# Patient Record
Sex: MALE | Race: WHITE | NOT HISPANIC OR LATINO | Employment: OTHER | URBAN - NONMETROPOLITAN AREA
[De-identification: names, ages, dates, MRNs, and addresses within clinical notes are randomized per-mention and may not be internally consistent; named-entity substitution may affect disease eponyms.]

---

## 2023-10-06 DIAGNOSIS — R22.31 AXILLARY MASS, RIGHT: ICD-10-CM

## 2023-10-09 DIAGNOSIS — R22.31 MASS OF RIGHT AXILLA: ICD-10-CM

## 2023-10-14 RX ORDER — LISINOPRIL 5 MG/1
1 TABLET ORAL DAILY
COMMUNITY
End: 2023-11-12 | Stop reason: WASHOUT

## 2023-10-14 RX ORDER — LOSARTAN POTASSIUM 100 MG/1
100 TABLET ORAL DAILY
COMMUNITY
Start: 2023-09-11 | End: 2023-12-15 | Stop reason: HOSPADM

## 2023-10-14 RX ORDER — NAPROXEN SODIUM 220 MG/1
81 TABLET, FILM COATED ORAL DAILY
COMMUNITY
Start: 2023-09-19 | End: 2023-12-15 | Stop reason: HOSPADM

## 2023-10-14 RX ORDER — OMEPRAZOLE 20 MG/1
20 CAPSULE, DELAYED RELEASE ORAL DAILY
COMMUNITY
Start: 2023-09-11 | End: 2023-12-15 | Stop reason: HOSPADM

## 2023-10-14 RX ORDER — CHLORTHALIDONE 25 MG/1
1 TABLET ORAL DAILY
COMMUNITY
Start: 2023-09-26 | End: 2023-12-15 | Stop reason: HOSPADM

## 2023-10-14 RX ORDER — POTASSIUM CHLORIDE 750 MG/1
10 TABLET, EXTENDED RELEASE ORAL 2 TIMES DAILY
COMMUNITY
Start: 2023-10-09 | End: 2023-12-15 | Stop reason: HOSPADM

## 2023-10-14 RX ORDER — ATORVASTATIN CALCIUM 80 MG/1
20 TABLET, FILM COATED ORAL DAILY
COMMUNITY
Start: 2023-09-19 | End: 2023-12-15 | Stop reason: HOSPADM

## 2023-10-14 RX ORDER — FOLIC ACID 1 MG/1
TABLET ORAL
COMMUNITY
Start: 2023-09-19 | End: 2023-11-12 | Stop reason: WASHOUT

## 2023-10-14 RX ORDER — POTASSIUM CHLORIDE 20 MEQ/1
TABLET, EXTENDED RELEASE ORAL
COMMUNITY
Start: 2023-09-19 | End: 2023-10-16 | Stop reason: SDUPTHER

## 2023-10-14 RX ORDER — CLOPIDOGREL BISULFATE 75 MG/1
1 TABLET ORAL DAILY
COMMUNITY
Start: 2023-09-19 | End: 2023-11-22 | Stop reason: ALTCHOICE

## 2023-10-14 RX ORDER — AMLODIPINE BESYLATE 10 MG/1
1 TABLET ORAL DAILY
COMMUNITY
Start: 2023-09-26 | End: 2023-12-15 | Stop reason: HOSPADM

## 2023-10-16 ENCOUNTER — OFFICE VISIT (OUTPATIENT)
Dept: CARDIOLOGY | Facility: CLINIC | Age: 68
End: 2023-10-16
Payer: MEDICARE

## 2023-10-16 VITALS
BODY MASS INDEX: 30.01 KG/M2 | HEART RATE: 84 BPM | OXYGEN SATURATION: 97 % | DIASTOLIC BLOOD PRESSURE: 62 MMHG | SYSTOLIC BLOOD PRESSURE: 130 MMHG | HEIGHT: 68 IN | WEIGHT: 198 LBS

## 2023-10-16 DIAGNOSIS — I70.90 ATHEROSCLEROSIS: Primary | ICD-10-CM

## 2023-10-16 PROCEDURE — 99205 OFFICE O/P NEW HI 60 MIN: CPT | Performed by: STUDENT IN AN ORGANIZED HEALTH CARE EDUCATION/TRAINING PROGRAM

## 2023-10-16 PROCEDURE — 3008F BODY MASS INDEX DOCD: CPT | Performed by: STUDENT IN AN ORGANIZED HEALTH CARE EDUCATION/TRAINING PROGRAM

## 2023-10-16 PROCEDURE — 1160F RVW MEDS BY RX/DR IN RCRD: CPT | Performed by: STUDENT IN AN ORGANIZED HEALTH CARE EDUCATION/TRAINING PROGRAM

## 2023-10-16 PROCEDURE — 1036F TOBACCO NON-USER: CPT | Performed by: STUDENT IN AN ORGANIZED HEALTH CARE EDUCATION/TRAINING PROGRAM

## 2023-10-16 PROCEDURE — 1159F MED LIST DOCD IN RCRD: CPT | Performed by: STUDENT IN AN ORGANIZED HEALTH CARE EDUCATION/TRAINING PROGRAM

## 2023-10-16 RX ORDER — AMOXICILLIN AND CLAVULANATE POTASSIUM 875; 125 MG/1; MG/1
875 TABLET, FILM COATED ORAL 2 TIMES DAILY
COMMUNITY
End: 2023-11-12 | Stop reason: WASHOUT

## 2023-10-16 RX ORDER — LANOLIN ALCOHOL/MO/W.PET/CERES
100 CREAM (GRAM) TOPICAL DAILY
COMMUNITY
End: 2023-12-15 | Stop reason: HOSPADM

## 2023-10-16 NOTE — PROGRESS NOTES
No chief complaint on file.    HPI:  I was requested to evaluate this patient in consultation for cardiac assessment.    Mike Olson is a 68 y.o. year old former smoker male patient with past medical history significant for atherosclerosis, hypertension, hyperlipidemia, mild stroke (Sept/2023), carotid stenosis, coming for cardiovascular assessment. Patient reports and episode of mild stroke on September/2023, with no sequels afterwards. He presented to emergency department after witnessed syncope while at a concert.  Patient was found to have hypotension along with hypokalemia and hyponatremia.  Patient likely was dehydrated and had hypokalemia from his diuretic therapy.  Patient was admitted and given IV fluid after which his electrolytes improved along with Potassium repletion.  Patient was also evaluated for an MRI and MRA.  MRI came back positive for acute and subacute infarct.  Patient has 0 deficits and has NIH of 0.  Carotid duplex was done showing right-sided velocity of ICA greater than 50% and left side was below 50%.    He states that he is currently asymptomatic. He denies chest pain, shortness of breath, palpitations, leg edema, lightheadedness, headaches, fever, chills, orthopnea, paroxysmal nocturnal dyspnea or syncope.  The EKG shows normal sinus rhythm with no signs of ACS.  The echocardiogram showed normal LVEF 55% with no wall motion abnormalities.  His holter monitor showed the predominant rhythm was sinus bradycardia to sinus tachycardia. *The Maximum Heart Rate recorded was 126 bpm, 09/25 12:19:31, the Minimum Heart Rate recorded was 49 bpm, 09/30 02:54:30, and the Average Heart Rate was 76 bpm. *There were 106 VE beats with a burden of <1 %. There was 1 occurrence of Ventricular Tachycardia with the Longest episode 3 beats, 09/20 18:57:19, and the Fastest episode 116 bpm, 09/20 18:57:19. *There were 1,611 SVE beats with a burden of <1 %. There were 12 occurrences of Supraventricular  "Tachycardia with the Longest episode 16 beats, 09/25 20:48:54, and the Fastest episode 126 bpm, 09/25 12:19:30. *There were 0 Patient Triggers.  US duplex carotids with calcified plaques 50% or less bilaterally.    Past Medical History  History reviewed. No pertinent past medical history.    Past Surgical History  Past Surgical History:   Procedure Laterality Date    MR HEAD ANGIO WO IV CONTRAST  9/18/2023    MR HEAD ANGIO WO IV CONTRAST 9/18/2023 [facility] MRI       Past Family History  Family History   Problem Relation Name Age of Onset    Hypertension Mother      Prostate cancer Father      Stroke Maternal Grandfather         Allergy History  No Known Allergies    Past Social History  Social History     Socioeconomic History    Marital status:      Spouse name: None    Number of children: None    Years of education: None    Highest education level: None   Occupational History    None   Tobacco Use    Smoking status: Former     Types: Cigarettes    Smokeless tobacco: Never   Substance and Sexual Activity    Alcohol use: Not Currently    Drug use: Yes     Types: Marijuana    Sexual activity: None   Other Topics Concern    None   Social History Narrative    None     Social Determinants of Health     Financial Resource Strain: Not on file   Food Insecurity: Not on file   Transportation Needs: Not on file   Physical Activity: Not on file   Stress: Not on file   Social Connections: Not on file   Intimate Partner Violence: Not on file   Housing Stability: Not on file       Social History     Tobacco Use   Smoking Status Former    Types: Cigarettes   Smokeless Tobacco Never       Review of Systems:  ROS     Objective Data:  Last Recorded Vitals:  Vitals:    10/16/23 1333   BP: 130/62   Pulse: 84   SpO2: 97%   Weight: 89.8 kg (198 lb)   Height: 1.727 m (5' 8\")       Last Labs:  CBC - 9/18/2023:  4:27 AM  8.1 13.8 295    40.9      CMP - 9/19/2023:  5:05 AM  8.6 6.3 10 --- 0.5   2.5 3.6 10 58      PTT - No results in last " year.  _   _ _     TROPHS   Date/Time Value Ref Range Status   09/19/2023 05:05 AM 5 0 - 20 ng/L Final     Comment:     .  Less than 99th percentile of normal range cutoff-  Female and children under 18 years old <14 ng/L; Male <21 ng/L: Negative  Repeat testing should be performed if clinically indicated.   .  Female and children under 18 years old 14-50 ng/L; Male 21-50 ng/L:  Consistent with possible cardiac damage and possible increased clinical   risk. Serial measurements may help to assess extent of myocardial damage.   .  >50 ng/L: Consistent with cardiac damage, increased clinical risk and  myocardial infarction. Serial measurements may help assess extent of   myocardial damage.   .   NOTE: Children less than 1 year old may have higher baseline troponin   levels and results should be interpreted in conjunction with the overall   clinical context.   .  NOTE: Troponin I testing is performed using a different   testing methodology at [facility] than at other   [facility]. Direct result comparisons should only   be made within the same method.     09/16/2023 10:06 PM 4 0 - 20 ng/L Final     Comment:     .  Less than 99th percentile of normal range cutoff-  Female and children under 18 years old <14 ng/L; Male <21 ng/L: Negative  Repeat testing should be performed if clinically indicated.   .  Female and children under 18 years old 14-50 ng/L; Male 21-50 ng/L:  Consistent with possible cardiac damage and possible increased clinical   risk. Serial measurements may help to assess extent of myocardial damage.   .  >50 ng/L: Consistent with cardiac damage, increased clinical risk and  myocardial infarction. Serial measurements may help assess extent of   myocardial damage.   .   NOTE: Children less than 1 year old may have higher baseline troponin   levels and results should be interpreted in conjunction with the overall   clinical context.   .  NOTE: Troponin I testing is performed using a  different   testing methodology at [facility] than at other   [facility]. Direct result comparisons should only   be made within the same method.     09/16/2023 08:59 PM 4 0 - 20 ng/L Final     Comment:     .  Less than 99th percentile of normal range cutoff-  Female and children under 18 years old <14 ng/L; Male <21 ng/L: Negative  Repeat testing should be performed if clinically indicated.   .  Female and children under 18 years old 14-50 ng/L; Male 21-50 ng/L:  Consistent with possible cardiac damage and possible increased clinical   risk. Serial measurements may help to assess extent of myocardial damage.   .  >50 ng/L: Consistent with cardiac damage, increased clinical risk and  myocardial infarction. Serial measurements may help assess extent of   myocardial damage.   .   NOTE: Children less than 1 year old may have higher baseline troponin   levels and results should be interpreted in conjunction with the overall   clinical context.   .  NOTE: Troponin I testing is performed using a different   testing methodology at [facility] than at other   [facility]. Direct result comparisons should only   be made within the same method.       BNP   Date/Time Value Ref Range Status   09/19/2023 05:05 AM 92 0 - 99 pg/mL Final     Comment:     .  <100 pg/mL - Heart failure unlikely  100-299 pg/mL - Intermediate probability of acute heart  .               failure exacerbation. Correlate with clinical  .               context and patient history.    >=300 pg/mL - Heart Failure likely. Correlate with clinical  .               context and patient history.  BNP testing is performed using different testing   methodology at [facility] than at other   [facility]. Direct result comparisons should   only be made within the same method.     09/16/2023 08:59 PM 19 0 - 99 pg/mL Final     Comment:     .  <100 pg/mL - Heart failure unlikely  100-299 pg/mL - Intermediate  probability of acute heart  .               failure exacerbation. Correlate with clinical  .               context and patient history.    >=300 pg/mL - Heart Failure likely. Correlate with clinical  .               context and patient history.  BNP testing is performed using different testing   methodology at [facility] than at other   [facility] hospitals. Direct result comparisons should   only be made within the same method.       HGBA1C   Date/Time Value Ref Range Status   09/19/2023 05:05 AM 5.7 % Final     Comment:          Diagnosis of Diabetes-Adults   Non-Diabetic: < or = 5.6%   Increased risk for developing diabetes: 5.7-6.4%   Diagnostic of diabetes: > or = 6.5%  .       Monitoring of Diabetes                Age (y)     Therapeutic Goal (%)   Adults:          >18           <7.0   Pediatrics:    13-18           <7.5                   7-12           <8.0                   0- 6            7.5-8.5   American Diabetes Association. Diabetes Care 33(S1), Jan 2010.     09/11/2023 08:00 AM 5.5 4.3 - 5.6 % Final     Comment:     American Diabetes Association guidelines indicate that patients with HgbA1c in the range 5.7-6.4% are at increased risk for development of diabetes, and intervention by lifestyle modification may be beneficial. HgbA1c greater or equal to 6.5% is considered diagnostic of diabetes.   03/15/2023 08:00 AM 5.8 4.3 - 5.6 % Final     Comment:     American Diabetes Association guidelines indicate that patients with HgbA1c in the range 5.7-6.4% are at increased risk for development of diabetes, and intervention by lifestyle modification may be beneficial. HgbA1c greater or equal to 6.5% is considered diagnostic of diabetes.     VLDL   Date/Time Value Ref Range Status   09/19/2023 05:05 AM 29 0 - 40 mg/dL Final        Patient Medications:  Outpatient Encounter Medications as of 10/16/2023   Medication Sig Dispense Refill    amLODIPine (Norvasc) 10 mg tablet       amoxicillin-pot  clavulanate (Augmentin) 875-125 mg tablet Take 1 tablet (875 mg) by mouth 2 times a day.      aspirin 81 mg chewable tablet       atorvastatin (Lipitor) 80 mg tablet       chlorthalidone (Hygroton) 25 mg tablet       clopidogrel (Plavix) 75 mg tablet       folic acid (Folvite) 1 mg tablet       lisinopril 5 mg tablet Take 1 tablet (5 mg) by mouth once daily.      losartan (Cozaar) 100 mg tablet       omeprazole (PriLOSEC) 20 mg DR capsule       potassium chloride CR 10 mEq ER tablet       thiamine 100 mg tablet Take 1 tablet (100 mg) by mouth once daily.      docosahexaenoic acid/epa (FISH OIL ORAL) Fish Oil   Quantity: 0   Refills: 0   Ordered: 21-Feb-2022  Rose Colvin Generic Substitution Allowed      [DISCONTINUED] potassium chloride CR 20 mEq ER tablet        No facility-administered encounter medications on file as of 10/16/2023.       Physical Exam:  General: alert, oriented and in no acute distress  HEENT: NC/AT; EOMI; PERRLA, external ear is normal  Neck: supple; trachea midline; no masses; no JVD  Chest: lungs clear to auscultation bilaterally; no wheezing  CVS: regular rhythm, S1S2 normal, no murmurs  Abdomen: Soft, non-tender, non-distension, no organomegaly  Extremities: no clubbing/cyanosis/edema  Neuro: Grossly intact     Psychiatric: Normal mood and affect      Past Cardiology Results (Last 3 Years):  I have personally reviewed the test results and my impressions are:  - The EKG shows normal sinus rhythm with no signs of ACS.  - The echocardiogram showed normal LVEF 55% with no wall motion abnormalities.  - His holter monitor showed the predominant rhythm was sinus bradycardia to sinus tachycardia. *The Maximum Heart Rate recorded was 126 bpm, 09/25 12:19:31, the Minimum Heart Rate recorded was 49 bpm, 09/30 02:54:30, and the Average Heart Rate was 76 bpm. *There were 106 VE beats with a burden of <1 %. There was 1 occurrence of Ventricular Tachycardia with the Longest episode 3 beats, 09/20  18:57:19, and the Fastest episode 116 bpm, 09/20 18:57:19. *There were 1,611 SVE beats with a burden of <1 %. There were 12 occurrences of Supraventricular Tachycardia with the Longest episode 16 beats, 09/25 20:48:54, and the Fastest episode 126 bpm, 09/25 12:19:30. *There were 0 Patient Triggers.       Assessment/Plan   Mike Olson is a 68 y.o. year old former smoker male patient with past medical history significant for atherosclerosis, hypertension, hyperlipidemia, mild stroke (Sept/2023), carotid stenosis, coming for cardiovascular assessment. Patient reports and episode of mild stroke on September/2023, with no sequels afterwards. He states that he is currently asymptomatic.      Assessment    # Atherosclerosis / Carotid stenosis  - Patient is currently asymptomatic.  - The EKG shows normal sinus rhythm with no signs of ACS.  - The echocardiogram showed normal LVEF 55% with no wall motion abnormalities.  - His holter monitor showed the predominant rhythm was sinus bradycardia to sinus tachycardia. *The Maximum Heart Rate recorded was 126 bpm, 09/25 12:19:31, the Minimum Heart Rate recorded was 49 bpm, 09/30 02:54:30, and the Average Heart Rate was 76 bpm. *There were 106 VE beats with a burden of <1 %. There was 1 occurrence of Ventricular Tachycardia with the Longest episode 3 beats, 09/20 18:57:19, and the Fastest episode 116 bpm, 09/20 18:57:19. *There were 1,611 SVE beats with a burden of <1 %. There were 12 occurrences of Supraventricular Tachycardia with the Longest episode 16 beats, 09/25 20:48:54, and the Fastest episode 126 bpm, 09/25 12:19:30. *There were 0 Patient Triggers.  - Keep Clopidogrel, statins.  - Will request stress test, CT calcium scoring.  - Follow up in 3 months.  - Discuss initiation of Metoprolol upon return.    # Carotid stenosis  - US duplex carotids with calcified plaques 50% or less bilaterally.  - Keep home medication.    # Hypertension  - Controlled blood pressure - today  130/62mmHg.  - Keep home medication including Losartan 100mg daily, chlortalidone 25mg daily, amlopidine 10mg daily.  - Patient counseled to keep a healthy lifestyle including low-sodium diet.    # Hyperlipidemia  - Controlled by PCP  - Keep home medication with statins    Thank you for allowing me to participate in the care of this patient. Please do not hesitate to contact me with any further questions or concerns.    I spent 65 minutes in the professional and overall care of this patient.    Rob Phipps MD  Cardiology

## 2023-10-19 ENCOUNTER — HOSPITAL ENCOUNTER (OUTPATIENT)
Dept: RADIOLOGY | Facility: HOSPITAL | Age: 68
Discharge: HOME | End: 2023-10-19
Payer: MEDICARE

## 2023-10-19 DIAGNOSIS — R22.31 MASS OF RIGHT AXILLA: ICD-10-CM

## 2023-10-19 PROCEDURE — 88341 IMHCHEM/IMCYTCHM EA ADD ANTB: CPT | Mod: TC,CMCLAB,SAMLAB | Performed by: SURGERY

## 2023-10-19 PROCEDURE — 88184 FLOWCYTOMETRY/ TC 1 MARKER: CPT

## 2023-10-19 PROCEDURE — 38505 NEEDLE BIOPSY LYMPH NODES: CPT

## 2023-10-19 PROCEDURE — 76942 ECHO GUIDE FOR BIOPSY: CPT | Performed by: RADIOLOGY

## 2023-10-19 PROCEDURE — 88341 IMHCHEM/IMCYTCHM EA ADD ANTB: CPT | Mod: TC,SUR,CMCLAB,SAMLAB | Performed by: SURGERY

## 2023-10-19 PROCEDURE — 20206 BIOPSY MUSCLE PERQ NEEDLE: CPT | Performed by: RADIOLOGY

## 2023-10-19 PROCEDURE — 88185 FLOWCYTOMETRY/TC ADD-ON: CPT

## 2023-10-19 PROCEDURE — 2720000007 HC OR 272 NO HCPCS

## 2023-10-19 PROCEDURE — 88305 TISSUE EXAM BY PATHOLOGIST: CPT | Performed by: PATHOLOGY

## 2023-10-19 PROCEDURE — 88341 IMHCHEM/IMCYTCHM EA ADD ANTB: CPT | Mod: TC,SUR,SAMLAB | Performed by: SURGERY

## 2023-10-19 PROCEDURE — 88341 IMHCHEM/IMCYTCHM EA ADD ANTB: CPT | Performed by: PATHOLOGY

## 2023-10-19 PROCEDURE — 88342 IMHCHEM/IMCYTCHM 1ST ANTB: CPT | Performed by: PATHOLOGY

## 2023-10-19 PROCEDURE — 81445 SO NEO GSAP 5-50DNA/DNA&RNA: CPT | Performed by: SURGERY

## 2023-10-19 PROCEDURE — 88189 FLOWCYTOMETRY/READ 16 & >: CPT

## 2023-10-19 PROCEDURE — G0452 MOLECULAR PATHOLOGY INTERPR: HCPCS | Performed by: SURGERY

## 2023-10-20 ENCOUNTER — APPOINTMENT (OUTPATIENT)
Dept: SURGICAL ONCOLOGY | Facility: HOSPITAL | Age: 68
End: 2023-10-20
Payer: MEDICARE

## 2023-10-27 ENCOUNTER — HOSPITAL ENCOUNTER (OUTPATIENT)
Dept: CARDIOLOGY | Facility: HOSPITAL | Age: 68
Discharge: HOME | End: 2023-10-27
Payer: MEDICARE

## 2023-10-27 DIAGNOSIS — I67.848 OTHER CEREBROVASCULAR VASOSPASM AND VASOCONSTRICTION: ICD-10-CM

## 2023-10-27 DIAGNOSIS — I67.81 CEREBROVASCULAR INSUFFICIENCY: ICD-10-CM

## 2023-10-27 LAB
CELL COUNT (BLOOD): 0.04 X10*3/UL
CELL POPULATIONS: NORMAL
DIAGNOSIS: NORMAL
FLOW DIFFERENTIAL: NORMAL
FLOW TEST ORDERED: NORMAL
LAB TEST METHOD: NORMAL
NUMBER OF CELLS COLLECTED: NORMAL
PATH REPORT.TOTAL CANCER: NORMAL
SIGNATURE COMMENT: NORMAL
SPECIMEN VIABILITY: NORMAL

## 2023-10-27 PROCEDURE — 93248 EXT ECG>7D<15D REV&INTERPJ: CPT | Performed by: STUDENT IN AN ORGANIZED HEALTH CARE EDUCATION/TRAINING PROGRAM

## 2023-11-10 ENCOUNTER — OFFICE VISIT (OUTPATIENT)
Dept: SURGICAL ONCOLOGY | Facility: HOSPITAL | Age: 68
End: 2023-11-10
Payer: MEDICARE

## 2023-11-10 VITALS
HEIGHT: 67 IN | WEIGHT: 182.54 LBS | HEART RATE: 90 BPM | DIASTOLIC BLOOD PRESSURE: 54 MMHG | SYSTOLIC BLOOD PRESSURE: 139 MMHG | TEMPERATURE: 97 F | BODY MASS INDEX: 28.65 KG/M2 | OXYGEN SATURATION: 100 % | RESPIRATION RATE: 18 BRPM

## 2023-11-10 DIAGNOSIS — R13.10 DYSPHAGIA, UNSPECIFIED TYPE: ICD-10-CM

## 2023-11-10 DIAGNOSIS — C77.3: ICD-10-CM

## 2023-11-10 DIAGNOSIS — R22.31 AXILLARY MASS, RIGHT: ICD-10-CM

## 2023-11-10 DIAGNOSIS — R97.20 PSA ELEVATION: ICD-10-CM

## 2023-11-10 PROCEDURE — 1036F TOBACCO NON-USER: CPT | Performed by: SURGERY

## 2023-11-10 PROCEDURE — 1160F RVW MEDS BY RX/DR IN RCRD: CPT | Performed by: SURGERY

## 2023-11-10 PROCEDURE — 1159F MED LIST DOCD IN RCRD: CPT | Performed by: SURGERY

## 2023-11-10 PROCEDURE — 99215 OFFICE O/P EST HI 40 MIN: CPT | Mod: GC | Performed by: SURGERY

## 2023-11-10 PROCEDURE — 99205 OFFICE O/P NEW HI 60 MIN: CPT | Performed by: SURGERY

## 2023-11-10 PROCEDURE — 3008F BODY MASS INDEX DOCD: CPT | Performed by: SURGERY

## 2023-11-10 PROCEDURE — 1125F AMNT PAIN NOTED PAIN PRSNT: CPT | Performed by: SURGERY

## 2023-11-10 RX ORDER — TRAMADOL HYDROCHLORIDE 50 MG/1
50 TABLET ORAL EVERY 8 HOURS PRN
Qty: 21 TABLET | Refills: 0 | Status: SHIPPED | OUTPATIENT
Start: 2023-11-10 | End: 2023-12-15 | Stop reason: HOSPADM

## 2023-11-10 ASSESSMENT — PAIN SCALES - GENERAL: PAINLEVEL: 7

## 2023-11-10 ASSESSMENT — ENCOUNTER SYMPTOMS
UNEXPECTED WEIGHT CHANGE: 1
VOICE CHANGE: 1
APPETITE CHANGE: 1
TROUBLE SWALLOWING: 1
ACTIVITY CHANGE: 1

## 2023-11-10 NOTE — H&P
"History Of Present Illness  Mike Olson is a 68 y.o. 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 The patient reports that the mass first appeared 6 months ago, at which time he perceived it as \"skin boil.\" Over this period the mass has grown in size to 7 cm x 4 cm x 3.5 cm, and appears erythematous, with ulcerations at the apex of the mass. Mr. Olson reports that the mass occasionally bled in the beginning, but currently bleeds as often as twice a week, often time also exuding a clear liquid. He also reports that more recently he has been shedding some skin and regions of the mass. He states that the mass is painful, especially when he is trying to sleep, reporting a 6 to 7 out of 10 pain. In this time, he also reports a weight loss of 34 pounds (216 lbs to 182 lbs).     The patient also reports a recent stroke (). He also complains of dysphagia, which he states it may have preceded the onset of the stroke. He states that he has difficulty swallowing and \"keeping [solid] food down.\" Noteworthy, the patient quit alcohol since the stroke in September of this year. He used to drink 20 beers weekly. Additionally, he uses a can of chewing tobacco weekly and has done for years as well as smoke marijuana. He denied use of any illicit substance. Lastly, the patient also reported concern for recent high PSA lab, and father who  of prostate cancer. He reports not following up with urologist.      Past Medical History  Recent stroke 2023  Hyperlipidemia  Atherosclerosis  Hypertension  Carotid stenosis   LV EF 55%    Surgical History  Past Surgical History:   Procedure Laterality Date    MR HEAD ANGIO WO IV CONTRAST  2023    MR HEAD ANGIO WO IV CONTRAST 2023 [facility] MRI        Social History  He reports that he has quit smoking. " His smoking use included cigarettes. He has never used smokeless tobacco. He reports that he does not currently use alcohol. He reports current drug use. Drug: Marijuana. In the past, his drinking included 20 beers a week. Chewing tobacco history included a can a week.     Family History  Family History   Problem Relation Name Age of Onset    Hypertension Mother      Prostate cancer Father      Stroke Maternal Grandfather          Allergies  Patient has no known allergies.    Review of Systems   Constitutional:  Positive for activity change, appetite change and unexpected weight change.   HENT:  Positive for trouble swallowing and voice change.    Respiratory: Negative.     Gastrointestinal:         Poor appetite. Weight loss. Regular bowel function. No bleeding   Endocrine: Negative.    Musculoskeletal:         Pain with movement of the right arm due to the mass.    Skin:         Axillary mass with bleeding and sloughing   Neurological:         No radiating pain or paresthesias in the right upper extremity        Physical Exam  Cardiovascular:      Rate and Rhythm: Normal rate and regular rhythm.      Pulses: Normal pulses.      Heart sounds: Normal heart sounds.   Pulmonary:      Effort: Pulmonary effort is normal.      Breath sounds: Normal breath sounds.   Abdominal:      General: Abdomen is flat. Bowel sounds are normal.      Palpations: Abdomen is soft.   Musculoskeletal:         General: No swelling or deformity.   Skin:     Comments: The right axillary mass measures 7 cm x 4 cm x 3.5 cm.  No other masses were noted. Palpation of axillary lymph nodes did not reveal any enlarged lymph nodes closed to the chest wall or apical area. A possible lymph node mass was noted upon palpation around the mass, but it was unclear whether it was an extension of the main mass or an enlarged lymph node. No lymph nodes were noted in neck or submandibular area.    Neurological:      Sensory: No sensory deficit.      Comments:  "Right leg weakness is minor but has been present since the stroke         Last Recorded Vitals  Blood pressure 139/54, pulse 90, temperature 36.1 °C (97 °F), temperature source Temporal, resp. rate 18, height 1.707 m (5' 7.21\"), weight 82.8 kg (182 lb 8.7 oz), SpO2 100 %.    Relevant Results    BI US biopsy of lymph node    Result Date: 10/19/2023  Interpreted By:  Damon Nayak, STUDY: BI US BIOPSY OF LYMPH NODE;  10/19/2023 11:56 am   INDICATION: Signs/Symptoms:R axilla mass biopsy.   COMPARISON: Ultrasound dated 10/04/2023   ACCESSION NUMBER(S): [id number]   ORDERING CLINICIAN: SCAR JOHNSON   FINDINGS: A detailed discussion of the procedure was performed with the patient.  Informed consent was obtained by Dr. Nayak.   The patient was placed in the supine position. Ultrasound of the right axilla was performed and demonstrated  a large fungating mass in the right axilla, similar to that seen on prior ultrasound.. The patient was prepped and draped in normal sterile fashion. 1% lidocaine was utilized for local anesthesia. A 17 gauge coaxial trocar was then placed through the normal skin inferior to the mass and into the posterior aspect of the mass under direct ultrasound guidance. The inner stylette was withdrawn and an 18 gauge cutting needle was placed through the coaxial sheath. After confirmation of position of the cutting trough of the needle within the mass, a biopsy sample was obtained. Subsequently, 2 additional passes were made into the mass using the 18 gauge cutting needle through the coaxial sheath under direct ultrasound guidance. There were no immediate complications.  The procedure was performed by Dr. Nayak.   The patient was monitored throughout the procedure by the nurse, including blood pressure, heart rate, EKG, and pulse oximetry.       Successful ultrasound-guided biopsy of the right axillary mass.   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 Will obtain staging imaging to rule out occult disease prior to intervention. Recent stroke ~2 months ago. Followed by Cardiology.   Also, elevated PSA, and Dysphagia    [ ] Plan for staging of the SCC with a whole ruth PET/CT to rule out occult disease.   [ ] Surgical planning and tumor board discussion thereafter. The patient's recent stroke may preclude surgical management as a first treatment, and referral to Medical Oncology is made to consider all appropriate options.   [ ] Tramadol for pain. Discussed using tylenol regularly first  [ ] ENT referral for dysphagia - reportedly present prior to stroke.   [ ] Urology referral for elevated PSA  [ ] Cardiac clearance needed for any surgical intervention. Planned assessments may need to be expedited.   [ ] Counseled to continue to abstain from alcohol, and to stop dip.     The patient asked very appropriate questions that were answered to the best of my ability with the current information at hand. He knows to call with any questions or concerns that arise

## 2023-11-12 ASSESSMENT — ENCOUNTER SYMPTOMS
ENDOCRINE NEGATIVE: 1
RESPIRATORY NEGATIVE: 1

## 2023-11-12 NOTE — H&P
"History Of Present Illness  Mike Oslon is a 68 y.o. 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 The patient reports that the mass first appeared 6 months ago, at which time he perceived it as \"skin boil.\" Over this period the mass has grown in size to 7 cm x 4 cm x 3.5 cm, and appears erythematous, with ulcerations at the apex of the mass. Mr. Olson reports that the mass occasionally bled in the beginning, but currently bleeds as often as twice a week, often time also exuding a clear liquid. He also reports that more recently he has been shedding some skin and regions of the mass. He states that the mass is painful, especially when he is trying to sleep, reporting a 6 to 7 out of 10 pain. In this time, he also reports a weight loss of 34 pounds (216 lbs to 182 lbs).     The patient also reports a recent stroke (). He also complains of dysphagia, which he states it may have preceded the onset of the stroke. He states that he has difficulty swallowing and \"keeping [solid] food down.\" Noteworthy, the patient quit alcohol since the stroke in September of this year. He used to drink 20 beers weekly. Additionally, he uses a can of chewing tobacco weekly and has done for years as well as smoke marijuana. He denied use of any illicit substance. Lastly, the patient also reported concern for recent high PSA lab, and father who  of prostate cancer. He reports not following up with urologist.      Past Medical History  Recent stroke 2023  Hyperlipidemia  Atherosclerosis  Hypertension  Carotid stenosis   LV EF 55%    Surgical History  Past Surgical History:   Procedure Laterality Date    MR HEAD ANGIO WO IV CONTRAST  2023    MR HEAD ANGIO WO IV CONTRAST 2023 [facility] MRI        Social History  He reports that he has quit smoking. " His smoking use included cigarettes. He has never used smokeless tobacco. He reports that he does not currently use alcohol. He reports current drug use. Drug: Marijuana. In the past, his drinking included 20 beers a week. Chewing tobacco history included a can a week.     Family History  Family History   Problem Relation Name Age of Onset    Hypertension Mother      Prostate cancer Father      Stroke Maternal Grandfather          Allergies  Patient has no known allergies.    Review of Systems   Constitutional:  Positive for activity change, appetite change and unexpected weight change.   HENT:  Positive for trouble swallowing and voice change.    Respiratory: Negative.     Gastrointestinal:         Poor appetite. Weight loss. Regular bowel function. No bleeding   Endocrine: Negative.    Musculoskeletal:         Pain with movement of the right arm due to the mass.    Skin:         Axillary mass with bleeding and sloughing   Neurological:         No radiating pain or paresthesias in the right upper extremity        Physical Exam  Cardiovascular:      Rate and Rhythm: Normal rate and regular rhythm.      Pulses: Normal pulses.      Heart sounds: Normal heart sounds.   Pulmonary:      Effort: Pulmonary effort is normal.      Breath sounds: Normal breath sounds.   Abdominal:      General: Abdomen is flat. Bowel sounds are normal.      Palpations: Abdomen is soft.   Musculoskeletal:         General: No swelling or deformity.   Skin:     Comments: The right axillary mass measures 7 cm x 4 cm x 3.5 cm.  No other masses were noted. Palpation of axillary lymph nodes did not reveal any enlarged lymph nodes closed to the chest wall or apical area. A possible lymph node mass was noted upon palpation around the mass, but it was unclear whether it was an extension of the main mass or an enlarged lymph node. No lymph nodes were noted in neck or submandibular area.    Neurological:      Sensory: No sensory deficit.      Comments:  "Right leg weakness is minor but has been present since the stroke         Last Recorded Vitals  Blood pressure 139/54, pulse 90, temperature 36.1 °C (97 °F), temperature source Temporal, resp. rate 18, height 1.707 m (5' 7.21\"), weight 82.8 kg (182 lb 8.7 oz), SpO2 100 %.    Relevant Results    BI US biopsy of lymph node    Result Date: 10/19/2023  Interpreted By:  Damon Nayak, STUDY: BI US BIOPSY OF LYMPH NODE;  10/19/2023 11:56 am   INDICATION: Signs/Symptoms:R axilla mass biopsy.   COMPARISON: Ultrasound dated 10/04/2023   ACCESSION NUMBER(S): [id number]   ORDERING CLINICIAN: SCAR JOHNSON   FINDINGS: A detailed discussion of the procedure was performed with the patient.  Informed consent was obtained by Dr. Nayak.   The patient was placed in the supine position. Ultrasound of the right axilla was performed and demonstrated  a large fungating mass in the right axilla, similar to that seen on prior ultrasound.. The patient was prepped and draped in normal sterile fashion. 1% lidocaine was utilized for local anesthesia. A 17 gauge coaxial trocar was then placed through the normal skin inferior to the mass and into the posterior aspect of the mass under direct ultrasound guidance. The inner stylette was withdrawn and an 18 gauge cutting needle was placed through the coaxial sheath. After confirmation of position of the cutting trough of the needle within the mass, a biopsy sample was obtained. Subsequently, 2 additional passes were made into the mass using the 18 gauge cutting needle through the coaxial sheath under direct ultrasound guidance. There were no immediate complications.  The procedure was performed by Dr. Nayak.   The patient was monitored throughout the procedure by the nurse, including blood pressure, heart rate, EKG, and pulse oximetry.       Successful ultrasound-guided biopsy of the right axillary mass.   Signed by: Damon Nayak 10/19/2023 11:59 AM Dictation workstation:   [id number]  "          Assessment/Plan   Right axillary SCC, presumed skin source based on the exophytic mass. Will obtain staging imaging to rule out occult disease prior to intervention. Recent stroke ~2 months ago. Followed by Cardiology.   Also, elevated PSA, and Dysphagia    [ ] Plan for staging of the SCC with a whole ruth PET/CT to rule out occult disease.   [ ] Surgical planning and tumor board discussion thereafter. The patient's recent stroke may preclude surgical management as a first treatment, and referral to Medical Oncology is made to consider all appropriate options.   [ ] Tramadol for pain. Discussed using tylenol regularly first  [ ] ENT referral for dysphagia - reportedly present prior to stroke.   [ ] Urology referral for elevated PSA  [ ] Cardiac clearance needed for any surgical intervention. Planned assessments may need to be expedited.   [ ] Counseled to continue to abstain from alcohol, and to stop dip.     The patient asked very appropriate questions that were answered to the best of my ability with the current information at hand. He knows to call with any questions or concerns that arise    Nicholas Arrizaa MD MPH

## 2023-11-14 NOTE — PROGRESS NOTES
"History Of Present Illness  Mike Olson is a 68 y.o. 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 The patient reports that the mass first appeared 6 months ago, at which time he perceived it as \"skin boil.\" Over this period the mass has grown in size to 7 cm x 4 cm x 3.5 cm, and appears erythematous, with ulcerations at the apex of the mass. Mr. Olson reports that the mass occasionally bled in the beginning, but currently bleeds as often as twice a week, often time also exuding a clear liquid. He also reports that more recently he has been shedding some skin and regions of the mass. He states that the mass is painful, especially when he is trying to sleep, reporting a 6 to 7 out of 10 pain. In this time, he also reports a weight loss of 34 pounds (216 lbs to 182 lbs).      The patient also reports a recent stroke (). He also complains of dysphagia, which he states it may have preceded the onset of the stroke. He states that he has difficulty swallowing and \"keeping [solid] food down.\" Noteworthy, the patient quit alcohol since the stroke in September of this year. He used to drink 20 beers weekly. Additionally, he uses a can of chewing tobacco weekly and has done for years as well as smoke marijuana. He denied use of any illicit substance. Lastly, the patient also reported concern for recent high PSA lab, and father who  of prostate cancer. He reports not following up with urologist.      Past Medical History  Recent stroke 2023  Hyperlipidemia  Atherosclerosis  Hypertension  Carotid stenosis   LV EF 55%     Surgical History        Past Surgical History:   Procedure Laterality Date    MR HEAD ANGIO WO IV CONTRAST   2023     MR HEAD ANGIO WO IV CONTRAST 2023 [facility] MRI         Social History  He reports that he has " quit smoking. His smoking use included cigarettes. He has never used smokeless tobacco. He reports that he does not currently use alcohol. He reports current drug use. Drug: Marijuana. In the past, his drinking included 20 beers a week. Chewing tobacco history included a can a week.      Family History         Family History   Problem Relation Name Age of Onset    Hypertension Mother        Prostate cancer Father        Stroke Maternal Grandfather             Allergies  Patient has no known allergies.     Review of Systems   Constitutional:  Positive for activity change, appetite change and unexpected weight change.   HENT:  Positive for trouble swallowing and voice change.    Respiratory: Negative.     Gastrointestinal:         Poor appetite. Weight loss. Regular bowel function. No bleeding   Endocrine: Negative.    Musculoskeletal:         Pain with movement of the right arm due to the mass.    Skin:         Axillary mass with bleeding and sloughing   Neurological:         No radiating pain or paresthesias in the right upper extremity         Physical Exam  Cardiovascular:      Rate and Rhythm: Normal rate and regular rhythm.      Pulses: Normal pulses.      Heart sounds: Normal heart sounds.   Pulmonary:      Effort: Pulmonary effort is normal.      Breath sounds: Normal breath sounds.   Abdominal:      General: Abdomen is flat. Bowel sounds are normal.      Palpations: Abdomen is soft.   Musculoskeletal:         General: No swelling or deformity.   Skin:     Comments: The right axillary mass measures 7 cm x 4 cm x 3.5 cm.  No other masses were noted. Palpation of axillary lymph nodes did not reveal any enlarged lymph nodes closed to the chest wall or apical area. A possible lymph node mass was noted upon palpation around the mass, but it was unclear whether it was an extension of the main mass or an enlarged lymph node. No lymph nodes were noted in neck or submandibular area.    Neurological:      Sensory: No  "sensory deficit.      Comments: Right leg weakness is minor but has been present since the stroke            Last Recorded Vitals  Blood pressure 139/54, pulse 90, temperature 36.1 °C (97 °F), temperature source Temporal, resp. rate 18, height 1.707 m (5' 7.21\"), weight 82.8 kg (182 lb 8.7 oz), SpO2 100 %.     Relevant Results     BI US biopsy of lymph node     Result Date: 10/19/2023  Interpreted By:  Damon Nayak, STUDY: BI US BIOPSY OF LYMPH NODE;  10/19/2023 11:56 am   INDICATION: Signs/Symptoms:R axilla mass biopsy.   COMPARISON: Ultrasound dated 10/04/2023   ACCESSION NUMBER(S): [id number]   ORDERING CLINICIAN: SCAR JOHNSON   FINDINGS: A detailed discussion of the procedure was performed with the patient.  Informed consent was obtained by Dr. Nayak.   The patient was placed in the supine position. Ultrasound of the right axilla was performed and demonstrated  a large fungating mass in the right axilla, similar to that seen on prior ultrasound.. The patient was prepped and draped in normal sterile fashion. 1% lidocaine was utilized for local anesthesia. A 17 gauge coaxial trocar was then placed through the normal skin inferior to the mass and into the posterior aspect of the mass under direct ultrasound guidance. The inner stylette was withdrawn and an 18 gauge cutting needle was placed through the coaxial sheath. After confirmation of position of the cutting trough of the needle within the mass, a biopsy sample was obtained. Subsequently, 2 additional passes were made into the mass using the 18 gauge cutting needle through the coaxial sheath under direct ultrasound guidance. There were no immediate complications.  The procedure was performed by Dr. Nayak.   The patient was monitored throughout the procedure by the nurse, including blood pressure, heart rate, EKG, and pulse oximetry.        Successful ultrasound-guided biopsy of the right axillary mass.   Signed by: Damon Nayak 10/19/2023 11:59 AM " Dictation workstation:   [id number]             Assessment/Plan   Right axillary SCC, presumed skin source based on the exophytic mass. Will obtain staging imaging to rule out occult disease prior to intervention. Recent stroke ~2 months ago. Followed by Cardiology.   Also, elevated PSA, and Dysphagia     [ ] Plan for staging of the SCC with a whole ruth PET/CT to rule out occult disease.   [ ] Surgical planning and tumor board discussion thereafter. The patient's recent stroke may preclude surgical management as a first treatment, and referral to Medical Oncology is made to consider all appropriate options.   [ ] Tramadol for pain. Discussed using tylenol regularly first  [ ] ENT referral for dysphagia - reportedly present prior to stroke.   [ ] Urology referral for elevated PSA  [ ] Cardiac clearance needed for any surgical intervention. Planned assessments may need to be expedited.   [ ] Counseled to continue to abstain from alcohol, and to stop dip.      The patient asked very appropriate questions that were answered to the best of my ability with the current information at hand. He knows to call with any questions or concerns that arise     Nicholas Arriaza MD MPH

## 2023-11-16 ENCOUNTER — APPOINTMENT (OUTPATIENT)
Dept: RADIOLOGY | Facility: HOSPITAL | Age: 68
End: 2023-11-16
Payer: MEDICARE

## 2023-11-16 ENCOUNTER — HOSPITAL ENCOUNTER (EMERGENCY)
Facility: HOSPITAL | Age: 68
Discharge: HOME | End: 2023-11-16
Payer: MEDICARE

## 2023-11-16 VITALS
OXYGEN SATURATION: 96 % | TEMPERATURE: 97.9 F | HEART RATE: 86 BPM | SYSTOLIC BLOOD PRESSURE: 147 MMHG | HEIGHT: 68 IN | RESPIRATION RATE: 18 BRPM | WEIGHT: 182 LBS | BODY MASS INDEX: 27.58 KG/M2 | DIASTOLIC BLOOD PRESSURE: 64 MMHG

## 2023-11-16 DIAGNOSIS — R13.10 DYSPHAGIA, UNSPECIFIED TYPE: Primary | ICD-10-CM

## 2023-11-16 LAB
ANION GAP SERPL CALC-SCNC: 16 MMOL/L (ref 10–20)
BASOPHILS # BLD AUTO: 0.04 X10*3/UL (ref 0–0.1)
BASOPHILS NFR BLD AUTO: 0.3 %
BUN SERPL-MCNC: 52 MG/DL (ref 6–23)
CALCIUM SERPL-MCNC: 11.1 MG/DL (ref 8.6–10.3)
CHLORIDE SERPL-SCNC: 98 MMOL/L (ref 98–107)
CO2 SERPL-SCNC: 27 MMOL/L (ref 21–32)
CREAT SERPL-MCNC: 1.5 MG/DL (ref 0.5–1.3)
EOSINOPHIL # BLD AUTO: 0.21 X10*3/UL (ref 0–0.7)
EOSINOPHIL NFR BLD AUTO: 1.6 %
ERYTHROCYTE [DISTWIDTH] IN BLOOD BY AUTOMATED COUNT: 11.4 % (ref 11.5–14.5)
GFR SERPL CREATININE-BSD FRML MDRD: 50 ML/MIN/1.73M*2
GLUCOSE SERPL-MCNC: 131 MG/DL (ref 74–99)
HCT VFR BLD AUTO: 38.9 % (ref 41–52)
HGB BLD-MCNC: 12.8 G/DL (ref 13.5–17.5)
IMM GRANULOCYTES # BLD AUTO: 0.08 X10*3/UL (ref 0–0.7)
IMM GRANULOCYTES NFR BLD AUTO: 0.6 % (ref 0–0.9)
LYMPHOCYTES # BLD AUTO: 1.29 X10*3/UL (ref 1.2–4.8)
LYMPHOCYTES NFR BLD AUTO: 9.7 %
MCH RBC QN AUTO: 29.2 PG (ref 26–34)
MCHC RBC AUTO-ENTMCNC: 32.9 G/DL (ref 32–36)
MCV RBC AUTO: 89 FL (ref 80–100)
MONOCYTES # BLD AUTO: 1.17 X10*3/UL (ref 0.1–1)
MONOCYTES NFR BLD AUTO: 8.8 %
NEUTROPHILS # BLD AUTO: 10.55 X10*3/UL (ref 1.2–7.7)
NEUTROPHILS NFR BLD AUTO: 79 %
NRBC BLD-RTO: 0 /100 WBCS (ref 0–0)
PLATELET # BLD AUTO: 447 X10*3/UL (ref 150–450)
POTASSIUM SERPL-SCNC: 3.8 MMOL/L (ref 3.5–5.3)
RBC # BLD AUTO: 4.38 X10*6/UL (ref 4.5–5.9)
SODIUM SERPL-SCNC: 137 MMOL/L (ref 136–145)
TSH SERPL-ACNC: 0.89 MIU/L (ref 0.44–3.98)
WBC # BLD AUTO: 13.3 X10*3/UL (ref 4.4–11.3)

## 2023-11-16 PROCEDURE — 96360 HYDRATION IV INFUSION INIT: CPT

## 2023-11-16 PROCEDURE — 84443 ASSAY THYROID STIM HORMONE: CPT | Performed by: NURSE PRACTITIONER

## 2023-11-16 PROCEDURE — 99283 EMERGENCY DEPT VISIT LOW MDM: CPT | Mod: 25

## 2023-11-16 PROCEDURE — 70360 X-RAY EXAM OF NECK: CPT | Mod: FY,FR

## 2023-11-16 PROCEDURE — 2500000004 HC RX 250 GENERAL PHARMACY W/ HCPCS (ALT 636 FOR OP/ED): Performed by: NURSE PRACTITIONER

## 2023-11-16 PROCEDURE — 36415 COLL VENOUS BLD VENIPUNCTURE: CPT | Performed by: NURSE PRACTITIONER

## 2023-11-16 PROCEDURE — 70360 X-RAY EXAM OF NECK: CPT | Mod: FOREIGN READ | Performed by: RADIOLOGY

## 2023-11-16 PROCEDURE — 80048 BASIC METABOLIC PNL TOTAL CA: CPT | Performed by: NURSE PRACTITIONER

## 2023-11-16 PROCEDURE — 99285 EMERGENCY DEPT VISIT HI MDM: CPT | Mod: 25

## 2023-11-16 PROCEDURE — 85025 COMPLETE CBC W/AUTO DIFF WBC: CPT | Performed by: NURSE PRACTITIONER

## 2023-11-16 RX ORDER — TRAMADOL HYDROCHLORIDE 50 MG/1
50 TABLET ORAL EVERY 8 HOURS PRN
Status: DISCONTINUED | OUTPATIENT
Start: 2023-11-16 | End: 2023-11-16 | Stop reason: HOSPADM

## 2023-11-16 RX ADMIN — SODIUM CHLORIDE 1000 ML: 9 INJECTION, SOLUTION INTRAVENOUS at 12:16

## 2023-11-16 ASSESSMENT — COLUMBIA-SUICIDE SEVERITY RATING SCALE - C-SSRS
2. HAVE YOU ACTUALLY HAD ANY THOUGHTS OF KILLING YOURSELF?: NO
6. HAVE YOU EVER DONE ANYTHING, STARTED TO DO ANYTHING, OR PREPARED TO DO ANYTHING TO END YOUR LIFE?: NO
1. IN THE PAST MONTH, HAVE YOU WISHED YOU WERE DEAD OR WISHED YOU COULD GO TO SLEEP AND NOT WAKE UP?: NO

## 2023-11-16 ASSESSMENT — PAIN SCALES - GENERAL: PAINLEVEL_OUTOF10: 7

## 2023-11-16 ASSESSMENT — PAIN - FUNCTIONAL ASSESSMENT: PAIN_FUNCTIONAL_ASSESSMENT: 0-10

## 2023-11-16 ASSESSMENT — PAIN DESCRIPTION - PAIN TYPE: TYPE: CHRONIC PAIN

## 2023-11-17 ENCOUNTER — HOSPITAL ENCOUNTER (OUTPATIENT)
Dept: RADIOLOGY | Facility: HOSPITAL | Age: 68
Discharge: HOME | End: 2023-11-17
Payer: MEDICARE

## 2023-11-17 DIAGNOSIS — R22.31 AXILLARY MASS, RIGHT: ICD-10-CM

## 2023-11-17 DIAGNOSIS — C77.3: ICD-10-CM

## 2023-11-17 PROCEDURE — 78816 PET IMAGE W/CT FULL BODY: CPT | Mod: PET TUMOR INIT TX STRAT | Performed by: RADIOLOGY

## 2023-11-17 PROCEDURE — 78816 PET IMAGE W/CT FULL BODY: CPT | Mod: PI

## 2023-11-17 PROCEDURE — A9552 F18 FDG: HCPCS

## 2023-11-17 PROCEDURE — 3430000001 HC RX 343 DIAGNOSTIC RADIOPHARMACEUTICALS

## 2023-11-17 RX ORDER — FLUDEOXYGLUCOSE F 18 200 MCI/ML
14.2 INJECTION, SOLUTION INTRAVENOUS
Status: COMPLETED | OUTPATIENT
Start: 2023-11-17 | End: 2023-11-17

## 2023-11-17 RX ADMIN — FLUDEOXYGLUCOSE F 18 14.2 MILLICURIE: 200 INJECTION, SOLUTION INTRAVENOUS at 12:20

## 2023-11-20 ENCOUNTER — TUMOR BOARD CONFERENCE (OUTPATIENT)
Dept: HEMATOLOGY/ONCOLOGY | Facility: HOSPITAL | Age: 68
End: 2023-11-20
Payer: MEDICARE

## 2023-11-20 DIAGNOSIS — C77.3: ICD-10-CM

## 2023-11-20 DIAGNOSIS — C44.92 SQUAMOUS CELL CARCINOMA OF SKIN: Primary | ICD-10-CM

## 2023-11-20 NOTE — TUMOR BOARD NOTE
General Patient Information  Name:  Mike Olson  Evaluation #:  1  Conference Date:  11-  YOB: 1955  MRN:  [id number]  Program Physician(s):  Axel Blackburn  Referring Physician(s):  Nicholas Arriaza, Zach Miramontes      Summary   Assessment:  Right axillary lymph node with a poorly differentiated carcinoma, consistent with poorly differentiated squamous cell carcinoma.  No residual lymph node parenchyma is seen.    PET/CT with squamous cell carcinoma of unknown origin with significant involvement of the proximal right arm as well as the proximal esophagus, extensive osseous metastatic disease involving the axial and appendicular skeleton, multiple suspected muscular implants, hypermetabolic nodule within the left lower lobe which may represent metastatic involvement or a primary lung lesion, and left anterior pleural-based implant favoring metastatic disease.    Recommendation:  Refer to Head & Neck Surgical Oncology.  EGD.  Consider triple scope.  P16 and PD1 staining.    Review Multidisciplinary Cutaneous Oncology Conference recommendation with patient.  Continue routine follow up and total body skin exams with Dermatology.    Follow Up:  Nicholas Arriaza, Dermatology, Head & Neck Surgical Oncology, GI      History and Physical Exam  Dermatologic History:   68 y.o. male with a biopsy of the right axillary lymph node on 10- showing a poorly differentiated carcinoma, consistent with poorly differentiated squamous cell carcinoma.  No residual lymph node parenchyma is seen.    PET/CT on 11- showed squamous cell carcinoma of unknown origin with significant involvement of the proximal right arm as well as the proximal esophagus, extensive osseous metastatic disease involving the axial and appendicular skeleton, multiple suspected muscular implants are also present, hypermetabolic nodule within the left lower lobe which may represent metastatic involvement or a primary lung  lesion, left anterior pleural-based implant favoring metastatic disease, and possible fracture of the left 3rd and 4th metacarpal base.    Past Medical History:    Recent stroke 9/17/2023  Hyperlipidemia  Atherosclerosis  Hypertension  Carotid stenosis   LV EF 55%    Family History of DNS/MM:  Unknown    Skin:  The right axillary mass measures 7 cm x 4 cm x 3.5 cm.  No other masses were noted.    Lymphatic:  Palpation of axillary lymph nodes did not reveal any enlarged lymph nodes closed to the chest wall or apical area. A possible lymph node mass was noted upon palpation around the mass, but it was unclear whether it was an extension of the main mass or an enlarged lymph node. No lymph nodes were noted in neck or submandibular area.      Pathology  Surgical Pathology Exam: [id number]  Resulted 10/23/2023 10:08     A. AXILLARY LYMPH NODE BIOPSY:  -POORLY DIFFERENTIATED CARCINOMA, CONSISTENT WITH POORLY DIFFERENTIATED SQUAMOUS CELL CARCINOMA.  SEE NOTE     Note:  Microscopic examination of H&E sections demonstrates a poorly differentiated carcinoma , immunoreactive with CK7 and p40 , infiltrating soft tissue.  No residual lymph node parenchyma is seen.  The following immunostains are negative: NKX3.1, GATA3, CK20, TTF-1 and CDX2.  Findings are nonspecific and consistent with the diagnosis issued above.  Clinical correlation is recommended.    Electronically signed by Heike Yap MD PhD on 10/23/2023 at 1008     Radiology  NM PET CT FDG wholebody  Interpreted By:  Ric Mcgarry,  John Baird   STUDY:  NM PET CT FDG WHOLEBODY;  11/17/2023 2:58 pm      INDICATION:  Signs/Symptoms:patient with large axilla squamous cell mass- staging  needed for surgical planning.      Per EMR: 68-year-old male with history of a relatively longstanding  right axillary mass, corresponding to biopsy-proven poorly  differentiated squamous cell carcinoma. Patient also reports recent  dysphagia.      COMPARISON:  Extremity ultrasound  10/19/2023.      ACCESSION NUMBER(S):  [id number]      ORDERING CLINICIAN:  SCAR JOHNSON      TECHNIQUE:  DIVISION OF NUCLEAR MEDICINE  POSITRON EMISSION TOMOGRAPHY (PET-CT)      The patient received an intravenous dose of 14.2 mCi of Fluorine-18  fluorodeoxyglucose (FDG). Positron emission tomographic (PET) images  from skull vertex to the feet were then acquired after a one hour  delay. Also acquired was a contemporaneous low dose non-contrast CT  scan performed for attenuation correction of PET images and anatomic  localization.  The PET and CT images were digitally fused for  display.  All images were acquired on a combined PET-CT scanner unit.  Some areas of FDG accumulation may be described in standardized  uptake value (SUV) units.      CODING:  Initial Treatment Strategy (PI)  Subsequent Treatment Strategy (PS)      CALIBRATION:  Dose Injection-to-Scan Interval (mins): 81 min  Mediastinal bloodpool SUV (normal 1.5-2.5): 2.3  Blood glucose: 112 mg/dL      FINDINGS:  HEAD AND NECK:  No evidence of focal hypermetabolic lesion in the brain parenchyma,  noting that evaluation is limited because of the expected physiologic  diffuse FDG uptake in the brain. No focal hypermetabolic soft tissue  lesion is seen in the neck. No hypermetabolic cervical  lymphadenopathy is present.      CHEST:  There is a pleural-based lesion measuring 0.6 cm demonstrating  hypermetabolic activity (Maximum SUV 6.0).  1.2 cm left lower lobe nodule with hypermetabolic activity (Maximum  SUV 4.7). Trace left pleural effusion.  Hypermetabolic right axillary lymphadenopathy with maximum SUV of  10.1. Hypermetabolic mediastinal lymphadenopathy including a right  superior paratracheal lymph node with maximum SUV of 3.2 as well as a  2.0 cm left superior paratracheal lymph node with maximum SUV 9.9.  The esophagus is patulous in appearance without with note made of a  hypermetabolic solid mass involving the superior 3rd of the  esophagus  measuring 2.3 cm in thickness (Maximum SUV 14.6).      ABDOMEN AND PELVIS:  No hypermetabolic soft tissue lesion is present in the abdomen and  pelvis. No evidence of hypermetabolic lymphadenopathy.  Physiologic radiotracer uptake is present in the liver and spleen  with excretion into the bowel loops and the genitourinary tract.      MUSCULOSKELETAL/EXTREMITIES:  Along the ventral aspect of the right arm there is a large cutaneous  mass measuring 4.7 x 2.9 cm (Maximum SUV 14.6).      Extensive osseous metastatic disease is suggestive focal  hypermetabolic activity within the axial and appendicular skeleton.  Notable lesions include, but are not limited to: *The right lateral  mass of C1 extending to the transverse process (Maximum SUV 8.3) *The  left vertebral body of C2 (Maximum SUV 7.0) and C2 spinous process  (Maximum SUV 4.0) *The left coracoid process (Maximum SUV 10.7).  *The right clavicle at the level of the acromioclavicular joint  (Maximum SUV 8.5) and sternoclavicular joint. *The T1 vertebral body  extending through the right pedicle, lamina, transverse process, and  spinous process *The right L4 vertebral body extending to the pedicle  (Maximum SUV 11.3) *The S1 vertebral body (predominantly right side)  (Maximum SUV 9.0) *Patchy involvement of the entire pelvis including  the bilateral acetabula. (Maximum SUV 8-10) *Involvement of the  intratrochanteric portion of the bilateral femora, with maximum SUV  of 11.0 on the right and 8.6 on the left. *Left distal ulnar  involvement (Maximum SUV 5.1)      There is a suspected fracture of the base of the left 3rd/4th  metacarpal with diffuse hypermetabolic activity of the 4th metacarpal  diaphysis (Maximum SUV 7.6).      Multiple suspected muscular implants are present, including the left  biceps femoris, the right erector spinae and the left lower cervical  paraspinal musculature.      IMPRESSION:  1. Squamous cell carcinoma of unknown origin with  significant  involvement of the proximal right arm as well as the proximal  esophagus.  2. Extensive osseous metastatic disease involving the axial and  appendicular skeleton is present as described above. Multiple  suspected muscular implants are also present.  3. Hypermetabolic nodule within the left lower lobe which may  represent metastatic involvement or a primary lung lesion.  4. Left anterior pleural-based implant favoring metastatic disease.  5. Possible fracture of the left 3rd and 4th metacarpal base.  Recommend correlation with radiograph of the left hand.      Images  11-

## 2023-11-21 DIAGNOSIS — C77.3: ICD-10-CM

## 2023-11-22 ENCOUNTER — APPOINTMENT (OUTPATIENT)
Dept: RADIOLOGY | Facility: HOSPITAL | Age: 68
DRG: 356 | End: 2023-11-22
Payer: MEDICARE

## 2023-11-22 ENCOUNTER — HOSPITAL ENCOUNTER (INPATIENT)
Facility: HOSPITAL | Age: 68
LOS: 23 days | Discharge: HOSPICE | DRG: 356 | End: 2023-12-15
Attending: EMERGENCY MEDICINE | Admitting: INTERNAL MEDICINE
Payer: MEDICARE

## 2023-11-22 ENCOUNTER — CLINICAL SUPPORT (OUTPATIENT)
Dept: EMERGENCY MEDICINE | Facility: HOSPITAL | Age: 68
DRG: 356 | End: 2023-11-22
Payer: MEDICARE

## 2023-11-22 DIAGNOSIS — R13.19 ESOPHAGEAL DYSPHAGIA: ICD-10-CM

## 2023-11-22 DIAGNOSIS — C44.1292 SQUAMOUS CELL CARCINOMA (SCC) OF LOWER EYELID OF LEFT EYE: ICD-10-CM

## 2023-11-22 DIAGNOSIS — C15.9 SQUAMOUS CELL ESOPHAGEAL CANCER (MULTI): ICD-10-CM

## 2023-11-22 DIAGNOSIS — C79.51 METASTATIC SQUAMOUS CELL CARCINOMA TO BONE (MULTI): Primary | ICD-10-CM

## 2023-11-22 DIAGNOSIS — S41.109A WOUND OF AXILLARY REGION: ICD-10-CM

## 2023-11-22 PROBLEM — R13.10 DIFFICULTY SWALLOWING: Status: ACTIVE | Noted: 2023-11-22

## 2023-11-22 LAB
ABO GROUP (TYPE) IN BLOOD: NORMAL
ALBUMIN SERPL BCP-MCNC: 3.4 G/DL (ref 3.4–5)
ALP SERPL-CCNC: 87 U/L (ref 33–136)
ALT SERPL W P-5'-P-CCNC: 18 U/L (ref 10–52)
ANION GAP SERPL CALC-SCNC: 20 MMOL/L (ref 10–20)
ANTIBODY SCREEN: NORMAL
AST SERPL W P-5'-P-CCNC: 21 U/L (ref 9–39)
ATRIAL RATE: 94 BPM
BASOPHILS # BLD AUTO: 0.08 X10*3/UL (ref 0–0.1)
BASOPHILS NFR BLD AUTO: 0.4 %
BILIRUB SERPL-MCNC: 0.6 MG/DL (ref 0–1.2)
BUN SERPL-MCNC: 47 MG/DL (ref 6–23)
CALCIUM SERPL-MCNC: 11.5 MG/DL (ref 8.6–10.6)
CHLORIDE SERPL-SCNC: 97 MMOL/L (ref 98–107)
CO2 SERPL-SCNC: 26 MMOL/L (ref 21–32)
CREAT SERPL-MCNC: 1.48 MG/DL (ref 0.5–1.3)
EOSINOPHIL # BLD AUTO: 0.11 X10*3/UL (ref 0–0.7)
EOSINOPHIL NFR BLD AUTO: 0.6 %
ERYTHROCYTE [DISTWIDTH] IN BLOOD BY AUTOMATED COUNT: 11.8 % (ref 11.5–14.5)
GFR SERPL CREATININE-BSD FRML MDRD: 51 ML/MIN/1.73M*2
GLUCOSE SERPL-MCNC: 119 MG/DL (ref 74–99)
HCT VFR BLD AUTO: 41 % (ref 41–52)
HGB BLD-MCNC: 12.6 G/DL (ref 13.5–17.5)
IMM GRANULOCYTES # BLD AUTO: 0.22 X10*3/UL (ref 0–0.7)
IMM GRANULOCYTES NFR BLD AUTO: 1.2 % (ref 0–0.9)
INR PPP: 1.4 (ref 0.9–1.1)
LYMPHOCYTES # BLD AUTO: 1.35 X10*3/UL (ref 1.2–4.8)
LYMPHOCYTES NFR BLD AUTO: 7.5 %
MAGNESIUM SERPL-MCNC: 1.76 MG/DL (ref 1.6–2.4)
MCH RBC QN AUTO: 28.6 PG (ref 26–34)
MCHC RBC AUTO-ENTMCNC: 30.7 G/DL (ref 32–36)
MCV RBC AUTO: 93 FL (ref 80–100)
MONOCYTES # BLD AUTO: 1.34 X10*3/UL (ref 0.1–1)
MONOCYTES NFR BLD AUTO: 7.4 %
NEUTROPHILS # BLD AUTO: 14.91 X10*3/UL (ref 1.2–7.7)
NEUTROPHILS NFR BLD AUTO: 82.9 %
NRBC BLD-RTO: 0 /100 WBCS (ref 0–0)
P AXIS: 97 DEGREES
P OFFSET: 181 MS
P ONSET: 139 MS
PHOSPHATE SERPL-MCNC: 4.3 MG/DL (ref 2.5–4.9)
PLATELET # BLD AUTO: 417 X10*3/UL (ref 150–450)
POTASSIUM SERPL-SCNC: 4.2 MMOL/L (ref 3.5–5.3)
PR INTERVAL: 168 MS
PROT SERPL-MCNC: 8.2 G/DL (ref 6.4–8.2)
PROTHROMBIN TIME: 15.3 SECONDS (ref 9.8–12.8)
Q ONSET: 223 MS
QRS COUNT: 15 BEATS
QRS DURATION: 80 MS
QT INTERVAL: 360 MS
QTC CALCULATION(BAZETT): 450 MS
QTC FREDERICIA: 418 MS
R AXIS: -1 DEGREES
RBC # BLD AUTO: 4.41 X10*6/UL (ref 4.5–5.9)
RH FACTOR (ANTIGEN D): NORMAL
SODIUM SERPL-SCNC: 139 MMOL/L (ref 136–145)
T AXIS: 27 DEGREES
T OFFSET: 403 MS
VENTRICULAR RATE: 94 BPM
WBC # BLD AUTO: 18 X10*3/UL (ref 4.4–11.3)

## 2023-11-22 PROCEDURE — 73130 X-RAY EXAM OF HAND: CPT | Mod: LEFT SIDE | Performed by: RADIOLOGY

## 2023-11-22 PROCEDURE — 85610 PROTHROMBIN TIME: CPT | Performed by: PHYSICIAN ASSISTANT

## 2023-11-22 PROCEDURE — 83735 ASSAY OF MAGNESIUM: CPT | Performed by: PHYSICIAN ASSISTANT

## 2023-11-22 PROCEDURE — 85025 COMPLETE CBC W/AUTO DIFF WBC: CPT | Performed by: PHYSICIAN ASSISTANT

## 2023-11-22 PROCEDURE — 2500000001 HC RX 250 WO HCPCS SELF ADMINISTERED DRUGS (ALT 637 FOR MEDICARE OP)

## 2023-11-22 PROCEDURE — 99285 EMERGENCY DEPT VISIT HI MDM: CPT | Mod: 25 | Performed by: EMERGENCY MEDICINE

## 2023-11-22 PROCEDURE — 2500000004 HC RX 250 GENERAL PHARMACY W/ HCPCS (ALT 636 FOR OP/ED): Performed by: PHYSICIAN ASSISTANT

## 2023-11-22 PROCEDURE — 71045 X-RAY EXAM CHEST 1 VIEW: CPT | Mod: FY,FR

## 2023-11-22 PROCEDURE — 86901 BLOOD TYPING SEROLOGIC RH(D): CPT | Performed by: PHYSICIAN ASSISTANT

## 2023-11-22 PROCEDURE — 1170000001 HC PRIVATE ONCOLOGY ROOM DAILY

## 2023-11-22 PROCEDURE — 99222 1ST HOSP IP/OBS MODERATE 55: CPT

## 2023-11-22 PROCEDURE — 36415 COLL VENOUS BLD VENIPUNCTURE: CPT | Performed by: PHYSICIAN ASSISTANT

## 2023-11-22 PROCEDURE — 92610 EVALUATE SWALLOWING FUNCTION: CPT | Mod: GN

## 2023-11-22 PROCEDURE — 99223 1ST HOSP IP/OBS HIGH 75: CPT | Performed by: STUDENT IN AN ORGANIZED HEALTH CARE EDUCATION/TRAINING PROGRAM

## 2023-11-22 PROCEDURE — 99285 EMERGENCY DEPT VISIT HI MDM: CPT | Performed by: PHYSICIAN ASSISTANT

## 2023-11-22 PROCEDURE — 96360 HYDRATION IV INFUSION INIT: CPT

## 2023-11-22 PROCEDURE — 84100 ASSAY OF PHOSPHORUS: CPT | Performed by: PHYSICIAN ASSISTANT

## 2023-11-22 PROCEDURE — 93005 ELECTROCARDIOGRAM TRACING: CPT

## 2023-11-22 PROCEDURE — 80053 COMPREHEN METABOLIC PANEL: CPT | Performed by: PHYSICIAN ASSISTANT

## 2023-11-22 PROCEDURE — 71045 X-RAY EXAM CHEST 1 VIEW: CPT | Mod: FOREIGN READ | Performed by: RADIOLOGY

## 2023-11-22 PROCEDURE — 73130 X-RAY EXAM OF HAND: CPT | Mod: LT,FR

## 2023-11-22 RX ORDER — ACETAMINOPHEN 325 MG/1
975 TABLET ORAL EVERY 8 HOURS
Status: DISCONTINUED | OUTPATIENT
Start: 2023-11-22 | End: 2023-11-28

## 2023-11-22 RX ORDER — PANTOPRAZOLE SODIUM 20 MG/1
20 TABLET, DELAYED RELEASE ORAL
Status: DISCONTINUED | OUTPATIENT
Start: 2023-11-23 | End: 2023-11-28 | Stop reason: ALTCHOICE

## 2023-11-22 RX ORDER — ACETAMINOPHEN 325 MG/1
975 TABLET ORAL EVERY 8 HOURS PRN
Status: DISCONTINUED | OUTPATIENT
Start: 2023-11-22 | End: 2023-11-22

## 2023-11-22 RX ORDER — AMLODIPINE BESYLATE 10 MG/1
10 TABLET ORAL DAILY
Status: DISCONTINUED | OUTPATIENT
Start: 2023-11-22 | End: 2023-11-28

## 2023-11-22 RX ORDER — NAPROXEN SODIUM 220 MG/1
81 TABLET, FILM COATED ORAL DAILY
Status: DISCONTINUED | OUTPATIENT
Start: 2023-11-22 | End: 2023-11-28

## 2023-11-22 RX ORDER — ATORVASTATIN CALCIUM 80 MG/1
80 TABLET, FILM COATED ORAL DAILY
Status: DISCONTINUED | OUTPATIENT
Start: 2023-11-22 | End: 2023-11-28

## 2023-11-22 RX ORDER — SODIUM CHLORIDE, SODIUM LACTATE, POTASSIUM CHLORIDE, CALCIUM CHLORIDE 600; 310; 30; 20 MG/100ML; MG/100ML; MG/100ML; MG/100ML
125 INJECTION, SOLUTION INTRAVENOUS CONTINUOUS
Status: DISCONTINUED | OUTPATIENT
Start: 2023-11-22 | End: 2023-11-24

## 2023-11-22 RX ORDER — POLYETHYLENE GLYCOL 3350 17 G/17G
17 POWDER, FOR SOLUTION ORAL DAILY
Status: DISCONTINUED | OUTPATIENT
Start: 2023-11-22 | End: 2023-12-01

## 2023-11-22 RX ORDER — CHLORTHALIDONE 25 MG/1
25 TABLET ORAL DAILY
Status: DISCONTINUED | OUTPATIENT
Start: 2023-11-22 | End: 2023-11-28

## 2023-11-22 RX ORDER — ENOXAPARIN SODIUM 100 MG/ML
40 INJECTION SUBCUTANEOUS EVERY 24 HOURS
Status: DISCONTINUED | OUTPATIENT
Start: 2023-11-22 | End: 2023-12-08

## 2023-11-22 RX ORDER — OXYCODONE HYDROCHLORIDE 5 MG/1
5 TABLET ORAL EVERY 6 HOURS PRN
Status: DISCONTINUED | OUTPATIENT
Start: 2023-11-22 | End: 2023-11-25

## 2023-11-22 RX ADMIN — OXYCODONE HYDROCHLORIDE 5 MG: 5 TABLET ORAL at 17:50

## 2023-11-22 RX ADMIN — ACETAMINOPHEN 975 MG: 325 TABLET ORAL at 17:50

## 2023-11-22 RX ADMIN — SODIUM CHLORIDE, POTASSIUM CHLORIDE, SODIUM LACTATE AND CALCIUM CHLORIDE 1000 ML: 600; 310; 30; 20 INJECTION, SOLUTION INTRAVENOUS at 09:33

## 2023-11-22 RX ADMIN — SODIUM CHLORIDE, POTASSIUM CHLORIDE, SODIUM LACTATE AND CALCIUM CHLORIDE 125 ML/HR: 600; 310; 30; 20 INJECTION, SOLUTION INTRAVENOUS at 12:47

## 2023-11-22 SDOH — SOCIAL STABILITY: SOCIAL INSECURITY: ARE THERE ANY APPARENT SIGNS OF INJURIES/BEHAVIORS THAT COULD BE RELATED TO ABUSE/NEGLECT?: NO

## 2023-11-22 SDOH — SOCIAL STABILITY: SOCIAL INSECURITY: HAS ANYONE EVER THREATENED TO HURT YOUR FAMILY OR YOUR PETS?: NO

## 2023-11-22 SDOH — SOCIAL STABILITY: SOCIAL INSECURITY: ABUSE: ADULT

## 2023-11-22 SDOH — SOCIAL STABILITY: SOCIAL INSECURITY: ARE YOU OR HAVE YOU BEEN THREATENED OR ABUSED PHYSICALLY, EMOTIONALLY, OR SEXUALLY BY ANYONE?: NO

## 2023-11-22 SDOH — SOCIAL STABILITY: SOCIAL INSECURITY: DO YOU FEEL ANYONE HAS EXPLOITED OR TAKEN ADVANTAGE OF YOU FINANCIALLY OR OF YOUR PERSONAL PROPERTY?: NO

## 2023-11-22 SDOH — SOCIAL STABILITY: SOCIAL INSECURITY: DOES ANYONE TRY TO KEEP YOU FROM HAVING/CONTACTING OTHER FRIENDS OR DOING THINGS OUTSIDE YOUR HOME?: NO

## 2023-11-22 SDOH — SOCIAL STABILITY: SOCIAL INSECURITY: DO YOU FEEL UNSAFE GOING BACK TO THE PLACE WHERE YOU ARE LIVING?: NO

## 2023-11-22 SDOH — SOCIAL STABILITY: SOCIAL INSECURITY: HAVE YOU HAD THOUGHTS OF HARMING ANYONE ELSE?: NO

## 2023-11-22 ASSESSMENT — COGNITIVE AND FUNCTIONAL STATUS - GENERAL
DRESSING REGULAR UPPER BODY CLOTHING: A LITTLE
EATING MEALS: A LITTLE
TURNING FROM BACK TO SIDE WHILE IN FLAT BAD: A LITTLE
STANDING UP FROM CHAIR USING ARMS: A LITTLE
MOVING FROM LYING ON BACK TO SITTING ON SIDE OF FLAT BED WITH BEDRAILS: A LITTLE
MOVING TO AND FROM BED TO CHAIR: A LITTLE
DRESSING REGULAR LOWER BODY CLOTHING: A LITTLE
DAILY ACTIVITIY SCORE: 21
DRESSING REGULAR LOWER BODY CLOTHING: A LITTLE
MOBILITY SCORE: 16
DAILY ACTIVITIY SCORE: 21
EATING MEALS: A LITTLE
CLIMB 3 TO 5 STEPS WITH RAILING: A LITTLE
WALKING IN HOSPITAL ROOM: A LITTLE
STANDING UP FROM CHAIR USING ARMS: A LITTLE
PATIENT BASELINE BEDBOUND: NO
CLIMB 3 TO 5 STEPS WITH RAILING: A LOT
MOBILITY SCORE: 21
WALKING IN HOSPITAL ROOM: A LOT
DRESSING REGULAR UPPER BODY CLOTHING: A LITTLE

## 2023-11-22 ASSESSMENT — LIFESTYLE VARIABLES
EVER FELT BAD OR GUILTY ABOUT YOUR DRINKING: NO
AUDIT-C TOTAL SCORE: 0
HAVE YOU EVER FELT YOU SHOULD CUT DOWN ON YOUR DRINKING: NO
HOW OFTEN DO YOU HAVE 6 OR MORE DRINKS ON ONE OCCASION: NEVER
HAVE PEOPLE ANNOYED YOU BY CRITICIZING YOUR DRINKING: NO
HOW MANY STANDARD DRINKS CONTAINING ALCOHOL DO YOU HAVE ON A TYPICAL DAY: PATIENT DOES NOT DRINK
SKIP TO QUESTIONS 9-10: 1
REASON UNABLE TO ASSESS: NO
EVER HAD A DRINK FIRST THING IN THE MORNING TO STEADY YOUR NERVES TO GET RID OF A HANGOVER: NO
HOW OFTEN DO YOU HAVE A DRINK CONTAINING ALCOHOL: NEVER
AUDIT-C TOTAL SCORE: 0

## 2023-11-22 ASSESSMENT — PAIN - FUNCTIONAL ASSESSMENT
PAIN_FUNCTIONAL_ASSESSMENT: 0-10

## 2023-11-22 ASSESSMENT — PATIENT HEALTH QUESTIONNAIRE - PHQ9
SUM OF ALL RESPONSES TO PHQ9 QUESTIONS 1 & 2: 0
2. FEELING DOWN, DEPRESSED OR HOPELESS: NOT AT ALL
1. LITTLE INTEREST OR PLEASURE IN DOING THINGS: NOT AT ALL

## 2023-11-22 ASSESSMENT — PAIN SCALES - GENERAL
PAINLEVEL_OUTOF10: 0 - NO PAIN
PAINLEVEL_OUTOF10: 0 - NO PAIN
PAINLEVEL_OUTOF10: 8
PAINLEVEL_OUTOF10: 8

## 2023-11-22 ASSESSMENT — ACTIVITIES OF DAILY LIVING (ADL)
GROOMING: INDEPENDENT
ADEQUATE_TO_COMPLETE_ADL: YES
TOILETING: NEEDS ASSISTANCE
LACK_OF_TRANSPORTATION: NO
DRESSING YOURSELF: NEEDS ASSISTANCE
FEEDING YOURSELF: INDEPENDENT
WALKS IN HOME: INDEPENDENT
JUDGMENT_ADEQUATE_SAFELY_COMPLETE_DAILY_ACTIVITIES: YES
PATIENT'S MEMORY ADEQUATE TO SAFELY COMPLETE DAILY ACTIVITIES?: YES
HEARING - RIGHT EAR: FUNCTIONAL
BATHING: INDEPENDENT
HEARING - LEFT EAR: FUNCTIONAL

## 2023-11-22 ASSESSMENT — COLUMBIA-SUICIDE SEVERITY RATING SCALE - C-SSRS
1. IN THE PAST MONTH, HAVE YOU WISHED YOU WERE DEAD OR WISHED YOU COULD GO TO SLEEP AND NOT WAKE UP?: NO
2. HAVE YOU ACTUALLY HAD ANY THOUGHTS OF KILLING YOURSELF?: NO
6. HAVE YOU EVER DONE ANYTHING, STARTED TO DO ANYTHING, OR PREPARED TO DO ANYTHING TO END YOUR LIFE?: NO

## 2023-11-22 NOTE — H&P
History Of Present Illness  67 year old male with PMH HTN, CVA (9/2023), recently diagnosed poorly differentiated metastatic SCC (11/2023) who presents for worsening dysphagia.    Patient states that he has had trouble swallowing for about 6 months that has progressively worsened. He endorses dysphagia with solid food but none with water.  He states that over the last 6 months he has progressed to eating just pudding.  He states he is able to take his medications with water without issues.  He states that he recently saw a surgeon for his dysphagia and he recommended the PET scan which he got a few days ago and did not know the results of until he was informed of them by the emergency department.  He states he does not have any pain but sometimes can have pain while sleeping in his right armpit.  He states he knows how to move it now so that he does not have any pain.  Describes some seeping from the mass sometimes but otherwise no issues.  Denies fevers, chills, chest pain, and shortness of breath, abdominal pain, and urinary symptoms.  He states that he completed 3 weeks of the Plavix and is longer taking it.  Endorses compliance with aspirin.    In the ED:  - Vitals:   T 36.9, , /72, RR 16, SpO2 95% on RA  - Labs:   CBC: WBC 18, Hgb 12.6, plt 417   BMP: Na 139, K 4.2, Cl 97, HCO3 26, BUN 47, Cr 1.48, glu 119   LFT: tprot 8.2, alb 3.4, alkphos 87, AST 21, ALT 18, tbili 0.6,   Electrolytes: PO4 4.3, Ca 11.5,   Heme: PT 15.3, INR 1.4,      - Imaging:  PET 11/17/23:  1. Squamous cell carcinoma of unknown origin with significant  involvement of the proximal right arm as well as the proximal  esophagus.  2. Extensive osseous metastatic disease involving the axial and  appendicular skeleton is present as described above. Multiple  suspected muscular implants are also present.  3. Hypermetabolic nodule within the left lower lobe which may  represent metastatic involvement or a primary lung lesion.  4. Left  anterior pleural-based implant favoring metastatic disease.  5. Possible fracture of the left 3rd and 4th metacarpal base.  Recommend correlation with radiograph of the left hand.    EKG: NSR, rate 90s, L axis deviation, significant artifact    ED Interventions: 1L LR, IV LR infusion at 125cc/hr,     -------------------------------------------  TUMOR BOARD 11/20/23:  Assessment:  Right axillary lymph node with a poorly differentiated carcinoma, consistent with poorly differentiated squamous cell carcinoma.  No residual lymph node parenchyma is seen.     PET/CT with squamous cell carcinoma of unknown origin with significant involvement of the proximal right arm as well as the proximal esophagus, extensive osseous metastatic disease involving the axial and appendicular skeleton, multiple suspected muscular implants, hypermetabolic nodule within the left lower lobe which may represent metastatic involvement or a primary lung lesion, and left anterior pleural-based implant favoring metastatic disease.     Recommendation:  Refer to Head & Neck Surgical Oncology.  EGD.  Consider triple scope.  P16 and PD1 staining.     Review Multidisciplinary Cutaneous Oncology Conference recommendation with patient.  Continue routine follow up and total body skin exams with Dermatology.     Follow Up:  Nicholas Arriaza, Dermatology, Head & Neck Surgical Oncology, GI  --------------------------------------------------    Dermatologic History:   68 y.o. male with a biopsy of the right axillary lymph node on 10- showing a poorly differentiated carcinoma, consistent with poorly differentiated squamous cell carcinoma.  No residual lymph node parenchyma is seen.     PET/CT on 11- showed squamous cell carcinoma of unknown origin with significant involvement of the proximal right arm as well as the proximal esophagus, extensive osseous metastatic disease involving the axial and appendicular skeleton, multiple suspected muscular  implants are also present, hypermetabolic nodule within the left lower lobe which may represent metastatic involvement or a primary lung lesion, left anterior pleural-based implant favoring metastatic disease, and possible fracture of the left 3rd and 4th metacarpal base.     Past Medical History:    Recent stroke 9/17/2023  Hyperlipidemia  Atherosclerosis  Hypertension  Carotid stenosis   LV EF 55%     Family History of DNS/MM:  Unknown     Past Medical History  Past Medical History:   Diagnosis Date    Cancer (CMS/HCC)        Surgical History  Past Surgical History:   Procedure Laterality Date    MR HEAD ANGIO WO IV CONTRAST  9/18/2023    MR HEAD ANGIO WO IV CONTRAST 9/18/2023 [facility] MRI        Social History  He reports that he has quit smoking. His smoking use included cigarettes. He has never used smokeless tobacco. He reports that he does not currently use alcohol. He reports current drug use. Drug: Marijuana.    Family History  Family History   Problem Relation Name Age of Onset    Hypertension Mother      Prostate cancer Father      Stroke Maternal Grandfather          Allergies  Patient has no known allergies.    Review of Systems     Physical Exam  Constitutional:       General: He is not in acute distress.     Appearance: Normal appearance.   HENT:      Head: Normocephalic and atraumatic.      Mouth/Throat:      Mouth: Mucous membranes are moist.   Eyes:      Extraocular Movements: Extraocular movements intact.      Conjunctiva/sclera: Conjunctivae normal.      Pupils: Pupils are equal, round, and reactive to light.   Cardiovascular:      Rate and Rhythm: Normal rate and regular rhythm.      Heart sounds: No murmur heard.  Pulmonary:      Effort: Pulmonary effort is normal.   Abdominal:      General: Bowel sounds are normal. There is no distension.      Palpations: Abdomen is soft.      Tenderness: There is no abdominal tenderness.   Skin:     General: Skin is warm and dry.      Comments: R axilla fungating  "mass covered with clean bandage, no signs of erythema, non tender to palpation in surrounding area.   Neurological:      General: No focal deficit present.      Mental Status: He is alert and oriented to person, place, and time.      Gait: Gait normal.   Psychiatric:      Comments: Appropriate mood and affect          Last Recorded Vitals  Blood pressure 134/75, pulse 84, temperature 36.4 °C (97.5 °F), temperature source Temporal, resp. rate 16, height 1.702 m (5' 7\"), weight 82.6 kg (182 lb), SpO2 95 %.    Relevant Results      Results for orders placed or performed during the hospital encounter of 11/22/23 (from the past 24 hour(s))   Phosphorus   Result Value Ref Range    Phosphorus 4.3 2.5 - 4.9 mg/dL   Magnesium   Result Value Ref Range    Magnesium 1.76 1.60 - 2.40 mg/dL   Comprehensive metabolic panel   Result Value Ref Range    Glucose 119 (H) 74 - 99 mg/dL    Sodium 139 136 - 145 mmol/L    Potassium 4.2 3.5 - 5.3 mmol/L    Chloride 97 (L) 98 - 107 mmol/L    Bicarbonate 26 21 - 32 mmol/L    Anion Gap 20 10 - 20 mmol/L    Urea Nitrogen 47 (H) 6 - 23 mg/dL    Creatinine 1.48 (H) 0.50 - 1.30 mg/dL    eGFR 51 (L) >60 mL/min/1.73m*2    Calcium 11.5 (H) 8.6 - 10.6 mg/dL    Albumin 3.4 3.4 - 5.0 g/dL    Alkaline Phosphatase 87 33 - 136 U/L    Total Protein 8.2 6.4 - 8.2 g/dL    AST 21 9 - 39 U/L    Bilirubin, Total 0.6 0.0 - 1.2 mg/dL    ALT 18 10 - 52 U/L   CBC and Auto Differential   Result Value Ref Range    WBC 18.0 (H) 4.4 - 11.3 x10*3/uL    nRBC 0.0 0.0 - 0.0 /100 WBCs    RBC 4.41 (L) 4.50 - 5.90 x10*6/uL    Hemoglobin 12.6 (L) 13.5 - 17.5 g/dL    Hematocrit 41.0 41.0 - 52.0 %    MCV 93 80 - 100 fL    MCH 28.6 26.0 - 34.0 pg    MCHC 30.7 (L) 32.0 - 36.0 g/dL    RDW 11.8 11.5 - 14.5 %    Platelets 417 150 - 450 x10*3/uL    Neutrophils % 82.9 40.0 - 80.0 %    Immature Granulocytes %, Automated 1.2 (H) 0.0 - 0.9 %    Lymphocytes % 7.5 13.0 - 44.0 %    Monocytes % 7.4 2.0 - 10.0 %    Eosinophils % 0.6 0.0 - 6.0 % "    Basophils % 0.4 0.0 - 2.0 %    Neutrophils Absolute 14.91 (H) 1.20 - 7.70 x10*3/uL    Immature Granulocytes Absolute, Automated 0.22 0.00 - 0.70 x10*3/uL    Lymphocytes Absolute 1.35 1.20 - 4.80 x10*3/uL    Monocytes Absolute 1.34 (H) 0.10 - 1.00 x10*3/uL    Eosinophils Absolute 0.11 0.00 - 0.70 x10*3/uL    Basophils Absolute 0.08 0.00 - 0.10 x10*3/uL   Type And Screen   Result Value Ref Range    ABO TYPE A     Rh TYPE POS     ANTIBODY SCREEN NEG    Protime-INR   Result Value Ref Range    Protime 15.3 (H) 9.8 - 12.8 seconds    INR 1.4 (H) 0.9 - 1.1   SST TOP   Result Value Ref Range    Extra Tube Hold for add-ons.    SST TOP   Result Value Ref Range    Extra Tube Hold for add-ons.    ECG 12 lead   Result Value Ref Range    Ventricular Rate 94 BPM    Atrial Rate 94 BPM    IA Interval 168 ms    QRS Duration 80 ms    QT Interval 360 ms    QTC Calculation(Bazett) 450 ms    P Axis 97 degrees    R Axis -1 degrees    T Axis 27 degrees    QRS Count 15 beats    Q Onset 223 ms    P Onset 139 ms    P Offset 181 ms    T Offset 403 ms    QTC Fredericia 418 ms     Scheduled medications  amLODIPine, 10 mg, oral, Daily  aspirin, 81 mg, oral, Daily  atorvastatin, 80 mg, oral, Daily  chlorthalidone, 25 mg, oral, Daily  enoxaparin, 40 mg, subcutaneous, q24h  [START ON 11/23/2023] pantoprazole, 20 mg, oral, Daily before breakfast  polyethylene glycol, 17 g, oral, Daily      Continuous medications  lactated Ringer's, 125 mL/hr, Last Rate: 125 mL/hr (11/22/23 1247)      PRN medications         Assessment/Plan   Principal Problem:    Difficulty swallowing  Active Problems:    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  67 year old male with PMH HTN, CVA (9/2023), recently diagnosed poorly differentiated metastatic SCC (11/2023) who presents for worsening dysphagia. Given new diagnosis, no treatment has been started yet and no known origin yet. Will discuss with Med Onc (Dr. Yin was to be his primary oncologist), Surg  Onc (Dr Corona had already seen pt) and will also consult GI for EGD/biopsy. Will keep NPO pending SLP.    #Metastatic SCC, poorly differentiated   #Dysphagia   -Diagnosed 11/2023; no treatment yet; unknown origin  -Surg Onc consulted; appreciate recs (saw Dr. Corona outpatient 11/10/23)  -SLP consulted for swallow evaluation; NPO in mean time  -Consulted GI for possible EGD/biopsy; plan for EGD on Friday 11/24  -Plan to inform Dr. Yin (future medical oncologist-1st appt scheduled 12/1)  -Consider consulting pulm for possible lung biopsy as potential origin   -Tylenol PRN for pain; patient denies any pain currently    #MARYELLEN  -Baseline Cr ~1; on admission 1.48, was 1.50 on 11/16  -S/p 1L LR, IV LR infusion at 125cc/hr,  -Urine lytes pending; likely pre-renal, appears a little dry on exam  -Hold ARB  -Continue to monitor     #HTN  -Continue home amlodipine 10mg daily  -Continue home chlorithalidone 25mg daily  -Hold ARB given MARYELLEN    #CVA 9/2023  #Carotid stenosis  -2 acute or subacute infarcts seen on MRI 9/18/2023  -Continue ASA 81mg daily  -Completed 21 days of plavix (started 9/18/23)  -Continue atorvastatin 80mg daily  -Vascular Surgery follow up outpatient for the carotid stenosis    #Hypercalcemia  -Ca 11.5 on admission  -S/p 1L LR in the ED, on LR infusion at 125cc/hr  -Follow up repeat    #Elevated PSA  -Urology follow up outpatient    Disposition: tele  Follow-ups: PCP, Onc, Surg Onc,     N: NPO  A: none    DVT ppx: lovenox  GI ppx: home PPI    Code Status: FULL (confirmed on 11/22/23)  Surrogate Medical Decision-maker: Wife April [phone]             Alison Pride MD  DACR

## 2023-11-22 NOTE — CONSULTS
Reason For Consult  67yo with hx of HTN, HLD, chronic alcoholism, long term tobacco usage (chewing tobacco), CVA 2/2 carotid artery stenosis (09/17/2023, finished course of DAPT) who presents through ED for workup/treatment of metastatic squamous cell cancer.     Pt presented in clinic to Dr. Arriaza on 11/10 with 40lb weight loss, dysphagia, weakness and bleeding fungating right axillary mass 7cm x 4cm x 3.5cm. Biopsy of right axillary lymph node found poorly differentiated squamous cell cancer. Planned for PET, tumor board presentation, ENT c/s, urology c/s, cardiac workup, med onc appointment (Dr. Yin).     PET on 11/17 show involvement of proximal right arm, proximal esophagus, extensive osseous metastatic disease involving axial/appendicular skeleton, muscular implants, left lower lobe nodule, and left anterior pleural based implant. Pt discussed in 11/20 tumor board, recommendation for ENT surg onc, triple scope, P16 and PD1 staining, followup with surgical oncology.     Pt presented today 11/22 with continuous dysphagia. States that he is still having difficulty keeping solid food down, able to keep hydrated but otherwise unable to take much orally. States that he is concerned about getting his cancer workup and treatment done expeditiously since appointments are made 1+ months out. WBC 18.0, otherwise lab work non-concerning.       History Of Present Illness     Past Medical History  He has a past medical history of Cancer (CMS/HCC).    Surgical History  He has a past surgical history that includes MR angio head wo IV contrast (9/18/2023).     Social History  He reports that he has quit smoking. His smoking use included cigarettes. He has never used smokeless tobacco. He reports that he does not currently use alcohol. He reports current drug use. Drug: Marijuana. In the past, his drinking included 20 beers a week. Chewing tobacco history included a can a week, recently quit in September.     Family  "History  Family History   Problem Relation Name Age of Onset    Hypertension Mother      Prostate cancer Father      Stroke Maternal Grandfather          Allergies  Patient has no known allergies.    Review of Systems  Review of Systems   Constitutional:  Positive for activity change, appetite change and unexpected weight change.   HENT:  Positive for trouble swallowing and voice change.    Respiratory: Negative.     Gastrointestinal:         Poor appetite. Weight loss. Regular bowel function. No bleeding   Endocrine: Negative.    Musculoskeletal:         Pain with movement of the right arm due to the mass.    Skin:         Axillary mass with bleeding and sloughing   Neurological:         No radiating pain or paresthesias in the right upper extremity       Physical Exam    Physical Exam  Cardiovascular:      Rate and Rhythm: Normal rate and regular rhythm.      Pulses: Normal pulses.      Heart sounds: Normal heart sounds.   Pulmonary:      Effort: Pulmonary effort is normal.      Breath sounds: Normal breath sounds.   Abdominal:      General: Abdomen is flat. Bowel sounds are normal.      Palpations: Abdomen is soft.   Musculoskeletal:         General: No swelling or deformity.   Skin:     Comments: palpable right axillary mass, clean without purulent drainage, erythematous without bleeding   Neurological:      Sensory: No sensory deficit.      Comments: minor right leg weakness, uses cane          Last Recorded Vitals  Blood pressure 134/75, pulse 84, temperature 36.4 °C (97.5 °F), temperature source Temporal, resp. rate 16, height 1.702 m (5' 7\"), weight 82.6 kg (182 lb), SpO2 95 %.    Relevant Results  Results for orders placed or performed during the hospital encounter of 11/22/23 (from the past 24 hour(s))   Phosphorus   Result Value Ref Range    Phosphorus 4.3 2.5 - 4.9 mg/dL   Magnesium   Result Value Ref Range    Magnesium 1.76 1.60 - 2.40 mg/dL   Comprehensive metabolic panel   Result Value Ref Range    " Glucose 119 (H) 74 - 99 mg/dL    Sodium 139 136 - 145 mmol/L    Potassium 4.2 3.5 - 5.3 mmol/L    Chloride 97 (L) 98 - 107 mmol/L    Bicarbonate 26 21 - 32 mmol/L    Anion Gap 20 10 - 20 mmol/L    Urea Nitrogen 47 (H) 6 - 23 mg/dL    Creatinine 1.48 (H) 0.50 - 1.30 mg/dL    eGFR 51 (L) >60 mL/min/1.73m*2    Calcium 11.5 (H) 8.6 - 10.6 mg/dL    Albumin 3.4 3.4 - 5.0 g/dL    Alkaline Phosphatase 87 33 - 136 U/L    Total Protein 8.2 6.4 - 8.2 g/dL    AST 21 9 - 39 U/L    Bilirubin, Total 0.6 0.0 - 1.2 mg/dL    ALT 18 10 - 52 U/L   CBC and Auto Differential   Result Value Ref Range    WBC 18.0 (H) 4.4 - 11.3 x10*3/uL    nRBC 0.0 0.0 - 0.0 /100 WBCs    RBC 4.41 (L) 4.50 - 5.90 x10*6/uL    Hemoglobin 12.6 (L) 13.5 - 17.5 g/dL    Hematocrit 41.0 41.0 - 52.0 %    MCV 93 80 - 100 fL    MCH 28.6 26.0 - 34.0 pg    MCHC 30.7 (L) 32.0 - 36.0 g/dL    RDW 11.8 11.5 - 14.5 %    Platelets 417 150 - 450 x10*3/uL    Neutrophils % 82.9 40.0 - 80.0 %    Immature Granulocytes %, Automated 1.2 (H) 0.0 - 0.9 %    Lymphocytes % 7.5 13.0 - 44.0 %    Monocytes % 7.4 2.0 - 10.0 %    Eosinophils % 0.6 0.0 - 6.0 %    Basophils % 0.4 0.0 - 2.0 %    Neutrophils Absolute 14.91 (H) 1.20 - 7.70 x10*3/uL    Immature Granulocytes Absolute, Automated 0.22 0.00 - 0.70 x10*3/uL    Lymphocytes Absolute 1.35 1.20 - 4.80 x10*3/uL    Monocytes Absolute 1.34 (H) 0.10 - 1.00 x10*3/uL    Eosinophils Absolute 0.11 0.00 - 0.70 x10*3/uL    Basophils Absolute 0.08 0.00 - 0.10 x10*3/uL   Type And Screen   Result Value Ref Range    ABO TYPE A     Rh TYPE POS     ANTIBODY SCREEN NEG    Protime-INR   Result Value Ref Range    Protime 15.3 (H) 9.8 - 12.8 seconds    INR 1.4 (H) 0.9 - 1.1   SST TOP   Result Value Ref Range    Extra Tube Hold for add-ons.    SST TOP   Result Value Ref Range    Extra Tube Hold for add-ons.    ECG 12 lead   Result Value Ref Range    Ventricular Rate 94 BPM    Atrial Rate 94 BPM    MA Interval 168 ms    QRS Duration 80 ms    QT Interval 360 ms     QTC Calculation(Bazett) 450 ms    P Axis 97 degrees    R Axis -1 degrees    T Axis 27 degrees    QRS Count 15 beats    Q Onset 223 ms    P Onset 139 ms    P Offset 181 ms    T Offset 403 ms    QTC Fredericia 418 ms          Assessment/Plan   69yo with hx of HTN, HLD, chronic alcoholism, long term tobacco usage (chewing tobacco), CVA 2/2 carotid artery stenosis (09/17/2023, finished course of DAPT) hx of rapidly enlarging right axillary mass who presents through ED for workup/treatment of metastatic squamous cell cancer. PET scan showed widely metastatic disease to bone, proximal esophagus, and lung.     Pt will need workup of his dysphagia (likely 2/2 to proximal esophageal mass) likely triple scope with biopsy and ENT evaluation, admission to med onc. Of note, Dr. Yin (medical oncology) had scheduled outpatient appointment with patient for 12/1 and was aware of patient's clinical history. Surg onc will continue to follow, appreciate all coordinating teams.       Discussed with Dr. Sofiya Armendariz MD

## 2023-11-22 NOTE — CARE PLAN
The patient's goals for the shift include      The clinical goals for the shift include pt will remain safe and free of injuries and fall throughout shift    Pt new admission this shift. Pt oriented to floor. Pt progressing.   Problem: Fall/Injury  Goal: Not fall by end of shift  Outcome: Progressing  Goal: Be free from injury by end of the shift  Outcome: Progressing  Goal: Verbalize understanding of personal risk factors for fall in the hospital  Outcome: Progressing  Goal: Verbalize understanding of risk factor reduction measures to prevent injury from fall in the home  Outcome: Progressing  Goal: Use assistive devices by end of the shift  Outcome: Progressing  Goal: Pace activities to prevent fatigue by end of the shift  Outcome: Progressing     Problem: Skin  Goal: Decreased wound size/increased tissue granulation at next dressing change  Outcome: Progressing  Goal: Participates in plan/prevention/treatment measures  Outcome: Progressing  Goal: Prevent/manage excess moisture  Outcome: Progressing  Goal: Prevent/minimize sheer/friction injuries  Outcome: Progressing  Goal: Promote/optimize nutrition  Outcome: Progressing  Goal: Promote skin healing  Outcome: Progressing

## 2023-11-22 NOTE — H&P (VIEW-ONLY)
"Gastroenterology Consult Service INITIAL CONSULT Note  Department of Gastroenterology & Hepatology  [facility]    [facility]  November 22, 2023   Patient: Mike Olson    Medical Record: [id number]    Reason for Consult: Progressive dysphagia  Requesting Service: Heme-onc    Subjective     Mike Olson is a 68 y.o. 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 Admitted for expedite workup for the malignancy workup . Gastroenterology is consulted for Progressive dysphagia.    The patient was seen at bedside complaining of progressive dysphagia that started about 6 months ago, noticed at the same time he noticed a right axillary mass, dysphagia was at first with solids reports \"the feeling of food being stuck in his upper chest\" and would be relived with drinking liquids, becoming gradually worse, unable to tolerate solids at all, and he regurgitates the food minutes after ingestion. Denies heart burn, vomiting, coughing, sob, chest pain, hematemesis, or odynophagia,  fever or abdominal pain.  Currently tolerating soft diet and liquids like mashed potato, pudding, soups, boost drinks and water. Able to tolerate taking his pills.   The patient reports a weight loss of 40 pounds since September. Decrease appetite, and constipation (last BM 1 week ago, he is passing flatus)    Oncological history:  Has been dealing with a right armpit mass for the past several months which recently was biopsied on 10-  which showed the carcinoma and this was followed up by PET scan.  PET/CT on 11- with squamous cell carcinoma of unknown origin with significant involvement of the proximal right arm as well as the proximal esophagus, extensive osseous metastatic disease involving the axial and appendicular " skeleton, multiple suspected muscular implants, hypermetabolic nodule within the left lower lobe which may represent metastatic involvement or a primary lung lesion, and left anterior pleural-based implant favoring metastatic disease.  Tumor board held on 11/20: Recs: Refer to [facility].  EGD.  Consider triple scope.  P16 and PD1 staining.    12 point ROS otherwise negative, unless indicated above.     Past Medical History:  Recent stroke 9/17/2023  Hyperlipidemia  Atherosclerosis  Hypertension  Carotid stenosis   LV EF 55%    Home Medications    The list below reflects the updated PTA list. Comments regarding how patient may be taking medications differently can be found in the Admit Orders Activity  Prior to Admission Medications   Prescriptions Last Dose Informant Patient Reported? Taking?   amLODIPine (Norvasc) 10 mg tablet 11/21/2023 at am Self Yes Yes   Sig: Take 1 tablet (10 mg) by mouth once daily.   aspirin 81 mg chewable tablet 11/21/2023 at am Self Yes Yes   Sig: Chew 1 tablet (81 mg) once daily.   atorvastatin (Lipitor) 80 mg tablet 11/21/2023 at am Self Yes Yes   Sig: Take 1 tablet (80 mg) by mouth once daily.   chlorthalidone (Hygroton) 25 mg tablet 11/21/2023 at am Self Yes Yes   Sig: Take 1 tablet (25 mg) by mouth once daily.   losartan (Cozaar) 100 mg tablet 11/21/2023 at am Self Yes Yes   Sig: Take 1 tablet (100 mg) by mouth once daily.   omeprazole (PriLOSEC) 20 mg DR capsule 11/21/2023 at am Self Yes Yes   Sig: Take 1 capsule (20 mg) by mouth once daily.   potassium chloride CR 10 mEq ER tablet 11/21/2023 Self Yes Yes   Sig: Take 1 tablet (10 mEq) by mouth 2 times a day.   thiamine 100 mg tablet 11/21/2023 Self Yes Yes   Sig: Take 1 tablet (100 mg) by mouth once daily.   traMADol (Ultram) 50 mg tablet     No Yes   Sig: Take 1 tablet (50 mg) by mouth every 8 hours if needed for severe pain (7 - 10) for up to 7 days.            Surgical History: No abdominal  "surgeries.    Allergies:  No Known Allergies    Social History:  He reports that he has quit smoking. His smoking use included cigarettes. He has never used smokeless tobacco. He reports that he does not currently use alcohol. He reports current drug use. Drug: Marijuana.     Family History:    Family History   Problem Relation Name Age of Onset    Hypertension Mother      Prostate cancer Father      Stroke Maternal Grandfather       No family history of GI or liver disease.     Objective     Vitals:    Vitals:    11/22/23 0830 11/22/23 1207 11/22/23 1510 11/22/23 1650   BP: 131/77 134/75 139/71 139/71   BP Location: Right arm Right arm  Left arm   Patient Position: Lying Lying  Lying   Pulse: 98 84 93 95   Resp: 16 16 16 18   Temp: 36.4 °C (97.5 °F) 36.4 °C (97.5 °F)  36.4 °C (97.5 °F)   TempSrc: Temporal Temporal  Temporal   SpO2: 96% 95% 98% 97%   Weight:    76.5 kg (168 lb 11.2 oz)   Height:   1.69 m (5' 6.54\")        Physical Exam  Gen: well appearing, A+Ox3, in no acute distress  HEENT: PEERL, EOMI, sclera anicteric, no conjunctival injection, MMM, no oropharyngeal lesions  Resp: CTAB, normal breath sounds, no wheezing/crackles/ rales  CV: RRR, normal S1/S2  GI: non-tender, non-distended, normal BSs in all 4 quadrants, no rebound or guarding, no masses palpable  MSK: warm and well perfused, no edema, scratch marks on LE bilaterally  Neuro: A+Ox3, no gross focal deficits  Skin:  R axilla fungating mass covered with clean bandage, no signs of erythema, non tender to palpation in surrounding area.        Labs:   Lab Results   Component Value Date    WBC 18.0 (H) 11/22/2023    HGB 12.6 (L) 11/22/2023    HCT 41.0 11/22/2023    MCV 93 11/22/2023     11/22/2023     Lab Results   Component Value Date    GLUCOSE 119 (H) 11/22/2023    CALCIUM 11.5 (H) 11/22/2023     11/22/2023    K 4.2 11/22/2023    CO2 26 11/22/2023    CL 97 (L) 11/22/2023    BUN 47 (H) 11/22/2023    CREATININE 1.48 (H) 11/22/2023     Lab " Results   Component Value Date    ALT 18 11/22/2023    AST 21 11/22/2023    ALKPHOS 87 11/22/2023    BILITOT 0.6 11/22/2023     Lab Results   Component Value Date    INR 1.4 (H) 11/22/2023    PROTIME 15.3 (H) 11/22/2023       Imaging:   PET/CT on 11- showed squamous cell carcinoma of unknown origin with significant involvement of the proximal right arm as well as the proximal esophagus, extensive osseous metastatic disease involving the axial and appendicular skeleton, multiple suspected muscular implants are also present, hypermetabolic nodule within the left lower lobe which may represent metastatic involvement or a primary lung lesion, left anterior pleural-based implant favoring metastatic disease, and possible fracture of the left 3rd and 4th metacarpal base.     GI procedures: No previous Endoscopy or colonoscopy    Assessment & Plan      Mike Olson is a 68 y.o. 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 Admitted for expedite workup for the malignancy workup . Gastroenterology is consulted for Progressive dysphagia.    The patient progressive Dysphagia is most likely related to the proximal esophageal mass seen on PET scan. The patients PET scan findings are concerning for possible esophageal primary source or Pulmonary source, further evaluation with EGD and biopsy is planned on Friday 11/24. The plan was discussed with the patient.     Recommendations:  - EGD on Friday 11/24.  - keep the patient NPO at midnight on 11/23.  - The patient is clear now for eating as tolerated.    Patient seen and discussed with attending, Dr. Wright.     Thank you for the consultation. Gastroenterology will continue to the follow the patient.   Please do not hesitate to contact me on Haiku or page [phone] if there are any further  questions between the weekday hours of 7 AM - 5 PM.   If there is an urgent concern during the weekend, after-hours, or holidays; then please page the on-call GI fellow at [phone]. Thank you.    SIGNATURE: Cary Moser MD PATIENT NAME: Mike Olson   DATE: November 22, 2023 MRN: [id number]

## 2023-11-22 NOTE — PROGRESS NOTES
Speech-Language Pathology    Inpatient Speech-Language Pathology Clinical Swallow Evaluation    Patient Name: Mike Olson  MRN: [id number]  Today's Date: 11/22/2023             Current Problem:   1. 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   Life sustaining POI meds per MD given risk of dysphagia and aspiration.      Assessment:  Pt tolerated single sips of water. Once progressed to the three ounce water protocol; pt unable to complete screen with overt s/s of airway invasion via cough with regurgitation. Given absent enteral access with poor intake and weight loss; additional PO trials of purees x3 tsp given with continued s/s of pharyngeal deficits or airway invasion via wet vocal quality and audible regurgitation sounds without the expectoration of purees.       Plan:  SLP Plan: Skilled SLP  SLP Frequency: 1x per week  Discussed POC: Patient, Physician  Discussed Risks/Benefits: Yes  Patient/Caregiver Agreeable: Yes  SLP - OK to Discharge: Yes      Subjective   Current Problem:  Outside imaging reviewed including his recent PET scan which does show tumor in his proximal esophagus most likely causing his dysphagia. BSE reveals concern for pharyngeal as well.      General Visit  Information:  Patient Class: Inpatient  Dysphagia Diagnosis:  (Dysphagia with concern for pharyngeal and esophageal deficits.)    Baseline Assessment:  Respiratory Status: Room air  Hearing: Within Functional Limits  Baseline Vocal Quality: Normal  Volitional Cough: Strong    Oral/Motor Assessment:  Dentition: Adequate/Natural  Oral Motor: Within Functional Limits  Breath Support: Adequate for speech  Hearing: Within Functional Limits      Consistencies Trialed:  Consistencies Trialed: Yes  Consistencies Trialed: Ice Chips, Thin (IDDSI Level 0) - Straw, Thin (IDDSI Level 0) - Cup, Thin (IDDSI Level 0) - Spoon, Pureed/extremely thick (IDDSI Level 4)      Clinical Observations:  Patient Positioning: Upright in Bed  Management of Oral Secretions: Adequate  Was The 3 oz Swallow Protocol Completed: Yes  Signs/Symptoms of Aspiration: Cough, Other (Comment) (regurgitation)  Multiple Swallows: Thin (IDDSI Level 0) - Straw, Thin (IDDSI Level 0) - Cup, Pureed/Extremely Thick (IDDSI Level 4)  Delayed Cough: Thin (IDDSI Level 0) - Straw, Pureed/Extremely Thick (IDDSI Level 4)  Wet Vocal Quality: Thin (IDDSI Level 0) - Straw, Pureed/Extremely Thick (IDDSI Level 4)    Extensive education provided to patient re: current swallow function, recommendations/results and dysphagia POC.     Consultations/Referrals/Coordination of Services: GI

## 2023-11-22 NOTE — CONSULTS
"Gastroenterology Consult Service INITIAL CONSULT Note  Department of Gastroenterology & Hepatology  [facility]    [facility]  November 22, 2023   Patient: Mike Olson    Medical Record: [id number]    Reason for Consult: Progressive dysphagia  Requesting Service: Heme-onc    Subjective     Mike Olson is a 68 y.o. 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 Admitted for expedite workup for the malignancy workup . Gastroenterology is consulted for Progressive dysphagia.    The patient was seen at bedside complaining of progressive dysphagia that started about 6 months ago, noticed at the same time he noticed a right axillary mass, dysphagia was at first with solids reports \"the feeling of food being stuck in his upper chest\" and would be relived with drinking liquids, becoming gradually worse, unable to tolerate solids at all, and he regurgitates the food minutes after ingestion. Denies heart burn, vomiting, coughing, sob, chest pain, hematemesis, or odynophagia,  fever or abdominal pain.  Currently tolerating soft diet and liquids like mashed potato, pudding, soups, boost drinks and water. Able to tolerate taking his pills.   The patient reports a weight loss of 40 pounds since September. Decrease appetite, and constipation (last BM 1 week ago, he is passing flatus)    Oncological history:  Has been dealing with a right armpit mass for the past several months which recently was biopsied on 10-  which showed the carcinoma and this was followed up by PET scan.  PET/CT on 11- with squamous cell carcinoma of unknown origin with significant involvement of the proximal right arm as well as the proximal esophagus, extensive osseous metastatic disease involving the axial and appendicular " skeleton, multiple suspected muscular implants, hypermetabolic nodule within the left lower lobe which may represent metastatic involvement or a primary lung lesion, and left anterior pleural-based implant favoring metastatic disease.  Tumor board held on 11/20: Recs: Refer to [facility].  EGD.  Consider triple scope.  P16 and PD1 staining.    12 point ROS otherwise negative, unless indicated above.     Past Medical History:  Recent stroke 9/17/2023  Hyperlipidemia  Atherosclerosis  Hypertension  Carotid stenosis   LV EF 55%    Home Medications    The list below reflects the updated PTA list. Comments regarding how patient may be taking medications differently can be found in the Admit Orders Activity  Prior to Admission Medications   Prescriptions Last Dose Informant Patient Reported? Taking?   amLODIPine (Norvasc) 10 mg tablet 11/21/2023 at am Self Yes Yes   Sig: Take 1 tablet (10 mg) by mouth once daily.   aspirin 81 mg chewable tablet 11/21/2023 at am Self Yes Yes   Sig: Chew 1 tablet (81 mg) once daily.   atorvastatin (Lipitor) 80 mg tablet 11/21/2023 at am Self Yes Yes   Sig: Take 1 tablet (80 mg) by mouth once daily.   chlorthalidone (Hygroton) 25 mg tablet 11/21/2023 at am Self Yes Yes   Sig: Take 1 tablet (25 mg) by mouth once daily.   losartan (Cozaar) 100 mg tablet 11/21/2023 at am Self Yes Yes   Sig: Take 1 tablet (100 mg) by mouth once daily.   omeprazole (PriLOSEC) 20 mg DR capsule 11/21/2023 at am Self Yes Yes   Sig: Take 1 capsule (20 mg) by mouth once daily.   potassium chloride CR 10 mEq ER tablet 11/21/2023 Self Yes Yes   Sig: Take 1 tablet (10 mEq) by mouth 2 times a day.   thiamine 100 mg tablet 11/21/2023 Self Yes Yes   Sig: Take 1 tablet (100 mg) by mouth once daily.   traMADol (Ultram) 50 mg tablet     No Yes   Sig: Take 1 tablet (50 mg) by mouth every 8 hours if needed for severe pain (7 - 10) for up to 7 days.            Surgical History: No abdominal  "surgeries.    Allergies:  No Known Allergies    Social History:  He reports that he has quit smoking. His smoking use included cigarettes. He has never used smokeless tobacco. He reports that he does not currently use alcohol. He reports current drug use. Drug: Marijuana.     Family History:    Family History   Problem Relation Name Age of Onset    Hypertension Mother      Prostate cancer Father      Stroke Maternal Grandfather       No family history of GI or liver disease.     Objective     Vitals:    Vitals:    11/22/23 0830 11/22/23 1207 11/22/23 1510 11/22/23 1650   BP: 131/77 134/75 139/71 139/71   BP Location: Right arm Right arm  Left arm   Patient Position: Lying Lying  Lying   Pulse: 98 84 93 95   Resp: 16 16 16 18   Temp: 36.4 °C (97.5 °F) 36.4 °C (97.5 °F)  36.4 °C (97.5 °F)   TempSrc: Temporal Temporal  Temporal   SpO2: 96% 95% 98% 97%   Weight:    76.5 kg (168 lb 11.2 oz)   Height:   1.69 m (5' 6.54\")        Physical Exam  Gen: well appearing, A+Ox3, in no acute distress  HEENT: PEERL, EOMI, sclera anicteric, no conjunctival injection, MMM, no oropharyngeal lesions  Resp: CTAB, normal breath sounds, no wheezing/crackles/ rales  CV: RRR, normal S1/S2  GI: non-tender, non-distended, normal BSs in all 4 quadrants, no rebound or guarding, no masses palpable  MSK: warm and well perfused, no edema, scratch marks on LE bilaterally  Neuro: A+Ox3, no gross focal deficits  Skin:  R axilla fungating mass covered with clean bandage, no signs of erythema, non tender to palpation in surrounding area.        Labs:   Lab Results   Component Value Date    WBC 18.0 (H) 11/22/2023    HGB 12.6 (L) 11/22/2023    HCT 41.0 11/22/2023    MCV 93 11/22/2023     11/22/2023     Lab Results   Component Value Date    GLUCOSE 119 (H) 11/22/2023    CALCIUM 11.5 (H) 11/22/2023     11/22/2023    K 4.2 11/22/2023    CO2 26 11/22/2023    CL 97 (L) 11/22/2023    BUN 47 (H) 11/22/2023    CREATININE 1.48 (H) 11/22/2023     Lab " Results   Component Value Date    ALT 18 11/22/2023    AST 21 11/22/2023    ALKPHOS 87 11/22/2023    BILITOT 0.6 11/22/2023     Lab Results   Component Value Date    INR 1.4 (H) 11/22/2023    PROTIME 15.3 (H) 11/22/2023       Imaging:   PET/CT on 11- showed squamous cell carcinoma of unknown origin with significant involvement of the proximal right arm as well as the proximal esophagus, extensive osseous metastatic disease involving the axial and appendicular skeleton, multiple suspected muscular implants are also present, hypermetabolic nodule within the left lower lobe which may represent metastatic involvement or a primary lung lesion, left anterior pleural-based implant favoring metastatic disease, and possible fracture of the left 3rd and 4th metacarpal base.     GI procedures: No previous Endoscopy or colonoscopy    Assessment & Plan      Mike Olson is a 68 y.o. 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 Admitted for expedite workup for the malignancy workup . Gastroenterology is consulted for Progressive dysphagia.    The patient progressive Dysphagia is most likely related to the proximal esophageal mass seen on PET scan. The patients PET scan findings are concerning for possible esophageal primary source or Pulmonary source, further evaluation with EGD and biopsy is planned on Friday 11/24. The plan was discussed with the patient.     Recommendations:  - EGD on Friday 11/24.  - keep the patient NPO at midnight on 11/23.  - The patient is clear now for eating as tolerated.    Patient seen and discussed with attending, Dr. Wright.     Thank you for the consultation. Gastroenterology will continue to the follow the patient.   Please do not hesitate to contact me on Haiku or page [phone] if there are any further  questions between the weekday hours of 7 AM - 5 PM.   If there is an urgent concern during the weekend, after-hours, or holidays; then please page the on-call GI fellow at [phone]. Thank you.    SIGNATURE: Cary Moser MD PATIENT NAME: Mike Olson   DATE: November 22, 2023 MRN: [id number]

## 2023-11-22 NOTE — Clinical Note
Patient tolerated the procedure well and is comfortable with no complaints of pain. Vital signs stable. Arousable prior to transport. Patient recovery started in room with endo RN.

## 2023-11-22 NOTE — ED PROVIDER NOTES
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  States that he has had this trouble swallowing for past several months however it is worse over the past couple months including now a 40 pound weight loss.  States that he feels that he cannot get food down past his lower throat/upper chest.  Has only been able to tolerate liquids.  Has been chewing his medications to take them regularly which she states he is currently taking them.  Denies any fevers, chills, night with rigors.  Denies any pain of any kind.  Has been dealing with a right armpit mass for the past several months which recently was biopsied which showed the carcinoma and this was followed up by PET scan.  However results have not yet been communicated to him nor step going forward for treatment plan as they stated they were expecting a phone call however not yet received 1.  States that previously functioned independently however lately has needed a cane to ambulate due to generalized weakness  Intermission from alcohol use disorder and longtime tobacco use      Physical Exam:   GEN: Vitals noted. NAD  EYES:  EOMs grossly intact, anicteric sclera  HIGINIO: Mucosa moist.  NECK: Supple.  CARD: RRR  PULMONARY: Moving air well. Clear all lung fields.  ABDOMEN: Soft, no guarding, no rigidity. Nontender. NABS  EXTREMITIES: Full ROM, no pitting edema,   SKIN: Approximately 10 cm round fungating mass in the right axilla with mild weeping with no surrounding erythema  NEURO: Alert and oriented x 3, speech is clear, no obvious deficits noted.     EKG interpretation:  Sinus rhythm rate of 94, normal axis, normal levels, no ST segment  change.  ----------------------------------------------------------------------------------------------------------------------------    MDM:  60-year-old male with history as above presenting for trouble swallowing worsened over the past few weeks.  On exam he is well-appearing sitting up comfortably.  Vital signs stable.  Lungs CTA BL.  CV RRR.  ABD soft NTTP.  Does have a large fungating mass in his right axilla that appears to be unchanged from recent dermatology visit.  Outside imaging reviewed including his recent PET scan which does show tumor in his proximal esophagus most likely causing his dysphagia.  Will check blood work and admit to hospital for further evaluation.  Patient discussed with ED attending.  Lab work with leukocytosis to 18,000, no remarkable anemia, unchanged kidney function from recent.  X-rays unremarkable.  He is admitted to heme-onc service.  He remained hemodynamically stable with no new complaints.    XR hand left 3+ views   Final Result   No detectable traumatic left hand abnormality.   Signed by Emory Gonzalez MD      XR chest 1 view   Final Result   No acute cardiopulmonary abnormality.   Signed by Walker Burton MD        Labs Reviewed   COMPREHENSIVE METABOLIC PANEL - Abnormal       Result Value    Glucose 119 (*)     Sodium 139      Potassium 4.2      Chloride 97 (*)     Bicarbonate 26      Anion Gap 20      Urea Nitrogen 47 (*)     Creatinine 1.48 (*)     eGFR 51 (*)     Calcium 11.5 (*)     Albumin 3.4      Alkaline Phosphatase 87      Total Protein 8.2      AST 21      Bilirubin, Total 0.6      ALT 18     CBC WITH AUTO DIFFERENTIAL - Abnormal    WBC 18.0 (*)     nRBC 0.0      RBC 4.41 (*)     Hemoglobin 12.6 (*)     Hematocrit 41.0      MCV 93      MCH 28.6      MCHC 30.7 (*)     RDW 11.8      Platelets 417      Neutrophils % 82.9      Immature Granulocytes %, Automated 1.2 (*)     Lymphocytes % 7.5      Monocytes % 7.4      Eosinophils % 0.6      Basophils % 0.4       Neutrophils Absolute 14.91 (*)     Immature Granulocytes Absolute, Automated 0.22      Lymphocytes Absolute 1.35      Monocytes Absolute 1.34 (*)     Eosinophils Absolute 0.11      Basophils Absolute 0.08     PROTIME-INR - Abnormal    Protime 15.3 (*)     INR 1.4 (*)    PHOSPHORUS - Normal    Phosphorus 4.3     MAGNESIUM - Normal    Magnesium 1.76     TYPE AND SCREEN        ----------------------------------------------------------------------------------------------------------------------------    This note was dictated using a speech recognition program.  While an attempt was made at proof reading to minimize errors, minor errors in transcription may be present call for questions.     Indio Schneider PA-C  11/22/23 1049       Indio Schneider PA-C  11/22/23 1112

## 2023-11-22 NOTE — ED NOTES
Pharmacy Medication History Review    Mike Olson is a 68 y.o. male admitted for Difficulty swallowing. Pharmacy reviewed the patient's [id number] medications and allergies for accuracy.    The list below reflects the updated PTA list. Comments regarding how patient may be taking medications differently can be found in the Admit Orders Activity  Prior to Admission Medications   Prescriptions Last Dose Informant Patient Reported? Taking?   amLODIPine (Norvasc) 10 mg tablet 11/21/2023 at am Self Yes Yes   Sig: Take 1 tablet (10 mg) by mouth once daily.   aspirin 81 mg chewable tablet 11/21/2023 at am Self Yes Yes   Sig: Chew 1 tablet (81 mg) once daily.   atorvastatin (Lipitor) 80 mg tablet 11/21/2023 at am Self Yes Yes   Sig: Take 1 tablet (80 mg) by mouth once daily.   chlorthalidone (Hygroton) 25 mg tablet 11/21/2023 at am Self Yes Yes   Sig: Take 1 tablet (25 mg) by mouth once daily.   losartan (Cozaar) 100 mg tablet 11/21/2023 at am Self Yes Yes   Sig: Take 1 tablet (100 mg) by mouth once daily.   omeprazole (PriLOSEC) 20 mg DR capsule 11/21/2023 at am Self Yes Yes   Sig: Take 1 capsule (20 mg) by mouth once daily.   potassium chloride CR 10 mEq ER tablet 11/21/2023 Self Yes Yes   Sig: Take 1 tablet (10 mEq) by mouth 2 times a day.   thiamine 100 mg tablet 11/21/2023 Self Yes Yes   Sig: Take 1 tablet (100 mg) by mouth once daily.   traMADol (Ultram) 50 mg tablet   No Yes   Sig: Take 1 tablet (50 mg) by mouth every 8 hours if needed for severe pain (7 - 10) for up to 7 days.      Facility-Administered Medications: None        The list below reflects the updated allergy list. Please review each documented allergy for additional clarification and justification.  Allergies  Reviewed by Rose Guardado CPhT on 11/22/2023   No Known Allergies         Patient declines M2B at discharge. Pharmacy has been updated to [id number] on [facility].    Sources used to complete the med history  "include   OARRS, Surescripts fill history, Care everywhere  Patient interview, patient is a good historian, and had a list of his current medications available  Patient also brought his home medications with him to the ED    Below are additional concerns with the patient's PTA list.  None      Rose Guardado, PharmD, [facility] PGY1 Pharmacy Resident  [facility]  Please reach out via [employer] Secure Chat for questions, or if no response call [phone] or [employer] \"MedRec\"    "

## 2023-11-23 LAB
ALBUMIN SERPL BCP-MCNC: 3.4 G/DL (ref 3.4–5)
ANION GAP SERPL CALC-SCNC: 19 MMOL/L (ref 10–20)
BASOPHILS # BLD AUTO: 0.08 X10*3/UL (ref 0–0.1)
BASOPHILS NFR BLD AUTO: 0.5 %
BUN SERPL-MCNC: 34 MG/DL (ref 6–23)
CALCIUM SERPL-MCNC: 11.3 MG/DL (ref 8.6–10.6)
CHLORIDE SERPL-SCNC: 97 MMOL/L (ref 98–107)
CO2 SERPL-SCNC: 29 MMOL/L (ref 21–32)
CREAT SERPL-MCNC: 1.04 MG/DL (ref 0.5–1.3)
EOSINOPHIL # BLD AUTO: 0.3 X10*3/UL (ref 0–0.7)
EOSINOPHIL NFR BLD AUTO: 1.7 %
ERYTHROCYTE [DISTWIDTH] IN BLOOD BY AUTOMATED COUNT: 11.6 % (ref 11.5–14.5)
GFR SERPL CREATININE-BSD FRML MDRD: 78 ML/MIN/1.73M*2
GLUCOSE SERPL-MCNC: 78 MG/DL (ref 74–99)
HCT VFR BLD AUTO: 39 % (ref 41–52)
HGB BLD-MCNC: 12.6 G/DL (ref 13.5–17.5)
IMM GRANULOCYTES # BLD AUTO: 0.28 X10*3/UL (ref 0–0.7)
IMM GRANULOCYTES NFR BLD AUTO: 1.6 % (ref 0–0.9)
LYMPHOCYTES # BLD AUTO: 1.77 X10*3/UL (ref 1.2–4.8)
LYMPHOCYTES NFR BLD AUTO: 10.3 %
MAGNESIUM SERPL-MCNC: 1.56 MG/DL (ref 1.6–2.4)
MCH RBC QN AUTO: 29.2 PG (ref 26–34)
MCHC RBC AUTO-ENTMCNC: 32.3 G/DL (ref 32–36)
MCV RBC AUTO: 90 FL (ref 80–100)
MONOCYTES # BLD AUTO: 1.47 X10*3/UL (ref 0.1–1)
MONOCYTES NFR BLD AUTO: 8.5 %
NEUTROPHILS # BLD AUTO: 13.36 X10*3/UL (ref 1.2–7.7)
NEUTROPHILS NFR BLD AUTO: 77.4 %
NRBC BLD-RTO: 0 /100 WBCS (ref 0–0)
PHOSPHATE SERPL-MCNC: 3.4 MG/DL (ref 2.5–4.9)
PLATELET # BLD AUTO: 417 X10*3/UL (ref 150–450)
POTASSIUM SERPL-SCNC: 3.8 MMOL/L (ref 3.5–5.3)
RBC # BLD AUTO: 4.32 X10*6/UL (ref 4.5–5.9)
SODIUM SERPL-SCNC: 141 MMOL/L (ref 136–145)
WBC # BLD AUTO: 17.3 X10*3/UL (ref 4.4–11.3)

## 2023-11-23 PROCEDURE — 80069 RENAL FUNCTION PANEL: CPT | Performed by: STUDENT IN AN ORGANIZED HEALTH CARE EDUCATION/TRAINING PROGRAM

## 2023-11-23 PROCEDURE — 99232 SBSQ HOSP IP/OBS MODERATE 35: CPT | Performed by: INTERNAL MEDICINE

## 2023-11-23 PROCEDURE — 96372 THER/PROPH/DIAG INJ SC/IM: CPT | Performed by: STUDENT IN AN ORGANIZED HEALTH CARE EDUCATION/TRAINING PROGRAM

## 2023-11-23 PROCEDURE — 2500000001 HC RX 250 WO HCPCS SELF ADMINISTERED DRUGS (ALT 637 FOR MEDICARE OP)

## 2023-11-23 PROCEDURE — 83735 ASSAY OF MAGNESIUM: CPT | Performed by: STUDENT IN AN ORGANIZED HEALTH CARE EDUCATION/TRAINING PROGRAM

## 2023-11-23 PROCEDURE — 2500000004 HC RX 250 GENERAL PHARMACY W/ HCPCS (ALT 636 FOR OP/ED)

## 2023-11-23 PROCEDURE — 1170000001 HC PRIVATE ONCOLOGY ROOM DAILY

## 2023-11-23 PROCEDURE — 2500000001 HC RX 250 WO HCPCS SELF ADMINISTERED DRUGS (ALT 637 FOR MEDICARE OP): Performed by: STUDENT IN AN ORGANIZED HEALTH CARE EDUCATION/TRAINING PROGRAM

## 2023-11-23 PROCEDURE — 2500000004 HC RX 250 GENERAL PHARMACY W/ HCPCS (ALT 636 FOR OP/ED): Performed by: STUDENT IN AN ORGANIZED HEALTH CARE EDUCATION/TRAINING PROGRAM

## 2023-11-23 PROCEDURE — 36415 COLL VENOUS BLD VENIPUNCTURE: CPT | Performed by: STUDENT IN AN ORGANIZED HEALTH CARE EDUCATION/TRAINING PROGRAM

## 2023-11-23 PROCEDURE — 85025 COMPLETE CBC W/AUTO DIFF WBC: CPT | Performed by: STUDENT IN AN ORGANIZED HEALTH CARE EDUCATION/TRAINING PROGRAM

## 2023-11-23 RX ORDER — ACETAMINOPHEN 500 MG
5 TABLET ORAL NIGHTLY PRN
Status: DISCONTINUED | OUTPATIENT
Start: 2023-11-23 | End: 2023-11-28

## 2023-11-23 RX ORDER — MAGNESIUM SULFATE HEPTAHYDRATE 40 MG/ML
2 INJECTION, SOLUTION INTRAVENOUS ONCE
Status: COMPLETED | OUTPATIENT
Start: 2023-11-23 | End: 2023-11-23

## 2023-11-23 RX ADMIN — ASPIRIN 81 MG CHEWABLE TABLET 81 MG: 81 TABLET CHEWABLE at 09:32

## 2023-11-23 RX ADMIN — MAGNESIUM SULFATE HEPTAHYDRATE 2 G: 40 INJECTION, SOLUTION INTRAVENOUS at 14:55

## 2023-11-23 RX ADMIN — ENOXAPARIN SODIUM 40 MG: 100 INJECTION SUBCUTANEOUS at 12:18

## 2023-11-23 RX ADMIN — OXYCODONE HYDROCHLORIDE 5 MG: 5 TABLET ORAL at 09:33

## 2023-11-23 RX ADMIN — ACETAMINOPHEN 975 MG: 325 TABLET ORAL at 09:32

## 2023-11-23 RX ADMIN — AMLODIPINE BESYLATE 10 MG: 10 TABLET ORAL at 09:33

## 2023-11-23 RX ADMIN — CHLORTHALIDONE 25 MG: 25 TABLET ORAL at 09:33

## 2023-11-23 RX ADMIN — ACETAMINOPHEN 975 MG: 325 TABLET ORAL at 17:40

## 2023-11-23 RX ADMIN — OXYCODONE HYDROCHLORIDE 5 MG: 5 TABLET ORAL at 17:40

## 2023-11-23 RX ADMIN — ATORVASTATIN CALCIUM 80 MG: 80 TABLET, FILM COATED ORAL at 09:32

## 2023-11-23 ASSESSMENT — PAIN SCALES - GENERAL
PAINLEVEL_OUTOF10: 7
PAINLEVEL_OUTOF10: 8
PAINLEVEL_OUTOF10: 0 - NO PAIN
PAINLEVEL_OUTOF10: 7
PAINLEVEL_OUTOF10: 8
PAINLEVEL_OUTOF10: 0 - NO PAIN

## 2023-11-23 ASSESSMENT — COGNITIVE AND FUNCTIONAL STATUS - GENERAL
MOBILITY SCORE: 21
DAILY ACTIVITIY SCORE: 21
EATING MEALS: A LITTLE
WALKING IN HOSPITAL ROOM: A LITTLE
CLIMB 3 TO 5 STEPS WITH RAILING: A LITTLE
DRESSING REGULAR UPPER BODY CLOTHING: A LITTLE
DRESSING REGULAR LOWER BODY CLOTHING: A LITTLE
DRESSING REGULAR UPPER BODY CLOTHING: A LITTLE
STANDING UP FROM CHAIR USING ARMS: A LITTLE
STANDING UP FROM CHAIR USING ARMS: A LITTLE
WALKING IN HOSPITAL ROOM: A LITTLE
TOILETING: A LITTLE
DAILY ACTIVITIY SCORE: 21
CLIMB 3 TO 5 STEPS WITH RAILING: A LITTLE
MOBILITY SCORE: 21
DRESSING REGULAR LOWER BODY CLOTHING: A LITTLE

## 2023-11-23 ASSESSMENT — PAIN - FUNCTIONAL ASSESSMENT: PAIN_FUNCTIONAL_ASSESSMENT: 0-10

## 2023-11-23 NOTE — PROGRESS NOTES
"Mike Olson is a 68 y.o. male on day 1 of admission presenting with Difficulty swallowing.    Subjective   No acute events overnight. Today, the patient reports that he is doing well with no concerns. Denies worsening right axillary pain, throat pain, N/V, diarrhea/constipation.       Objective     Physical Exam  Constitutional:       General: He is not in acute distress.     Appearance: Normal appearance.   HENT:      Head: Normocephalic and atraumatic.      Mouth/Throat:      Mouth: Mucous membranes are moist.   Eyes:      Extraocular Movements: Extraocular movements intact.      Conjunctiva/sclera: Conjunctivae normal.      Pupils: Pupils are equal, round, and reactive to light.   Cardiovascular:      Rate and Rhythm: Normal rate and regular rhythm.      Heart sounds: No murmur heard.  Pulmonary:      Effort: Pulmonary effort is normal.   Abdominal:      General: Bowel sounds are normal. There is no distension.      Palpations: Abdomen is soft.      Tenderness: There is no abdominal tenderness.   Skin:     General: Skin is warm and dry.      Comments: R axilla fungating mass covered with clean bandage, no signs of erythema, non tender to palpation in surrounding area.   Neurological:      General: No focal deficit present.      Mental Status: He is alert and oriented to person, place, and time.      Gait: Gait normal.   Psychiatric:      Comments: Appropriate mood and affect     Last Recorded Vitals  Blood pressure 126/69, pulse 77, temperature 36.3 °C (97.3 °F), temperature source Temporal, resp. rate 18, height 1.69 m (5' 6.54\"), weight 76.5 kg (168 lb 11.2 oz), SpO2 95 %.  Intake/Output last 3 Shifts:  I/O last 3 completed shifts:  In: 652.1 (8.5 mL/kg) [I.V.:652.1 (8.5 mL/kg)]  Out: 300 (3.9 mL/kg) [Urine:300 (0.1 mL/kg/hr)]  Weight: 76.5 kg     Relevant Results  Scheduled medications  acetaminophen, 975 mg, oral, q8h  amLODIPine, 10 mg, oral, Daily  aspirin, 81 mg, oral, Daily  atorvastatin, 80 mg, oral, " Daily  chlorthalidone, 25 mg, oral, Daily  enoxaparin, 40 mg, subcutaneous, q24h  [Held by provider] pantoprazole, 20 mg, oral, Daily before breakfast  polyethylene glycol, 17 g, oral, Daily      Continuous medications  lactated Ringer's, 125 mL/hr, Last Rate: 125 mL/hr (11/22/23 1247)      PRN medications  PRN medications: melatonin, oxyCODONE  Results for orders placed or performed during the hospital encounter of 11/22/23 (from the past 24 hour(s))   CBC and Auto Differential   Result Value Ref Range    WBC 17.3 (H) 4.4 - 11.3 x10*3/uL    nRBC 0.0 0.0 - 0.0 /100 WBCs    RBC 4.32 (L) 4.50 - 5.90 x10*6/uL    Hemoglobin 12.6 (L) 13.5 - 17.5 g/dL    Hematocrit 39.0 (L) 41.0 - 52.0 %    MCV 90 80 - 100 fL    MCH 29.2 26.0 - 34.0 pg    MCHC 32.3 32.0 - 36.0 g/dL    RDW 11.6 11.5 - 14.5 %    Platelets 417 150 - 450 x10*3/uL    Neutrophils % 77.4 40.0 - 80.0 %    Immature Granulocytes %, Automated 1.6 (H) 0.0 - 0.9 %    Lymphocytes % 10.3 13.0 - 44.0 %    Monocytes % 8.5 2.0 - 10.0 %    Eosinophils % 1.7 0.0 - 6.0 %    Basophils % 0.5 0.0 - 2.0 %    Neutrophils Absolute 13.36 (H) 1.20 - 7.70 x10*3/uL    Immature Granulocytes Absolute, Automated 0.28 0.00 - 0.70 x10*3/uL    Lymphocytes Absolute 1.77 1.20 - 4.80 x10*3/uL    Monocytes Absolute 1.47 (H) 0.10 - 1.00 x10*3/uL    Eosinophils Absolute 0.30 0.00 - 0.70 x10*3/uL    Basophils Absolute 0.08 0.00 - 0.10 x10*3/uL   Renal function panel   Result Value Ref Range    Glucose 78 74 - 99 mg/dL    Sodium 141 136 - 145 mmol/L    Potassium 3.8 3.5 - 5.3 mmol/L    Chloride 97 (L) 98 - 107 mmol/L    Bicarbonate 29 21 - 32 mmol/L    Anion Gap 19 10 - 20 mmol/L    Urea Nitrogen 34 (H) 6 - 23 mg/dL    Creatinine 1.04 0.50 - 1.30 mg/dL    eGFR 78 >60 mL/min/1.73m*2    Calcium 11.3 (H) 8.6 - 10.6 mg/dL    Phosphorus 3.4 2.5 - 4.9 mg/dL    Albumin 3.4 3.4 - 5.0 g/dL   Magnesium   Result Value Ref Range    Magnesium 1.56 (L) 1.60 - 2.40 mg/dL     ECG 12 lead    Result Date:  11/22/2023  Normal sinus rhythm Normal ECG When compared with ECG of 16-SEP-2023 20:38, Previous ECG has undetermined rhythm, needs review See ED provider note for full interpretation and clinical correlation Confirmed by Lisa Figueroa ([id number]) on 11/22/2023 12:25:32 PM    XR chest 1 view    Result Date: 11/22/2023  STUDY: Chest Radiograph;  11/22/2023, 9:23AM. INDICATION: Shortness of breath. COMPARISON: None. ACCESSION NUMBER(S): [id number] ORDERING CLINICIAN: JANENE GARCÍA TECHNIQUE:  Frontal chest was obtained at 9:10 hours. FINDINGS: CARDIOMEDIASTINAL SILHOUETTE: Cardiomediastinal silhouette is normal in size and configuration.  LUNGS: Lungs are clear.  ABDOMEN: No remarkable upper abdominal findings.  BONES: No acute osseous changes.    No acute cardiopulmonary abnormality. Signed by Walker Burton MD    XR hand left 3+ views    Result Date: 11/22/2023  STUDY: Hand Radiographs; 11/22/2023 9:23 AM INDICATION: Left third and fourth finger pain. Evaluate for fracture. COMPARISON: None. ACCESSION NUMBER(S): [id number] ORDERING CLINICIAN: JANENE GARCÍA TECHNIQUE:  Three view(s) of the left hand. FINDINGS:  There is no detectable displaced fracture, including at the left third and fourth fingers.  The alignment is anatomic.  No soft tissue abnormality is seen.    No detectable traumatic left hand abnormality. Signed by Emory Gonzalez MD                                Assessment/Plan   Principal Problem:    Difficulty swallowing  Active Problems:    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 Given new diagnosis, no treatment has been started yet and no known origin yet. Will discuss with Med Onc (Dr. Yin was to be his primary oncologist), Surg Onc (Dr Corona had already seen pt) and will also consult GI for EGD/biopsy. Per GI, CLD as  tolerated and NPO at [facility] for EGD 11/24.     #Metastatic SCC, poorly differentiated   #Dysphagia   -Diagnosed 11/2023; no treatment yet; unknown origin  -Surg Onc consulted; appreciate recs (saw Dr. Croona outpatient 11/10/23)  -SLP consulted for swallow evaluation and recommended NPO, but GI said ok for clear liquid diet  -Consulted GI for possible EGD/biopsy; plan for EGD on Friday 11/24  -Plan to inform Dr. Yin (future medical oncologist-1st appt scheduled 12/1)  -Consider consulting pulm for possible lung biopsy as potential origin   -Tylenol PRN for pain; patient denies any pain currently     #MARYELLEN (resolved)  -Baseline Cr ~1; on admission 1.48, was 1.50 on 11/16 -> now at baseline 1.04 on 11/23  -S/p 1L LR, IV LR infusion at 125cc/hr, given improvement in Cr likely pre-renal  -Hold ARB for now  -Continue to monitor      #HTN  -Continue home amlodipine 10mg daily  -Continue home chlorithalidone 25mg daily  -Hold ARB given MARYELLEN     #CVA 9/2023  #Carotid stenosis  -2 acute or subacute infarcts seen on MRI 9/18/2023  -Continue ASA 81mg daily  -Completed 21 days of plavix (started 9/18/23)  -Continue atorvastatin 80mg daily  -Vascular Surgery follow up outpatient for the carotid stenosis     #Hypercalcemia  -Ca 11.5 on admission  -S/p 1L LR in the ED, on LR infusion at 125cc/hr  -Follow up repeat     #Elevated PSA  -Urology follow up outpatient     Disposition: tele  Follow-ups: PCP, Onc, Surg Onc,     F: prn  E: prn   N: CLD, NPO at [facility]  A: PIVs     DVT ppx: lovenox  GI ppx: home PPI    Code Status: FULL (confirmed on 11/22/23)  Surrogate Medical Decision-maker: Wife April [phone]           Axel Willoughby MD

## 2023-11-23 NOTE — CARE PLAN
The patient's goals for the shift include      The clinical goals for the shift include Pt will remain safe and free of injury and fall by end of shift    Pt goals progressing.   Problem: Fall/Injury  Goal: Not fall by end of shift  11/23/2023 1043 by Alxe Olea RN  Outcome: Progressing  11/23/2023 1042 by Alex Olea RN  Outcome: Progressing  Goal: Be free from injury by end of the shift  11/23/2023 1043 by Alex Olea RN  Outcome: Progressing  11/23/2023 1042 by Alex Olea RN  Outcome: Progressing  Goal: Verbalize understanding of personal risk factors for fall in the hospital  11/23/2023 1043 by Alex Olea RN  Outcome: Progressing  11/23/2023 1042 by Alex Olea RN  Outcome: Progressing  Goal: Verbalize understanding of risk factor reduction measures to prevent injury from fall in the home  11/23/2023 1043 by Alex Olea RN  Outcome: Progressing  11/23/2023 1042 by Alex Olea RN  Outcome: Progressing  Goal: Use assistive devices by end of the shift  11/23/2023 1043 by Alex Olea RN  Outcome: Progressing  11/23/2023 1042 by Alex Olea RN  Outcome: Progressing  Goal: Pace activities to prevent fatigue by end of the shift  11/23/2023 1043 by Alex Olea RN  Outcome: Progressing  11/23/2023 1042 by Alex Olea RN  Outcome: Progressing     Problem: Skin  Goal: Decreased wound size/increased tissue granulation at next dressing change  11/23/2023 1043 by Alex Olea RN  Outcome: Progressing  11/23/2023 1042 by Alex Olea RN  Outcome: Progressing  Goal: Participates in plan/prevention/treatment measures  11/23/2023 1043 by Alex Olea RN  Outcome: Progressing  11/23/2023 1042 by Alex Olea RN  Outcome: Progressing  Goal: Prevent/manage excess moisture  11/23/2023 1043 by lAex Olea RN  Outcome: Progressing  11/23/2023 1042 by Alex Olea RN  Outcome: Progressing  Goal: Prevent/minimize sheer/friction  injuries  11/23/2023 1043 by Alex Olea RN  Outcome: Progressing  11/23/2023 1042 by Alex Olea RN  Outcome: Progressing  Goal: Promote/optimize nutrition  11/23/2023 1043 by Alex Olea RN  Outcome: Progressing  11/23/2023 1042 by Alex Olea, RN  Outcome: Progressing  Goal: Promote skin healing  11/23/2023 1043 by Alex Olea RN  Outcome: Progressing  11/23/2023 1042 by Alex Olea RN  Outcome: Progressing

## 2023-11-23 NOTE — HOSPITAL COURSE
69yo with hx of HTN, HLD, chronic alcoholism, long term tobacco usage (chewing tobacco), CVA 2/2 carotid artery stenosis (09/17/2023, finished course of DAPT) hx of rapidly enlarging right axillary mass who initially presented through ED for workup/treatment of worsening dysphagia. PET scan 11/2023 showed widely metastatic disease to bone, proximal esophagus, and lung.  Given new diagnosis, no treatment has been started yet and no known origin yet. Consulted GI who performed EGD/biopsy on 11/24 which showed a large, nearly completely obstructing esophageal mass. Rad onc was consulted for palliative radiation therapy, plan for CT sim on 11/27 and begin radiation treatment after. General surgery was consulted for possible G-tube and port placement for chemo infusion and plan to take patient to OR on 11/30.  Eventually had PEG tube placed with general surgery without complication.  Patient began palliative radiation with radiation oncology in 5 fractions to target the esophageal mass.  Patient was tolerating radiation treatment but did develop signs and symptoms of delirium while inpatient.  Most notably deliriums symptoms were worse on 12/8 with the patient was only oriented to self and had an increasing oxygen requirement that necessitated 2 L of supplemental oxygen therapy.  The precipitation of the delirium is likely multifactorial but initiation of methadone preceded the delirium 3 days prior and likely contributed to the bladder retention and somnolence.  After discontinuing the patient's methadone, he was more awake and oriented on 12/9 upon evaluation by providers and by family.  Repeats chest x-rays demonstrated pulmonary edema and diuresis with multiple doses of Lasix was given over the course of 3 days.  On 12/8 patient did develop bladder retention and was straight cathed 3 times and PVR demonstrated a volume of greater than 400 cc, bladder.  A Vazquez catheter was placed on 12/8 and the patient was net  negative on 12/9 with 1.9 L out in 24 hours and net -1 L.  Patient's creatinine slowly improved during this time although his white count slowly crept up.  CT chest without contrast was ordered on 12/9 due to persistent oxygen requirement and elevated white count on antibiotics.  CT chest demonstrated coarse bibasilar airspace opacities that may correlate with residual aspiration pneumonia. Patient also received RT to R axillary mass from 12/5-12/11. On 12/12 in AM rapid response was called for fever, tachycardia, and altered mental status. Wound cultures from R axillary mass started growing yeast on 12/13. ID was consulted and patient was started on Micafungin in addition to Vancomycin and Meropenem. On 12/14 patient became increasingly tachypneic and pain was out of proportion. Patient made comfort care on 12/14 and was admitted to [facility] on 12/15.

## 2023-11-23 NOTE — PROGRESS NOTES
"[facility]  [facility] [place]  CONSULT FOLLOW-UP     Reason For Consult  Progressive dysphagia, esophageal mass on PET CT    History Of Present Illness  Mike Olson is a 68 y.o. 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 Admitted for expedite workup for the malignancy workup . Gastroenterology is consulted for Progressive dysphagia     SUBJECTIVE     NAEO  Patient has no new complaints this morning    EXAM     Last Recorded Vitals  Blood pressure 126/69, pulse 77, temperature 36.3 °C (97.3 °F), temperature source Temporal, resp. rate 18, height 1.69 m (5' 6.54\"), weight 76.5 kg (168 lb 11.2 oz), SpO2 95 %.      Intake/Output Summary (Last 24 hours) at 11/23/2023 1241  Last data filed at 11/23/2023 0200  Gross per 24 hour   Intake 652.08 ml   Output 300 ml   Net 352.08 ml          Physical Exam  General: well-nourished, no acute distress  HEENT: PERRLA, EOM intact, no scleral icterus, moist MM  Respiratory: CTA bilaterally, normal work of breathing  Cardiovascular: RRR, no murmurs/rubs/gallops  Abdomen: Soft, nontender, nondistended, bowel sounds present, no masses palpated, no organomeagly  Extremities: no edema, no asterixis  Neuro: alert and oriented, CNII-XII grossly intact, moves all 4 extremities with no focal deficits      OBJECTIVE                                                                              Medications             Current Facility-Administered Medications:     acetaminophen (Tylenol) tablet 975 mg, 975 mg, oral, q8h, Glo Cooper MD, 975 mg at 11/23/23 0932    amLODIPine (Norvasc) tablet 10 mg, 10 mg, oral, Daily, Alison Pride MD, 10 mg at 11/23/23 0933    aspirin chewable tablet 81 mg, 81 mg, oral, " Daily, Alison Pride MD, 81 mg at 11/23/23 0932    atorvastatin (Lipitor) tablet 80 mg, 80 mg, oral, Daily, Alison Pride MD, 80 mg at 11/23/23 0932    chlorthalidone (Hygroton) tablet 25 mg, 25 mg, oral, Daily, Alison Pride MD, 25 mg at 11/23/23 0933    enoxaparin (Lovenox) syringe 40 mg, 40 mg, subcutaneous, q24h, Alison Pride MD, 40 mg at 11/23/23 1218    lactated Ringer's infusion, 125 mL/hr, intravenous, Continuous, Indio Schneider PA-C, Last Rate: 125 mL/hr at 11/22/23 1247, 125 mL/hr at 11/22/23 1247    oxyCODONE (Roxicodone) immediate release tablet 5 mg, 5 mg, oral, q6h PRN, Glo Cooper MD, 5 mg at 11/23/23 0933    pantoprazole (ProtoNix) EC tablet 20 mg, 20 mg, oral, Daily before breakfast, Alison Pride MD    polyethylene glycol (Glycolax, Miralax) packet 17 g, 17 g, oral, Daily, Alison Pride MD                                                                            Labs     Results for orders placed or performed during the hospital encounter of 11/22/23 (from the past 24 hour(s))   CBC and Auto Differential   Result Value Ref Range    WBC 17.3 (H) 4.4 - 11.3 x10*3/uL    nRBC 0.0 0.0 - 0.0 /100 WBCs    RBC 4.32 (L) 4.50 - 5.90 x10*6/uL    Hemoglobin 12.6 (L) 13.5 - 17.5 g/dL    Hematocrit 39.0 (L) 41.0 - 52.0 %    MCV 90 80 - 100 fL    MCH 29.2 26.0 - 34.0 pg    MCHC 32.3 32.0 - 36.0 g/dL    RDW 11.6 11.5 - 14.5 %    Platelets 417 150 - 450 x10*3/uL    Neutrophils % 77.4 40.0 - 80.0 %    Immature Granulocytes %, Automated 1.6 (H) 0.0 - 0.9 %    Lymphocytes % 10.3 13.0 - 44.0 %    Monocytes % 8.5 2.0 - 10.0 %    Eosinophils % 1.7 0.0 - 6.0 %    Basophils % 0.5 0.0 - 2.0 %    Neutrophils Absolute 13.36 (H) 1.20 - 7.70 x10*3/uL    Immature Granulocytes Absolute, Automated 0.28 0.00 - 0.70 x10*3/uL    Lymphocytes Absolute 1.77 1.20 - 4.80 x10*3/uL    Monocytes Absolute 1.47 (H) 0.10 - 1.00 x10*3/uL    Eosinophils Absolute 0.30 0.00 - 0.70 x10*3/uL    Basophils Absolute 0.08 0.00 - 0.10 x10*3/uL    Renal function panel   Result Value Ref Range    Glucose 78 74 - 99 mg/dL    Sodium 141 136 - 145 mmol/L    Potassium 3.8 3.5 - 5.3 mmol/L    Chloride 97 (L) 98 - 107 mmol/L    Bicarbonate 29 21 - 32 mmol/L    Anion Gap 19 10 - 20 mmol/L    Urea Nitrogen 34 (H) 6 - 23 mg/dL    Creatinine 1.04 0.50 - 1.30 mg/dL    eGFR 78 >60 mL/min/1.73m*2    Calcium 11.3 (H) 8.6 - 10.6 mg/dL    Phosphorus 3.4 2.5 - 4.9 mg/dL    Albumin 3.4 3.4 - 5.0 g/dL   Magnesium   Result Value Ref Range    Magnesium 1.56 (L) 1.60 - 2.40 mg/dL                                                                            Imaging     PET CT  IMPRESSION:  1. Squamous cell carcinoma of unknown origin with significant  involvement of the proximal right arm as well as the proximal  esophagus.  2. Extensive osseous metastatic disease involving the axial and  appendicular skeleton is present as described above. Multiple  suspected muscular implants are also present.  3. Hypermetabolic nodule within the left lower lobe which may  represent metastatic involvement or a primary lung lesion.  4. Left anterior pleural-based implant favoring metastatic disease.  5. Possible fracture of the left 3rd and 4th metacarpal base.  Recommend correlation with radiograph of the left hand.                                                                         GI Procedures     No Prior      ASSESSMENT / PLAN     ASSESSMENT/PLAN:  Mike Olson is a 68 y.o. 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 Admitted for expedite workup for the malignancy workup . Gastroenterology is consulted for Progressive dysphagia     Patient's progressive dysphagia is most likely related to the proximal esophageal mass seen on PET scan. The patients  PET scan findings are concerning for possible esophageal primary source or pulmonary source. Plan to proceed with EGD for evaluation       Recommendations:  -Okay for diet as tolerated today  -Plan for EGD tomorrow 11/24  -Please make NPO at [facility]    Patient was seen and discussed with Dr. Wright    Thank you for this interesting consult. Gastroenterology will continue to follow.  -During weekday hours of 7am-5pm please do not hesitate to contact me on [employer] Chat or page [phone] if there are any further questions between the weekday hours of 7 AM - 5 PM.   -After hours, on weekends, and on holidays, please page the on-call GI fellow at [phone]. Thank you.      Mary Alex MD  PGY4 Gastroenterology Fellow  [facility] [place]

## 2023-11-24 ENCOUNTER — APPOINTMENT (OUTPATIENT)
Dept: GASTROENTEROLOGY | Facility: HOSPITAL | Age: 68
DRG: 356 | End: 2023-11-24
Payer: MEDICARE

## 2023-11-24 LAB
ALBUMIN SERPL BCP-MCNC: 3.1 G/DL (ref 3.4–5)
ANION GAP SERPL CALC-SCNC: 20 MMOL/L (ref 10–20)
BASOPHILS # BLD AUTO: 0.07 X10*3/UL (ref 0–0.1)
BASOPHILS NFR BLD AUTO: 0.4 %
BUN SERPL-MCNC: 23 MG/DL (ref 6–23)
CALCIUM SERPL-MCNC: 10.2 MG/DL (ref 8.6–10.6)
CHLORIDE SERPL-SCNC: 98 MMOL/L (ref 98–107)
CO2 SERPL-SCNC: 27 MMOL/L (ref 21–32)
CREAT SERPL-MCNC: 0.78 MG/DL (ref 0.5–1.3)
EOSINOPHIL # BLD AUTO: 0.36 X10*3/UL (ref 0–0.7)
EOSINOPHIL NFR BLD AUTO: 2.3 %
ERYTHROCYTE [DISTWIDTH] IN BLOOD BY AUTOMATED COUNT: 11.4 % (ref 11.5–14.5)
GFR SERPL CREATININE-BSD FRML MDRD: >90 ML/MIN/1.73M*2
GLUCOSE BLD MANUAL STRIP-MCNC: 71 MG/DL (ref 74–99)
GLUCOSE BLD MANUAL STRIP-MCNC: 74 MG/DL (ref 74–99)
GLUCOSE SERPL-MCNC: 65 MG/DL (ref 74–99)
HCT VFR BLD AUTO: 35.2 % (ref 41–52)
HGB BLD-MCNC: 11.3 G/DL (ref 13.5–17.5)
IMM GRANULOCYTES # BLD AUTO: 0.19 X10*3/UL (ref 0–0.7)
IMM GRANULOCYTES NFR BLD AUTO: 1.2 % (ref 0–0.9)
LYMPHOCYTES # BLD AUTO: 1.31 X10*3/UL (ref 1.2–4.8)
LYMPHOCYTES NFR BLD AUTO: 8.2 %
MAGNESIUM SERPL-MCNC: 1.49 MG/DL (ref 1.6–2.4)
MCH RBC QN AUTO: 28.7 PG (ref 26–34)
MCHC RBC AUTO-ENTMCNC: 32.1 G/DL (ref 32–36)
MCV RBC AUTO: 89 FL (ref 80–100)
MONOCYTES # BLD AUTO: 1.46 X10*3/UL (ref 0.1–1)
MONOCYTES NFR BLD AUTO: 9.2 %
NEUTROPHILS # BLD AUTO: 12.54 X10*3/UL (ref 1.2–7.7)
NEUTROPHILS NFR BLD AUTO: 78.7 %
NRBC BLD-RTO: 0 /100 WBCS (ref 0–0)
PHOSPHATE SERPL-MCNC: 3.4 MG/DL (ref 2.5–4.9)
PLATELET # BLD AUTO: 353 X10*3/UL (ref 150–450)
POTASSIUM SERPL-SCNC: 3.4 MMOL/L (ref 3.5–5.3)
RBC # BLD AUTO: 3.94 X10*6/UL (ref 4.5–5.9)
SODIUM SERPL-SCNC: 142 MMOL/L (ref 136–145)
WBC # BLD AUTO: 15.9 X10*3/UL (ref 4.4–11.3)

## 2023-11-24 PROCEDURE — 2500000001 HC RX 250 WO HCPCS SELF ADMINISTERED DRUGS (ALT 637 FOR MEDICARE OP)

## 2023-11-24 PROCEDURE — 80069 RENAL FUNCTION PANEL: CPT

## 2023-11-24 PROCEDURE — 85025 COMPLETE CBC W/AUTO DIFF WBC: CPT

## 2023-11-24 PROCEDURE — 3700000012 HC SEDATION LEVEL 5+ TIME - INITIAL 15 MINUTES 5/> YEARS

## 2023-11-24 PROCEDURE — 88305 TISSUE EXAM BY PATHOLOGIST: CPT | Mod: TC,SUR | Performed by: INTERNAL MEDICINE

## 2023-11-24 PROCEDURE — 96372 THER/PROPH/DIAG INJ SC/IM: CPT | Performed by: STUDENT IN AN ORGANIZED HEALTH CARE EDUCATION/TRAINING PROGRAM

## 2023-11-24 PROCEDURE — 1170000001 HC PRIVATE ONCOLOGY ROOM DAILY

## 2023-11-24 PROCEDURE — 7100000009 HC PHASE TWO TIME - INITIAL BASE CHARGE

## 2023-11-24 PROCEDURE — 88305 TISSUE EXAM BY PATHOLOGIST: CPT | Performed by: STUDENT IN AN ORGANIZED HEALTH CARE EDUCATION/TRAINING PROGRAM

## 2023-11-24 PROCEDURE — 82947 ASSAY GLUCOSE BLOOD QUANT: CPT

## 2023-11-24 PROCEDURE — 99152 MOD SED SAME PHYS/QHP 5/>YRS: CPT | Performed by: INTERNAL MEDICINE

## 2023-11-24 PROCEDURE — 43202 ESOPHAGOSCOPY FLEX BIOPSY: CPT

## 2023-11-24 PROCEDURE — 36415 COLL VENOUS BLD VENIPUNCTURE: CPT

## 2023-11-24 PROCEDURE — 97161 PT EVAL LOW COMPLEX 20 MIN: CPT | Mod: GP

## 2023-11-24 PROCEDURE — 99232 SBSQ HOSP IP/OBS MODERATE 35: CPT

## 2023-11-24 PROCEDURE — 2500000001 HC RX 250 WO HCPCS SELF ADMINISTERED DRUGS (ALT 637 FOR MEDICARE OP): Performed by: STUDENT IN AN ORGANIZED HEALTH CARE EDUCATION/TRAINING PROGRAM

## 2023-11-24 PROCEDURE — 43202 ESOPHAGOSCOPY FLEX BIOPSY: CPT | Performed by: INTERNAL MEDICINE

## 2023-11-24 PROCEDURE — 0DB28ZX EXCISION OF MIDDLE ESOPHAGUS, VIA NATURAL OR ARTIFICIAL OPENING ENDOSCOPIC, DIAGNOSTIC: ICD-10-PCS | Performed by: SURGERY

## 2023-11-24 PROCEDURE — 83735 ASSAY OF MAGNESIUM: CPT

## 2023-11-24 PROCEDURE — 94760 N-INVAS EAR/PLS OXIMETRY 1: CPT

## 2023-11-24 PROCEDURE — 2500000004 HC RX 250 GENERAL PHARMACY W/ HCPCS (ALT 636 FOR OP/ED)

## 2023-11-24 PROCEDURE — 2500000004 HC RX 250 GENERAL PHARMACY W/ HCPCS (ALT 636 FOR OP/ED): Performed by: INTERNAL MEDICINE

## 2023-11-24 PROCEDURE — 88342 IMHCHEM/IMCYTCHM 1ST ANTB: CPT | Performed by: STUDENT IN AN ORGANIZED HEALTH CARE EDUCATION/TRAINING PROGRAM

## 2023-11-24 PROCEDURE — 2500000004 HC RX 250 GENERAL PHARMACY W/ HCPCS (ALT 636 FOR OP/ED): Performed by: STUDENT IN AN ORGANIZED HEALTH CARE EDUCATION/TRAINING PROGRAM

## 2023-11-24 PROCEDURE — 7100000010 HC PHASE TWO TIME - EACH INCREMENTAL 1 MINUTE

## 2023-11-24 PROCEDURE — 0DC58ZZ EXTIRPATION OF MATTER FROM ESOPHAGUS, VIA NATURAL OR ARTIFICIAL OPENING ENDOSCOPIC: ICD-10-PCS | Performed by: SURGERY

## 2023-11-24 PROCEDURE — 88341 IMHCHEM/IMCYTCHM EA ADD ANTB: CPT | Performed by: STUDENT IN AN ORGANIZED HEALTH CARE EDUCATION/TRAINING PROGRAM

## 2023-11-24 RX ORDER — POTASSIUM CHLORIDE 14.9 MG/ML
20 INJECTION INTRAVENOUS ONCE
Status: COMPLETED | OUTPATIENT
Start: 2023-11-24 | End: 2023-11-24

## 2023-11-24 RX ORDER — DEXTROSE MONOHYDRATE AND SODIUM CHLORIDE 5; .9 G/100ML; G/100ML
100 INJECTION, SOLUTION INTRAVENOUS CONTINUOUS
Status: DISCONTINUED | OUTPATIENT
Start: 2023-11-24 | End: 2023-11-28

## 2023-11-24 RX ORDER — SODIUM CHLORIDE 9 MG/ML
125 INJECTION, SOLUTION INTRAVENOUS CONTINUOUS
Status: DISCONTINUED | OUTPATIENT
Start: 2023-11-24 | End: 2023-11-24

## 2023-11-24 RX ORDER — MIDAZOLAM HYDROCHLORIDE 1 MG/ML
INJECTION, SOLUTION INTRAMUSCULAR; INTRAVENOUS AS NEEDED
Status: DISCONTINUED | OUTPATIENT
Start: 2023-11-24 | End: 2023-12-07

## 2023-11-24 RX ORDER — MEPERIDINE HYDROCHLORIDE 50 MG/ML
INJECTION INTRAMUSCULAR; INTRAVENOUS; SUBCUTANEOUS AS NEEDED
Status: DISCONTINUED | OUTPATIENT
Start: 2023-11-24 | End: 2023-12-15 | Stop reason: HOSPADM

## 2023-11-24 RX ORDER — MAGNESIUM SULFATE HEPTAHYDRATE 40 MG/ML
4 INJECTION, SOLUTION INTRAVENOUS ONCE
Status: COMPLETED | OUTPATIENT
Start: 2023-11-24 | End: 2023-11-24

## 2023-11-24 RX ORDER — LOSARTAN POTASSIUM 50 MG/1
100 TABLET ORAL DAILY
Status: DISCONTINUED | OUTPATIENT
Start: 2023-11-24 | End: 2023-11-28

## 2023-11-24 RX ADMIN — LOSARTAN POTASSIUM 100 MG: 50 TABLET, FILM COATED ORAL at 11:17

## 2023-11-24 RX ADMIN — POTASSIUM CHLORIDE 20 MEQ: 14.9 INJECTION, SOLUTION INTRAVENOUS at 18:00

## 2023-11-24 RX ADMIN — MAGNESIUM SULFATE HEPTAHYDRATE 4 G: 40 INJECTION, SOLUTION INTRAVENOUS at 18:00

## 2023-11-24 RX ADMIN — MIDAZOLAM HYDROCHLORIDE 1 MG: 1 INJECTION, SOLUTION INTRAMUSCULAR; INTRAVENOUS at 15:42

## 2023-11-24 RX ADMIN — MEPERIDINE HYDROCHLORIDE 25 MG: 100 INJECTION, SOLUTION INTRAMUSCULAR; INTRAVENOUS; SUBCUTANEOUS at 15:39

## 2023-11-24 RX ADMIN — Medication 5 MG: at 20:09

## 2023-11-24 RX ADMIN — ENOXAPARIN SODIUM 40 MG: 100 INJECTION SUBCUTANEOUS at 20:03

## 2023-11-24 RX ADMIN — MEPERIDINE HYDROCHLORIDE 50 MG: 50 INJECTION INTRAMUSCULAR; INTRAVENOUS; SUBCUTANEOUS at 15:36

## 2023-11-24 RX ADMIN — OXYCODONE HYDROCHLORIDE 5 MG: 5 TABLET ORAL at 20:10

## 2023-11-24 RX ADMIN — ATORVASTATIN CALCIUM 80 MG: 80 TABLET, FILM COATED ORAL at 20:03

## 2023-11-24 RX ADMIN — AMLODIPINE BESYLATE 10 MG: 10 TABLET ORAL at 09:10

## 2023-11-24 RX ADMIN — MIDAZOLAM HYDROCHLORIDE 2 MG: 1 INJECTION, SOLUTION INTRAMUSCULAR; INTRAVENOUS at 15:37

## 2023-11-24 RX ADMIN — MEPERIDINE HYDROCHLORIDE 25 MG: 50 INJECTION INTRAMUSCULAR; INTRAVENOUS; SUBCUTANEOUS at 15:42

## 2023-11-24 RX ADMIN — SODIUM CHLORIDE 125 ML/HR: 9 INJECTION, SOLUTION INTRAVENOUS at 09:09

## 2023-11-24 RX ADMIN — DEXTROSE AND SODIUM CHLORIDE 125 ML/HR: 5; 900 INJECTION, SOLUTION INTRAVENOUS at 17:59

## 2023-11-24 RX ADMIN — MIDAZOLAM HYDROCHLORIDE 1 MG: 1 INJECTION, SOLUTION INTRAMUSCULAR; INTRAVENOUS at 15:39

## 2023-11-24 RX ADMIN — OXYCODONE HYDROCHLORIDE 5 MG: 5 TABLET ORAL at 11:17

## 2023-11-24 ASSESSMENT — COGNITIVE AND FUNCTIONAL STATUS - GENERAL
MOVING TO AND FROM BED TO CHAIR: A LITTLE
STANDING UP FROM CHAIR USING ARMS: A LITTLE
DRESSING REGULAR LOWER BODY CLOTHING: A LITTLE
WALKING IN HOSPITAL ROOM: A LITTLE
CLIMB 3 TO 5 STEPS WITH RAILING: A LITTLE
MOVING TO AND FROM BED TO CHAIR: A LITTLE
WALKING IN HOSPITAL ROOM: A LITTLE
WALKING IN HOSPITAL ROOM: A LITTLE
DRESSING REGULAR LOWER BODY CLOTHING: A LITTLE
HELP NEEDED FOR BATHING: A LITTLE
STANDING UP FROM CHAIR USING ARMS: A LITTLE
TOILETING: A LITTLE
MOBILITY SCORE: 20
MOBILITY SCORE: 20
MOVING TO AND FROM BED TO CHAIR: A LITTLE
DRESSING REGULAR UPPER BODY CLOTHING: A LITTLE
MOBILITY SCORE: 20
TOILETING: A LITTLE
DAILY ACTIVITIY SCORE: 20
DAILY ACTIVITIY SCORE: 20
CLIMB 3 TO 5 STEPS WITH RAILING: A LITTLE
HELP NEEDED FOR BATHING: A LITTLE
STANDING UP FROM CHAIR USING ARMS: A LITTLE
DRESSING REGULAR UPPER BODY CLOTHING: A LITTLE
CLIMB 3 TO 5 STEPS WITH RAILING: A LITTLE

## 2023-11-24 ASSESSMENT — PAIN - FUNCTIONAL ASSESSMENT
PAIN_FUNCTIONAL_ASSESSMENT: 0-10

## 2023-11-24 ASSESSMENT — PAIN SCALES - GENERAL
PAINLEVEL_OUTOF10: 0 - NO PAIN
PAINLEVEL_OUTOF10: 7
PAINLEVEL_OUTOF10: 0 - NO PAIN
PAINLEVEL_OUTOF10: 7
PAINLEVEL_OUTOF10: 7
PAINLEVEL_OUTOF10: 5 - MODERATE PAIN
PAINLEVEL_OUTOF10: 7
PAINLEVEL_OUTOF10: 0 - NO PAIN
PAINLEVEL_OUTOF10: 0 - NO PAIN

## 2023-11-24 ASSESSMENT — ACTIVITIES OF DAILY LIVING (ADL): ADL_ASSISTANCE: INDEPENDENT

## 2023-11-24 ASSESSMENT — COLUMBIA-SUICIDE SEVERITY RATING SCALE - C-SSRS
6. HAVE YOU EVER DONE ANYTHING, STARTED TO DO ANYTHING, OR PREPARED TO DO ANYTHING TO END YOUR LIFE?: NO
1. IN THE PAST MONTH, HAVE YOU WISHED YOU WERE DEAD OR WISHED YOU COULD GO TO SLEEP AND NOT WAKE UP?: NO
2. HAVE YOU ACTUALLY HAD ANY THOUGHTS OF KILLING YOURSELF?: NO

## 2023-11-24 NOTE — PROGRESS NOTES
"Mike Olson is a 68 y.o. male on day 2 of admission presenting with Difficulty swallowing.    Subjective   No acute events overnight. Today, the patient reports that he is doing well with no specific concerns. Denies worsening throat pain, constipation/diarrhea, or fever/chills.       Objective     Physical Exam  Constitutional:       General: He is not in acute distress.     Appearance: Normal appearance.   HENT:      Head: Normocephalic and atraumatic.      Mouth/Throat:      Mouth: Mucous membranes are moist.   Eyes:      Extraocular Movements: Extraocular movements intact.      Conjunctiva/sclera: Conjunctivae normal.      Pupils: Pupils are equal, round, and reactive to light.   Cardiovascular:      Rate and Rhythm: Normal rate and regular rhythm.      Heart sounds: No murmur heard.  Pulmonary:      Effort: Pulmonary effort is normal.   Abdominal:      General: Bowel sounds are normal. There is no distension.      Palpations: Abdomen is soft.      Tenderness: There is no abdominal tenderness.   Skin:     General: Skin is warm and dry.      Comments: R axilla fungating mass covered with clean bandage, no signs of erythema, non tender to palpation in surrounding area.   Neurological:      General: No focal deficit present.      Mental Status: He is alert and oriented to person, place, and time.      Gait: Gait normal.   Psychiatric:      Comments: Appropriate mood and affect     Last Recorded Vitals  Blood pressure 164/74, pulse 86, temperature 37 °C (98.6 °F), temperature source Temporal, resp. rate 16, height 1.69 m (5' 6.54\"), weight 76.5 kg (168 lb 11.2 oz), SpO2 95 %.  Intake/Output last 3 Shifts:  I/O last 3 completed shifts:  In: 50 (0.7 mL/kg) [I.V.:50 (0.7 mL/kg)]  Out: 876 (11.4 mL/kg) [Urine:875 (0.3 mL/kg/hr); Stool:1]  Weight: 76.5 kg     Relevant Results  Scheduled medications  acetaminophen, 975 mg, oral, q8h  amLODIPine, 10 mg, oral, Daily  aspirin, 81 mg, oral, Daily  atorvastatin, 80 mg, oral, " Daily  [Held by provider] chlorthalidone, 25 mg, oral, Daily  enoxaparin, 40 mg, subcutaneous, q24h  [Held by provider] pantoprazole, 20 mg, oral, Daily before breakfast  polyethylene glycol, 17 g, oral, Daily      Continuous medications  [Held by provider] lactated Ringer's, 125 mL/hr, Last Rate: 125 mL/hr (11/22/23 1247)      PRN medications  PRN medications: melatonin, oxyCODONE  No results found for this or any previous visit (from the past 24 hour(s)).  ECG 12 lead    Result Date: 11/22/2023  Normal sinus rhythm Normal ECG When compared with ECG of 16-SEP-2023 20:38, Previous ECG has undetermined rhythm, needs review See ED provider note for full interpretation and clinical correlation Confirmed by Lisa Figueroa ([id number]) on 11/22/2023 12:25:32 PM    XR chest 1 view    Result Date: 11/22/2023  STUDY: Chest Radiograph;  11/22/2023, 9:23AM. INDICATION: Shortness of breath. COMPARISON: None. ACCESSION NUMBER(S): [id number] ORDERING CLINICIAN: JANENE GARCÍA TECHNIQUE:  Frontal chest was obtained at 9:10 hours. FINDINGS: CARDIOMEDIASTINAL SILHOUETTE: Cardiomediastinal silhouette is normal in size and configuration.  LUNGS: Lungs are clear.  ABDOMEN: No remarkable upper abdominal findings.  BONES: No acute osseous changes.    No acute cardiopulmonary abnormality. Signed by Walker Burton MD    XR hand left 3+ views    Result Date: 11/22/2023  STUDY: Hand Radiographs; 11/22/2023 9:23 AM INDICATION: Left third and fourth finger pain. Evaluate for fracture. COMPARISON: None. ACCESSION NUMBER(S): [id number] ORDERING CLINICIAN: JANENE GARCÍA TECHNIQUE:  Three view(s) of the left hand. FINDINGS:  There is no detectable displaced fracture, including at the left third and fourth fingers.  The alignment is anatomic.  No soft tissue abnormality is seen.    No detectable traumatic left hand abnormality. Signed by Emory Gonzalez MD                                Assessment/Plan   Principal Problem:    Difficulty  swallowing  Active Problems:    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 Given new diagnosis, no treatment has been started yet and no known origin yet. Will discuss with Med Onc (Dr. Yin was to be his primary oncologist), Surg Onc (Dr Corona had already seen pt) and will also consult GI for EGD/biopsy. Per GI, CLD as tolerated and NPO at [facility] for EGD 11/24.     Updates 11/24:  -Scheduled for EGD on 11/24, will follow GI recs after procedure  -Holding chlorithalidone 25mg daily given hypercalcemia  -Restarting home losartan 100mg daily as MARYELLEN has resolved    #Metastatic SCC, poorly differentiated   #Dysphagia   -Diagnosed 11/2023; no treatment yet; unknown origin  -Surg Onc consulted; appreciate recs (saw Dr. Corona outpatient 11/10/23)  -SLP consulted for swallow evaluation and recommended NPO  -Consulted GI for possible EGD/biopsy; plan for EGD on Friday 11/24  -Plan to inform Dr. Yin (future medical oncologist-1st appt scheduled 12/1)  -Consider consulting pulm for possible lung biopsy as potential origin   -Tylenol PRN for pain; patient denies any pain currently     #MARYELLEN (resolved)  -Baseline Cr ~1; on admission 1.48, was 1.50 on 11/16 -> now at baseline 1.04 on 11/23  -S/p 1L LR, IV LR infusion at 125cc/hr, given improvement in Cr likely pre-renal  -Restarted home losartan 100mg daily  -Continue to monitor      #HTN  -Continue home amlodipine 10mg daily  -Holding chlorithalidone 25mg daily given hypercalcemia  -Continue ARB as MARYELLEN has resolved     #CVA 9/2023  #Carotid stenosis  -2 acute or subacute infarcts seen on MRI 9/18/2023  -Continue ASA 81mg daily  -Completed 21 days of plavix (started 9/18/23)  -Continue atorvastatin 80mg daily  -Vascular Surgery follow up outpatient for the carotid stenosis     #Hypercalcemia  -Ca 11.5 on  admission  -S/p 1L LR in the ED, on LR infusion at 125cc/hr  -Repeat was 11.3 on 11/23, so avoiding LR in favor of NS     #Elevated PSA  -Urology follow up outpatient     Disposition: tele  Follow-ups: PCP, Onc, Surg Onc,      F: prn  E: prn   N: NPO, TBD after EGD  A: PIVs     DVT ppx: lovenox  GI ppx: home PPI    Code Status: FULL (confirmed on 11/22/23)  Surrogate Medical Decision-maker: Wife April [phone]           Axel Willoughby MD

## 2023-11-24 NOTE — PROGRESS NOTES
Brief GI Note:    S/p EGD today  Impression  Malignant-appearing mass (not traversable) in the esophagus, covering the whole circumference; performed cold forceps biopsy  Single small, mucosal nodule was visualized in the esophagus        Findings  A large amount of medication debris was noted in the esophagus requiring removal.  Malignant-appearing mass (not traversable) in the esophagus (25 cm from the incisors), covering the whole circumference; performed cold forceps biopsy  Single small, mucosal nodule was visualized in the esophagus (23 cm from the incisors). This nodule likely represents synchronous disease.    Recommendations:  -follow up path results  -Consult to oncology  -Can trial clear liquid diet if cleared by SLP  -Ongoing formal SLP eval  -Will need multidisciplinary discussions for nutrition plan in the setting of esophageal obstruction if unable to tolerate PO intake--PEG tube cannot be placed endoscopically   -GI will continue to follow      Thank you for this interesting consult. Gastroenterology will continue to follow.  -During weekday hours of 7am-5pm please do not hesitate to contact me on [employer] Chat or page [phone] if there are any further questions between the weekday hours of 7 AM - 5 PM.   -After hours, on weekends, and on holidays, please page the [place] [phone]. Thank you.

## 2023-11-24 NOTE — PROGRESS NOTES
Occupational Therapy                 Therapy Communication Note    Patient Name: Mike Olson  MRN: [id number]  Today's Date: 11/24/2023     Discipline: Occupational Therapy    Missed Visit Reason: Missed Visit Reason:  (EGD 11/24.)    Missed Time: Attempt

## 2023-11-24 NOTE — CONSULTS
Wound Care Consult     Visit Date: 11/24/2023      Patient Name: Mike Olson         MRN: [id number]           YOB: 1955     Reason for Consult: Fungating tumor to right  axilla.        Wound History:  Mike Olson is a 68 y.o. 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 Admitted for expedite workup for the malignancy workup .  Now with fungating tumor to right axilla.     Pertinent Labs:   Albumin   Date Value Ref Range Status   11/24/2023 3.1 (L) 3.4 - 5.0 g/dL Final       Wound Assessment:  Wound 11/22/23 Other (comment) Axilla Right (Active)   Wound Image   11/24/23 1638   Site Assessment Edema;Red 11/23/23 1100   Lisa-Wound Assessment White;Dejesus 11/23/23 1100   Shape Irregular  11/24/23 1638   Wound Length (cm) 7.5 cm 11/24/23 1638   Wound Width (cm) 3.5 cm 11/24/23 1638   Wound Surface Area (cm^2) 26.25 cm^2 11/24/23 1638   Wound Depth (cm) 0 cm 11/24/23 1638   Wound Volume (cm^3) 0 cm^3 11/24/23 1638   Drainage Amount Small 11/23/23 1100   Dressing Foam 11/23/23 1100       Wound Team Summary Assessment: Wound is moist and malodorous. Patient reports it has been there for about 6 months. He has treated with neosporin and alcohol at home.   Recommendations:  Remove ABD and then Calcium alginate by saturating with water or NSS. Do not pull any stuck dressing from this wound as it can cause bleeding. Tumors tend to be very vascular. Clean with Vashe cleanser (Left at bedside) to clean and to minimize odor. Cover with Aquacel silver cut to size of tumor to control drainage and neutralize odor. Then cover with ABD to help absorb drainage. Change daily or more often as needed.      Wound Team Plan: Wound team will not continue to follow. Please re consult for   any changes or concerns with wound.      SUZANNE GERHARDT, RN, BSN, CWOCN  11/24/2023  4:52 PM

## 2023-11-24 NOTE — PROGRESS NOTES
Physical Therapy    Physical Therapy    Physical Therapy Evaluation    Patient Name: Mike Olson  MRN: [id number]  Today's Date: 11/24/2023   Time Calculation  Start Time: 1114  Stop Time: 1130  Time Calculation (min): 16 min    Assessment/Plan   PT Assessment  PT Assessment Results: Decreased endurance, Impaired balance, Decreased mobility, Pain  Rehab Prognosis: Excellent  Evaluation/Treatment Tolerance: Patient tolerated treatment well  Medical Staff Made Aware: Yes  End of Session Communication: Bedside nurse  Assessment Comment: 68 year old male admitted with worsening dysphagia.. Pt presents with decreased endurance and balance impacting functional mobility. Pt would benefit from continued PT while in hospital to improve functional mobility and return to PLOF.  End of Session Patient Position: Bed, 2 rail up (sititng EOB; family present in room)  IP OR SWING BED PT PLAN  Inpatient or Swing Bed: Inpatient  PT Plan  Treatment/Interventions: Transfer training, Gait training, Stair training, Balance training, Neuromuscular re-education, Endurance training, Strengthening, Therapeutic exercise, Therapeutic activity, Home exercise program, Positioning, Postural re-education  PT Plan: Skilled PT  PT Frequency: 2 times per week  PT Discharge Recommendations: No PT needed after discharge  PT Recommended Transfer Status: Stand by assist  PT - OK to Discharge: Yes      Subjective   General Visit Information:  Reason for Referral: 68 year old male admitted with worsening dysphagia.  Past Medical History Relevant to Rehab: HTN, CVA (9/2023), recently diagnosed poorly differentiated metastatic SCC (11/2023)  Prior to Session Communication: Bedside nurse  Patient Position Received: Bed, 2 rail up (sitting EOB)  Family/Caregiver Present: Yes (pts daughter and son in law present during session)  General Comment: Pt supine in bed upon entry to room. Pt pleasant, cooperative and willing to work with PT.   Home Living:  Home  Living  Type of Home: House  Lives With: Spouse (dog named Jason.)  Home Adaptive Equipment: Cane, Walker rolling or standard (pt does not use walker currently)  Home Layout: One level (2 SANJU)  Prior Level of Function:  Prior Function Per Pt/Caregiver Report  Level of [place]: Independent with ADLs and functional transfers, Independent with homemaking with ambulation  ADL Assistance: Independent  Homemaking Assistance: Independent  Ambulatory Assistance: Independent  Vocational: Retired  Leisure: Pt likes to hang out with is dog.  Prior Function Comments: pt states that he has been using a cane since 3 weeks ago 2/2 to fatigue and weakness.  Precautions:     Vital Signs:       Objective   Lines/Tubes/Drains:     Continuous Medications/Drips:  sodium chloride 0.9%, 125 mL/hr, Last Rate: 125 mL/hr (11/24/23 0909)      Oxygen:    Pain:  Pain Assessment  Pain Assessment: 0-10  Pain Score: 7  Pain Location:  (R lateral trunk where pt states a mass is located)  Cognition:  Cognition  Overall Cognitive Status: Within Functional Limits      Extremity/Trunk Assessments:  Strength:           RLE   RLE : Within Functional Limits  LLE   LLE : Within Functional Limits    General Assessments:         Activity Tolerance  Endurance: Tolerates 10 - 20 min exercise with multiple rests        Static Sitting Balance  Static Sitting-Comment/Number of Minutes: sba  Dynamic Sitting Balance  Dynamic Sitting-Comments: sba  Static Standing Balance  Static Standing-Comment/Number of Minutes: sba  Dynamic Standing Balance  Dynamic Standing-Comments: sba    Functional Assessments:  Bed Mobility  Bed Mobility: No (pt sitting EOB upon entry to room and wanting to remain sitting EOB at end of session; predict pt completes I.)  Transfers  Transfer: Yes  Transfer 1  Technique 1: Sit to stand, Stand to sit  Transfer Device 1:  (straight cane)  Transfer Level of Assistance 1: Close supervision  Ambulation/Gait Training  Ambulation/Gait Training  Performed: Yes  Ambulation/Gait Training 1  Surface 1: Level tile  Device 1: Single point cane  Assistance 1: Contact guard  Quality of Gait 1:  (pt with occasional uneven steps)  Comments/Distance (ft) 1: 30ft          Outcome Measures:  [facility] Basic Mobility  Turning from your back to your side while in a flat bed without using bedrails: None  Moving from lying on your back to sitting on the side of a flat bed without using bedrails: None  Moving to and from bed to chair (including a wheelchair): A little  Standing up from a chair using your arms (e.g. wheelchair or bedside chair): A little  To walk in hospital room: A little  Climbing 3-5 steps with railing: A little  Basic Mobility - Total Score: 20                               Encounter Problems       Encounter Problems (Active)       Balance       Goal 1       Start:  11/24/23               Mobility       Goal 1       Start:  11/24/23            Goal 2       Start:  11/24/23            Goal 3       Start:  11/24/23               Transfers       Goal 1       Start:  11/24/23                   Education Documentation  Mobility Training, taught by Neyda Isbell PT at 11/24/2023 12:21 PM.  Learner: Patient  Readiness: Acceptance  Method: Explanation  Response: Needs Reinforcement    Education Comments  No comments found.            11/24/23 at 12:22 PM   Neyda Isbell PT   Rehab Office: [phone]

## 2023-11-24 NOTE — CARE PLAN
The patient's goals for the shift include      The clinical goals for the shift include Pt will remain safe and free of injury and fall by end of shift      Problem: Fall/Injury  Goal: Not fall by end of shift  Outcome: Progressing  Goal: Be free from injury by end of the shift  Outcome: Progressing  Goal: Verbalize understanding of personal risk factors for fall in the hospital  Outcome: Progressing  Goal: Verbalize understanding of risk factor reduction measures to prevent injury from fall in the home  Outcome: Progressing  Goal: Use assistive devices by end of the shift  Outcome: Progressing  Goal: Pace activities to prevent fatigue by end of the shift  Outcome: Progressing     Problem: Skin  Goal: Decreased wound size/increased tissue granulation at next dressing change  Outcome: Progressing  Flowsheets (Taken 11/23/2023 2208)  Decreased wound size/increased tissue granulation at next dressing change: Promote sleep for wound healing  Goal: Participates in plan/prevention/treatment measures  Outcome: Progressing  Flowsheets (Taken 11/23/2023 2208)  Participates in plan/prevention/treatment measures: Increase activity/out of bed for meals  Goal: Prevent/manage excess moisture  Outcome: Progressing  Goal: Prevent/minimize sheer/friction injuries  Outcome: Progressing  Goal: Promote/optimize nutrition  Outcome: Progressing  Goal: Promote skin healing  Outcome: Progressing

## 2023-11-24 NOTE — CARE PLAN
The patient's goals for the shift include      The clinical goals for the shift include patient will remain HDS throughout the shift 11/23/23 @ 0700

## 2023-11-24 NOTE — PROGRESS NOTES
Speech-Language Pathology                 Therapy Communication Note    Patient Name: Mike Olson  MRN: [id number]  Today's Date: 11/24/2023     Discipline: Speech Language Pathology    Missed Visit Reason:   PT NPO for EGD today. Speech Language Pathologist recommends full work up for dysphagia to include oral-pharyngeal and esophageal via MBSS with esophagram once cleared by GI.     Missed Time: Attempt    Comment:

## 2023-11-25 ENCOUNTER — DOCUMENTATION (OUTPATIENT)
Dept: RADIATION ONCOLOGY | Facility: HOSPITAL | Age: 68
End: 2023-11-25
Payer: MEDICARE

## 2023-11-25 DIAGNOSIS — C15.9 SQUAMOUS CELL ESOPHAGEAL CANCER (MULTI): Primary | ICD-10-CM

## 2023-11-25 LAB
ALBUMIN SERPL BCP-MCNC: 2.8 G/DL (ref 3.4–5)
ALBUMIN SERPL BCP-MCNC: 2.9 G/DL (ref 3.4–5)
ANION GAP SERPL CALC-SCNC: 13 MMOL/L (ref 10–20)
ANION GAP SERPL CALC-SCNC: 14 MMOL/L (ref 10–20)
BASOPHILS # BLD AUTO: 0.05 X10*3/UL (ref 0–0.1)
BASOPHILS NFR BLD AUTO: 0.3 %
BUN SERPL-MCNC: 13 MG/DL (ref 6–23)
BUN SERPL-MCNC: 15 MG/DL (ref 6–23)
CALCIUM SERPL-MCNC: 9.6 MG/DL (ref 8.6–10.6)
CALCIUM SERPL-MCNC: 9.7 MG/DL (ref 8.6–10.6)
CHLORIDE SERPL-SCNC: 101 MMOL/L (ref 98–107)
CHLORIDE SERPL-SCNC: 103 MMOL/L (ref 98–107)
CO2 SERPL-SCNC: 28 MMOL/L (ref 21–32)
CO2 SERPL-SCNC: 29 MMOL/L (ref 21–32)
CREAT SERPL-MCNC: 0.68 MG/DL (ref 0.5–1.3)
CREAT SERPL-MCNC: 0.7 MG/DL (ref 0.5–1.3)
EOSINOPHIL # BLD AUTO: 0.26 X10*3/UL (ref 0–0.7)
EOSINOPHIL NFR BLD AUTO: 1.7 %
ERYTHROCYTE [DISTWIDTH] IN BLOOD BY AUTOMATED COUNT: 11.9 % (ref 11.5–14.5)
GFR SERPL CREATININE-BSD FRML MDRD: >90 ML/MIN/1.73M*2
GFR SERPL CREATININE-BSD FRML MDRD: >90 ML/MIN/1.73M*2
GLUCOSE BLD MANUAL STRIP-MCNC: 121 MG/DL (ref 74–99)
GLUCOSE BLD MANUAL STRIP-MCNC: 131 MG/DL (ref 74–99)
GLUCOSE BLD MANUAL STRIP-MCNC: 136 MG/DL (ref 74–99)
GLUCOSE BLD MANUAL STRIP-MCNC: 148 MG/DL (ref 74–99)
GLUCOSE BLD MANUAL STRIP-MCNC: 148 MG/DL (ref 74–99)
GLUCOSE BLD MANUAL STRIP-MCNC: 68 MG/DL (ref 74–99)
GLUCOSE SERPL-MCNC: 128 MG/DL (ref 74–99)
GLUCOSE SERPL-MCNC: 140 MG/DL (ref 74–99)
HCT VFR BLD AUTO: 32.7 % (ref 41–52)
HGB BLD-MCNC: 10.9 G/DL (ref 13.5–17.5)
IMM GRANULOCYTES # BLD AUTO: 0.18 X10*3/UL (ref 0–0.7)
IMM GRANULOCYTES NFR BLD AUTO: 1.2 % (ref 0–0.9)
LYMPHOCYTES # BLD AUTO: 1.29 X10*3/UL (ref 1.2–4.8)
LYMPHOCYTES NFR BLD AUTO: 8.6 %
MAGNESIUM SERPL-MCNC: 1.74 MG/DL (ref 1.6–2.4)
MCH RBC QN AUTO: 29 PG (ref 26–34)
MCHC RBC AUTO-ENTMCNC: 33.3 G/DL (ref 32–36)
MCV RBC AUTO: 87 FL (ref 80–100)
MONOCYTES # BLD AUTO: 1.44 X10*3/UL (ref 0.1–1)
MONOCYTES NFR BLD AUTO: 9.6 %
NEUTROPHILS # BLD AUTO: 11.84 X10*3/UL (ref 1.2–7.7)
NEUTROPHILS NFR BLD AUTO: 78.6 %
NRBC BLD-RTO: 0 /100 WBCS (ref 0–0)
PHOSPHATE SERPL-MCNC: 2.4 MG/DL (ref 2.5–4.9)
PHOSPHATE SERPL-MCNC: 2.6 MG/DL (ref 2.5–4.9)
PLATELET # BLD AUTO: 341 X10*3/UL (ref 150–450)
POTASSIUM SERPL-SCNC: 3.1 MMOL/L (ref 3.5–5.3)
POTASSIUM SERPL-SCNC: 3.4 MMOL/L (ref 3.5–5.3)
RBC # BLD AUTO: 3.76 X10*6/UL (ref 4.5–5.9)
SODIUM SERPL-SCNC: 141 MMOL/L (ref 136–145)
SODIUM SERPL-SCNC: 141 MMOL/L (ref 136–145)
WBC # BLD AUTO: 15.1 X10*3/UL (ref 4.4–11.3)

## 2023-11-25 PROCEDURE — 1170000001 HC PRIVATE ONCOLOGY ROOM DAILY

## 2023-11-25 PROCEDURE — 36415 COLL VENOUS BLD VENIPUNCTURE: CPT

## 2023-11-25 PROCEDURE — 2500000001 HC RX 250 WO HCPCS SELF ADMINISTERED DRUGS (ALT 637 FOR MEDICARE OP): Performed by: STUDENT IN AN ORGANIZED HEALTH CARE EDUCATION/TRAINING PROGRAM

## 2023-11-25 PROCEDURE — 96372 THER/PROPH/DIAG INJ SC/IM: CPT | Performed by: STUDENT IN AN ORGANIZED HEALTH CARE EDUCATION/TRAINING PROGRAM

## 2023-11-25 PROCEDURE — 85025 COMPLETE CBC W/AUTO DIFF WBC: CPT

## 2023-11-25 PROCEDURE — 2500000004 HC RX 250 GENERAL PHARMACY W/ HCPCS (ALT 636 FOR OP/ED)

## 2023-11-25 PROCEDURE — 2500000001 HC RX 250 WO HCPCS SELF ADMINISTERED DRUGS (ALT 637 FOR MEDICARE OP)

## 2023-11-25 PROCEDURE — 99232 SBSQ HOSP IP/OBS MODERATE 35: CPT

## 2023-11-25 PROCEDURE — 83735 ASSAY OF MAGNESIUM: CPT

## 2023-11-25 PROCEDURE — 80069 RENAL FUNCTION PANEL: CPT

## 2023-11-25 PROCEDURE — 2500000004 HC RX 250 GENERAL PHARMACY W/ HCPCS (ALT 636 FOR OP/ED): Performed by: STUDENT IN AN ORGANIZED HEALTH CARE EDUCATION/TRAINING PROGRAM

## 2023-11-25 PROCEDURE — 82947 ASSAY GLUCOSE BLOOD QUANT: CPT

## 2023-11-25 RX ORDER — OXYCODONE HYDROCHLORIDE 5 MG/1
5 TABLET ORAL EVERY 6 HOURS PRN
Status: DISCONTINUED | OUTPATIENT
Start: 2023-11-25 | End: 2023-11-28

## 2023-11-25 RX ORDER — POTASSIUM CHLORIDE 14.9 MG/ML
20 INJECTION INTRAVENOUS ONCE
Status: DISCONTINUED | OUTPATIENT
Start: 2023-11-25 | End: 2023-11-25

## 2023-11-25 RX ORDER — ACETAMINOPHEN 325 MG/1
650 TABLET ORAL EVERY 4 HOURS PRN
Status: DISCONTINUED | OUTPATIENT
Start: 2023-11-25 | End: 2023-11-25

## 2023-11-25 RX ORDER — CEFAZOLIN SODIUM 2 G/100ML
2 INJECTION, SOLUTION INTRAVENOUS EVERY 8 HOURS
Status: CANCELLED | OUTPATIENT
Start: 2023-11-25

## 2023-11-25 RX ORDER — OXYCODONE HYDROCHLORIDE 5 MG/1
10 TABLET ORAL EVERY 6 HOURS PRN
Status: DISCONTINUED | OUTPATIENT
Start: 2023-11-25 | End: 2023-11-28

## 2023-11-25 RX ORDER — POTASSIUM CHLORIDE 14.9 MG/ML
20 INJECTION INTRAVENOUS
Status: COMPLETED | OUTPATIENT
Start: 2023-11-25 | End: 2023-11-25

## 2023-11-25 RX ORDER — LOSARTAN POTASSIUM 50 MG/1
100 TABLET ORAL DAILY
Status: CANCELLED | OUTPATIENT
Start: 2023-11-26

## 2023-11-25 RX ORDER — HEPARIN SODIUM 5000 [USP'U]/ML
5000 INJECTION, SOLUTION INTRAVENOUS; SUBCUTANEOUS ONCE
Status: CANCELLED | OUTPATIENT
Start: 2023-11-25 | End: 2023-11-25

## 2023-11-25 RX ADMIN — ASPIRIN 81 MG CHEWABLE TABLET 81 MG: 81 TABLET CHEWABLE at 08:14

## 2023-11-25 RX ADMIN — DEXTROSE AND SODIUM CHLORIDE 100 ML/HR: 5; 900 INJECTION, SOLUTION INTRAVENOUS at 17:15

## 2023-11-25 RX ADMIN — ACETAMINOPHEN 975 MG: 325 TABLET ORAL at 17:15

## 2023-11-25 RX ADMIN — POTASSIUM CHLORIDE 20 MEQ: 14.9 INJECTION, SOLUTION INTRAVENOUS at 13:33

## 2023-11-25 RX ADMIN — AMLODIPINE BESYLATE 10 MG: 10 TABLET ORAL at 08:14

## 2023-11-25 RX ADMIN — LOSARTAN POTASSIUM 100 MG: 50 TABLET, FILM COATED ORAL at 08:14

## 2023-11-25 RX ADMIN — ACETAMINOPHEN 975 MG: 325 TABLET ORAL at 08:15

## 2023-11-25 RX ADMIN — ACETAMINOPHEN 975 MG: 325 TABLET ORAL at 01:14

## 2023-11-25 RX ADMIN — DEXTROSE AND SODIUM CHLORIDE 125 ML/HR: 5; 900 INJECTION, SOLUTION INTRAVENOUS at 08:17

## 2023-11-25 RX ADMIN — OXYCODONE HYDROCHLORIDE 5 MG: 5 TABLET ORAL at 08:14

## 2023-11-25 RX ADMIN — DEXTROSE AND SODIUM CHLORIDE 125 ML/HR: 5; 900 INJECTION, SOLUTION INTRAVENOUS at 01:15

## 2023-11-25 RX ADMIN — OXYCODONE HYDROCHLORIDE 5 MG: 5 TABLET ORAL at 01:14

## 2023-11-25 RX ADMIN — OXYCODONE HYDROCHLORIDE 10 MG: 5 TABLET ORAL at 17:15

## 2023-11-25 RX ADMIN — POTASSIUM CHLORIDE 20 MEQ: 14.9 INJECTION, SOLUTION INTRAVENOUS at 11:29

## 2023-11-25 RX ADMIN — ATORVASTATIN CALCIUM 80 MG: 80 TABLET, FILM COATED ORAL at 08:14

## 2023-11-25 RX ADMIN — ENOXAPARIN SODIUM 40 MG: 100 INJECTION SUBCUTANEOUS at 20:27

## 2023-11-25 ASSESSMENT — PAIN SCALES - GENERAL
PAINLEVEL_OUTOF10: 5 - MODERATE PAIN
PAINLEVEL_OUTOF10: 7
PAINLEVEL_OUTOF10: 7
PAINLEVEL_OUTOF10: 2
PAINLEVEL_OUTOF10: 0 - NO PAIN
PAINLEVEL_OUTOF10: 7
PAINLEVEL_OUTOF10: 8

## 2023-11-25 ASSESSMENT — COGNITIVE AND FUNCTIONAL STATUS - GENERAL
DRESSING REGULAR LOWER BODY CLOTHING: A LITTLE
DRESSING REGULAR UPPER BODY CLOTHING: A LITTLE
DRESSING REGULAR UPPER BODY CLOTHING: A LITTLE
CLIMB 3 TO 5 STEPS WITH RAILING: A LITTLE
DAILY ACTIVITIY SCORE: 20
STANDING UP FROM CHAIR USING ARMS: A LITTLE
WALKING IN HOSPITAL ROOM: A LITTLE
MOVING TO AND FROM BED TO CHAIR: A LITTLE
TOILETING: A LITTLE
MOVING TO AND FROM BED TO CHAIR: A LITTLE
DAILY ACTIVITIY SCORE: 20
STANDING UP FROM CHAIR USING ARMS: A LITTLE
HELP NEEDED FOR BATHING: A LITTLE
TOILETING: A LITTLE
DRESSING REGULAR LOWER BODY CLOTHING: A LITTLE
WALKING IN HOSPITAL ROOM: A LOT
MOBILITY SCORE: 19
CLIMB 3 TO 5 STEPS WITH RAILING: A LITTLE
HELP NEEDED FOR BATHING: A LITTLE
MOBILITY SCORE: 20

## 2023-11-25 ASSESSMENT — PAIN - FUNCTIONAL ASSESSMENT
PAIN_FUNCTIONAL_ASSESSMENT: 0-10

## 2023-11-25 NOTE — PROGRESS NOTES
"Mike Olson is a 68 y.o. male on day 3 of admission presenting with Difficulty swallowing.    Subjective   No acute events overnight. Today, the patient reports that he is doing well with no concerns. He tolerated a clear liquid diet and denies any coughing or aspiration. Denies increased throat pain, fever/chills, abdominal pain, diarrhea/constipation.       Objective     Physical Exam  Constitutional:       General: He is not in acute distress.     Appearance: Normal appearance.   HENT:      Head: Normocephalic and atraumatic.      Mouth/Throat:      Mouth: Mucous membranes are moist.   Eyes:      Extraocular Movements: Extraocular movements intact.      Conjunctiva/sclera: Conjunctivae normal.      Pupils: Pupils are equal, round, and reactive to light.   Cardiovascular:      Rate and Rhythm: Normal rate and regular rhythm.      Heart sounds: No murmur heard.  Pulmonary:      Effort: Pulmonary effort is normal.   Abdominal:      General: Bowel sounds are normal. There is no distension.      Palpations: Abdomen is soft.      Tenderness: There is no abdominal tenderness.   Skin:     General: Skin is warm and dry.      Comments: R axilla fungating mass covered with clean bandage, no signs of erythema, non tender to palpation in surrounding area.   Neurological:      General: No focal deficit present.      Mental Status: He is alert and oriented to person, place, and time.      Gait: Gait normal.   Psychiatric:      Comments: Appropriate mood and affect     Last Recorded Vitals  Blood pressure 147/69, pulse 84, temperature 36.5 °C (97.7 °F), temperature source Temporal, resp. rate 18, height 1.69 m (5' 6.54\"), weight 76.5 kg (168 lb 11.2 oz), SpO2 94 %.  Intake/Output last 3 Shifts:  I/O last 3 completed shifts:  In: 2539.6 (33.2 mL/kg) [I.V.:2439.6 (31.9 mL/kg); IV Piggyback:100]  Out: 1050 (13.7 mL/kg) [Urine:1050 (0.4 mL/kg/hr)]  Weight: 76.5 kg     Relevant Results  Scheduled medications  acetaminophen, 975 mg, " oral, q8h  amLODIPine, 10 mg, oral, Daily  aspirin, 81 mg, oral, Daily  atorvastatin, 80 mg, oral, Daily  [Held by provider] chlorthalidone, 25 mg, oral, Daily  enoxaparin, 40 mg, subcutaneous, q24h  losartan, 100 mg, oral, Daily  [Held by provider] pantoprazole, 20 mg, oral, Daily before breakfast  polyethylene glycol, 17 g, oral, Daily      Continuous medications  D5 % and 0.9 % sodium chloride, 125 mL/hr, Last Rate: 125 mL/hr (11/25/23 0817)      PRN medications  PRN medications: acetaminophen, melatonin, meperidine, meperidine PF, midazolam, oxyCODONE, oxyCODONE  Results for orders placed or performed during the hospital encounter of 11/22/23 (from the past 24 hour(s))   POCT GLUCOSE   Result Value Ref Range    POCT Glucose 71 (L) 74 - 99 mg/dL   POCT GLUCOSE   Result Value Ref Range    POCT Glucose 68 (L) 74 - 99 mg/dL   POCT GLUCOSE   Result Value Ref Range    POCT Glucose 131 (H) 74 - 99 mg/dL   POCT GLUCOSE   Result Value Ref Range    POCT Glucose 148 (H) 74 - 99 mg/dL   CBC and Auto Differential   Result Value Ref Range    WBC 15.1 (H) 4.4 - 11.3 x10*3/uL    nRBC 0.0 0.0 - 0.0 /100 WBCs    RBC 3.76 (L) 4.50 - 5.90 x10*6/uL    Hemoglobin 10.9 (L) 13.5 - 17.5 g/dL    Hematocrit 32.7 (L) 41.0 - 52.0 %    MCV 87 80 - 100 fL    MCH 29.0 26.0 - 34.0 pg    MCHC 33.3 32.0 - 36.0 g/dL    RDW 11.9 11.5 - 14.5 %    Platelets 341 150 - 450 x10*3/uL    Neutrophils % 78.6 40.0 - 80.0 %    Immature Granulocytes %, Automated 1.2 (H) 0.0 - 0.9 %    Lymphocytes % 8.6 13.0 - 44.0 %    Monocytes % 9.6 2.0 - 10.0 %    Eosinophils % 1.7 0.0 - 6.0 %    Basophils % 0.3 0.0 - 2.0 %    Neutrophils Absolute 11.84 (H) 1.20 - 7.70 x10*3/uL    Immature Granulocytes Absolute, Automated 0.18 0.00 - 0.70 x10*3/uL    Lymphocytes Absolute 1.29 1.20 - 4.80 x10*3/uL    Monocytes Absolute 1.44 (H) 0.10 - 1.00 x10*3/uL    Eosinophils Absolute 0.26 0.00 - 0.70 x10*3/uL    Basophils Absolute 0.05 0.00 - 0.10 x10*3/uL   Magnesium   Result Value Ref  Range    Magnesium 1.74 1.60 - 2.40 mg/dL   Renal Function Panel   Result Value Ref Range    Glucose 140 (H) 74 - 99 mg/dL    Sodium 141 136 - 145 mmol/L    Potassium 3.1 (L) 3.5 - 5.3 mmol/L    Chloride 101 98 - 107 mmol/L    Bicarbonate 29 21 - 32 mmol/L    Anion Gap 14 10 - 20 mmol/L    Urea Nitrogen 15 6 - 23 mg/dL    Creatinine 0.70 0.50 - 1.30 mg/dL    eGFR >90 >60 mL/min/1.73m*2    Calcium 9.7 8.6 - 10.6 mg/dL    Phosphorus 2.4 (L) 2.5 - 4.9 mg/dL    Albumin 2.8 (L) 3.4 - 5.0 g/dL     EGD    Result Date: 11/24/2023  Table formatting from the original result was not included. Impression Malignant-appearing mass (not traversable) in the esophagus, covering the whole circumference; performed cold forceps biopsy Single small, mucosal nodule was visualized in the esophagus Findings A large amount of medication debris was noted in the esophagus requiring removal. Malignant-appearing mass (not traversable) in the esophagus (25 cm from the incisors), covering the whole circumference; performed cold forceps biopsy Single small, mucosal nodule was visualized in the esophagus (23 cm from the incisors). This nodule likely represents synchronous disease. Recommendation  Await pathology results  Follow up with PCP  Follow up with Dr. Vero Boucher - Inpatient Medicine Service. Follow up with Charlee Cloe and Mary Alex - Inpatient GI Consult Service.  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 Staff Staff Role Alvino Wiggins MD Proceduralist Medications meperidine PF (Demerol) injection 75 mg midazolam (Versed) injection 4 mg meperidine (Demerol) injection 25 mg (Totals for administrations occurring from 1518 to 1550 on 11/24/23) Preprocedure A history and physical has been performed, and patient  medication allergies have been reviewed. The patient's tolerance of previous anesthesia has been reviewed. The risks and benefits of the procedure and the sedation options and risks were discussed with the patient. All questions were answered and informed consent obtained. Details of the Procedure The patient underwent moderate sedation, which was administered by the procedural nurse. The patient's blood pressure, heart rate, level of consciousness, oxygen, respirations, ECG and ETCO2 were monitored throughout the procedure. The scope was introduced through the mouth and advanced to the middle third of the esophagus. The patient's estimated blood loss was minimal (<5 mL). The procedure was not difficult. The patient tolerated the procedure well. There were no apparent adverse events. Events Procedure Events Event Event Time ENDO SCOPE IN TIME 11/24/2023  3:42 PM ENDO SCOPE OUT TIME 11/24/2023  3:48 PM Specimens ID Type Source Tests Collected by Time 1 : Cold BX of esophageal mass in mis esophagus R/O cancer Tissue ESOPHAGUS MID BIOPSY SURGICAL PATHOLOGY EXAM Alvino Wiggins MD 11/24/2023 1545 Procedure Location [facility] [address] [facility] [id number] Referring Provider [place] [address] [place] [address] Procedure Provider Alvino Wiggins MD                                 Assessment/Plan   Principal Problem:    Difficulty swallowing  Active Problems:    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 Given new diagnosis, no treatment has been started yet and no known origin yet. Will discuss with Med Onc (Dr. Yin was to be his primary oncologist), Surg Onc (Dr Corona had already seen pt) and will also consult GI for EGD/biopsy. EGD on 11/24 showed large, nearly fully obstructing  esophageal mass. Patient is not a surgical candidate given metastatic disease. Managing nutrition and need for chemo/radiation.     Updates 11/25:  -EGD on 11/24 showed large, nearly fully obstructing esophageal mass. Patient is not a surgical candidate because of metastatic disease.   - For nutrition, started patient on Boost as tolerated. Consulted [facility] for G-tube consideration  - Will discuss with rad onc about whether patient is candidate for radiation therapy  -Restarted chlorithalidone 25mg daily as hypercalcemia resolved  -Restarting home losartan 100mg daily as MARYELLEN has resolved     #Metastatic SCC, poorly differentiated   #Dysphagia   -Diagnosed 11/2023; no treatment yet; unknown origin  -Surg Onc consulted; appreciate recs (saw Dr. Corona outpatient 11/10/23)  -SLP consulted for swallow evaluation and recommended NPO  -Consulted GI for possible EGD/biopsy; plan for EGD on Friday 11/24  -Plan to inform Dr. Yin (future medical oncologist-1st appt scheduled 12/1)  -Consider consulting pulm for possible lung biopsy as potential origin   -Tylenol PRN for pain; patient denies any pain currently     #MARYELLEN (resolved)  -Baseline Cr ~1; on admission 1.48, was 1.50 on 11/16 -> now at baseline 1.04 on 11/23  -S/p 1L LR, IV LR infusion at 125cc/hr, given improvement in Cr likely pre-renal  -Restarted home losartan 100mg daily  -Continue to monitor      #HTN  -Continue home amlodipine 10mg daily  -Holding chlorithalidone 25mg daily given hypercalcemia  -Continue ARB as MARYELLEN has resolved     #CVA 9/2023  #Carotid stenosis  -2 acute or subacute infarcts seen on MRI 9/18/2023  -Continue ASA 81mg daily  -Completed 21 days of plavix (started 9/18/23)  -Continue atorvastatin 80mg daily  -Vascular Surgery follow up outpatient for the carotid stenosis     #Hypercalcemia  -Ca 11.5 on admission  -S/p 1L LR in the ED, on LR infusion at 125cc/hr  -Repeat was 11.3 on 11/23, so avoiding LR in favor of NS     #Elevated  PSA  -Urology follow up outpatient     Disposition: tele  Follow-ups: PCP, Onc, Surg Onc,      F: prn  E: prn   N: CLD as tolerated +Boost  A: PIVs     DVT ppx: lovenox  GI ppx: home PPI    Code Status: FULL (confirmed on 11/22/23)  Surrogate Medical Decision-maker: Wife April [phone]           Axel Willoughby MD

## 2023-11-25 NOTE — CARE PLAN
The patient's goals for the shift include      The clinical goals for the shift include Patient will report decrease pain discomfort 6/10 by the end of the shift 11/25/23 @ 0700      Problem: Fall/Injury  Goal: Not fall by end of shift  Outcome: Progressing  Goal: Be free from injury by end of the shift  Outcome: Progressing  Goal: Verbalize understanding of personal risk factors for fall in the hospital  Outcome: Progressing  Goal: Verbalize understanding of risk factor reduction measures to prevent injury from fall in the home  Outcome: Progressing  Goal: Use assistive devices by end of the shift  Outcome: Progressing  Goal: Pace activities to prevent fatigue by end of the shift  Outcome: Progressing     Problem: Skin  Goal: Decreased wound size/increased tissue granulation at next dressing change  Outcome: Progressing  Flowsheets (Taken 11/24/2023 2338)  Decreased wound size/increased tissue granulation at next dressing change: Promote sleep for wound healing  Goal: Participates in plan/prevention/treatment measures  Outcome: Progressing  Flowsheets (Taken 11/24/2023 2338)  Participates in plan/prevention/treatment measures: Elevate heels  Goal: Prevent/manage excess moisture  Outcome: Progressing  Flowsheets (Taken 11/24/2023 2338)  Prevent/manage excess moisture:   Moisturize dry skin   Cleanse incontinence/protect with barrier cream  Goal: Prevent/minimize sheer/friction injuries  Outcome: Progressing  Flowsheets (Taken 11/24/2023 2338)  Prevent/minimize sheer/friction injuries: Use pull sheet  Goal: Promote/optimize nutrition  Outcome: Progressing  Flowsheets (Taken 11/24/2023 2338)  Promote/optimize nutrition: Offer water/supplements/favorite foods  Goal: Promote skin healing  Outcome: Progressing

## 2023-11-25 NOTE — CARE PLAN
The patient's goals for the shift include      The clinical goals for the shift include Patient will report decrease pain discomfort 6/10 by the end of the shift 11/25/23 @ 0700

## 2023-11-25 NOTE — PROGRESS NOTES
Radiation Oncology Inpatient Consult  Patient Name:  Mike Olson  MRN:  [id number]  :  1955    Referring Provider: No ref. provider found  Primary Care Provider: ANKUSH Mccarthy  Care Team: Patient Care Team:  ANKUSH Mccarthy as PCP - General (Family Medicine)  Nishant Driscoll MD as Referring Physician (General Surgery)  Vero Boucher MD as Consulting Physician (Hematology and Oncology)    Date of Service: 2023     Diagnosis:   1-Poorly differentiated SCC of unknown origin with diffuse metastasis (10/23)  2-severe dysphagia due to significant involvement of proximal esophagus ()    Cancer history:   -2023: noticed right axillary mass which progressively growth with intermittent bleeding and at the same time progressive dysphagia  -10/23/2023 axillary lymph node biopsy: poorly differentiated squamous cell carcinoma  -2023 PET/CT: extensive osseous metastatic disease including proximal esophagus  -2023: admitted with severe dysphagia and weakness. EGD: Malignant-appearing mass  in the esophagus (25 cm from the incisors), covering the whole circumference    History of Present Illness:  Mr. Olson, a 68-year-old man, has poorly differentiated SCC of unknown origin with diffuse metastasis, including the proximal esophagus, causing severe dysphagia.    Mr. Olson is a 68-year-old man who initially noticed right axillary lymph nodes. He described how a mass started to grow over time and eventually began to bleed intermittently with clear liquid discharge, along with shedding skin and affecting regions of the mass. The mass later became painful and interfered with his ability to sleep. During the same time, he experienced progressive dysphagia, which was initially relieved by drinking liquids but gradually worsened, eventually rendering him unable to tolerate solids at all. He also began regurgitating food minutes after ingestion. He denied experiencing heartburn, vomiting,  coughing, shortness of breath, chest pain, hematemesis  as well as fever or abdominal pain. He reported a weight loss of 40 pounds since September, decreased appetite, and constipation.    Ms. Olson sought medical attention and was seen by Dr. Arriaza, who conducted an axillary lymph node biopsy on 10/23/2023. The pathology results showed poorly differentiated squamous cell carcinoma, which immunoreacted with CK7 and p40, infiltrating soft tissue. No residual lymph node parenchyma was observed, with the following immunostains testing negative: NKX3.1, GATA3, CK20, TTF-1, and CDX2. Subsequently, he underwent a PET/CT on 11/17/2023, revealing extensive osseous metastatic disease involving the axial and appendicular skeleton and proximal esophagus, a hypermetabolic nodule within the left lower lobe, and a left anterior pleural-based implant favoring metastatic disease.  On 11/23/2023, Ms. Jones presented to the [facility] with severe dysphagia and was seen by the GI team. He underwent an EGD on 11/24/2023 by Dr. Wright, which reported a malignant-appearing mass in the esophagus, as well as a single small, mucosal nodule likely representing synchronous disease. Dr. Arriaza's surgical oncology team planned for the placement of a G-tube on 11/30 and consulted radiation oncology to discuss treatment options.    At the time of the interview, the patient was resting comfortably in bed and reported improved pain with pain medication. He denied experiencing hemoptysis, cough, nausea/vomiting, shortness of breath, chest pain, hematochezia, or melena.    Additionally, it is noted that the patient presented to the ED on 9/23/2023 with weakness and dysphagia and was diagnosed with a stroke.     - Past Medical History:  Stroke, Hyperlipidemia, Atherosclerosis, Hypertension, Carotid stenosis, LV EF 55%  - Past surgical history: none  - Social History:  quit smoking. His smoking use included cigarettes. He has never used smokeless  tobacco. He reports that he does not currently use alcohol. He reports current drug use. Drug: Marijuana. In the past, his drinking included 20 beers a week. Chewing tobacco history included a can a week.   - Family history: Father- Prostate cancer, Wife -liver cancer and currently receiving systemic therapy     -Prior radiation therapy: No  -Pacemaker: No  -Other implantable devices: No  -Connective tissue disease: No    Current Systemic Treatment:  No       Past Medical History:    Past Medical History:   Diagnosis Date    Cancer (CMS/HCC)         Past Surgical History:    Past Surgical History:   Procedure Laterality Date    MR HEAD ANGIO WO IV CONTRAST  9/18/2023    MR HEAD ANGIO WO IV CONTRAST 9/18/2023 [facility] MRI        Family History:  Cancer-related family history includes Prostate cancer in his father.    Social History:    Social History     Tobacco Use    Smoking status: Former     Types: Cigarettes    Smokeless tobacco: Never   Substance Use Topics    Alcohol use: Not Currently    Drug use: Yes     Types: Marijuana       Allergies:  No Known Allergies     Medications:  No current facility-administered medications for this visit.  No current outpatient medications on file.    Facility-Administered Medications Ordered in Other Visits:     acetaminophen (Tylenol) tablet 975 mg, 975 mg, oral, q8h, Glo Cooper MD, 975 mg at 11/25/23 0815    amLODIPine (Norvasc) tablet 10 mg, 10 mg, oral, Daily, Alison Pride MD, 10 mg at 11/25/23 0814    aspirin chewable tablet 81 mg, 81 mg, oral, Daily, Alison Pride MD, 81 mg at 11/25/23 0814    atorvastatin (Lipitor) tablet 80 mg, 80 mg, oral, Daily, Alison Pride MD, 80 mg at 11/25/23 0814    chlorthalidone (Hygroton) tablet 25 mg, 25 mg, oral, Daily, Alison Pride MD, 25 mg at 11/23/23 0933    D5 % and 0.9 % sodium chloride infusion, 100 mL/hr, intravenous, Continuous, Axel Willoughby MD, Last Rate: 100 mL/hr at 11/25/23 1126, 100 mL/hr at 11/25/23 1126    enoxaparin  (Lovenox) syringe 40 mg, 40 mg, subcutaneous, q24h, Alison Pride MD, 40 mg at 11/24/23 2003    losartan (Cozaar) tablet 100 mg, 100 mg, oral, Daily, Axel Willoughby MD, 100 mg at 11/25/23 0814    melatonin tablet 5 mg, 5 mg, oral, Nightly PRN, Celena Goldberg MD, 5 mg at 11/24/23 2009    meperidine (Demerol) injection, , intravenous, PRN, Alvino Wiggins MD, 25 mg at 11/24/23 1539    meperidine PF (Demerol) injection, , intravenous, PRN, Alvino Wiggins MD, 25 mg at 11/24/23 1542    midazolam (Versed) injection, , intravenous, PRN, Alvino Wiggins MD, 1 mg at 11/24/23 1542    oxyCODONE (Roxicodone) immediate release tablet 10 mg, 10 mg, oral, q6h PRN, Axel Willoughby MD    oxyCODONE (Roxicodone) immediate release tablet 5 mg, 5 mg, oral, q6h PRN, Axel Willoughby MD    [Held by provider] pantoprazole (ProtoNix) EC tablet 20 mg, 20 mg, oral, Daily before breakfast, Alison Pride MD    polyethylene glycol (Glycolax, Miralax) packet 17 g, 17 g, oral, Daily, Alison Pride MD    potassium chloride 20 mEq in 100 mL IV premix, 20 mEq, intravenous, q2h, Axel Willoughby MD, Last Rate: 50 mL/hr at 11/25/23 1333, 20 mEq at 11/25/23 1333      Review of Systems:    Except for the symptoms described in the history above, the review of systems is negative. Specifically, except as noted, when asked the patient expressed no complaints relative to constitutional (fever, weight loss), eyes, ears, nose, mouth, throat, neurologic, cardiovascular, pulmonary, breast, GI, , skin, musculoskeletal, endocrine, hematologic/lymphatic, or immunologic systems.  Performance Status:  The Karnofsky performance scale today is 100, Fully active, able to carry on all pre-disease performed without restriction (ECOG equivalent 0).        OBJECTIVE  Physical Exam:  Gen: normal appearing, in NAD  ENT: Eyes symmetric  Resp: Breathing comfortably in room air  CV: Normal perfusion  Abd: Soft and non-distended  Extremities: symmetric  Skin: A  fungating mass is present in the right axilla, covered with a clean bandage, and there are no signs of erythema. The surrounding area is not tender to palpation.    Laboratory Review:     All pertinent labs  were personally reviewed and interpreted in clinic by Dr. Castaneda and me. Findings as history of present illness and EMR.    Pathology Review:   All pertinent pathology were personally reviewed and interpreted in clinic by Dr. Castaneda and me. Findings as history of present illness and EMR.   10/23/2023 Right axillary lymph node biopsy: POORLY DIFFERENTIATED CARCINOMA, CONSISTENT WITH POORLY DIFFERENTIATED SQUAMOUS CELL CARCINOMA     Imaging:      All imaging was personally reviewed and interpreted in clinic by Dr. Castaneda and me. Findings as per history of present illness and EMR.  PET/CT 11/17/2023:     IMPRESSION:  1. Squamous cell carcinoma of unknown origin with significant  involvement of the proximal right arm as well as the proximal  esophagus.  2. Extensive osseous metastatic disease involving the axial and  appendicular skeleton is present as described above. Multiple  suspected muscular implants are also present.  3. Hypermetabolic nodule within the left lower lobe which may  represent metastatic involvement or a primary lung lesion.  4. Left anterior pleural-based implant favoring metastatic disease.  5. Possible fracture of the left 3rd and 4th metacarpal base.  Recommend correlation with radiograph of the left hand        ASSESSMENT/PLAN:      Mr. Olson, a 68-year-old man, has poorly differentiated SCC of unknown origin with diffuse metastasis, including the proximal esophagus, causing severe dysphagia, plan to proceed with palliative RT 20 Gy in 5 fractions.     We recommended palliative RT to provide the patient with symptomatic relief. We discussed with the patient this is not a definitive plan; there is still a possibility of the development of additional sites of metastatic disease in the future. The  treatment goal would be to provide palliation, and the patient agrees with these goals. We also went over the details of CT simulation and the delivery of once-daily treatments over one week.     We reviewed the potential acute and chronic side effects associated with radiation therapy, which may include fatigue, loss of appetite, difficulty swallowing, painful swallowing, nausea, vomiting, dehydration, and a risk of radiation pneumonitis, scarring, as well as a small but non-zero increased risk of long-term cardiovascular issues. The patient understands and accepts these risks. He knows he can contact us at any time with questions or concerns    -Fast for 2 hours before radiation treatments   -Antiemetics prior to each treatment.   -11/27 CT simulation followed by the first treatment  -11/27 PEG tube placement  -Recommend low threshold for obtaining MRI of the spine given osseous extension seen on PET Scan    Discussed with attending physician, Dr. Tonya Padilla MD  PGY-4, Radiation Oncology  11/25/2023 2:40 PM       I saw and evaluated the patient.  I personally obtained the key and critical portions of the history and physical exam or was physically present for key and critical portions performed by the resident/fellow. I reviewed the resident/fellow’s documentation and discussed the patient with the resident/fellow.  I agree with the resident/fellow’s medical decision making as documented in the note.

## 2023-11-25 NOTE — PROGRESS NOTES
"Mike Olson is a 68 y.o. male on day 3 of admission presenting with Difficulty swallowing.    Subjective   NAONE       Objective     Physical Exam  Cardiovascular:      Rate and Rhythm: Normal rate and regular rhythm.      Pulses: Normal pulses.      Heart sounds: Normal heart sounds.   Pulmonary:      Effort: Pulmonary effort is normal.      Breath sounds: Normal breath sounds.   Abdominal:      General: Abdomen is flat. Bowel sounds are normal.      Palpations: Abdomen is soft.   Musculoskeletal:         General: No swelling or deformity.   Skin:     Comments: palpable right axillary mass, clean without purulent drainage, erythematous without bleeding   Neurological:      Sensory: No sensory deficit.      Comments: minor right leg weakness, uses cane    Last Recorded Vitals  Blood pressure 147/69, pulse 84, temperature 36.5 °C (97.7 °F), temperature source Temporal, resp. rate 18, height 1.69 m (5' 6.54\"), weight 76.5 kg (168 lb 11.2 oz), SpO2 94 %.  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 PET scan showed widely metastatic disease to bone, proximal esophagus, and lung.  EGD performed on 11/24 showed mass within esophagus and the scope was unable to be passed further.     Recommendations:   - Surgical oncology is available for laparoscopic placement of G-tube if desired by primary team for enteral access  - Possible OR time is available " this Thursday 11/30  - Will cont to follow    Discussed with attending, Dr. Sofiya Ludwig MD  Surgical Oncology PGY1

## 2023-11-26 ENCOUNTER — DOCUMENTATION (OUTPATIENT)
Dept: RADIATION ONCOLOGY | Facility: HOSPITAL | Age: 68
End: 2023-11-26
Payer: MEDICARE

## 2023-11-26 LAB
ALBUMIN SERPL BCP-MCNC: 2.8 G/DL (ref 3.4–5)
ANION GAP SERPL CALC-SCNC: 17 MMOL/L (ref 10–20)
BASOPHILS # BLD AUTO: 0.05 X10*3/UL (ref 0–0.1)
BASOPHILS NFR BLD AUTO: 0.3 %
BUN SERPL-MCNC: 11 MG/DL (ref 6–23)
CALCIUM SERPL-MCNC: 9.7 MG/DL (ref 8.6–10.6)
CHLORIDE SERPL-SCNC: 101 MMOL/L (ref 98–107)
CO2 SERPL-SCNC: 28 MMOL/L (ref 21–32)
CREAT SERPL-MCNC: 0.68 MG/DL (ref 0.5–1.3)
EOSINOPHIL # BLD AUTO: 0.19 X10*3/UL (ref 0–0.7)
EOSINOPHIL NFR BLD AUTO: 1.2 %
ERYTHROCYTE [DISTWIDTH] IN BLOOD BY AUTOMATED COUNT: 11.7 % (ref 11.5–14.5)
GFR SERPL CREATININE-BSD FRML MDRD: >90 ML/MIN/1.73M*2
GLUCOSE BLD MANUAL STRIP-MCNC: 120 MG/DL (ref 74–99)
GLUCOSE BLD MANUAL STRIP-MCNC: 125 MG/DL (ref 74–99)
GLUCOSE BLD MANUAL STRIP-MCNC: 136 MG/DL (ref 74–99)
GLUCOSE SERPL-MCNC: 129 MG/DL (ref 74–99)
HCT VFR BLD AUTO: 33.3 % (ref 41–52)
HGB BLD-MCNC: 10.6 G/DL (ref 13.5–17.5)
IMM GRANULOCYTES # BLD AUTO: 0.18 X10*3/UL (ref 0–0.7)
IMM GRANULOCYTES NFR BLD AUTO: 1.2 % (ref 0–0.9)
LYMPHOCYTES # BLD AUTO: 1.3 X10*3/UL (ref 1.2–4.8)
LYMPHOCYTES NFR BLD AUTO: 8.4 %
MAGNESIUM SERPL-MCNC: 1.4 MG/DL (ref 1.6–2.4)
MCH RBC QN AUTO: 28.8 PG (ref 26–34)
MCHC RBC AUTO-ENTMCNC: 31.8 G/DL (ref 32–36)
MCV RBC AUTO: 91 FL (ref 80–100)
MONOCYTES # BLD AUTO: 1.52 X10*3/UL (ref 0.1–1)
MONOCYTES NFR BLD AUTO: 9.8 %
NEUTROPHILS # BLD AUTO: 12.31 X10*3/UL (ref 1.2–7.7)
NEUTROPHILS NFR BLD AUTO: 79.1 %
NRBC BLD-RTO: 0 /100 WBCS (ref 0–0)
PHOSPHATE SERPL-MCNC: 3.1 MG/DL (ref 2.5–4.9)
PLATELET # BLD AUTO: 293 X10*3/UL (ref 150–450)
POTASSIUM SERPL-SCNC: 3.5 MMOL/L (ref 3.5–5.3)
RBC # BLD AUTO: 3.68 X10*6/UL (ref 4.5–5.9)
SODIUM SERPL-SCNC: 142 MMOL/L (ref 136–145)
WBC # BLD AUTO: 15.6 X10*3/UL (ref 4.4–11.3)

## 2023-11-26 PROCEDURE — 82947 ASSAY GLUCOSE BLOOD QUANT: CPT

## 2023-11-26 PROCEDURE — 36415 COLL VENOUS BLD VENIPUNCTURE: CPT

## 2023-11-26 PROCEDURE — 96372 THER/PROPH/DIAG INJ SC/IM: CPT | Performed by: STUDENT IN AN ORGANIZED HEALTH CARE EDUCATION/TRAINING PROGRAM

## 2023-11-26 PROCEDURE — 2500000004 HC RX 250 GENERAL PHARMACY W/ HCPCS (ALT 636 FOR OP/ED): Performed by: STUDENT IN AN ORGANIZED HEALTH CARE EDUCATION/TRAINING PROGRAM

## 2023-11-26 PROCEDURE — 2500000001 HC RX 250 WO HCPCS SELF ADMINISTERED DRUGS (ALT 637 FOR MEDICARE OP): Performed by: STUDENT IN AN ORGANIZED HEALTH CARE EDUCATION/TRAINING PROGRAM

## 2023-11-26 PROCEDURE — 83735 ASSAY OF MAGNESIUM: CPT

## 2023-11-26 PROCEDURE — 80069 RENAL FUNCTION PANEL: CPT

## 2023-11-26 PROCEDURE — 2500000004 HC RX 250 GENERAL PHARMACY W/ HCPCS (ALT 636 FOR OP/ED)

## 2023-11-26 PROCEDURE — 85025 COMPLETE CBC W/AUTO DIFF WBC: CPT

## 2023-11-26 PROCEDURE — 2500000001 HC RX 250 WO HCPCS SELF ADMINISTERED DRUGS (ALT 637 FOR MEDICARE OP)

## 2023-11-26 PROCEDURE — 1170000001 HC PRIVATE ONCOLOGY ROOM DAILY

## 2023-11-26 PROCEDURE — 99232 SBSQ HOSP IP/OBS MODERATE 35: CPT

## 2023-11-26 RX ORDER — MAGNESIUM SULFATE HEPTAHYDRATE 40 MG/ML
4 INJECTION, SOLUTION INTRAVENOUS ONCE
Status: COMPLETED | OUTPATIENT
Start: 2023-11-26 | End: 2023-11-26

## 2023-11-26 RX ORDER — POTASSIUM CHLORIDE 14.9 MG/ML
20 INJECTION INTRAVENOUS ONCE
Status: COMPLETED | OUTPATIENT
Start: 2023-11-26 | End: 2023-11-26

## 2023-11-26 RX ADMIN — CHLORTHALIDONE 25 MG: 25 TABLET ORAL at 08:21

## 2023-11-26 RX ADMIN — LOSARTAN POTASSIUM 100 MG: 50 TABLET, FILM COATED ORAL at 08:23

## 2023-11-26 RX ADMIN — MAGNESIUM SULFATE HEPTAHYDRATE 4 G: 40 INJECTION, SOLUTION INTRAVENOUS at 11:02

## 2023-11-26 RX ADMIN — ACETAMINOPHEN 975 MG: 325 TABLET ORAL at 08:24

## 2023-11-26 RX ADMIN — ASPIRIN 81 MG CHEWABLE TABLET 81 MG: 81 TABLET CHEWABLE at 08:21

## 2023-11-26 RX ADMIN — OXYCODONE HYDROCHLORIDE 10 MG: 5 TABLET ORAL at 20:15

## 2023-11-26 RX ADMIN — ATORVASTATIN CALCIUM 80 MG: 80 TABLET, FILM COATED ORAL at 08:21

## 2023-11-26 RX ADMIN — OXYCODONE HYDROCHLORIDE 10 MG: 5 TABLET ORAL at 08:21

## 2023-11-26 RX ADMIN — AMLODIPINE BESYLATE 10 MG: 10 TABLET ORAL at 08:22

## 2023-11-26 RX ADMIN — POTASSIUM CHLORIDE 20 MEQ: 14.9 INJECTION, SOLUTION INTRAVENOUS at 11:03

## 2023-11-26 RX ADMIN — ENOXAPARIN SODIUM 40 MG: 100 INJECTION SUBCUTANEOUS at 20:15

## 2023-11-26 ASSESSMENT — COGNITIVE AND FUNCTIONAL STATUS - GENERAL
DRESSING REGULAR UPPER BODY CLOTHING: A LITTLE
HELP NEEDED FOR BATHING: A LITTLE
WALKING IN HOSPITAL ROOM: A LOT
HELP NEEDED FOR BATHING: A LITTLE
TOILETING: A LITTLE
CLIMB 3 TO 5 STEPS WITH RAILING: A LITTLE
DRESSING REGULAR UPPER BODY CLOTHING: A LITTLE
DRESSING REGULAR LOWER BODY CLOTHING: A LITTLE
TOILETING: A LITTLE
STANDING UP FROM CHAIR USING ARMS: A LITTLE
DAILY ACTIVITIY SCORE: 20
MOBILITY SCORE: 24
DAILY ACTIVITIY SCORE: 20
MOVING TO AND FROM BED TO CHAIR: A LITTLE
DRESSING REGULAR LOWER BODY CLOTHING: A LITTLE
MOBILITY SCORE: 19

## 2023-11-26 ASSESSMENT — PAIN - FUNCTIONAL ASSESSMENT
PAIN_FUNCTIONAL_ASSESSMENT: 0-10

## 2023-11-26 ASSESSMENT — PAIN SCALES - GENERAL
PAINLEVEL_OUTOF10: 7
PAINLEVEL_OUTOF10: 7
PAINLEVEL_OUTOF10: 2
PAINLEVEL_OUTOF10: 0 - NO PAIN

## 2023-11-26 NOTE — PROGRESS NOTES
"Mike Olson is a 68 y.o. male on day 4 of admission presenting with Difficulty swallowing.    Subjective   No acute events overnight. Today, the patient reports that he is doing well with no concerns. Has been able to tolerate a clear liquid diet with no signs of aspiration. Denies worsening throat pain or dysphagia, fever/chills, N/V, diarrhea/constipation.       Objective     Physical Exam  Constitutional:       General: He is not in acute distress.     Appearance: Normal appearance.   HENT:      Head: Normocephalic and atraumatic.      Mouth/Throat:      Mouth: Mucous membranes are moist.   Eyes:      Extraocular Movements: Extraocular movements intact.      Conjunctiva/sclera: Conjunctivae normal.      Pupils: Pupils are equal, round, and reactive to light.   Cardiovascular:      Rate and Rhythm: Normal rate and regular rhythm.      Heart sounds: No murmur heard.  Pulmonary:      Effort: Pulmonary effort is normal.   Abdominal:      General: Bowel sounds are normal. There is no distension.      Palpations: Abdomen is soft.      Tenderness: There is no abdominal tenderness.   Skin:     General: Skin is warm and dry.      Comments: R axilla fungating mass covered with clean bandage, no signs of erythema, non tender to palpation in surrounding area.   Neurological:      General: No focal deficit present.      Mental Status: He is alert and oriented to person, place, and time.      Gait: Gait normal.   Psychiatric:      Comments: Appropriate mood and affect    Last Recorded Vitals  Blood pressure 154/76, pulse 89, temperature 36.7 °C (98.1 °F), resp. rate 18, height 1.69 m (5' 6.54\"), weight 76.5 kg (168 lb 11.2 oz), SpO2 95 %.  Intake/Output last 3 Shifts:  I/O last 3 completed shifts:  In: 4035.8 (52.7 mL/kg) [I.V.:4035.8 (52.7 mL/kg)]  Out: 2600 (34 mL/kg) [Urine:2600 (0.9 mL/kg/hr)]  Weight: 76.5 kg     Relevant Results  Scheduled medications  acetaminophen, 975 mg, oral, q8h  amLODIPine, 10 mg, oral, " Daily  aspirin, 81 mg, oral, Daily  atorvastatin, 80 mg, oral, Daily  chlorthalidone, 25 mg, oral, Daily  enoxaparin, 40 mg, subcutaneous, q24h  losartan, 100 mg, oral, Daily  magnesium sulfate, 4 g, intravenous, Once  [Held by provider] pantoprazole, 20 mg, oral, Daily before breakfast  polyethylene glycol, 17 g, oral, Daily  potassium chloride, 20 mEq, intravenous, Once      Continuous medications  D5 % and 0.9 % sodium chloride, 100 mL/hr, Last Rate: 100 mL/hr (11/26/23 0751)      PRN medications  PRN medications: melatonin, meperidine, meperidine PF, midazolam, oxyCODONE, oxyCODONE  Results for orders placed or performed during the hospital encounter of 11/22/23 (from the past 24 hour(s))   POCT GLUCOSE   Result Value Ref Range    POCT Glucose 148 (H) 74 - 99 mg/dL   POCT GLUCOSE   Result Value Ref Range    POCT Glucose 136 (H) 74 - 99 mg/dL   Renal function panel   Result Value Ref Range    Glucose 128 (H) 74 - 99 mg/dL    Sodium 141 136 - 145 mmol/L    Potassium 3.4 (L) 3.5 - 5.3 mmol/L    Chloride 103 98 - 107 mmol/L    Bicarbonate 28 21 - 32 mmol/L    Anion Gap 13 10 - 20 mmol/L    Urea Nitrogen 13 6 - 23 mg/dL    Creatinine 0.68 0.50 - 1.30 mg/dL    eGFR >90 >60 mL/min/1.73m*2    Calcium 9.6 8.6 - 10.6 mg/dL    Phosphorus 2.6 2.5 - 4.9 mg/dL    Albumin 2.9 (L) 3.4 - 5.0 g/dL   POCT GLUCOSE   Result Value Ref Range    POCT Glucose 121 (H) 74 - 99 mg/dL   CBC and Auto Differential   Result Value Ref Range    WBC 15.6 (H) 4.4 - 11.3 x10*3/uL    nRBC 0.0 0.0 - 0.0 /100 WBCs    RBC 3.68 (L) 4.50 - 5.90 x10*6/uL    Hemoglobin 10.6 (L) 13.5 - 17.5 g/dL    Hematocrit 33.3 (L) 41.0 - 52.0 %    MCV 91 80 - 100 fL    MCH 28.8 26.0 - 34.0 pg    MCHC 31.8 (L) 32.0 - 36.0 g/dL    RDW 11.7 11.5 - 14.5 %    Platelets 293 150 - 450 x10*3/uL    Neutrophils % 79.1 40.0 - 80.0 %    Immature Granulocytes %, Automated 1.2 (H) 0.0 - 0.9 %    Lymphocytes % 8.4 13.0 - 44.0 %    Monocytes % 9.8 2.0 - 10.0 %    Eosinophils % 1.2 0.0  - 6.0 %    Basophils % 0.3 0.0 - 2.0 %    Neutrophils Absolute 12.31 (H) 1.20 - 7.70 x10*3/uL    Immature Granulocytes Absolute, Automated 0.18 0.00 - 0.70 x10*3/uL    Lymphocytes Absolute 1.30 1.20 - 4.80 x10*3/uL    Monocytes Absolute 1.52 (H) 0.10 - 1.00 x10*3/uL    Eosinophils Absolute 0.19 0.00 - 0.70 x10*3/uL    Basophils Absolute 0.05 0.00 - 0.10 x10*3/uL   Magnesium   Result Value Ref Range    Magnesium 1.40 (L) 1.60 - 2.40 mg/dL   Renal Function Panel   Result Value Ref Range    Glucose 129 (H) 74 - 99 mg/dL    Sodium 142 136 - 145 mmol/L    Potassium 3.5 3.5 - 5.3 mmol/L    Chloride 101 98 - 107 mmol/L    Bicarbonate 28 21 - 32 mmol/L    Anion Gap 17 10 - 20 mmol/L    Urea Nitrogen 11 6 - 23 mg/dL    Creatinine 0.68 0.50 - 1.30 mg/dL    eGFR >90 >60 mL/min/1.73m*2    Calcium 9.7 8.6 - 10.6 mg/dL    Phosphorus 3.1 2.5 - 4.9 mg/dL    Albumin 2.8 (L) 3.4 - 5.0 g/dL   POCT GLUCOSE   Result Value Ref Range    POCT Glucose 136 (H) 74 - 99 mg/dL                                    Assessment/Plan   Principal Problem:    Difficulty swallowing  Active Problems:    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 Given new diagnosis, no treatment has been started yet and no known origin yet. Will discuss with Med Onc (Dr. Yin was to be his primary oncologist), Surg Onc (Dr Corona had already seen pt) and will also consult GI for EGD/biopsy. EGD on 11/24 showed large, nearly fully obstructing esophageal mass. Patient is not a surgical candidate given metastatic disease. Managing nutrition and need for chemo/radiation.     Updates 11/26:  -EGD on 11/24 showed large, nearly fully obstructing esophageal mass. Patient is not a surgical candidate because of metastatic disease.   - For nutrition, started patient on  Clear Boost as tolerated. Consulted ACS for G-tube consideration, plan to take to OR on 11/30  - Patient will have CT sim on 11/27 with rad onc and begin treatment     #Metastatic SCC, poorly differentiated   #Dysphagia   -Diagnosed 11/2023; no treatment yet; unknown origin  -Surg Onc consulted; appreciate recs (saw Dr. Corona outpatient 11/10/23)  -SLP consulted for swallow evaluation and recommended NPO  -Consulted GI for possible EGD/biopsy; plan for EGD on Friday 11/24  -Plan to inform Dr. Yin (future medical oncologist-1st appt scheduled 12/1)  -Consider consulting pulm for possible lung biopsy as potential origin   -Tylenol PRN for pain; patient denies any pain currently  - For nutrition, started patient on Clear Boost as tolerated. Consulted ACS for G-tube consideration, plan to take to OR on 11/30  - Patient will have CT sim on 11/27 with rad onc and begin treatment     #MARYELLEN (resolved)  -Baseline Cr ~1; on admission 1.48, was 1.50 on 11/16 -> now at baseline 1.04 on 11/23  -S/p 1L LR, IV LR infusion at 125cc/hr, given improvement in Cr likely pre-renal  -Restarted home losartan 100mg daily  -Continue to monitor      #HTN  -Continue home amlodipine 10mg daily  -Holding chlorithalidone 25mg daily given hypercalcemia  -Continue ARB as MARYELLEN has resolved     #CVA 9/2023  #Carotid stenosis  -2 acute or subacute infarcts seen on MRI 9/18/2023  -Continue ASA 81mg daily  -Completed 21 days of plavix (started 9/18/23)  -Continue atorvastatin 80mg daily  -Vascular Surgery follow up outpatient for the carotid stenosis     #Hypercalcemia  -Ca 11.5 on admission  -S/p 1L LR in the ED, on LR infusion at 125cc/hr  -Repeat was 11.3 on 11/23, so avoiding LR in favor of NS     #Elevated PSA  -Urology follow up outpatient     Disposition: tele  Follow-ups: PCP, Onc, Surg Onc,      F: prn  E: prn   N: CLD as tolerated +Boost  A: PIVs     DVT ppx: lovenox  GI ppx: home PPI    Code Status: FULL (confirmed on  11/22/23)  Surrogate Medical Decision-maker: Wife April [phone]           Axel Willoughby MD

## 2023-11-26 NOTE — PROGRESS NOTES
Due to scheduling conflicts, we will not be able to get his surgery done on Thursday with Dr Arriaza. Will tentatively add him on for tomorrow. Currently have him booked to follow at 11:00 a.m. Tomorrow 11/27/23 with Dr Lazo.    We will try to plan around radiation oncology so as to not interfere with his planned treatment.     If unable to get the case completed tomorrow, will likely be on Wednesday.    Lonnie CASTRO [facility]

## 2023-11-27 ENCOUNTER — HOSPITAL ENCOUNTER (OUTPATIENT)
Dept: RADIOLOGY | Facility: EXTERNAL LOCATION | Age: 68
Discharge: HOME | End: 2023-11-27
Payer: MEDICARE

## 2023-11-27 ENCOUNTER — APPOINTMENT (OUTPATIENT)
Dept: RADIATION ONCOLOGY | Facility: HOSPITAL | Age: 68
End: 2023-11-27
Payer: MEDICARE

## 2023-11-27 ENCOUNTER — APPOINTMENT (OUTPATIENT)
Dept: RADIOLOGY | Facility: HOSPITAL | Age: 68
DRG: 356 | End: 2023-11-27
Payer: MEDICARE

## 2023-11-27 ENCOUNTER — HOSPITAL ENCOUNTER (OUTPATIENT)
Dept: RADIATION ONCOLOGY | Facility: HOSPITAL | Age: 68
Setting detail: RADIATION/ONCOLOGY SERIES
Discharge: HOME | End: 2023-11-27
Payer: MEDICARE

## 2023-11-27 ENCOUNTER — ANESTHESIA EVENT (OUTPATIENT)
Dept: OPERATING ROOM | Facility: HOSPITAL | Age: 68
DRG: 356 | End: 2023-11-27
Payer: MEDICARE

## 2023-11-27 ENCOUNTER — ANESTHESIA (OUTPATIENT)
Dept: OPERATING ROOM | Facility: HOSPITAL | Age: 68
DRG: 356 | End: 2023-11-27
Payer: MEDICARE

## 2023-11-27 DIAGNOSIS — C15.9 MALIGNANT NEOPLASM OF ESOPHAGUS, UNSPECIFIED LOCATION (MULTI): ICD-10-CM

## 2023-11-27 DIAGNOSIS — C15.9 MALIGNANT NEOPLASM OF ESOPHAGUS, UNSPECIFIED LOCATION (MULTI): Primary | ICD-10-CM

## 2023-11-27 PROBLEM — D64.9 ANEMIA: Status: ACTIVE | Noted: 2023-11-27

## 2023-11-27 PROBLEM — I10 HTN (HYPERTENSION): Status: ACTIVE | Noted: 2023-11-27

## 2023-11-27 PROBLEM — I63.9 CVA (CEREBRAL VASCULAR ACCIDENT) (MULTI): Status: ACTIVE | Noted: 2023-11-27

## 2023-11-27 PROBLEM — I65.21 STENOSIS OF RIGHT CAROTID ARTERY: Status: ACTIVE | Noted: 2023-11-27

## 2023-11-27 PROBLEM — E78.5 HYPERLIPIDEMIA: Status: ACTIVE | Noted: 2023-11-27

## 2023-11-27 PROCEDURE — 77001 FLUOROGUIDE FOR VEIN DEVICE: CPT | Performed by: SURGERY

## 2023-11-27 PROCEDURE — 2500000004 HC RX 250 GENERAL PHARMACY W/ HCPCS (ALT 636 FOR OP/ED): Performed by: STUDENT IN AN ORGANIZED HEALTH CARE EDUCATION/TRAINING PROGRAM

## 2023-11-27 PROCEDURE — 99233 SBSQ HOSP IP/OBS HIGH 50: CPT

## 2023-11-27 PROCEDURE — 71045 X-RAY EXAM CHEST 1 VIEW: CPT

## 2023-11-27 PROCEDURE — 1170000001 HC PRIVATE ONCOLOGY ROOM DAILY

## 2023-11-27 PROCEDURE — 2780000003 HC OR 278 NO HCPCS: Performed by: SURGERY

## 2023-11-27 PROCEDURE — 77300 RADIATION THERAPY DOSE PLAN: CPT | Performed by: STUDENT IN AN ORGANIZED HEALTH CARE EDUCATION/TRAINING PROGRAM

## 2023-11-27 PROCEDURE — 3600000005 HC OR TIME - INITIAL BASE CHARGE - PROCEDURE LEVEL FIVE: Performed by: SURGERY

## 2023-11-27 PROCEDURE — 7100000002 HC RECOVERY ROOM TIME - EACH INCREMENTAL 1 MINUTE: Performed by: SURGERY

## 2023-11-27 PROCEDURE — 77334 RADIATION TREATMENT AID(S): CPT | Performed by: STUDENT IN AN ORGANIZED HEALTH CARE EDUCATION/TRAINING PROGRAM

## 2023-11-27 PROCEDURE — 77290 THER RAD SIMULAJ FIELD CPLX: CPT | Performed by: STUDENT IN AN ORGANIZED HEALTH CARE EDUCATION/TRAINING PROGRAM

## 2023-11-27 PROCEDURE — 2500000001 HC RX 250 WO HCPCS SELF ADMINISTERED DRUGS (ALT 637 FOR MEDICARE OP)

## 2023-11-27 PROCEDURE — 77295 3-D RADIOTHERAPY PLAN: CPT | Performed by: STUDENT IN AN ORGANIZED HEALTH CARE EDUCATION/TRAINING PROGRAM

## 2023-11-27 PROCEDURE — A49440 PERIPHERAL IV: Performed by: ANESTHESIOLOGY

## 2023-11-27 PROCEDURE — 71045 X-RAY EXAM CHEST 1 VIEW: CPT | Performed by: RADIOLOGY

## 2023-11-27 PROCEDURE — A4217 STERILE WATER/SALINE, 500 ML: HCPCS | Performed by: SURGERY

## 2023-11-27 PROCEDURE — 7100000001 HC RECOVERY ROOM TIME - INITIAL BASE CHARGE: Performed by: SURGERY

## 2023-11-27 PROCEDURE — 3600000010 HC OR TIME - EACH INCREMENTAL 1 MINUTE - PROCEDURE LEVEL FIVE: Performed by: SURGERY

## 2023-11-27 PROCEDURE — 02HV33Z INSERTION OF INFUSION DEVICE INTO SUPERIOR VENA CAVA, PERCUTANEOUS APPROACH: ICD-10-PCS | Performed by: SURGERY

## 2023-11-27 PROCEDURE — 3700000002 HC GENERAL ANESTHESIA TIME - EACH INCREMENTAL 1 MINUTE: Performed by: SURGERY

## 2023-11-27 PROCEDURE — 2500000005 HC RX 250 GENERAL PHARMACY W/O HCPCS: Performed by: STUDENT IN AN ORGANIZED HEALTH CARE EDUCATION/TRAINING PROGRAM

## 2023-11-27 PROCEDURE — 2500000004 HC RX 250 GENERAL PHARMACY W/ HCPCS (ALT 636 FOR OP/ED): Performed by: INTERNAL MEDICINE

## 2023-11-27 PROCEDURE — 8E0W4CZ ROBOTIC ASSISTED PROCEDURE OF TRUNK REGION, PERCUTANEOUS ENDOSCOPIC APPROACH: ICD-10-PCS | Performed by: SURGERY

## 2023-11-27 PROCEDURE — 82947 ASSAY GLUCOSE BLOOD QUANT: CPT

## 2023-11-27 PROCEDURE — 2720000007 HC OR 272 NO HCPCS: Performed by: SURGERY

## 2023-11-27 PROCEDURE — 3700000001 HC GENERAL ANESTHESIA TIME - INITIAL BASE CHARGE: Performed by: SURGERY

## 2023-11-27 PROCEDURE — 2500000004 HC RX 250 GENERAL PHARMACY W/ HCPCS (ALT 636 FOR OP/ED)

## 2023-11-27 PROCEDURE — 0DH64UZ INSERTION OF FEEDING DEVICE INTO STOMACH, PERCUTANEOUS ENDOSCOPIC APPROACH: ICD-10-PCS | Performed by: SURGERY

## 2023-11-27 PROCEDURE — 36561 INSERT TUNNELED CV CATH: CPT | Performed by: SURGERY

## 2023-11-27 PROCEDURE — C1788 PORT, INDWELLING, IMP: HCPCS | Performed by: SURGERY

## 2023-11-27 PROCEDURE — 0JH60WZ INSERTION OF TOTALLY IMPLANTABLE VASCULAR ACCESS DEVICE INTO CHEST SUBCUTANEOUS TISSUE AND FASCIA, OPEN APPROACH: ICD-10-PCS | Performed by: SURGERY

## 2023-11-27 PROCEDURE — 77293 RESPIRATOR MOTION MGMT SIMUL: CPT | Performed by: STUDENT IN AN ORGANIZED HEALTH CARE EDUCATION/TRAINING PROGRAM

## 2023-11-27 PROCEDURE — 96372 THER/PROPH/DIAG INJ SC/IM: CPT | Performed by: STUDENT IN AN ORGANIZED HEALTH CARE EDUCATION/TRAINING PROGRAM

## 2023-11-27 PROCEDURE — 76000 FLUOROSCOPY <1 HR PHYS/QHP: CPT

## 2023-11-27 PROCEDURE — 43653 LAPAROSCOPY GASTROSTOMY: CPT | Performed by: SURGERY

## 2023-11-27 PROCEDURE — C1892 INTRO/SHEATH,FIXED,PEEL-AWAY: HCPCS | Performed by: SURGERY

## 2023-11-27 PROCEDURE — 2500000004 HC RX 250 GENERAL PHARMACY W/ HCPCS (ALT 636 FOR OP/ED): Performed by: SURGERY

## 2023-11-27 DEVICE — POWERPORT SLIM IMPLANTABLE PORT WITH ATTACHABLE 6F CHRONOFLEX OPEN-ENDED SINGLE-LUMEN VENOUS CATHETER INTERMEDIATE KIT (WITHOUT SUTURE PLUGS)
Type: IMPLANTABLE DEVICE | Site: CHEST | Status: FUNCTIONAL
Brand: POWERPORT, CHRONOFLEX

## 2023-11-27 RX ORDER — HEPARIN SODIUM 5000 [USP'U]/ML
INJECTION, SOLUTION INTRAVENOUS; SUBCUTANEOUS AS NEEDED
Status: DISCONTINUED | OUTPATIENT
Start: 2023-11-27 | End: 2023-11-27

## 2023-11-27 RX ORDER — NEOSTIGMINE METHYLSULFATE 1 MG/ML
INJECTION, SOLUTION INTRAVENOUS AS NEEDED
Status: DISCONTINUED | OUTPATIENT
Start: 2023-11-27 | End: 2023-11-27

## 2023-11-27 RX ORDER — ONDANSETRON HYDROCHLORIDE 2 MG/ML
INJECTION, SOLUTION INTRAVENOUS AS NEEDED
Status: DISCONTINUED | OUTPATIENT
Start: 2023-11-27 | End: 2023-11-27

## 2023-11-27 RX ORDER — ONDANSETRON HYDROCHLORIDE 2 MG/ML
4 INJECTION, SOLUTION INTRAVENOUS ONCE AS NEEDED
Status: DISCONTINUED | OUTPATIENT
Start: 2023-11-27 | End: 2023-11-27 | Stop reason: HOSPADM

## 2023-11-27 RX ORDER — PROPOFOL 10 MG/ML
INJECTION, EMULSION INTRAVENOUS
Status: COMPLETED
Start: 2023-11-27 | End: 2023-11-27

## 2023-11-27 RX ORDER — HYDROMORPHONE HYDROCHLORIDE 1 MG/ML
0.4 INJECTION, SOLUTION INTRAMUSCULAR; INTRAVENOUS; SUBCUTANEOUS EVERY 5 MIN PRN
Status: DISCONTINUED | OUTPATIENT
Start: 2023-11-27 | End: 2023-11-27 | Stop reason: HOSPADM

## 2023-11-27 RX ORDER — LIDOCAINE HYDROCHLORIDE 10 MG/ML
0.1 INJECTION INFILTRATION; PERINEURAL ONCE
Status: DISCONTINUED | OUTPATIENT
Start: 2023-11-27 | End: 2023-11-27 | Stop reason: HOSPADM

## 2023-11-27 RX ORDER — SODIUM CHLORIDE, SODIUM LACTATE, POTASSIUM CHLORIDE, CALCIUM CHLORIDE 600; 310; 30; 20 MG/100ML; MG/100ML; MG/100ML; MG/100ML
100 INJECTION, SOLUTION INTRAVENOUS CONTINUOUS
Status: DISCONTINUED | OUTPATIENT
Start: 2023-11-27 | End: 2023-11-27 | Stop reason: HOSPADM

## 2023-11-27 RX ORDER — SUCCINYLCHOLINE CHLORIDE 100 MG/5ML
SYRINGE (ML) INTRAVENOUS AS NEEDED
Status: DISCONTINUED | OUTPATIENT
Start: 2023-11-27 | End: 2023-11-27

## 2023-11-27 RX ORDER — HYDROMORPHONE HYDROCHLORIDE 1 MG/ML
INJECTION, SOLUTION INTRAMUSCULAR; INTRAVENOUS; SUBCUTANEOUS
Status: COMPLETED
Start: 2023-11-27 | End: 2023-11-27

## 2023-11-27 RX ORDER — HYDROMORPHONE HYDROCHLORIDE 1 MG/ML
0.2 INJECTION, SOLUTION INTRAMUSCULAR; INTRAVENOUS; SUBCUTANEOUS EVERY 5 MIN PRN
Status: DISCONTINUED | OUTPATIENT
Start: 2023-11-27 | End: 2023-11-27 | Stop reason: HOSPADM

## 2023-11-27 RX ORDER — HYDROMORPHONE HYDROCHLORIDE 1 MG/ML
INJECTION, SOLUTION INTRAMUSCULAR; INTRAVENOUS; SUBCUTANEOUS AS NEEDED
Status: DISCONTINUED | OUTPATIENT
Start: 2023-11-27 | End: 2023-11-27

## 2023-11-27 RX ORDER — FENTANYL CITRATE 50 UG/ML
INJECTION, SOLUTION INTRAMUSCULAR; INTRAVENOUS
Status: DISCONTINUED
Start: 2023-11-27 | End: 2023-12-15 | Stop reason: HOSPADM

## 2023-11-27 RX ORDER — GLYCOPYRROLATE 0.2 MG/ML
INJECTION INTRAMUSCULAR; INTRAVENOUS AS NEEDED
Status: DISCONTINUED | OUTPATIENT
Start: 2023-11-27 | End: 2023-11-27

## 2023-11-27 RX ORDER — PROPOFOL 10 MG/ML
INJECTION, EMULSION INTRAVENOUS AS NEEDED
Status: DISCONTINUED | OUTPATIENT
Start: 2023-11-27 | End: 2023-11-27

## 2023-11-27 RX ORDER — BUPIVACAINE HYDROCHLORIDE 5 MG/ML
INJECTION, SOLUTION EPIDURAL; INTRACAUDAL AS NEEDED
Status: DISCONTINUED | OUTPATIENT
Start: 2023-11-27 | End: 2023-11-27 | Stop reason: HOSPADM

## 2023-11-27 RX ORDER — MIDAZOLAM HYDROCHLORIDE 1 MG/ML
INJECTION INTRAMUSCULAR; INTRAVENOUS
Status: DISCONTINUED
Start: 2023-11-27 | End: 2023-12-15 | Stop reason: HOSPADM

## 2023-11-27 RX ORDER — FENTANYL CITRATE 50 UG/ML
INJECTION, SOLUTION INTRAMUSCULAR; INTRAVENOUS AS NEEDED
Status: DISCONTINUED | OUTPATIENT
Start: 2023-11-27 | End: 2023-11-27

## 2023-11-27 RX ORDER — CEFAZOLIN 1 G/1
INJECTION, POWDER, FOR SOLUTION INTRAVENOUS AS NEEDED
Status: DISCONTINUED | OUTPATIENT
Start: 2023-11-27 | End: 2023-11-27

## 2023-11-27 RX ORDER — LIDOCAINE HYDROCHLORIDE 20 MG/ML
INJECTION, SOLUTION INFILTRATION; PERINEURAL AS NEEDED
Status: DISCONTINUED | OUTPATIENT
Start: 2023-11-27 | End: 2023-11-27

## 2023-11-27 RX ORDER — HYDROMORPHONE HYDROCHLORIDE 1 MG/ML
0.1 INJECTION, SOLUTION INTRAMUSCULAR; INTRAVENOUS; SUBCUTANEOUS EVERY 5 MIN PRN
Status: DISCONTINUED | OUTPATIENT
Start: 2023-11-27 | End: 2023-11-27 | Stop reason: HOSPADM

## 2023-11-27 RX ORDER — ROCURONIUM BROMIDE 10 MG/ML
INJECTION, SOLUTION INTRAVENOUS AS NEEDED
Status: DISCONTINUED | OUTPATIENT
Start: 2023-11-27 | End: 2023-11-27

## 2023-11-27 RX ORDER — PHENYLEPHRINE HCL IN 0.9% NACL 0.4MG/10ML
SYRINGE (ML) INTRAVENOUS AS NEEDED
Status: DISCONTINUED | OUTPATIENT
Start: 2023-11-27 | End: 2023-11-27

## 2023-11-27 RX ADMIN — DEXTROSE AND SODIUM CHLORIDE 100 ML/HR: 5; 900 INJECTION, SOLUTION INTRAVENOUS at 20:07

## 2023-11-27 RX ADMIN — ROCURONIUM BROMIDE 15 MG: 10 INJECTION, SOLUTION INTRAVENOUS at 14:46

## 2023-11-27 RX ADMIN — FENTANYL CITRATE 25 MCG: 50 INJECTION, SOLUTION INTRAMUSCULAR; INTRAVENOUS at 13:28

## 2023-11-27 RX ADMIN — HEPARIN SODIUM 5000 UNITS: 5000 INJECTION, SOLUTION INTRAVENOUS; SUBCUTANEOUS at 12:08

## 2023-11-27 RX ADMIN — SODIUM CHLORIDE, SODIUM LACTATE, POTASSIUM CHLORIDE, AND CALCIUM CHLORIDE: 600; 310; 30; 20 INJECTION, SOLUTION INTRAVENOUS at 11:50

## 2023-11-27 RX ADMIN — LIDOCAINE HYDROCHLORIDE 100 MG: 20 INJECTION, SOLUTION INFILTRATION; PERINEURAL at 11:54

## 2023-11-27 RX ADMIN — ROCURONIUM BROMIDE 20 MG: 10 INJECTION, SOLUTION INTRAVENOUS at 12:23

## 2023-11-27 RX ADMIN — ENOXAPARIN SODIUM 40 MG: 100 INJECTION SUBCUTANEOUS at 20:34

## 2023-11-27 RX ADMIN — ROCURONIUM BROMIDE 20 MG: 10 INJECTION, SOLUTION INTRAVENOUS at 13:04

## 2023-11-27 RX ADMIN — FENTANYL CITRATE 50 MCG: 50 INJECTION, SOLUTION INTRAMUSCULAR; INTRAVENOUS at 12:23

## 2023-11-27 RX ADMIN — HYDROMORPHONE HYDROCHLORIDE 0.4 MG: 1 INJECTION, SOLUTION INTRAMUSCULAR; INTRAVENOUS; SUBCUTANEOUS at 15:13

## 2023-11-27 RX ADMIN — GLYCOPYRROLATE 0.8 MG: 0.2 INJECTION INTRAMUSCULAR; INTRAVENOUS at 15:11

## 2023-11-27 RX ADMIN — ONDANSETRON 4 MG: 2 INJECTION, SOLUTION INTRAMUSCULAR; INTRAVENOUS at 14:59

## 2023-11-27 RX ADMIN — ROCURONIUM BROMIDE 30 MG: 10 INJECTION, SOLUTION INTRAVENOUS at 12:00

## 2023-11-27 RX ADMIN — Medication 80 MCG: at 13:50

## 2023-11-27 RX ADMIN — ROCURONIUM BROMIDE 10 MG: 10 INJECTION, SOLUTION INTRAVENOUS at 14:01

## 2023-11-27 RX ADMIN — MIDAZOLAM HYDROCHLORIDE 2 MG: 1 INJECTION, SOLUTION INTRAMUSCULAR; INTRAVENOUS at 11:38

## 2023-11-27 RX ADMIN — ROCURONIUM BROMIDE 5 MG: 10 INJECTION, SOLUTION INTRAVENOUS at 14:31

## 2023-11-27 RX ADMIN — CEFAZOLIN 2 G: 330 INJECTION, POWDER, FOR SOLUTION INTRAMUSCULAR; INTRAVENOUS at 12:02

## 2023-11-27 RX ADMIN — OXYCODONE HYDROCHLORIDE 10 MG: 5 TABLET ORAL at 05:58

## 2023-11-27 RX ADMIN — Medication 120 MCG: at 13:58

## 2023-11-27 RX ADMIN — NEOSTIGMINE METHYLSULFATE 4 MG: 1 INJECTION, SOLUTION INTRAVENOUS at 15:11

## 2023-11-27 RX ADMIN — FENTANYL CITRATE 25 MCG: 50 INJECTION, SOLUTION INTRAMUSCULAR; INTRAVENOUS at 13:30

## 2023-11-27 RX ADMIN — PROPOFOL 150 MG: 10 INJECTION, EMULSION INTRAVENOUS at 11:54

## 2023-11-27 RX ADMIN — Medication 100 MG: at 11:54

## 2023-11-27 ASSESSMENT — COGNITIVE AND FUNCTIONAL STATUS - GENERAL
DRESSING REGULAR UPPER BODY CLOTHING: A LITTLE
MOBILITY SCORE: 22
MOBILITY SCORE: 23
DRESSING REGULAR LOWER BODY CLOTHING: A LITTLE
CLIMB 3 TO 5 STEPS WITH RAILING: A LITTLE
DRESSING REGULAR UPPER BODY CLOTHING: A LITTLE
TOILETING: A LITTLE
DAILY ACTIVITIY SCORE: 20
CLIMB 3 TO 5 STEPS WITH RAILING: A LITTLE
DAILY ACTIVITIY SCORE: 20
HELP NEEDED FOR BATHING: A LITTLE
TOILETING: A LITTLE
HELP NEEDED FOR BATHING: A LITTLE
DRESSING REGULAR LOWER BODY CLOTHING: A LITTLE
WALKING IN HOSPITAL ROOM: A LITTLE

## 2023-11-27 ASSESSMENT — PAIN - FUNCTIONAL ASSESSMENT
PAIN_FUNCTIONAL_ASSESSMENT: 0-10

## 2023-11-27 ASSESSMENT — PAIN SCALES - GENERAL
PAINLEVEL_OUTOF10: 0 - NO PAIN
PAINLEVEL_OUTOF10: 0 - NO PAIN
PAINLEVEL_OUTOF10: 5 - MODERATE PAIN
PAINLEVEL_OUTOF10: 7
PAINLEVEL_OUTOF10: 8
PAINLEVEL_OUTOF10: 0 - NO PAIN
PAINLEVEL_OUTOF10: 5 - MODERATE PAIN
PAIN_LEVEL: 1
PAINLEVEL_OUTOF10: 0 - NO PAIN
PAINLEVEL_OUTOF10: 0 - NO PAIN

## 2023-11-27 NOTE — SIGNIFICANT EVENT
S:    POD 0 from  robotic PEG placement and mediport placement    O:   Vital signs are stable, normotensive, afebrile, no new or worsening oxygen requirement, not tachycardic  Visit Vitals  /71   Pulse 94   Temp 36.5 °C (97.7 °F) (Temporal)   Resp 20        Constitutional: no acute distress  Skin: warm and dry overall   Neuro: A/O x4, no gross deficits   HEENT: Atraumatic, no scleral icterus  Cardiac: RRR  Pulmonary: Unlabored respirations   Abdomen: Non distended, non tender, incision c/d/i  GI: Voiding  Surgical Site: Dressing clean dry and intact with minimal amounts of strikethrough, appropriately tender    A/P:  - Overall, patient is doing well postoperatively with no acute concerns.  - OK for meds through PEG immediately  - OK for TF through PEG 4 hours postop

## 2023-11-27 NOTE — OP NOTE
Robot-Assisted Gastrotomy Tube Insertertion, Line Placement Subcutaneous Port with Fluoroscopy Operative Note     Date: 2023  OR Location: [facility] OR    Name: Mike Olson YOB: 1955, Age: 68 y.o., MRN: [id number], Sex: male    Diagnosis  Pre-op Diagnosis     * Squamous cell esophageal cancer (CMS/HCC) [C15.9]     * Esophageal dysphagia [R13.19] Post-op Diagnosis     * Squamous cell esophageal cancer (CMS/HCC) [C15.9]     * Esophageal dysphagia [R13.19]     Procedures  Line Placement Subcutaneous Port with Fluoroscopy  19321 - [place] INSJ TUNNELED CTR VAD W/SUBQ PORT AGE 5 YR/>    Robot-Assisted Gastrotomy Tube Insertertion   - [place] ROBOTIC SURGICAL SYSTEM  35315 - GASTROSTOMY INSERTION    Surgeons      * Uzair Lazo - Primary    Resident/Fellow/Other Assistant:  Surgeon(s) and Role:     * Tamika Elise MD - Resident - Assisting     * Mason Ludwig MD - Resident - Assisting    Procedure Summary  Anesthesia: General  ASA: III  Anesthesia Staff: Anesthesiologist: Mason Gerardo MD  Anesthesia Resident: Yoly Guardado MD  Anesthesia Break User: JEFF Galdamez-CRNA  Estimated Blood Loss: 10mL  Intra-op Medications:   Medication Name Total Dose   midazolam (Versed) injection 2 mg   propofol (Diprivan) injection  - Omnicell Override Pull Cannot be calculated              Anesthesia Record               Intraprocedure I/O Totals          Intake    Propofol Drip 0.00 mL    The total shown is the total volume documented since Anesthesia Start was filed.    Total Intake 0 mL          Specimen: No specimens collected     Staff:   Circulator: Iveth Alvarez RN  Relief Circulator: Zoey Gregg RN  Relief Scrub: Miladis Coates  Scrub Person: Alciia Armstrong RN; Elke Landers         Drains and/or Catheters:   Gastrostomy/Enterostomy Gastrostomy 1 20 Fr. LLQ (Active)   Dressing Status Clean;Dry 23 1548       Tourniquet Times:         Implants:  Implants       Type Name Action Serial No.       Implant POWER PORT, SLIM, TI DEVICE W/6FR - [id number] Implanted               Findings: Normal gastric anatomy.  Fluoroscopy showing central venous catheter in position above the sinoatrial junction    Indications: Mike Olson is an 68 y.o. male who is having surgery for Squamous cell esophageal cancer (CMS/HCC) [C15.9]  Esophageal dysphagia [R13.19].  EGD showed complete obstruction of the midesophagus and he requires feeding access.  PEG could not be placed as the EGD could not pass the obstruction.  Therefore surgical gastrostomy tube is recommended and he requires chemotherapy port.    The patient was seen in the preoperative area. The risks, benefits, complications, treatment options, non-operative alternatives, expected recovery and outcomes were discussed with the patient. The possibilities of reaction to medication, pulmonary aspiration, injury to surrounding structures, bleeding, recurrent infection, the need for additional procedures, failure to diagnose a condition, and creating a complication requiring transfusion or operation were discussed with the patient. The patient concurred with the proposed plan, giving informed consent.  The site of surgery was properly noted/marked if necessary per policy. The patient has been actively warmed in preoperative area. Preoperative antibiotics have been ordered and given within 1 hours of incision. Venous thrombosis prophylaxis have been ordered including bilateral sequential compression devices and chemical prophylaxis    Procedure Details: The patient was brought to the operating room and placed supine on the table. The operative sites were prepped and draped in the usual fashion.    I directed my attention to the left chest. Incision was made below the right clavicle and a port pocket was created.  Through the incision site, the left subclavian vein was cannulated and a wire was placed. Fluoroscopy confirmed good position.  Using fluoroscopy, the catheter length  was measured and was cut to 21 cm.  Using the peel-away dilating sheath under fluoroscopic guidance, the catheter was placed.  After the catheter was placed, fluoroscopy showed the catheter couple centimeters above the sinoatrial junction, but in a good position for use.  Blood was aspirated and flushed easily from the port. Heparinized saline was used. At the superior aspect of the port, sutures were used to secure the port to the underlying fascia. Port site incision was closed with 3-0 Vicryl in 2 layers and 4-0 Monocryl.  Surgical glue was applied at the port site.    I directed my attention to the abdomen using an optical trocar technique, a 5 mm port was placed in the right upper quadrant.  Upon what seemed to be entry into the peritoneal cavity, there was some uncertainty and therefore the 5 mm port was left in place.  I now did a Chicas cutdown in the infraumbilical region and placed the 12 mm Chicas port.  The abdomen was insufflated and the 5 mm right upper quadrant port was seen in position.  It appeared that there was a blood clot on the liver capsule and this was the reason that the initial optical trocar did not look normal.  There was no active bleeding noted from the liver.  There was no visceral injury.  The right upper quadrant 5 mm port was changed to an 8 mm robotic port.  2 additional robotic ports were placed 1 in the right lower quadrant and 1 in the left lower quadrant.  The robot was docked.  The left lateral section of liver was reflected using one of the instruments and the underlying stomach was noted.  3-0 Vicryl pursestring sutures were used for an inner pursestring and an outer pursestring on the anterior wall of the distal gastric body towards the greater curve side.  An introducer set was used and a needle placed in the middle of the pursestrings, wire passed and Seldinger technique used to dilate the tract.  20 Portuguese G-tube was placed and the balloon insufflated with 10 cc of  sterile saline.  Pursestring sutures were now tied down.  4 quadrant sutures of 3-0 Vicryl were used to tack the stomach to the exit site at the abdominal peritoneum.  Surgicel Nu-Knit was applied around the site for additional hemostasis as well as at the anterior surface of the liver.  Hemostasis was excellent.  Bilateral transversus abdominis plane blocks were performed using half percent Marcaine.  Ports were removed and the Chicas site fascia was closed using 0 Vicryl sutures.  Skin incisions were closed with observable sutures. Surgical glue was applied.  G-tube bumper was at 4.5 cm      The patient tolerated the procedure well without any apparent intraoperative complications. Counts were correct. As the attending surgeon I was scrubbed for the key portions of the procedure.     Complications:  None; patient tolerated the procedure well.    Disposition: PACU - hemodynamically stable.  Condition: stable         Attending Attestation: I was present and scrubbed for the key portions of the procedure.    Uzair Lazo  Phone Number: [phone]

## 2023-11-27 NOTE — INTERVAL H&P NOTE
H&P reviewed. The patient was examined and there are no changes to the H&P. Patient is appropriate for mediport and G tube at this time.       Discussed with Attending Physician    Estrella Elise MD  General Surgery PGY 5  Surgical Oncology [id number]

## 2023-11-27 NOTE — BRIEF OP NOTE
Date: 2023 - 2023  OR Location: [facility] OR    Name: Mike Olson, : 1955, Age: 68 y.o., MRN: [id number], Sex: male    Diagnosis  Pre-op Diagnosis     * Squamous cell esophageal cancer (CMS/HCC) [C15.9]     * Esophageal dysphagia [R13.19] Post-op Diagnosis     * Squamous cell esophageal cancer (CMS/HCC) [C15.9]     * Esophageal dysphagia [R13.19]     Procedures  Line Placement Subcutaneous Port with Fluoroscopy  68312 - [place] INSJ TUNNELED CTR VAD W/SUBQ PORT AGE 5 YR/>    Robot-Assisted Gastrotomy Tube Insertertion   - [place] ROBOTIC SURGICAL SYSTEM      Surgeons      * Uzair Lazo - Primary    Resident/Fellow/Other Assistant:  Surgeon(s) and Role:     * Tamika Elise MD - Resident - Assisting     * Mason Ludwig MD - Resident - Assisting    Procedure Summary  Anesthesia: General  ASA: III  Anesthesia Staff: Anesthesiologist: Mason Gerardo MD  Anesthesia Resident: Yoly Guardado MD  Anesthesia Break User: JEFF Galdamez-CRNA  Estimated Blood Loss: 10mL  Intra-op Medications:   Medication Name Total Dose   midazolam (Versed) injection 2 mg   propofol (Diprivan) injection  - Omnicell Override Pull Cannot be calculated              Anesthesia Record               Intraprocedure I/O Totals          Intake    Propofol Drip 0.00 mL    The total shown is the total volume documented since Anesthesia Start was filed.    Total Intake 0 mL          Specimen: No specimens collected     Staff:   Circulator: Iveth Alvarez RN  Relief Circulator: Zoey Gregg RN  Relief Scrub: [place] Jaxon  Scrub Person: Alicia Armstrong RN; Elke Landers          Findings: mediport successfully placed and confirmed with fluoroscopy. Tip appears to be in SVC but catheter flushes and withdraws appropriately. G tube placed robotically with bumper at 4.5cm     Complications:  None; patient tolerated the procedure well.     Disposition: PACU - hemodynamically stable.  Condition: stable  Specimens Collected: No  specimens collected  Attending Attestation: I was present and scrubbed for the entire procedure.    Uzair Lazo  Phone Number: [phone]

## 2023-11-27 NOTE — ANESTHESIA PREPROCEDURE EVALUATION
Patient: Mike Olson    Procedure Information       Date/Time: 11/27/23 1100    Procedures:       Insertion Gastrostomy Tube Laparoscopy      Line Placement Subcutaneous Port    Location: [facility] OR 14 / [facility]    Surgeons: Uzair Lazo MD            Relevant Problems   Anesthesia (within normal limits)      Cardiovascular   (+) HTN (hypertension)   (+) Hyperlipidemia   (+) Stenosis of right carotid artery      Endocrine (within normal limits)      GI  dysphagia   (+) Squamous cell esophageal cancer (CMS/HCC)      /Renal (within normal limits)      Neuro/Psych   (+) CVA (cerebral vascular accident) (CMS/HCC)   (+) Stenosis of right carotid artery      Pulmonary (within normal limits)  Chronic smoking      GI/Hepatic   (+) Squamous cell esophageal cancer (CMS/HCC)      Hematology   (+) Anemia      Musculoskeletal (within normal limits)      Eyes, Ears, Nose, and Throat (within normal limits)       Clinical information reviewed:   Tobacco  Allergies  Meds   Med Hx  Surg Hx   Fam Hx  Soc Hx        NPO Detail:  NPO/Void Status  Date of Last Liquid: 11/27/23  Time of Last Liquid: 0000  Date of Last Solid: 11/27/23  Time of Last Solid: 0000         Physical Exam    Airway  Mallampati: IV  TM distance: <3 FB  Neck ROM: full     Cardiovascular   Rhythm: regular  Rate: normal     Dental    Pulmonary   Breath sounds clear to auscultation     Abdominal            Anesthesia Plan    ASA 3     general     intravenous induction   Postoperative administration of opioids is intended.  Trial extubation is planned.  Anesthetic plan and risks discussed with patient.    Plan discussed with attending.

## 2023-11-27 NOTE — CONSULTS
"Nutrition Initial Assessment:   Nutrition Assessment    The patient is a 68 y.o. male who is hospital day #5.  Pt presents with worsening dysphagia. Has had trouble with swallowing for about 6 months that has progressively worsened. EGD on 11/24 found a malignant appearing mass (not traversable) in the esophagus. Plan for PEG placement with surg onc on 11/27. Plan to start palliative rad (20 Gy in 5 fractions) on 11/27.     PMHx: poorly differentiated metastatic SCC     Reason for Assessment: Tube feeding recommendations      Nutrition History:  Energy Intake: Poor < 50 %  Food and Nutrient History: Pt in the OR getting PEG placed. Wife present and provided nutrition information. Reports pt has been having trouble with swalloing foods for about 4-6 months. It has progressively been getting worse. Not doing well in terms of nutrition. Mostly relying on liquids for nutrition. Pt was drinking 1-2 Boost High Protein (250 kcal and 20g pro per serving) per day. Pt also eating popsicles and ice cream. Some emesis with eating/drinking when food felt stuck.  Vitamin/Herbal Supplement Use: Vitamin B  Food Allergies/Intolerances:  None  GI Symptoms: Constipation - 2 BMs in the past 2 weeks  Oral Problems: Swallowing difficulty - d/t esophageal mass          Anthropometrics:  Start of admission anthropometrics:  Height: 169 cm (5' 6.54\")  Weight: 76.5 kg (168 lb 11.2 oz)  BMI (Calculated): 26.79    IBW/kg (Dietitian Calculated): 66 kg  Percent of IBW: 116 %       Wt Hx prior to admission    11/22/23 76.5 kg (168 lb 11.2 oz)   11/16/23 82.6 kg (182 lb)   11/10/23 82.8 kg (182 lb 8.7 oz)   10/16/23 89.8 kg (198 lb) - 15% wt loss in 1 month      Weight Change %:  Weight History / % Weight Change: Per wife, pt's weight up until middle of Sept 2023 (2.5 months ago) was around 217lb and believes pt is now 189lb (13%). Per family and EMR wt hx, pt with significant wt loss PTA.  Significant Weight Loss: Yes  Interpretation of Weight " Loss: >7.5% in 3 months; >5% in 1 month        Nutrition Focused Physical Exam Findings:  defer: pt is ALESHIA    Edema:  Edema: none  Physical Findings:  Skin: Negative (Fungating mass @axilla)    Objective Data:    Last BM Date: 11/25/23    Nutrition Significant Labs:    Results from last 7 days   Lab Units 11/26/23  0733 11/25/23  0845 11/24/23  0807 11/22/23  0901   HEMOGLOBIN g/dL 10.6* 10.9* 11.3* 12.6*   MCV fL 91 87 89 93   GLUCOSE mg/dL 129* 140* 65* 119*   POTASSIUM mmol/L 3.5 3.1* 3.4* 4.2   SODIUM mmol/L 142 141 142 139   PHOSPHORUS mg/dL 3.1 2.4* 3.4 4.3   MAGNESIUM mg/dL 1.40* 1.74 1.49* 1.76   CREATININE mg/dL 0.68 0.70 0.78 1.48*   BUN mg/dL 11 15 23 47*   ALT U/L  --   --   --  18   AST U/L  --   --   --  21   ALK PHOS U/L  --   --   --  87          Nutrition Specific Medications:  [Held by provider] atorvastatin, 80 mg, oral, Daily  [Held by provider] pantoprazole, 20 mg, oral, Daily before breakfast  [Held by provider] polyethylene glycol, 17 g, oral, Daily    Dietary Orders (From admission, onward)       Start     Ordered    11/27/23 0001  NPO Diet; Effective midnight  Diet effective midnight         11/26/23 1513    11/26/23 0855  Oral nutritional supplements  Until discontinued        Comments: As tolerated, please hold if concerns for aspiration   Question Answer Comment   Deliver with All meals    Select supplement: Ensure Clear        11/26/23 0854                     Estimated Needs:   Total Energy Estimated Needs (kCal): 2050 kCal  Method for Estimating Needs: admit wt (76.5kg) * 27 kcal/kg    Total Protein Estimated Needs (g): 100 g  Method for Estimating Needs: admit wt (76.5kg) * 1.3 g/kg    Total Fluid Estimated Needs (mL): 2050 mL  Method for Estimating Needs: 1 ml/kcal or per team          Nutrition Diagnosis   Nutrition Diagnosis:  Malnutrition Diagnosis  Patient has Malnutrition Diagnosis: Yes  Diagnosis Status: New  Malnutrition Diagnosis: Severe malnutrition related to acute disease  or injury  As Evidenced by: >5% wt loss in 1 month; reported >7.5% wt loss in 3 months; PO intake likely meeting <75% of nutr needs for >7 days            Nutrition Interventions/Recommendations   Nutrition Interventions and Recommendations:    1. Start 100mg IV thiamine x5 days    2. Please replete lytes prior to TF initiation     3. When appropriate, start Isosource 1.5 @ 25 ml/hr. Advance slowly by 10 ml/hr q8h, as tolerated, to goal rate of 55 ml/hr.  -- FWF: 250 ml 4x/day or per MD   -- Monitor RFP + Mg for s/s refeeding; hold TF at rate and replete lytes, if low, prior to advancing.   -- This regimen provides: 1320 ml, 1980 kcal, 90 g of protein, 2008 ml water.     4. After pt tolerates continuous goal volume for at least 24 hrs and lytes are consistently WNL, transition to bolus feeds   -- Isosource 1.5 bolus @ 345 ml 4x/day (~5.5 cans per day)  -- FWF: 60 ml pre/post feed with additional 260 ml BID  -- This regimen provides: 1380 ml, 2070 kcal, 94 g of protein, 2054 ml water.    5. PO diet per team          Nutrition Prescription:  Individualized Nutrition Prescription Provided for : 2050 kcal and 100g protein via enteral nutrition        Nutrition Recommendation Details:   Food and/or Nutrient Delivery Interventions  Interventions: Enteral intake, Vitamin supplement therapy  Enteral Intake: Other (Comment) (start TF)  Goal: Transition to bolus feeds (Isosource 1.5 (1375 ml, 2063 kcal, 94g pro))  Vitamin Supplement Therapy: Thiamin (100mg IV)  Goal: prevent refeeding syndrome    Coordination of Nutrition Care by a Nutrition Professional  Collaboration and Referral of Nutrition Care: Team meeting involving nutrition professional    Nutrition Education:    Discussed basics of EN and possible TF regimen with pt's wife. Answered all questions.        Nutrition Monitoring and Evaluation   Monitoring/Evaluation:   Food/Nutrient Related History Monitoring  Monitoring and Evaluation Plan: Enteral and parenteral  nutrition intake  Enteral and Parenteral Nutrition Intake: Enteral nutrition formula/solution, Enteral nutrition intake  Criteria: 100% of nutr needs    Body Composition/Growth/Weight History  Monitoring and Evaluation Plan: Weight  Weight: Weight change  Criteria: no wt change    Biochemical Data, Medical Tests and Procedures  Monitoring and Evaluation Plan: Electrolyte/renal panel  Electrolyte and Renal Panel: Magnesium, Phosphorus, Potassium, Sodium  Criteria: labs WNL            Time Spent/Follow-up Reminder:   Follow Up  Time Spent (min): 75 minutes  Last Date of Nutrition Visit: 11/27/23  Nutrition Follow-Up Needed?: Dietitian to reassess per policy  Follow up Comment: TF recs via PEG

## 2023-11-27 NOTE — CARE PLAN
Problem: Fall/Injury  Goal: Not fall by end of shift  Outcome: Progressing  Goal: Be free from injury by end of the shift  Outcome: Progressing  Goal: Verbalize understanding of personal risk factors for fall in the hospital  Outcome: Progressing  Goal: Verbalize understanding of risk factor reduction measures to prevent injury from fall in the home  Outcome: Progressing  Goal: Use assistive devices by end of the shift  Outcome: Progressing  Goal: Pace activities to prevent fatigue by end of the shift  Outcome: Progressing     Problem: Skin  Goal: Decreased wound size/increased tissue granulation at next dressing change  Outcome: Progressing  Flowsheets (Taken 11/27/2023 1753)  Decreased wound size/increased tissue granulation at next dressing change: Promote sleep for wound healing  Goal: Participates in plan/prevention/treatment measures  Outcome: Progressing  Flowsheets (Taken 11/27/2023 1753)  Participates in plan/prevention/treatment measures: Elevate heels  Goal: Prevent/manage excess moisture  Outcome: Progressing  Flowsheets (Taken 11/27/2023 1753)  Prevent/manage excess moisture: Moisturize dry skin  Goal: Prevent/minimize sheer/friction injuries  Outcome: Progressing  Flowsheets (Taken 11/27/2023 1753)  Prevent/minimize sheer/friction injuries:   Use pull sheet   Increase activity/out of bed for meals  Goal: Promote/optimize nutrition  Outcome: Progressing  Flowsheets (Taken 11/27/2023 1753)  Promote/optimize nutrition: Monitor/record intake including meals  Goal: Promote skin healing  Outcome: Progressing  Flowsheets (Taken 11/27/2023 1753)  Promote skin healing: Assess skin/pad under line(s)/device(s)

## 2023-11-27 NOTE — ANESTHESIA PROCEDURE NOTES
Peripheral IV  Date/Time: 11/27/2023 12:00 PM      Placement  Needle size: 18 G  Laterality: left  Location: hand  Site prep: alcohol  Attempts: 1

## 2023-11-27 NOTE — ANESTHESIA POSTPROCEDURE EVALUATION
Patient: Mike Olson    Procedure Summary       Date: 11/27/23 Room / Location: [facility] OR 14 / [facility] OR    Anesthesia Start: 1138 Anesthesia Stop: 1541    Procedures:       Robot-Assisted Gastrotomy Tube Insertertion (Abdomen)      Line Placement Subcutaneous Port with Fluoroscopy (Chest) Diagnosis:       Squamous cell esophageal cancer (CMS/HCC)      Esophageal dysphagia      (Squamous cell esophageal cancer (CMS/HCC) [C15.9])      (Esophageal dysphagia [R13.19])    Surgeons: Uzair Lazo MD Responsible Provider: Mason Gerardo MD    Anesthesia Type: general ASA Status: 3            Anesthesia Type: general    Vitals Value Taken Time   /59 11/27/23 1546   Temp 36.6 11/27/23 1546   Pulse 86 11/27/23 1543   Resp 21 11/27/23 1543   SpO2 100 % 11/27/23 1543   Vitals shown include unvalidated device data.    Anesthesia Post Evaluation    Patient participation: complete - patient participated  Level of consciousness: awake  Pain score: 1  Pain management: adequate  Airway patency: patent  Cardiovascular status: acceptable  Respiratory status: acceptable and face mask  Hydration status: acceptable  Postoperative Nausea and Vomiting: none        No notable events documented.

## 2023-11-27 NOTE — CARE PLAN
The patient's goals for the shift include      The clinical goals for the shift include patient will rate pain below 6/10 after pain intervention by end of shift    Problem: Fall/Injury  Goal: Not fall by end of shift  Outcome: Progressing  Goal: Be free from injury by end of the shift  Outcome: Progressing  Goal: Verbalize understanding of personal risk factors for fall in the hospital  Outcome: Progressing  Goal: Verbalize understanding of risk factor reduction measures to prevent injury from fall in the home  Outcome: Progressing  Goal: Use assistive devices by end of the shift  Outcome: Progressing  Goal: Pace activities to prevent fatigue by end of the shift  Outcome: Progressing     Problem: Skin  Goal: Decreased wound size/increased tissue granulation at next dressing change  Outcome: Progressing  Flowsheets (Taken 11/26/2023 2106)  Decreased wound size/increased tissue granulation at next dressing change: Promote sleep for wound healing  Goal: Participates in plan/prevention/treatment measures  Outcome: Progressing  Flowsheets (Taken 11/26/2023 2106)  Participates in plan/prevention/treatment measures: Elevate heels  Goal: Prevent/manage excess moisture  Outcome: Progressing  Flowsheets (Taken 11/26/2023 2106)  Prevent/manage excess moisture: Moisturize dry skin  Goal: Prevent/minimize sheer/friction injuries  Outcome: Progressing  Flowsheets (Taken 11/26/2023 2106)  Prevent/minimize sheer/friction injuries: Use pull sheet  Goal: Promote/optimize nutrition  Outcome: Progressing  Flowsheets (Taken 11/26/2023 2106)  Promote/optimize nutrition: Offer water/supplements/favorite foods  Goal: Promote skin healing  Outcome: Progressing  Flowsheets (Taken 11/26/2023 2106)  Promote skin healing: Assess skin/pad under line(s)/device(s)        Pounds Preamble Statement (Weight Entered In Details Tab): Reported Weight in pounds:

## 2023-11-27 NOTE — PROGRESS NOTES
Physical Therapy                 Therapy Communication Note    Patient Name: Mike Olson  MRN: [id number]  Today's Date: 11/27/2023     Discipline: Physical Therapy    Missed Visit Reason: Missed Visit Reason:  (Attempted follow  up however pt. in the OR.)    Missed Time: Attempt    Comment:

## 2023-11-27 NOTE — PROGRESS NOTES
"Mike Olson is a 68 y.o. male on day 5 of admission presenting with Difficulty swallowing.    Subjective   No acute events overnight. Today, the patient reports that he is doing well with no concerns. NPO overnight for anticipated PEG tube placement with ACS. CT SIM today with Rad onc. Denies worsening throat pain or dysphagia, fever/chills, N/V, diarrhea/constipation. Patient had BM yesterday. Pain is well controlled.        Objective     Physical Exam  Constitutional:       General: He is not in acute distress.     Appearance: Normal appearance.   HENT:      Head: Normocephalic and atraumatic.      Mouth/Throat:      Mouth: Mucous membranes are moist.   Eyes:      Extraocular Movements: Extraocular movements intact.      Conjunctiva/sclera: Conjunctivae normal.      Pupils: Pupils are equal, round, and reactive to light.   Cardiovascular:      Rate and Rhythm: Normal rate and regular rhythm.      Heart sounds: No murmur heard.  Pulmonary:      Effort: Pulmonary effort is normal.   Abdominal:      General: Bowel sounds are normal. There is no distension.      Palpations: Abdomen is soft.      Tenderness: There is no abdominal tenderness.   Skin:     General: Skin is warm and dry.      Comments: R axilla fungating mass covered with clean bandage, no signs of erythema, non tender to palpation in surrounding area.   Neurological:      General: No focal deficit present.      Mental Status: He is alert and oriented to person, place, and time.      Gait: Gait normal.   Psychiatric:      Comments: Appropriate mood and affect    Last Recorded Vitals  Blood pressure 142/71, pulse 94, temperature 36.5 °C (97.7 °F), temperature source Temporal, resp. rate 20, height 1.69 m (5' 6.54\"), weight 76.5 kg (168 lb 11.2 oz), SpO2 94 %.  Intake/Output last 3 Shifts:  I/O last 3 completed shifts:  In: 2956.7 (38.6 mL/kg) [I.V.:2856.7 (37.3 mL/kg); IV Piggyback:100]  Out: 2450 (32 mL/kg) [Urine:2450 (0.9 mL/kg/hr)]  Weight: 76.5 kg "     Relevant Results  Scheduled medications  [Held by provider] acetaminophen, 975 mg, oral, q8h  [Held by provider] amLODIPine, 10 mg, oral, Daily  [Held by provider] aspirin, 81 mg, oral, Daily  [Held by provider] atorvastatin, 80 mg, oral, Daily  [Held by provider] chlorthalidone, 25 mg, oral, Daily  enoxaparin, 40 mg, subcutaneous, q24h  fentaNYL PF, , ,   [Held by provider] losartan, 100 mg, oral, Daily  midazolam, , ,   [Held by provider] pantoprazole, 20 mg, oral, Daily before breakfast  [Held by provider] polyethylene glycol, 17 g, oral, Daily      Continuous medications  D5 % and 0.9 % sodium chloride, 100 mL/hr, Last Rate: 100 mL/hr (11/26/23 1336)      PRN medications  PRN medications: fentaNYL PF, [Held by provider] melatonin, meperidine, meperidine PF, midazolam, midazolam, [Held by provider] oxyCODONE, [Held by provider] oxyCODONE  Results for orders placed or performed during the hospital encounter of 11/22/23 (from the past 24 hour(s))   POCT GLUCOSE   Result Value Ref Range    POCT Glucose 120 (H) 74 - 99 mg/dL                                   Assessment/Plan   Principal Problem:    Difficulty swallowing  Active Problems:    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 Given new diagnosis, no treatment has been started yet and no known origin yet. Will discuss with Med Onc (Dr. Yin was to be his primary oncologist), Surg Onc (Dr Corona had already seen pt) and will also consult GI for EGD/biopsy. EGD on 11/24 showed large, nearly fully obstructing esophageal mass. Patient is not a surgical candidate given metastatic disease. Managing nutrition and need for  chemo/radiation.     Updates 11/27:  - ACS to place PEG tube today   - Nutrition consulted, appreciate recs  - CT SIM today with rad once  - Plan for first radiation treatment tomorrow 11/28  - remain NPO tonight with D5 and NS infusion   - start tube feeds tomorrow      #Metastatic SCC, poorly differentiated   #Dysphagia   -Diagnosed 11/2023; no treatment yet; unknown origin  -Surg Onc consulted; appreciate recs (saw Dr. Corona outpatient 11/10/23)  -SLP consulted for swallow evaluation and recommended NPO  -EGD/biopsy 11/24:  large, nearly fully obstructing esophageal mass. Patient is not a surgical candidate because of metastatic disease.   -Plan to inform Dr. Yin (future medical oncologist-1st appt scheduled 12/1)  -Consider consulting pulm for possible lung biopsy as potential origin   -Tylenol PRN for pain; patient denies any pain currently  - Nutrition consulted: 1. Start 100mg IV thiamine x5 days;  Isosource 1.5 @ 25 ml/hr. Advance slowly by 10 ml/hr q8h, as tolerated, to goal rate of 55 ml/hr.   - CT SIM 11/27  - s/p PEG tube placement 11/27  - To begin radiation therapy 11/28     #MARYELLEN (resolved)  -Baseline Cr ~1; on admission 1.48, was 1.50 on 11/16 -> now at baseline 1.04 on 11/23  -S/p 1L LR, IV LR infusion at 125cc/hr, given improvement in Cr likely pre-renal  -Restarted home losartan 100mg daily  -Continue to monitor      #HTN  -Continue home amlodipine 10mg daily  -Holding chlorithalidone 25mg daily given hypercalcemia  -Continue ARB as MARYELLEN has resolved     #CVA 9/2023  #Carotid stenosis  -2 acute or subacute infarcts seen on MRI 9/18/2023  -Continue ASA 81mg daily  -Completed 21 days of plavix (started 9/18/23)  -Continue atorvastatin 80mg daily  -Vascular Surgery follow up outpatient for the carotid stenosis     #Hypercalcemia  -Ca 11.5 on admission  -S/p 1L LR in the ED, on LR infusion at 125cc/hr  -Repeat was 11.3 on 11/23, so avoiding LR in favor of NS     #Elevated PSA  -Urology follow up  outpatient     Disposition: tele  Follow-ups: PCP, Onc, Surg Onc,      F: prn  E: prn   N: CLD as tolerated +Boost  A: PIVs     DVT ppx: lovenox  GI ppx: home PPI    Code Status: FULL (confirmed on 11/22/23)  Surrogate Medical Decision-maker: Wife April [phone]           Sid Jose MD

## 2023-11-27 NOTE — ANESTHESIA PROCEDURE NOTES
Airway  Date/Time: 11/27/2023 11:56 AM  Urgency: elective    Airway not difficult    Staffing  Performed: resident   Authorized by: Mason Gerardo MD    Performed by: Yoly Guardado MD  Patient location during procedure: OR    Indications and Patient Condition  Indications for airway management: anesthesia and airway protection  Spontaneous Ventilation: absent  Preoxygenated: yes  Patient position: sniffing  Mask difficulty assessment: 1 - vent by mask    Final Airway Details  Final airway type: endotracheal airway      Successful airway: ETT  Cuffed: yes   Successful intubation technique: video laryngoscopy  Endotracheal tube insertion site: oral  Blade: Cheng  Blade size: #4  ETT size (mm): 7.5  Cormack-Lehane Classification: grade I - full view of glottis  Placement verified by: chest auscultation and capnometry   Measured from: lips  ETT to lips (cm): 23  Number of attempts at approach: 1

## 2023-11-27 NOTE — CONSULTS
"Assessment/Plan     Consults  Subjective     HPI    Review of Systems  Review of Systems    Objective     Vital signs for last 24 hours:  Temp:  [36.4 °C (97.5 °F)-37.3 °C (99.1 °F)] 37.1 °C (98.8 °F)  Heart Rate:  [88-98] 92  Resp:  [18-20] 18  BP: (147-157)/(71-76) 147/75    Intake/Output this shift:  I/O this shift:  In: -   Out: 800 [Urine:800]    Physical Exam  Physical Exam    Labs  CBC:   Lab Results   Component Value Date    WBC 15.6 (H) 11/26/2023    RBC 3.68 (L) 11/26/2023     BMP:   Lab Results   Component Value Date    GLUCOSE 129 (H) 11/26/2023    CO2 28 11/26/2023    BUN 11 11/26/2023    CREATININE 0.68 11/26/2023    CALCIUM 9.7 11/26/2023     Coagulation: No results found for: \"INR\", \"APTT\"  "

## 2023-11-28 ENCOUNTER — RADIATION ONCOLOGY OTV (OUTPATIENT)
Dept: RADIATION ONCOLOGY | Facility: HOSPITAL | Age: 68
End: 2023-11-28
Payer: MEDICARE

## 2023-11-28 LAB
ALBUMIN SERPL BCP-MCNC: 2.7 G/DL (ref 3.4–5)
ALBUMIN SERPL BCP-MCNC: 2.9 G/DL (ref 3.4–5)
ANION GAP SERPL CALC-SCNC: 14 MMOL/L (ref 10–20)
ANION GAP SERPL CALC-SCNC: 19 MMOL/L (ref 10–20)
BASOPHILS # BLD AUTO: 0.03 X10*3/UL (ref 0–0.1)
BASOPHILS NFR BLD AUTO: 0.2 %
BUN SERPL-MCNC: 15 MG/DL (ref 6–23)
BUN SERPL-MCNC: 18 MG/DL (ref 6–23)
CALCIUM SERPL-MCNC: 9.2 MG/DL (ref 8.6–10.6)
CALCIUM SERPL-MCNC: 9.7 MG/DL (ref 8.6–10.6)
CHLORIDE SERPL-SCNC: 102 MMOL/L (ref 98–107)
CHLORIDE SERPL-SCNC: 103 MMOL/L (ref 98–107)
CO2 SERPL-SCNC: 25 MMOL/L (ref 21–32)
CO2 SERPL-SCNC: 28 MMOL/L (ref 21–32)
CREAT SERPL-MCNC: 0.77 MG/DL (ref 0.5–1.3)
CREAT SERPL-MCNC: 0.77 MG/DL (ref 0.5–1.3)
EOSINOPHIL # BLD AUTO: 0 X10*3/UL (ref 0–0.7)
EOSINOPHIL NFR BLD AUTO: 0 %
ERYTHROCYTE [DISTWIDTH] IN BLOOD BY AUTOMATED COUNT: 11.9 % (ref 11.5–14.5)
GFR SERPL CREATININE-BSD FRML MDRD: >90 ML/MIN/1.73M*2
GFR SERPL CREATININE-BSD FRML MDRD: >90 ML/MIN/1.73M*2
GLUCOSE BLD MANUAL STRIP-MCNC: 118 MG/DL (ref 74–99)
GLUCOSE BLD MANUAL STRIP-MCNC: 128 MG/DL (ref 74–99)
GLUCOSE SERPL-MCNC: 122 MG/DL (ref 74–99)
GLUCOSE SERPL-MCNC: 236 MG/DL (ref 74–99)
HCT VFR BLD AUTO: 34.2 % (ref 41–52)
HGB BLD-MCNC: 10.7 G/DL (ref 13.5–17.5)
IMM GRANULOCYTES # BLD AUTO: 0.25 X10*3/UL (ref 0–0.7)
IMM GRANULOCYTES NFR BLD AUTO: 1.3 % (ref 0–0.9)
LAB AP ASR DISCLAIMER: NORMAL
LABORATORY COMMENT REPORT: NORMAL
LYMPHOCYTES # BLD AUTO: 1.26 X10*3/UL (ref 1.2–4.8)
LYMPHOCYTES NFR BLD AUTO: 6.4 %
MAGNESIUM SERPL-MCNC: 1.73 MG/DL (ref 1.6–2.4)
MCH RBC QN AUTO: 29.3 PG (ref 26–34)
MCHC RBC AUTO-ENTMCNC: 31.3 G/DL (ref 32–36)
MCV RBC AUTO: 94 FL (ref 80–100)
MONOCYTES # BLD AUTO: 1.42 X10*3/UL (ref 0.1–1)
MONOCYTES NFR BLD AUTO: 7.3 %
NEUTROPHILS # BLD AUTO: 16.62 X10*3/UL (ref 1.2–7.7)
NEUTROPHILS NFR BLD AUTO: 84.8 %
NRBC BLD-RTO: 0 /100 WBCS (ref 0–0)
PATH REPORT.ADDENDUM SPEC: NORMAL
PATH REPORT.ADDENDUM SPEC: NORMAL
PATH REPORT.FINAL DX SPEC: NORMAL
PATH REPORT.GROSS SPEC: NORMAL
PATH REPORT.RELEVANT HX SPEC: NORMAL
PATH REPORT.TOTAL CANCER: NORMAL
PHOSPHATE SERPL-MCNC: 3.3 MG/DL (ref 2.5–4.9)
PHOSPHATE SERPL-MCNC: 3.4 MG/DL (ref 2.5–4.9)
PLATELET # BLD AUTO: 306 X10*3/UL (ref 150–450)
POTASSIUM SERPL-SCNC: 3 MMOL/L (ref 3.5–5.3)
POTASSIUM SERPL-SCNC: 4.2 MMOL/L (ref 3.5–5.3)
RBC # BLD AUTO: 3.65 X10*6/UL (ref 4.5–5.9)
SODIUM SERPL-SCNC: 141 MMOL/L (ref 136–145)
SODIUM SERPL-SCNC: 143 MMOL/L (ref 136–145)
WBC # BLD AUTO: 19.6 X10*3/UL (ref 4.4–11.3)

## 2023-11-28 PROCEDURE — 88342 IMHCHEM/IMCYTCHM 1ST ANTB: CPT | Performed by: PATHOLOGY

## 2023-11-28 PROCEDURE — 88363 XM ARCHIVE TISSUE MOLEC ANAL: CPT | Mod: TC,SUR,SAMLAB | Performed by: SURGERY

## 2023-11-28 PROCEDURE — 88360 TUMOR IMMUNOHISTOCHEM/MANUAL: CPT | Performed by: PATHOLOGY

## 2023-11-28 PROCEDURE — 2500000001 HC RX 250 WO HCPCS SELF ADMINISTERED DRUGS (ALT 637 FOR MEDICARE OP)

## 2023-11-28 PROCEDURE — 2500000004 HC RX 250 GENERAL PHARMACY W/ HCPCS (ALT 636 FOR OP/ED)

## 2023-11-28 PROCEDURE — 96372 THER/PROPH/DIAG INJ SC/IM: CPT | Performed by: STUDENT IN AN ORGANIZED HEALTH CARE EDUCATION/TRAINING PROGRAM

## 2023-11-28 PROCEDURE — 85025 COMPLETE CBC W/AUTO DIFF WBC: CPT

## 2023-11-28 PROCEDURE — 2500000004 HC RX 250 GENERAL PHARMACY W/ HCPCS (ALT 636 FOR OP/ED): Performed by: STUDENT IN AN ORGANIZED HEALTH CARE EDUCATION/TRAINING PROGRAM

## 2023-11-28 PROCEDURE — 88363 XM ARCHIVE TISSUE MOLEC ANAL: CPT | Performed by: PATHOLOGY

## 2023-11-28 PROCEDURE — 80069 RENAL FUNCTION PANEL: CPT

## 2023-11-28 PROCEDURE — 82947 ASSAY GLUCOSE BLOOD QUANT: CPT

## 2023-11-28 PROCEDURE — 99233 SBSQ HOSP IP/OBS HIGH 50: CPT

## 2023-11-28 PROCEDURE — 88341 IMHCHEM/IMCYTCHM EA ADD ANTB: CPT | Performed by: PATHOLOGY

## 2023-11-28 PROCEDURE — 83735 ASSAY OF MAGNESIUM: CPT

## 2023-11-28 PROCEDURE — 36591 DRAW BLOOD OFF VENOUS DEVICE: CPT

## 2023-11-28 PROCEDURE — 1170000001 HC PRIVATE ONCOLOGY ROOM DAILY

## 2023-11-28 RX ORDER — OXYCODONE HYDROCHLORIDE 5 MG/1
5 TABLET ORAL EVERY 6 HOURS PRN
Status: DISCONTINUED | OUTPATIENT
Start: 2023-11-28 | End: 2023-12-12

## 2023-11-28 RX ORDER — ACETAMINOPHEN 500 MG
5 TABLET ORAL NIGHTLY PRN
Status: DISCONTINUED | OUTPATIENT
Start: 2023-11-28 | End: 2023-12-14

## 2023-11-28 RX ORDER — NAPROXEN SODIUM 220 MG/1
81 TABLET, FILM COATED ORAL DAILY
Status: DISCONTINUED | OUTPATIENT
Start: 2023-11-29 | End: 2023-12-14

## 2023-11-28 RX ORDER — CHLORTHALIDONE 25 MG/1
25 TABLET ORAL DAILY
Status: DISCONTINUED | OUTPATIENT
Start: 2023-11-29 | End: 2023-12-14

## 2023-11-28 RX ORDER — ESOMEPRAZOLE MAGNESIUM 20 MG/1
20 GRANULE, DELAYED RELEASE ORAL
Status: DISCONTINUED | OUTPATIENT
Start: 2023-11-29 | End: 2023-12-14

## 2023-11-28 RX ORDER — ACETAMINOPHEN 160 MG/5ML
650 SOLUTION ORAL EVERY 4 HOURS
Status: DISCONTINUED | OUTPATIENT
Start: 2023-11-28 | End: 2023-11-29

## 2023-11-28 RX ORDER — POTASSIUM CHLORIDE 14.9 MG/ML
20 INJECTION INTRAVENOUS
Status: COMPLETED | OUTPATIENT
Start: 2023-11-28 | End: 2023-11-29

## 2023-11-28 RX ORDER — OXYCODONE HYDROCHLORIDE 5 MG/1
10 TABLET ORAL EVERY 6 HOURS PRN
Status: DISCONTINUED | OUTPATIENT
Start: 2023-11-28 | End: 2023-11-30

## 2023-11-28 RX ORDER — ATORVASTATIN CALCIUM 80 MG/1
80 TABLET, FILM COATED ORAL DAILY
Status: DISCONTINUED | OUTPATIENT
Start: 2023-11-29 | End: 2023-12-14

## 2023-11-28 RX ORDER — AMLODIPINE BESYLATE 10 MG/1
10 TABLET ORAL DAILY
Status: DISCONTINUED | OUTPATIENT
Start: 2023-11-29 | End: 2023-12-14

## 2023-11-28 RX ORDER — THIAMINE HYDROCHLORIDE 100 MG/ML
100 INJECTION, SOLUTION INTRAMUSCULAR; INTRAVENOUS DAILY
Status: COMPLETED | OUTPATIENT
Start: 2023-11-28 | End: 2023-12-04

## 2023-11-28 RX ORDER — ACETAMINOPHEN 325 MG/1
975 TABLET ORAL EVERY 8 HOURS
Status: DISCONTINUED | OUTPATIENT
Start: 2023-11-28 | End: 2023-11-28

## 2023-11-28 RX ORDER — LOSARTAN POTASSIUM 50 MG/1
100 TABLET ORAL DAILY
Status: DISCONTINUED | OUTPATIENT
Start: 2023-11-29 | End: 2023-12-14

## 2023-11-28 RX ADMIN — POTASSIUM CHLORIDE 20 MEQ: 14.9 INJECTION, SOLUTION INTRAVENOUS at 23:39

## 2023-11-28 RX ADMIN — ENOXAPARIN SODIUM 40 MG: 100 INJECTION SUBCUTANEOUS at 20:52

## 2023-11-28 RX ADMIN — DEXTROSE AND SODIUM CHLORIDE 100 ML/HR: 5; 900 INJECTION, SOLUTION INTRAVENOUS at 02:12

## 2023-11-28 RX ADMIN — THIAMINE HYDROCHLORIDE 100 MG: 100 INJECTION, SOLUTION INTRAMUSCULAR; INTRAVENOUS at 12:04

## 2023-11-28 RX ADMIN — ACETAMINOPHEN 650 MG: 650 SOLUTION ORAL at 20:52

## 2023-11-28 RX ADMIN — OXYCODONE HYDROCHLORIDE 10 MG: 5 TABLET ORAL at 12:04

## 2023-11-28 RX ADMIN — ACETAMINOPHEN 975 MG: 325 TABLET ORAL at 16:22

## 2023-11-28 ASSESSMENT — COGNITIVE AND FUNCTIONAL STATUS - GENERAL
DAILY ACTIVITIY SCORE: 20
MOBILITY SCORE: 22
WALKING IN HOSPITAL ROOM: A LITTLE
DRESSING REGULAR LOWER BODY CLOTHING: A LITTLE
TOILETING: A LITTLE
TOILETING: A LITTLE
MOBILITY SCORE: 22
DRESSING REGULAR LOWER BODY CLOTHING: A LITTLE
CLIMB 3 TO 5 STEPS WITH RAILING: A LITTLE
HELP NEEDED FOR BATHING: A LITTLE
WALKING IN HOSPITAL ROOM: A LITTLE
CLIMB 3 TO 5 STEPS WITH RAILING: A LITTLE
DRESSING REGULAR UPPER BODY CLOTHING: A LITTLE
DAILY ACTIVITIY SCORE: 20
HELP NEEDED FOR BATHING: A LITTLE
DRESSING REGULAR UPPER BODY CLOTHING: A LITTLE

## 2023-11-28 ASSESSMENT — PAIN - FUNCTIONAL ASSESSMENT
PAIN_FUNCTIONAL_ASSESSMENT: 0-10
PAIN_FUNCTIONAL_ASSESSMENT: 0-10

## 2023-11-28 ASSESSMENT — PAIN SCALES - GENERAL
PAINLEVEL_OUTOF10: 8
PAINLEVEL_OUTOF10: 0 - NO PAIN
PAINLEVEL_OUTOF10: 4

## 2023-11-28 NOTE — CARE PLAN
Problem: Fall/Injury  Goal: Not fall by end of shift  Outcome: Progressing  Goal: Be free from injury by end of the shift  Outcome: Progressing  Goal: Verbalize understanding of personal risk factors for fall in the hospital  Outcome: Progressing  Goal: Verbalize understanding of risk factor reduction measures to prevent injury from fall in the home  Outcome: Progressing  Goal: Use assistive devices by end of the shift  Outcome: Progressing  Goal: Pace activities to prevent fatigue by end of the shift  Outcome: Progressing     Problem: Skin  Goal: Decreased wound size/increased tissue granulation at next dressing change  Outcome: Progressing  Flowsheets (Taken 11/28/2023 0551)  Decreased wound size/increased tissue granulation at next dressing change: Promote sleep for wound healing  Goal: Participates in plan/prevention/treatment measures  Outcome: Progressing  Flowsheets (Taken 11/28/2023 0551)  Participates in plan/prevention/treatment measures: Increase activity/out of bed for meals  Goal: Prevent/manage excess moisture  Outcome: Progressing  Flowsheets (Taken 11/28/2023 0551)  Prevent/manage excess moisture: Follow provider orders for dressing changes  Goal: Prevent/minimize sheer/friction injuries  Outcome: Progressing  Flowsheets (Taken 11/28/2023 0551)  Prevent/minimize sheer/friction injuries: Increase activity/out of bed for meals  Goal: Promote/optimize nutrition  Outcome: Progressing  Flowsheets (Taken 11/28/2023 0551)  Promote/optimize nutrition: Monitor/record intake including meals  Goal: Promote skin healing  Outcome: Progressing  Flowsheets (Taken 11/28/2023 0551)  Promote skin healing: Assess skin/pad under line(s)/device(s)       The clinical goals for the shift include pt will report pain lower than 6/10    Over the shift, patient reported manageable pain. Feels very weak and eager to increase nutrition today. Fluids running overnight, PEG and port incisions clean dry and intact overnight.

## 2023-11-28 NOTE — PROGRESS NOTES
Radiation Oncology On Treatment Visit    Patient Name:  Mike Olson  MRN:  [id number]  :  1955    Referring Provider: No ref. 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          SUBJECTIVE:   Mr. Olson is currently is inpatient. He underwent G-Tube placement on  and denies experiencing any abomainal pain, nausea, or vomiting.    OBJECTIVE:   Alert and oriented x3, respirations unlabored  Vital Signs:  There were no vitals taken for this visit.   Pain Scale: The patient's current pain level was assessed.  They report currently having a pain of 0 out of 10.    Other Pertinent Findings:     Toxicity Assessment          2023    10:24   Toxicity Assessment   Adverse Events Reviewed (WDL) No (Exceptions to WDL)   Treatment Site Abdomen   Anorexia Grade 1   Anxiety Grade 0   Dehydration Grade 0   Depression Grade 0   Dermatitis Radiation Grade 0   Diarrhea Grade 0   Fatigue Grade 0   Fracture Grade 0   Nausea Grade 0   Pain Grade 2   Tumor Pain Grade 2   Vomiting Grade 0   Abdominal Pain Grade 0   Bloating Grade 0   Constipation Grade 0   Dyspepsia Grade 0   Dysphagia Grade 2   Fecal Incontinence Grade 0   Rectal Pain Grade 0   Peripheral Motor  Neuropathy Grade 0   Peripheral Sensory Neuropathy Grade 0   Hematuria Grade 0   Urinary Incontinence Grade 0   Dyspareunia Grade 0   Erectile Dysfunction Grade 0   Pelvic Pain Grade 0        Assessment / Plan:  chart and imaging reviewed.   - plan for palliative  daily radiation ( 20 Gy in 5 Fx) to the esophageal mass  - scheduled for chemotherapy after RT      Seen with Dr. Tonya Padilla MD  PGY-4, Radiation Oncology  11/28/2023 12:41 PM

## 2023-11-28 NOTE — PROGRESS NOTES
"Mike Olson is a 68 y.o. male on day 6 of admission presenting with Difficulty swallowing.    Subjective   No acute events overnight. PEG tube in place secure and w/o s/s infection. Did well at first palliative radiation therapy treatment today to esophageal mass. Will begin TF today and monitor for refeeding. Denies worsening throat pain or dysphagia, fever/chills, N/V, diarrhea/constipation. Patient had BM yesterday. Pain is well controlled.        Objective     Physical Exam  Constitutional:       General: He is not in acute distress.     Appearance: Normal appearance.   HENT:      Head: Normocephalic and atraumatic.      Mouth/Throat:      Mouth: Mucous membranes are moist.   Eyes:      Extraocular Movements: Extraocular movements intact.      Conjunctiva/sclera: Conjunctivae normal.      Pupils: Pupils are equal, round, and reactive to light.   Cardiovascular:      Rate and Rhythm: Normal rate and regular rhythm.      Heart sounds: No murmur heard.  Pulmonary:      Effort: Pulmonary effort is normal.   Abdominal:      General: Bowel sounds are normal. There is no distension.      Palpations: Abdomen is soft.      Tenderness: There is no abdominal tenderness.   Skin:     General: Skin is warm and dry.      Comments: R axilla fungating mass covered with clean bandage, no signs of erythema, non tender to palpation in surrounding area.   Neurological:      General: No focal deficit present.      Mental Status: He is alert and oriented to person, place, and time.      Gait: Gait normal.   Psychiatric:      Comments: Appropriate mood and affect    Last Recorded Vitals  Blood pressure 152/76, pulse 96, temperature 36 °C (96.8 °F), temperature source Temporal, resp. rate 18, height 1.69 m (5' 6.54\"), weight 76.5 kg (168 lb 11.2 oz), SpO2 96 %.  Intake/Output last 3 Shifts:  I/O last 3 completed shifts:  In: - (0 mL/kg)   Out: 1200 (15.7 mL/kg) [Urine:1200 (0.4 mL/kg/hr)]  Weight: 76.5 kg     Relevant Results  Scheduled " medications  acetaminophen, 975 mg, g-tube, q8h  [START ON 11/29/2023] amLODIPine, 10 mg, g-tube, Daily  [START ON 11/29/2023] aspirin, 81 mg, g-tube, Daily  [START ON 11/29/2023] atorvastatin, 80 mg, g-tube, Daily  [START ON 11/29/2023] chlorthalidone, 25 mg, g-tube, Daily  enoxaparin, 40 mg, subcutaneous, q24h  [START ON 11/29/2023] esomeprazole, 20 mg, nasogastric tube, Daily before breakfast  fentaNYL PF, , ,   [START ON 11/29/2023] losartan, 100 mg, g-tube, Daily  midazolam, , ,   [Held by provider] polyethylene glycol, 17 g, oral, Daily  thiamine, 100 mg, intravenous, Daily      Continuous medications       PRN medications  PRN medications: fentaNYL PF, melatonin, meperidine, meperidine PF, midazolam, midazolam, oxyCODONE, oxyCODONE  Results for orders placed or performed during the hospital encounter of 11/22/23 (from the past 24 hour(s))   CBC and Auto Differential   Result Value Ref Range    WBC 19.6 (H) 4.4 - 11.3 x10*3/uL    nRBC 0.0 0.0 - 0.0 /100 WBCs    RBC 3.65 (L) 4.50 - 5.90 x10*6/uL    Hemoglobin 10.7 (L) 13.5 - 17.5 g/dL    Hematocrit 34.2 (L) 41.0 - 52.0 %    MCV 94 80 - 100 fL    MCH 29.3 26.0 - 34.0 pg    MCHC 31.3 (L) 32.0 - 36.0 g/dL    RDW 11.9 11.5 - 14.5 %    Platelets 306 150 - 450 x10*3/uL    Neutrophils % 84.8 40.0 - 80.0 %    Immature Granulocytes %, Automated 1.3 (H) 0.0 - 0.9 %    Lymphocytes % 6.4 13.0 - 44.0 %    Monocytes % 7.3 2.0 - 10.0 %    Eosinophils % 0.0 0.0 - 6.0 %    Basophils % 0.2 0.0 - 2.0 %    Neutrophils Absolute 16.62 (H) 1.20 - 7.70 x10*3/uL    Immature Granulocytes Absolute, Automated 0.25 0.00 - 0.70 x10*3/uL    Lymphocytes Absolute 1.26 1.20 - 4.80 x10*3/uL    Monocytes Absolute 1.42 (H) 0.10 - 1.00 x10*3/uL    Eosinophils Absolute 0.00 0.00 - 0.70 x10*3/uL    Basophils Absolute 0.03 0.00 - 0.10 x10*3/uL   Magnesium   Result Value Ref Range    Magnesium 1.73 1.60 - 2.40 mg/dL   Renal function panel   Result Value Ref Range    Glucose 236 (H) 74 - 99 mg/dL     Sodium 143 136 - 145 mmol/L    Potassium 4.2 3.5 - 5.3 mmol/L    Chloride 103 98 - 107 mmol/L    Bicarbonate 25 21 - 32 mmol/L    Anion Gap 19 10 - 20 mmol/L    Urea Nitrogen 15 6 - 23 mg/dL    Creatinine 0.77 0.50 - 1.30 mg/dL    eGFR >90 >60 mL/min/1.73m*2    Calcium 9.2 8.6 - 10.6 mg/dL    Phosphorus 3.4 2.5 - 4.9 mg/dL    Albumin 2.7 (L) 3.4 - 5.0 g/dL                                   Assessment/Plan   Principal Problem:    Difficulty swallowing  Active Problems:    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 Given new diagnosis, no treatment has been started yet and no known origin yet. Will discuss with Med Onc (Dr. Yin was to be his primary oncologist), Surg Onc (Dr Corona had already seen pt) and will also consult GI for EGD/biopsy. EGD on 11/24 showed large, nearly fully obstructing esophageal mass. Patient is not a surgical candidate given metastatic disease. Managing nutrition and need for chemo/radiation.      Updates 11/27:  - PEG tube in place, TF isosource 1.5, goal rate 55 ml/hr; hold 2 hrs before radiation  - Meds per PEG tube  - RFP evening for s/s refeeding  - S/p 1/5 first radiation treatment of esophageal mass  - discontinue IVF hydration  - monitor RFPs, increase/decrease FWF as needed  - Patient is not a surgical candidate for esophageal stenting     #Metastatic SCC, poorly differentiated   #Dysphagia   - Pathology Microscopic examination of H&E sections demonstrates a poorly differentiated carcinoma , immunoreactive with CK7 and p40 , infiltrating soft tissue.  No residual lymph node parenchyma is seen.  The following immunostains are negative:  NKX3.1, GATA3, CK20, TTF-1 and CDX2   -Diagnosed 11/2023; no treatment yet; unknown origin  -Surg Onc consulted; appreciate recs (saw Dr. Corona outpatient 11/10/23)  -SLP consulted for swallow evaluation and recommended NPO  -EGD/biopsy 11/24:  large, nearly fully obstructing esophageal mass. Patient is not a surgical candidate because of metastatic disease.   -Plan to inform Dr. Yin (future medical oncologist-1st appt scheduled 12/1)  -Consider consulting pulm for possible lung biopsy as potential origin   -Tylenol PRN for pain; patient denies any pain currently  - Nutrition consulted: 1. Start 100mg IV thiamine x5 days;  Isosource 1.5 @ 25 ml/hr. Advance slowly by 10 ml/hr q8h, as tolerated, to goal rate of 55 ml/hr.   - CT SIM 11/27  -- Palliative Radiotherapy: 1/5 fractions to complete 12/4  - s/p PEG tube placement 11/27  - Tube Feeds: 1.5 @ 25 ml/hr. Advance slowly by 10 ml/hr q8h, as tolerated, to goal rate of 55 ml/hr.  - 100mg IV thiamine x5 days   - FWF: 250 ml 4x/day  - Evening RFP        #MARYELLEN (resolved)  -Baseline Cr ~1; on admission 1.48, was 1.50 on 11/16 -> now at baseline 1.04 on 11/23  -S/p 1L LR, IV LR infusion at 125cc/hr, given improvement in Cr likely pre-renal  -Restarted home losartan 100mg daily  -Continue to monitor      #HTN  -Continue home amlodipine 10mg daily  -Holding chlorithalidone 25mg daily given hypercalcemia  -Continue ARB as MARYELLEN has resolved     #CVA 9/2023  #Carotid stenosis  -2 acute or subacute infarcts seen on MRI 9/18/2023  -Continue ASA 81mg daily  -Completed 21 days of plavix (started 9/18/23)  -Continue atorvastatin 80mg daily  -Vascular Surgery follow up outpatient for the carotid stenosis     #Hypercalcemia  -Ca 11.5 on admission  -S/p 1L LR in the ED, on LR infusion at 125cc/hr  -Repeat was 11.3 on 11/23, so avoiding LR in favor of NS     #Elevated PSA  -Urology follow up outpatient     Disposition: tele  Follow-ups: PCP, Onc, Surg Onc,      F: prn  E: prn   N:  CLD as tolerated +Boost  A: PIVs     DVT ppx: lovenox  GI ppx: home PPI    Code Status: FULL (confirmed on 11/22/23)  Surrogate Medical Decision-maker: Wife April [phone]           Sid Jose MD

## 2023-11-28 NOTE — PROGRESS NOTES
"Mike Olson is a 68 y.o. male on day 6 of admission presenting with Difficulty swallowing.    Subjective   No issues overnight, surgical gastrostomy placed 11/27 without post op concerns. Has been able to drink water without issue.       Objective     Physical Exam  Constitutional:       Appearance: Normal appearance. He is normal weight.   HENT:      Head: Normocephalic and atraumatic.      Mouth/Throat:      Mouth: Mucous membranes are moist.   Eyes:      Extraocular Movements: Extraocular movements intact.      Pupils: Pupils are equal, round, and reactive to light.   Cardiovascular:      Rate and Rhythm: Normal rate and regular rhythm.   Pulmonary:      Effort: Pulmonary effort is normal.      Breath sounds: Normal breath sounds.   Abdominal:      General: Abdomen is flat.      Palpations: Abdomen is soft.   Neurological:      General: No focal deficit present.      Mental Status: He is alert and oriented to person, place, and time.   Psychiatric:         Mood and Affect: Mood normal.         Behavior: Behavior normal.       PHYSICAL EXAM  Constitutional: no acute distress  Skin: warm and dry overall   Neuro: A/O x4, no gross deficits   HEENT: Atraumatic, no scleral icterus  Chest: [facility] site c/d/i  Cardiac: RRR  Pulmonary: Unlabored respirations   Abdomen: Non distended, non tender, PEG in place with bumper spinning freely  : Voiding independently      Last Recorded Vitals  Blood pressure 152/76, pulse 96, temperature 36 °C (96.8 °F), temperature source Temporal, resp. rate 18, height 1.69 m (5' 6.54\"), weight 76.5 kg (168 lb 11.2 oz), SpO2 96 %.  Intake/Output last 3 Shifts:  I/O last 3 completed shifts:  In: - (0 mL/kg)   Out: 1200 (15.7 mL/kg) [Urine:1200 (0.4 mL/kg/hr)]  Weight: 76.5 kg     Relevant Results  Scheduled medications  [Held by provider] acetaminophen, 975 mg, oral, q8h  [Held by provider] amLODIPine, 10 mg, oral, Daily  [Held by provider] aspirin, 81 mg, oral, Daily  [Held by provider] " atorvastatin, 80 mg, oral, Daily  [Held by provider] chlorthalidone, 25 mg, oral, Daily  enoxaparin, 40 mg, subcutaneous, q24h  fentaNYL PF, , ,   [Held by provider] losartan, 100 mg, oral, Daily  midazolam, , ,   [Held by provider] pantoprazole, 20 mg, oral, Daily before breakfast  [Held by provider] polyethylene glycol, 17 g, oral, Daily      Continuous medications  D5 % and 0.9 % sodium chloride, 100 mL/hr, Last Rate: 100 mL/hr (11/28/23 0212)      PRN medications  PRN medications: fentaNYL PF, [Held by provider] melatonin, meperidine, meperidine PF, midazolam, midazolam, [Held by provider] oxyCODONE, [Held by provider] oxyCODONE      Assessment/Plan   Principal Problem:    Difficulty swallowing  Active Problems:    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 PET scan showed widely metastatic disease to bone, proximal esophagus, and lung.  EGD performed on 11/24 showed mass within esophagus and the scope was unable to be passed further. Pt underwent robotic gastrostomy placement and mediport placement on 11/27 with Dr. Lazo, doing well post operatively.        -okay to use surgical gastrostomy for TF  -radiation and CT sim plan per rad onc (palliative rads plan is only for esophageal mass and not axilla)  -fu pathology   -recommend conversation with thoracic/GI regarding possibility of stenting         Discussed with Dr. Clifton Armendariz MD

## 2023-11-29 ENCOUNTER — HOSPITAL ENCOUNTER (OUTPATIENT)
Dept: RADIATION ONCOLOGY | Facility: HOSPITAL | Age: 68
Setting detail: RADIATION/ONCOLOGY SERIES
Discharge: HOME | End: 2023-11-29
Payer: MEDICARE

## 2023-11-29 DIAGNOSIS — Z51.0 ENCOUNTER FOR ANTINEOPLASTIC RADIATION THERAPY: ICD-10-CM

## 2023-11-29 DIAGNOSIS — C15.3 MALIGNANT NEOPLASM OF UPPER THIRD OF ESOPHAGUS (MULTI): ICD-10-CM

## 2023-11-29 DIAGNOSIS — C79.51 METASTATIC SQUAMOUS CELL CARCINOMA TO BONE (MULTI): Primary | ICD-10-CM

## 2023-11-29 LAB
ALBUMIN SERPL BCP-MCNC: 2.5 G/DL (ref 3.4–5)
ANION GAP SERPL CALC-SCNC: 16 MMOL/L (ref 10–20)
BASOPHILS # BLD AUTO: 0.05 X10*3/UL (ref 0–0.1)
BASOPHILS NFR BLD AUTO: 0.3 %
BUN SERPL-MCNC: 19 MG/DL (ref 6–23)
CALCIUM SERPL-MCNC: 9.3 MG/DL (ref 8.6–10.6)
CHLORIDE SERPL-SCNC: 104 MMOL/L (ref 98–107)
CO2 SERPL-SCNC: 28 MMOL/L (ref 21–32)
CREAT SERPL-MCNC: 0.71 MG/DL (ref 0.5–1.3)
EOSINOPHIL # BLD AUTO: 0.11 X10*3/UL (ref 0–0.7)
EOSINOPHIL NFR BLD AUTO: 0.6 %
ERYTHROCYTE [DISTWIDTH] IN BLOOD BY AUTOMATED COUNT: 12.2 % (ref 11.5–14.5)
GFR SERPL CREATININE-BSD FRML MDRD: >90 ML/MIN/1.73M*2
GLUCOSE BLD MANUAL STRIP-MCNC: 137 MG/DL (ref 74–99)
GLUCOSE SERPL-MCNC: 87 MG/DL (ref 74–99)
HCT VFR BLD AUTO: 31.8 % (ref 41–52)
HGB BLD-MCNC: 10.1 G/DL (ref 13.5–17.5)
IMM GRANULOCYTES # BLD AUTO: 0.31 X10*3/UL (ref 0–0.7)
IMM GRANULOCYTES NFR BLD AUTO: 1.6 % (ref 0–0.9)
LYMPHOCYTES # BLD AUTO: 1.43 X10*3/UL (ref 1.2–4.8)
LYMPHOCYTES NFR BLD AUTO: 7.3 %
MAGNESIUM SERPL-MCNC: 1.63 MG/DL (ref 1.6–2.4)
MCH RBC QN AUTO: 28.8 PG (ref 26–34)
MCHC RBC AUTO-ENTMCNC: 31.8 G/DL (ref 32–36)
MCV RBC AUTO: 91 FL (ref 80–100)
MONOCYTES # BLD AUTO: 1.6 X10*3/UL (ref 0.1–1)
MONOCYTES NFR BLD AUTO: 8.2 %
NEUTROPHILS # BLD AUTO: 16.04 X10*3/UL (ref 1.2–7.7)
NEUTROPHILS NFR BLD AUTO: 82 %
NRBC BLD-RTO: 0 /100 WBCS (ref 0–0)
PHOSPHATE SERPL-MCNC: 2.9 MG/DL (ref 2.5–4.9)
PLATELET # BLD AUTO: 265 X10*3/UL (ref 150–450)
POTASSIUM SERPL-SCNC: 3.5 MMOL/L (ref 3.5–5.3)
RAD ONC MSQ ACTUAL FRACTIONS DELIVERED: 2
RAD ONC MSQ ACTUAL SESSION DELIVERED DOSE: 400 CGRAY
RAD ONC MSQ ACTUAL TOTAL DOSE: 800 CGRAY
RAD ONC MSQ ELAPSED DAYS: 1
RAD ONC MSQ LAST DATE: NORMAL
RAD ONC MSQ PRESCRIBED FRACTIONAL DOSE: 400 CGRAY
RAD ONC MSQ PRESCRIBED NUMBER OF FRACTIONS: 5
RAD ONC MSQ PRESCRIBED TECHNIQUE: NORMAL
RAD ONC MSQ PRESCRIBED TOTAL DOSE: 2000 CGRAY
RAD ONC MSQ PRESCRIPTION PATTERN COMMENT: NORMAL
RAD ONC MSQ START DATE: NORMAL
RAD ONC MSQ TREATMENT COURSE NUMBER: 1
RAD ONC MSQ TREATMENT SITE: NORMAL
RBC # BLD AUTO: 3.51 X10*6/UL (ref 4.5–5.9)
SODIUM SERPL-SCNC: 144 MMOL/L (ref 136–145)
WBC # BLD AUTO: 19.5 X10*3/UL (ref 4.4–11.3)

## 2023-11-29 PROCEDURE — 97116 GAIT TRAINING THERAPY: CPT | Mod: GP,CQ

## 2023-11-29 PROCEDURE — 80069 RENAL FUNCTION PANEL: CPT

## 2023-11-29 PROCEDURE — 77387 GUIDANCE FOR RADJ TX DLVR: CPT | Performed by: STUDENT IN AN ORGANIZED HEALTH CARE EDUCATION/TRAINING PROGRAM

## 2023-11-29 PROCEDURE — 99233 SBSQ HOSP IP/OBS HIGH 50: CPT

## 2023-11-29 PROCEDURE — 2500000004 HC RX 250 GENERAL PHARMACY W/ HCPCS (ALT 636 FOR OP/ED)

## 2023-11-29 PROCEDURE — 2500000001 HC RX 250 WO HCPCS SELF ADMINISTERED DRUGS (ALT 637 FOR MEDICARE OP)

## 2023-11-29 PROCEDURE — 77412 RADIATION TX DELIVERY LVL 3: CPT | Performed by: STUDENT IN AN ORGANIZED HEALTH CARE EDUCATION/TRAINING PROGRAM

## 2023-11-29 PROCEDURE — 97165 OT EVAL LOW COMPLEX 30 MIN: CPT | Mod: GO

## 2023-11-29 PROCEDURE — 2500000004 HC RX 250 GENERAL PHARMACY W/ HCPCS (ALT 636 FOR OP/ED): Performed by: STUDENT IN AN ORGANIZED HEALTH CARE EDUCATION/TRAINING PROGRAM

## 2023-11-29 PROCEDURE — 96372 THER/PROPH/DIAG INJ SC/IM: CPT | Performed by: STUDENT IN AN ORGANIZED HEALTH CARE EDUCATION/TRAINING PROGRAM

## 2023-11-29 PROCEDURE — 85025 COMPLETE CBC W/AUTO DIFF WBC: CPT

## 2023-11-29 PROCEDURE — 1170000001 HC PRIVATE ONCOLOGY ROOM DAILY

## 2023-11-29 PROCEDURE — 77014 CHG CT GUIDANCE RADIATION THERAPY FLDS PLACEMENT: CPT | Performed by: STUDENT IN AN ORGANIZED HEALTH CARE EDUCATION/TRAINING PROGRAM

## 2023-11-29 PROCEDURE — 83735 ASSAY OF MAGNESIUM: CPT

## 2023-11-29 RX ORDER — ACETAMINOPHEN 160 MG/5ML
650 SOLUTION ORAL EVERY 4 HOURS
Status: DISCONTINUED | OUTPATIENT
Start: 2023-11-29 | End: 2023-12-14

## 2023-11-29 RX ORDER — MAGNESIUM SULFATE HEPTAHYDRATE 40 MG/ML
2 INJECTION, SOLUTION INTRAVENOUS ONCE
Status: COMPLETED | OUTPATIENT
Start: 2023-11-29 | End: 2023-11-29

## 2023-11-29 RX ORDER — POTASSIUM CHLORIDE 20 MEQ/1
40 TABLET, EXTENDED RELEASE ORAL ONCE
Status: DISCONTINUED | OUTPATIENT
Start: 2023-11-29 | End: 2023-12-02

## 2023-11-29 RX ADMIN — ACETAMINOPHEN 650 MG: 650 SOLUTION ORAL at 08:19

## 2023-11-29 RX ADMIN — THIAMINE HYDROCHLORIDE 100 MG: 100 INJECTION, SOLUTION INTRAMUSCULAR; INTRAVENOUS at 12:13

## 2023-11-29 RX ADMIN — OXYCODONE HYDROCHLORIDE 10 MG: 5 TABLET ORAL at 20:01

## 2023-11-29 RX ADMIN — OXYCODONE HYDROCHLORIDE 10 MG: 5 TABLET ORAL at 03:16

## 2023-11-29 RX ADMIN — ACETAMINOPHEN 650 MG: 650 SOLUTION ORAL at 20:01

## 2023-11-29 RX ADMIN — ESOMEPRAZOLE MAGNESIUM 20 MG: 20 FOR SUSPENSION ORAL at 06:05

## 2023-11-29 RX ADMIN — ASPIRIN 81 MG CHEWABLE TABLET 81 MG: 81 TABLET CHEWABLE at 08:19

## 2023-11-29 RX ADMIN — AMLODIPINE BESYLATE 10 MG: 10 TABLET ORAL at 08:19

## 2023-11-29 RX ADMIN — ACETAMINOPHEN 650 MG: 650 SOLUTION ORAL at 12:03

## 2023-11-29 RX ADMIN — ATORVASTATIN CALCIUM 80 MG: 80 TABLET, FILM COATED ORAL at 08:19

## 2023-11-29 RX ADMIN — ENOXAPARIN SODIUM 40 MG: 100 INJECTION SUBCUTANEOUS at 20:01

## 2023-11-29 RX ADMIN — ACETAMINOPHEN 650 MG: 650 SOLUTION ORAL at 03:17

## 2023-11-29 RX ADMIN — ACETAMINOPHEN 650 MG: 650 SOLUTION ORAL at 16:38

## 2023-11-29 RX ADMIN — CHLORTHALIDONE 25 MG: 25 TABLET ORAL at 08:30

## 2023-11-29 RX ADMIN — POTASSIUM CHLORIDE 20 MEQ: 14.9 INJECTION, SOLUTION INTRAVENOUS at 01:29

## 2023-11-29 RX ADMIN — MAGNESIUM SULFATE HEPTAHYDRATE 2 G: 40 INJECTION, SOLUTION INTRAVENOUS at 12:57

## 2023-11-29 RX ADMIN — LOSARTAN POTASSIUM 100 MG: 50 TABLET, FILM COATED ORAL at 08:19

## 2023-11-29 ASSESSMENT — COGNITIVE AND FUNCTIONAL STATUS - GENERAL
CLIMB 3 TO 5 STEPS WITH RAILING: A LITTLE
DRESSING REGULAR UPPER BODY CLOTHING: A LITTLE
MOBILITY SCORE: 20
MOBILITY SCORE: 20
TOILETING: A LITTLE
MOBILITY SCORE: 22
HELP NEEDED FOR BATHING: A LITTLE
DRESSING REGULAR UPPER BODY CLOTHING: A LITTLE
WALKING IN HOSPITAL ROOM: A LITTLE
MOVING TO AND FROM BED TO CHAIR: A LITTLE
STANDING UP FROM CHAIR USING ARMS: A LITTLE
DAILY ACTIVITIY SCORE: 22
HELP NEEDED FOR BATHING: A LITTLE
DRESSING REGULAR LOWER BODY CLOTHING: A LITTLE
DAILY ACTIVITIY SCORE: 19
DAILY ACTIVITIY SCORE: 19
STANDING UP FROM CHAIR USING ARMS: A LITTLE
MOVING TO AND FROM BED TO CHAIR: A LITTLE
PERSONAL GROOMING: A LITTLE
WALKING IN HOSPITAL ROOM: A LITTLE
HELP NEEDED FOR BATHING: A LITTLE
STANDING UP FROM CHAIR USING ARMS: A LITTLE
DRESSING REGULAR LOWER BODY CLOTHING: A LITTLE
DRESSING REGULAR LOWER BODY CLOTHING: A LITTLE
CLIMB 3 TO 5 STEPS WITH RAILING: A LITTLE
PERSONAL GROOMING: A LITTLE
CLIMB 3 TO 5 STEPS WITH RAILING: A LITTLE
TOILETING: A LITTLE

## 2023-11-29 ASSESSMENT — PAIN SCALES - GENERAL
PAINLEVEL_OUTOF10: 0 - NO PAIN
PAINLEVEL_OUTOF10: 8
PAINLEVEL_OUTOF10: 4
PAINLEVEL_OUTOF10: 8

## 2023-11-29 ASSESSMENT — PAIN - FUNCTIONAL ASSESSMENT: PAIN_FUNCTIONAL_ASSESSMENT: 0-10

## 2023-11-29 ASSESSMENT — ACTIVITIES OF DAILY LIVING (ADL)
BATHING_ASSISTANCE: MINIMAL
ADL_ASSISTANCE: INDEPENDENT

## 2023-11-29 NOTE — CARE PLAN
Problem: Fall/Injury  Goal: Not fall by end of shift  Outcome: Progressing  Goal: Be free from injury by end of the shift  Outcome: Progressing  Goal: Verbalize understanding of personal risk factors for fall in the hospital  Outcome: Progressing  Goal: Verbalize understanding of risk factor reduction measures to prevent injury from fall in the home  Outcome: Progressing  Goal: Use assistive devices by end of the shift  Outcome: Progressing  Goal: Pace activities to prevent fatigue by end of the shift  Outcome: Progressing     Problem: Skin  Goal: Decreased wound size/increased tissue granulation at next dressing change  Outcome: Progressing  Flowsheets (Taken 11/28/2023 0551)  Decreased wound size/increased tissue granulation at next dressing change: Promote sleep for wound healing  Goal: Participates in plan/prevention/treatment measures  Outcome: Progressing  Flowsheets (Taken 11/28/2023 0551)  Participates in plan/prevention/treatment measures: Increase activity/out of bed for meals  Goal: Prevent/manage excess moisture  Outcome: Progressing  Flowsheets (Taken 11/29/2023 0539)  Prevent/manage excess moisture:   Moisturize dry skin   Follow provider orders for dressing changes  Goal: Prevent/minimize sheer/friction injuries  Outcome: Progressing  Flowsheets (Taken 11/29/2023 0539)  Prevent/minimize sheer/friction injuries: Increase activity/out of bed for meals  Goal: Promote/optimize nutrition  Outcome: Progressing  Flowsheets (Taken 11/29/2023 0539)  Promote/optimize nutrition: Monitor/record intake including meals  Goal: Promote skin healing  Outcome: Progressing  Flowsheets (Taken 11/29/2023 0539)  Promote skin healing: Assess skin/pad under line(s)/device(s)     the clinical goals for the shift include pt will tolerate tube feed throughout shift with no nausea    Over the shift, patient did not tolerate tube feed due to electrolyte imbalances, resulting in holding tube feed and potassium replacement. Had  pain improved with medications. No acute events overnight

## 2023-11-29 NOTE — PROGRESS NOTES
Physical Therapy    Physical Therapy Treatment    Patient Name: Mike Olson  MRN: [id number]  Today's Date: 11/29/2023  Time Calculation  Start Time: 1125  Stop Time: 1154  Time Calculation (min): 29 min       Assessment/Plan   PT Assessment  PT Assessment Results: Decreased strength, Decreased endurance, Impaired balance, Decreased mobility  Evaluation/Treatment Tolerance: Patient tolerated treatment well  End of Session Communication: Bedside nurse  End of Session Patient Position: Alarm off, not on at start of session (seated EOB)     PT Plan  Treatment/Interventions: Transfer training, Gait training, Stair training, Balance training, Neuromuscular re-education, Endurance training, Strengthening, Therapeutic exercise, Therapeutic activity, Home exercise program, Positioning, Postural re-education  PT Plan: Skilled PT  PT Frequency: 2 times per week  PT Discharge Recommendations: No PT needed after discharge  PT Recommended Transfer Status: Stand by assist  PT - OK to Discharge: Yes      General Visit Information:   PT  Visit  PT Received On: 11/29/23  General  Family/Caregiver Present: No  Prior to Session Communication: Bedside nurse  Patient Position Received: Bed, 2 rail up, Alarm off, not on at start of session  General Comment: Pt supine in bed upon arrival. Pt eager to get OOB.    Subjective   Precautions:     Vital Signs:  Vital Signs  Heart Rate: 100  Heart Rate Source: Monitor  SpO2: 97 % (decreased to 84% with short ambulation, improved to 96% with standing rest.)  Patient Position: Standing    Objective   Pain:     Cognition:       Activity Tolerance:  Activity Tolerance  Endurance: Tolerates 10 - 20 min exercise with multiple rests  Treatments:  Therapeutic Activity  Therapeutic Activity Performed: Yes  Therapeutic Activity 1: Pt completed 5xSTS in 18 seconds.    Balance/Neuromuscular Re-Education  Balance/Neuromuscular Re-Education Activity Performed: Yes  Balance/Neuromuscular Re-Education Activity 1:  Pt scored 24/25 on the Tinetti    Bed Mobility  Bed Mobility: Yes  Bed Mobility 1  Bed Mobility 1: Supine to sitting  Level of Assistance 1: Distant supervision  Bed Mobility Comments 1: HOB elevated    Ambulation/Gait Training  Ambulation/Gait Training Performed: Yes  Ambulation/Gait Training 1  Surface 1: Level tile  Device 1: Rolling walker  Assistance 1: Close supervision  Comments/Distance (ft) 1: 150' x2 Pt required frequent standing rest breaks due to SpO2 decreasing to 80s, SpO2 improves with standing rest.  Transfers  Transfer: Yes  Transfer 1  Transfer From 1: Sit to  Transfer to 1: Stand  Technique 1: Sit to stand  Transfer Level of Assistance 1: Close supervision  Transfers 2  Transfer From 2: Stand to  Transfer to 2: Sit  Technique 2: Stand to sit  Transfer Level of Assistance 2: Close supervision    Stairs  Stairs: Yes  Stairs  Rails 1: Left  Curb Step 1: No  Device 1: Railing  Assistance 1: Close supervision  Comment/Number of Steps 1: 2    Outcome Measures:  [facility] Basic Mobility  Turning from your back to your side while in a flat bed without using bedrails: None  Moving from lying on your back to sitting on the side of a flat bed without using bedrails: None  Moving to and from bed to chair (including a wheelchair): A little  Standing up from a chair using your arms (e.g. wheelchair or bedside chair): A little  To walk in hospital room: A little  Climbing 3-5 steps with railing: A little  Basic Mobility - Total Score: 20    Tinetti  Sitting Balance: Steady, safe  Arises: Able without using arms  Attempts to Arise: Able to arise, one attempt  Immediate Standing Balance (First 5 Seconds): Steady without walker or other support  Standing Balance: Narrow stance without support  Nudged: Steady without walker or other support  Eyes Closed: Unsteady  Turned 360 Degrees: Steadiness: Steady  Turned 360 Degrees: Continuity of Steps: Continuous  Sitting Down: Safe, smooth motion  Balance Score: 15  Initiation  of Gait: No hesitancy  Step Height: R Swing Foot: Right foot complete clears floor  Step Length: R Swing Foot: Passes left stance foot  Step Height: L Swing Foot: Left foot complete clears floor  Step Length: L Swing Foot: Passes right stance foot  Step Symmetry: Right and left step appear equal  Step Continuity: Steps appear continuous  Path: Mild/moderate deviation or uses walking aid  Trunk: No sway but flexion of knees or back or spreads arms out while walking  Walking Time: Heels almost touching while walking  Gait Score: 10  Total Score: 25    Education Documentation  Mobility Training, taught by Estela Tovar PTA at 11/29/2023  2:06 PM.  Learner: Patient  Readiness: Acceptance  Method: Explanation  Response: Verbalizes Understanding    Education Comments  No comments found.        OP EDUCATION:       Encounter Problems       Encounter Problems (Active)       Mobility       Pt will ambulate >250ft with LRAD and MI with no LOB and VSS.   (Progressing)       Start:  11/24/23    Expected End:  12/15/23            Pt will complete the 6mwt and score within age related normative values. (Progressing)       Start:  11/24/23    Expected End:  12/15/23            Pt will complete 5 x STS and score within age related normative values.   (Progressing)       Start:  11/24/23    Expected End:  12/15/23               Transfers       Pt will perform all functional transfers with LRAD and MI. (Progressing)       Start:  11/24/23    Expected End:  12/15/23                  Encounter Problems (Resolved)       Balance       Pt will score >24 On the Tinetti to reduce the risk for falls.   (Met)       Start:  11/24/23    Expected End:  12/15/23    Resolved:  11/29/23

## 2023-11-29 NOTE — PROGRESS NOTES
Occupational Therapy    Evaluation    Patient Name: Mike Olson  MRN: [id number]  Today's Date: 11/29/2023  Time Calculation  Start Time: 1309  Stop Time: 1321  Time Calculation (min): 12 min        Assessment:  End of Session Communication: Bedside nurse  End of Session Patient Position: Alarm off, not on at start of session (seated EOB, family present in room)  OT Assessment Results: Decreased ADL status, Decreased endurance, Decreased functional mobility, Decreased IADLs    Plan:  Treatment Interventions: ADL retraining, Functional transfer training, UE strengthening/ROM, Patient/family training, Compensatory technique education, Equipment evaluation/education  OT Frequency: 2 times per week  OT Discharge Recommendations: No OT needed after discharge      Subjective   General:  Reason for Referral: worsening dysphagia  Past Medical History Relevant to Rehab: HTN, CVA (9/2023), recently diagnosed poorly differentiated metastatic SCC (11/2023)  Prior to Session Communication: Bedside nurse  Patient Position Received: Bed, 2 rail up, Alarm off, not on at start of session    General Comment: Pt supine in bed upon arrival, willing to participate in therapy- pleasant and with clam demeanor. Pt's dtr bedside throughout.    Pain:  Pain Assessment  Pain Assessment: 0-10  Pain Score: 0 - No pain    Objective   Cognition:  Overall Cognitive Status: Within Functional Limits  Orientation Level: Oriented X4    Home Living:  Type of Home: House  Lives With: Spouse (dog)  Home Adaptive Equipment: Cane, Walker rolling or standard  Home Layout: Two level (bedroom and half-bath (currently renovating to full walk-in shower) on first; full bath on second)  Bathroom Toilet: Standard    Prior Function:  ADL Assistance: Independent  Homemaking Assistance: Independent  Ambulatory Assistance: Independent  Prior Function Comments: Pt reports utilizing cane for ~3-4 d/t increased fatigue with I/ADLs    IADL History:  Current License:  (Pt  reports he does not drive; family to assist with groceries)  IADL Comments: Enjoys spending time with his dog- pt requires decreased interest in leisure occupations 2/2 health deficits    ADL:  Eating Assistance: Independent (anticipate)  Grooming Assistance: Minimal (anticipate)  Bathing Assistance: Minimal (anticipate, seated)  UE Dressing Assistance: Independent (anticipate)  LE Dressing Assistance: Minimal (anticipate)  Toileting Assistance with Device: Minimal (anticipate)  ADL Comments: Pt politely declines ADLs at time of OT eval d/t completing this AM    Bed Mobility/Transfers:   Bed Mobility 1  Bed Mobility 1: Supine to sitting, Sitting to supine  Level of Assistance 1: Distant supervision  Bed Mobility Comments 1: HOB elevated, use of bedrails    Transfer 1  Transfer From 1: Sit to, Stand to  Transfer to 1: Stand, Sit  Transfer Level of Assistance 1: Close supervision  Trials/Comments 1: no AD    Vision:  Patient Visual Report:  (Pt reports no acute visual deficits)    Sensation:  Sensation Comment: denies N/T; no apparent deficits    Strength:  Strength Comments: BUE grossly 4/5    Outcome Measures:  [facility] Daily Activity  Putting on and taking off regular lower body clothing: A little  Bathing (including washing, rinsing, drying): A little  Putting on and taking off regular upper body clothing: A little  Toileting, which includes using toilet, bedpan or urinal: A little  Taking care of personal grooming such as brushing teeth: A little  Eating Meals: None  Daily Activity - Total Score: 19    OT Adult Other Outcome Measures  4AT: Negative      Education Documentation  Body Mechanics, taught by Jazmyne Robles OT at 11/29/2023  2:37 PM.  Learner: Patient  Readiness: Acceptance  Method: Explanation  Response: Verbalizes Understanding    Precautions, taught by Jazmyne Robles OT at 11/29/2023  2:37 PM.  Learner: Patient  Readiness: Acceptance  Method: Explanation  Response: Verbalizes  Understanding    ADL Training, taught by Jazmyne Robles OT at 11/29/2023  2:37 PM.  Learner: Patient  Readiness: Acceptance  Method: Explanation  Response: Verbalizes Understanding    Education Comments  No comments found.          Goals:  Encounter Problems       Encounter Problems (Active)       ADLs       Pt will complete UB /LB bathing tasks with modified independence while seated and AE as needed. (Progressing)       Start:  11/29/23    Expected End:  12/13/23            Pt will complete UB dressing with independent level of assistance while seated and/or standing.   (Progressing)       Start:  11/29/23    Expected End:  12/13/23            Pt will complete LB dressing with independent level of assistance while seated and/or standing.   (Progressing)       Start:  11/29/23    Expected End:  12/13/23            Pt will complete grooming tasks while seated or standing with independent level of assistance.   (Progressing)       Start:  11/29/23    Expected End:  12/13/23            Pt will complete toilet hygiene while seated /standing with independent level of assistance.   (Progressing)       Start:  11/29/23    Expected End:  12/13/23               BALANCE       Pt will demo improved dynamic sitting /standing balance while engaging in I/ADL task with modified independence and no LOB.  (Progressing)       Start:  11/29/23    Expected End:  12/13/23               MOBILITY       Pt will complete functional ambulation household /community distance with modified independence and LRAD.  (Progressing)       Start:  11/29/23    Expected End:  12/13/23               TRANSFERS       Pt will complete functional transfers with modified independence and LRAD.  (Progressing)       Start:  11/29/23    Expected End:  12/13/23                   Jazmyne Robles (OTR/L, OTD)

## 2023-11-29 NOTE — PROGRESS NOTES
Mike Olson is a 68 y.o. male on day 7 of admission presenting with Difficulty swallowing.    Subjective   No acute events overnight.  PEG tube in place.  All meds through PEG tube.  Patient is feeling much better today after tube feeds were initiated yesterday.  Although, they had to be discontinued at around 11 PM due to concerns for hypokalemia.  Patient was repleted appropriately. Potassium recovered this morning on RFP. Patient will go for second fraction of palliative radiotherapy today.  Will restart tube feeds after and assess with evening RFP.  Denies worsening throat pain or dysphagia, fever/chills, N/V, diarrhea/constipation. Patient had BM yesterday. Pain is well controlled.        Objective     Physical Exam  Constitutional:       General: He is not in acute distress.     Appearance: Normal appearance.   HENT:      Head: Normocephalic and atraumatic.      Mouth/Throat:      Mouth: Mucous membranes are moist.   Eyes:      Extraocular Movements: Extraocular movements intact.      Conjunctiva/sclera: Conjunctivae normal.      Pupils: Pupils are equal, round, and reactive to light.   Cardiovascular:      Rate and Rhythm: Normal rate and regular rhythm.      Heart sounds: No murmur heard.  Pulmonary:      Effort: Pulmonary effort is normal.   Abdominal:      General: Bowel sounds are normal. There is no distension.      Palpations: Abdomen is soft.      Tenderness: There is no abdominal tenderness.   Skin:     General: Skin is warm and dry.      Comments: R axilla fungating mass covered with clean bandage, no signs of erythema, non tender to palpation in surrounding area.   Neurological:      General: No focal deficit present.      Mental Status: He is alert and oriented to person, place, and time.      Gait: Gait normal.   Psychiatric:      Comments: Appropriate mood and affect    Last Recorded Vitals  Blood pressure 151/75, pulse 86, temperature 36.5 °C (97.7 °F), temperature source Temporal, resp. rate 18,  "height 1.69 m (5' 6.54\"), weight 76.5 kg (168 lb 11.2 oz), SpO2 96 %.  Intake/Output last 3 Shifts:  I/O last 3 completed shifts:  In: 1789 (23.4 mL/kg) [I.V.:777 (10.2 mL/kg); NG/GT:812; IV Piggyback:200]  Out: 1300 (17 mL/kg) [Urine:1300 (0.5 mL/kg/hr)]  Weight: 76.5 kg     Relevant Results  Scheduled medications  acetaminophen, 650 mg, g-tube, q4h  amLODIPine, 10 mg, g-tube, Daily  aspirin, 81 mg, g-tube, Daily  atorvastatin, 80 mg, g-tube, Daily  chlorthalidone, 25 mg, g-tube, Daily  enoxaparin, 40 mg, subcutaneous, q24h  esomeprazole, 20 mg, nasogastric tube, Daily before breakfast  fentaNYL PF, , ,   losartan, 100 mg, g-tube, Daily  magnesium sulfate, 2 g, intravenous, Once  midazolam, , ,   [Held by provider] polyethylene glycol, 17 g, oral, Daily  thiamine, 100 mg, intravenous, Daily      Continuous medications       PRN medications  PRN medications: fentaNYL PF, melatonin, meperidine, meperidine PF, midazolam, midazolam, oxyCODONE, oxyCODONE  Results for orders placed or performed during the hospital encounter of 11/22/23 (from the past 24 hour(s))   POCT GLUCOSE   Result Value Ref Range    POCT Glucose 118 (H) 74 - 99 mg/dL   Renal function panel   Result Value Ref Range    Glucose 122 (H) 74 - 99 mg/dL    Sodium 141 136 - 145 mmol/L    Potassium 3.0 (L) 3.5 - 5.3 mmol/L    Chloride 102 98 - 107 mmol/L    Bicarbonate 28 21 - 32 mmol/L    Anion Gap 14 10 - 20 mmol/L    Urea Nitrogen 18 6 - 23 mg/dL    Creatinine 0.77 0.50 - 1.30 mg/dL    eGFR >90 >60 mL/min/1.73m*2    Calcium 9.7 8.6 - 10.6 mg/dL    Phosphorus 3.3 2.5 - 4.9 mg/dL    Albumin 2.9 (L) 3.4 - 5.0 g/dL   POCT GLUCOSE   Result Value Ref Range    POCT Glucose 128 (H) 74 - 99 mg/dL                                   Assessment/Plan   Principal Problem:    Difficulty swallowing  Active Problems:    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 Given new diagnosis, no treatment has been started yet and no known origin yet. Will discuss with Med Onc (Dr. Yin was to be his primary oncologist), Surg Onc (Dr Corona had already seen pt) and will also consult GI for EGD/biopsy. EGD on 11/24 showed large, nearly fully obstructing esophageal mass. Patient is not a surgical candidate given metastatic disease. Managing nutrition and need for chemo/radiation.      Updates 11/29:  -Continue tube feeds after radiation therapy.  - PEG tube in place, TF isosource 1.5, goal rate 55 ml/hr; hold 2 hrs before radiation  - Meds per PEG tube  - RFP evening for s/s refeeding  - S/p 2/5 first radiation treatment of esophageal mass  - monitor RFPs, increase/decrease FWF as needed  - Patient is not a surgical candidate for esophageal stenting     #Metastatic SCC, poorly differentiated   #Dysphagia   - Pathology Microscopic examination of H&E sections demonstrates a poorly differentiated carcinoma , immunoreactive with CK7 and p40 , infiltrating soft tissue.  No residual lymph node parenchyma is seen.  The following immunostains are negative: NKX3.1, GATA3, CK20, TTF-1 and CDX2   -Diagnosed 11/2023; no treatment yet; unknown origin  -Surg Onc consulted; appreciate recs (saw Dr. Corona outpatient 11/10/23)  -SLP consulted for swallow evaluation and recommended NPO  -EGD/biopsy 11/24:  large, nearly fully obstructing esophageal mass. Patient is not a surgical candidate because of metastatic disease.   -Plan to inform Dr. Yin (future medical oncologist-1st appt scheduled 12/1)  -Consider consulting pulm for possible lung biopsy as potential origin   -Tylenol PRN for pain; patient denies any pain currently  - Nutrition consulted: 1. Start  100mg IV thiamine x5 days;  Isosource 1.5 @ 25 ml/hr. Advance slowly by 10 ml/hr q8h, as tolerated, to goal rate of 55 ml/hr.   - CT SIM 11/27  -- Palliative Radiotherapy: 2/5 fractions to complete 12/4  - s/p PEG tube placement 11/27  - Tube Feeds: 1.5 @ 25 ml/hr. Advance slowly by 10 ml/hr q8h, as tolerated, to goal rate of 55 ml/hr.  - 100mg IV thiamine x5 days   - FWF: 250 ml 4x/day  - Evening RFP        #MARYELLEN (resolved)  -Baseline Cr ~1; on admission 1.48, was 1.50 on 11/16 -> now at baseline 1.04 on 11/23  -S/p 1L LR, IV LR infusion at 125cc/hr, given improvement in Cr likely pre-renal  -Restarted home losartan 100mg daily  -Continue to monitor      #HTN  -Continue home amlodipine 10mg daily  -Holding chlorithalidone 25mg daily given hypercalcemia  -Continue ARB as MARYELLEN has resolved     #CVA 9/2023  #Carotid stenosis  -2 acute or subacute infarcts seen on MRI 9/18/2023  -Continue ASA 81mg daily  -Completed 21 days of plavix (started 9/18/23)  -Continue atorvastatin 80mg daily  -Vascular Surgery follow up outpatient for the carotid stenosis     #Hypercalcemia  -Ca 11.5 on admission  -S/p 1L LR in the ED, on LR infusion at 125cc/hr  -Repeat was 11.3 on 11/23, so avoiding LR in favor of NS     #Elevated PSA  -Urology follow up outpatient     Disposition: tele  Follow-ups: PCP, Onc, Surg Onc,      F: prn  E: prn   N: CLD as tolerated +Boost  A: PIVs     DVT ppx: lovenox  GI ppx: home PPI    Code Status: FULL (confirmed on 11/22/23)  Surrogate Medical Decision-maker: Wife April [phone]           Sid Jose MD

## 2023-11-29 NOTE — CARE PLAN
The patient's goals for the shift include      The clinical goals for the shift include PT will tolerate tube feed throughout end of shift 11/29/2023 1900      Problem: Fall/Injury  Goal: Not fall by end of shift  Outcome: Progressing  Goal: Be free from injury by end of the shift  Outcome: Progressing  Goal: Verbalize understanding of personal risk factors for fall in the hospital  Outcome: Progressing  Goal: Verbalize understanding of risk factor reduction measures to prevent injury from fall in the home  Outcome: Progressing  Goal: Use assistive devices by end of the shift  Outcome: Progressing  Goal: Pace activities to prevent fatigue by end of the shift  Outcome: Progressing     Problem: Skin  Goal: Decreased wound size/increased tissue granulation at next dressing change  Outcome: Progressing  Goal: Participates in plan/prevention/treatment measures  Outcome: Progressing  Goal: Prevent/manage excess moisture  Outcome: Progressing  Goal: Prevent/minimize sheer/friction injuries  Outcome: Progressing  Goal: Promote/optimize nutrition  Outcome: Progressing  Goal: Promote skin healing  Outcome: Progressing

## 2023-11-30 ENCOUNTER — HOSPITAL ENCOUNTER (OUTPATIENT)
Dept: RADIATION ONCOLOGY | Facility: HOSPITAL | Age: 68
Setting detail: RADIATION/ONCOLOGY SERIES
Discharge: HOME | End: 2023-11-30
Payer: MEDICARE

## 2023-11-30 DIAGNOSIS — C15.3 MALIGNANT NEOPLASM OF UPPER THIRD OF ESOPHAGUS (MULTI): ICD-10-CM

## 2023-11-30 DIAGNOSIS — Z51.0 ENCOUNTER FOR ANTINEOPLASTIC RADIATION THERAPY: ICD-10-CM

## 2023-11-30 LAB
ALBUMIN SERPL BCP-MCNC: 2.5 G/DL (ref 3.4–5)
ALBUMIN SERPL BCP-MCNC: 2.5 G/DL (ref 3.4–5)
ALBUMIN SERPL BCP-MCNC: 2.7 G/DL (ref 3.4–5)
ANION GAP SERPL CALC-SCNC: 13 MMOL/L (ref 10–20)
ANION GAP SERPL CALC-SCNC: 13 MMOL/L (ref 10–20)
ANION GAP SERPL CALC-SCNC: 15 MMOL/L (ref 10–20)
BASOPHILS # BLD AUTO: 0.04 X10*3/UL (ref 0–0.1)
BASOPHILS NFR BLD AUTO: 0.2 %
BUN SERPL-MCNC: 15 MG/DL (ref 6–23)
BUN SERPL-MCNC: 16 MG/DL (ref 6–23)
BUN SERPL-MCNC: 16 MG/DL (ref 6–23)
CALCIUM SERPL-MCNC: 8.7 MG/DL (ref 8.6–10.6)
CALCIUM SERPL-MCNC: 9.1 MG/DL (ref 8.6–10.6)
CALCIUM SERPL-MCNC: 9.4 MG/DL (ref 8.6–10.6)
CHLORIDE SERPL-SCNC: 101 MMOL/L (ref 98–107)
CHLORIDE SERPL-SCNC: 102 MMOL/L (ref 98–107)
CHLORIDE SERPL-SCNC: 103 MMOL/L (ref 98–107)
CO2 SERPL-SCNC: 28 MMOL/L (ref 21–32)
CO2 SERPL-SCNC: 29 MMOL/L (ref 21–32)
CO2 SERPL-SCNC: 31 MMOL/L (ref 21–32)
CREAT SERPL-MCNC: 0.67 MG/DL (ref 0.5–1.3)
CREAT SERPL-MCNC: 0.68 MG/DL (ref 0.5–1.3)
CREAT SERPL-MCNC: 0.69 MG/DL (ref 0.5–1.3)
EOSINOPHIL # BLD AUTO: 0.37 X10*3/UL (ref 0–0.7)
EOSINOPHIL NFR BLD AUTO: 2 %
ERYTHROCYTE [DISTWIDTH] IN BLOOD BY AUTOMATED COUNT: 12.1 % (ref 11.5–14.5)
GFR SERPL CREATININE-BSD FRML MDRD: >90 ML/MIN/1.73M*2
GLUCOSE SERPL-MCNC: 118 MG/DL (ref 74–99)
GLUCOSE SERPL-MCNC: 127 MG/DL (ref 74–99)
GLUCOSE SERPL-MCNC: 91 MG/DL (ref 74–99)
HCT VFR BLD AUTO: 29.9 % (ref 41–52)
HGB BLD-MCNC: 9.4 G/DL (ref 13.5–17.5)
IMM GRANULOCYTES # BLD AUTO: 0.28 X10*3/UL (ref 0–0.7)
IMM GRANULOCYTES NFR BLD AUTO: 1.5 % (ref 0–0.9)
LYMPHOCYTES # BLD AUTO: 1.15 X10*3/UL (ref 1.2–4.8)
LYMPHOCYTES NFR BLD AUTO: 6.2 %
MAGNESIUM SERPL-MCNC: 1.72 MG/DL (ref 1.6–2.4)
MAGNESIUM SERPL-MCNC: 1.95 MG/DL (ref 1.6–2.4)
MCH RBC QN AUTO: 28.4 PG (ref 26–34)
MCHC RBC AUTO-ENTMCNC: 31.4 G/DL (ref 32–36)
MCV RBC AUTO: 90 FL (ref 80–100)
MONOCYTES # BLD AUTO: 1.5 X10*3/UL (ref 0.1–1)
MONOCYTES NFR BLD AUTO: 8 %
NEUTROPHILS # BLD AUTO: 15.3 X10*3/UL (ref 1.2–7.7)
NEUTROPHILS NFR BLD AUTO: 82.1 %
NRBC BLD-RTO: 0 /100 WBCS (ref 0–0)
PHOSPHATE SERPL-MCNC: 2.3 MG/DL (ref 2.5–4.9)
PHOSPHATE SERPL-MCNC: 2.5 MG/DL (ref 2.5–4.9)
PHOSPHATE SERPL-MCNC: 2.7 MG/DL (ref 2.5–4.9)
PLATELET # BLD AUTO: 251 X10*3/UL (ref 150–450)
POTASSIUM SERPL-SCNC: 3.3 MMOL/L (ref 3.5–5.3)
POTASSIUM SERPL-SCNC: 3.4 MMOL/L (ref 3.5–5.3)
POTASSIUM SERPL-SCNC: 3.4 MMOL/L (ref 3.5–5.3)
RAD ONC MSQ ACTUAL FRACTIONS DELIVERED: 3
RAD ONC MSQ ACTUAL SESSION DELIVERED DOSE: 400 CGRAY
RAD ONC MSQ ACTUAL TOTAL DOSE: 1200 CGRAY
RAD ONC MSQ ELAPSED DAYS: 2
RAD ONC MSQ LAST DATE: NORMAL
RAD ONC MSQ PRESCRIBED FRACTIONAL DOSE: 400 CGRAY
RAD ONC MSQ PRESCRIBED NUMBER OF FRACTIONS: 5
RAD ONC MSQ PRESCRIBED TECHNIQUE: NORMAL
RAD ONC MSQ PRESCRIBED TOTAL DOSE: 2000 CGRAY
RAD ONC MSQ PRESCRIPTION PATTERN COMMENT: NORMAL
RAD ONC MSQ START DATE: NORMAL
RAD ONC MSQ TREATMENT COURSE NUMBER: 1
RAD ONC MSQ TREATMENT SITE: NORMAL
RBC # BLD AUTO: 3.31 X10*6/UL (ref 4.5–5.9)
SODIUM SERPL-SCNC: 141 MMOL/L (ref 136–145)
SODIUM SERPL-SCNC: 142 MMOL/L (ref 136–145)
SODIUM SERPL-SCNC: 143 MMOL/L (ref 136–145)
WBC # BLD AUTO: 18.6 X10*3/UL (ref 4.4–11.3)

## 2023-11-30 PROCEDURE — 2500000001 HC RX 250 WO HCPCS SELF ADMINISTERED DRUGS (ALT 637 FOR MEDICARE OP)

## 2023-11-30 PROCEDURE — 77387 GUIDANCE FOR RADJ TX DLVR: CPT | Performed by: STUDENT IN AN ORGANIZED HEALTH CARE EDUCATION/TRAINING PROGRAM

## 2023-11-30 PROCEDURE — 2500000004 HC RX 250 GENERAL PHARMACY W/ HCPCS (ALT 636 FOR OP/ED)

## 2023-11-30 PROCEDURE — 99233 SBSQ HOSP IP/OBS HIGH 50: CPT

## 2023-11-30 PROCEDURE — 85025 COMPLETE CBC W/AUTO DIFF WBC: CPT

## 2023-11-30 PROCEDURE — 80069 RENAL FUNCTION PANEL: CPT

## 2023-11-30 PROCEDURE — 83735 ASSAY OF MAGNESIUM: CPT

## 2023-11-30 PROCEDURE — 1170000001 HC PRIVATE ONCOLOGY ROOM DAILY

## 2023-11-30 PROCEDURE — 77412 RADIATION TX DELIVERY LVL 3: CPT | Performed by: STUDENT IN AN ORGANIZED HEALTH CARE EDUCATION/TRAINING PROGRAM

## 2023-11-30 PROCEDURE — 99223 1ST HOSP IP/OBS HIGH 75: CPT | Performed by: NURSE PRACTITIONER

## 2023-11-30 PROCEDURE — 77014 CHG CT GUIDANCE RADIATION THERAPY FLDS PLACEMENT: CPT | Performed by: STUDENT IN AN ORGANIZED HEALTH CARE EDUCATION/TRAINING PROGRAM

## 2023-11-30 PROCEDURE — 2500000005 HC RX 250 GENERAL PHARMACY W/O HCPCS

## 2023-11-30 PROCEDURE — 82947 ASSAY GLUCOSE BLOOD QUANT: CPT

## 2023-11-30 PROCEDURE — 2500000004 HC RX 250 GENERAL PHARMACY W/ HCPCS (ALT 636 FOR OP/ED): Performed by: STUDENT IN AN ORGANIZED HEALTH CARE EDUCATION/TRAINING PROGRAM

## 2023-11-30 PROCEDURE — 96372 THER/PROPH/DIAG INJ SC/IM: CPT | Performed by: STUDENT IN AN ORGANIZED HEALTH CARE EDUCATION/TRAINING PROGRAM

## 2023-11-30 RX ORDER — METRONIDAZOLE 500 MG/1
500 TABLET ORAL EVERY 8 HOURS SCHEDULED
Status: DISCONTINUED | OUTPATIENT
Start: 2023-11-30 | End: 2023-12-11

## 2023-11-30 RX ORDER — MAGNESIUM SULFATE HEPTAHYDRATE 40 MG/ML
2 INJECTION, SOLUTION INTRAVENOUS ONCE
Status: COMPLETED | OUTPATIENT
Start: 2023-11-30 | End: 2023-11-30

## 2023-11-30 RX ORDER — GABAPENTIN 300 MG/1
300 CAPSULE ORAL
Status: DISCONTINUED | OUTPATIENT
Start: 2023-11-30 | End: 2023-12-05

## 2023-11-30 RX ORDER — POTASSIUM CHLORIDE 14.9 MG/ML
20 INJECTION INTRAVENOUS
Status: COMPLETED | OUTPATIENT
Start: 2023-11-30 | End: 2023-11-30

## 2023-11-30 RX ORDER — OXYCODONE HYDROCHLORIDE 5 MG/1
10 TABLET ORAL EVERY 4 HOURS PRN
Status: DISCONTINUED | OUTPATIENT
Start: 2023-11-30 | End: 2023-12-12

## 2023-11-30 RX ORDER — LACTOSE-REDUCED FOOD/FIBER 0.07 G-1.5
440 LIQUID (ML) ORAL 3 TIMES DAILY
Qty: 3000 ML | Refills: 11 | Status: SHIPPED | OUTPATIENT
Start: 2023-11-30 | End: 2023-11-30 | Stop reason: HOSPADM

## 2023-11-30 RX ORDER — POTASSIUM CHLORIDE 1.5 G/1.58G
40 POWDER, FOR SOLUTION ORAL ONCE
Status: DISCONTINUED | OUTPATIENT
Start: 2023-11-30 | End: 2023-11-30

## 2023-11-30 RX ADMIN — ACETAMINOPHEN 650 MG: 650 SOLUTION ORAL at 19:56

## 2023-11-30 RX ADMIN — OXYCODONE HYDROCHLORIDE 10 MG: 5 TABLET ORAL at 02:53

## 2023-11-30 RX ADMIN — POTASSIUM CHLORIDE 20 MEQ: 14.9 INJECTION, SOLUTION INTRAVENOUS at 04:39

## 2023-11-30 RX ADMIN — ACETAMINOPHEN 650 MG: 650 SOLUTION ORAL at 12:04

## 2023-11-30 RX ADMIN — METRONIDAZOLE 500 MG: 500 TABLET ORAL at 22:01

## 2023-11-30 RX ADMIN — OXYCODONE HYDROCHLORIDE 10 MG: 5 TABLET ORAL at 22:01

## 2023-11-30 RX ADMIN — METRONIDAZOLE 500 MG: 500 TABLET ORAL at 15:06

## 2023-11-30 RX ADMIN — ACETAMINOPHEN 650 MG: 650 SOLUTION ORAL at 04:30

## 2023-11-30 RX ADMIN — ACETAMINOPHEN 650 MG: 650 SOLUTION ORAL at 08:43

## 2023-11-30 RX ADMIN — LOSARTAN POTASSIUM 100 MG: 50 TABLET, FILM COATED ORAL at 08:43

## 2023-11-30 RX ADMIN — ATORVASTATIN CALCIUM 80 MG: 80 TABLET, FILM COATED ORAL at 08:44

## 2023-11-30 RX ADMIN — CHLORTHALIDONE 25 MG: 25 TABLET ORAL at 08:44

## 2023-11-30 RX ADMIN — ACETAMINOPHEN 650 MG: 650 SOLUTION ORAL at 16:37

## 2023-11-30 RX ADMIN — ACETAMINOPHEN 650 MG: 650 SOLUTION ORAL at 23:42

## 2023-11-30 RX ADMIN — MAGNESIUM SULFATE HEPTAHYDRATE 2 G: 40 INJECTION, SOLUTION INTRAVENOUS at 10:05

## 2023-11-30 RX ADMIN — POTASSIUM CHLORIDE 20 MEQ: 14.9 INJECTION, SOLUTION INTRAVENOUS at 02:37

## 2023-11-30 RX ADMIN — OXYCODONE HYDROCHLORIDE 10 MG: 5 TABLET ORAL at 12:05

## 2023-11-30 RX ADMIN — ENOXAPARIN SODIUM 40 MG: 100 INJECTION SUBCUTANEOUS at 19:56

## 2023-11-30 RX ADMIN — THIAMINE HYDROCHLORIDE 100 MG: 100 INJECTION, SOLUTION INTRAMUSCULAR; INTRAVENOUS at 08:44

## 2023-11-30 RX ADMIN — POTASSIUM PHOSPHATE, MONOBASIC POTASSIUM PHOSPHATE, DIBASIC 21 MMOL: 224; 236 INJECTION, SOLUTION, CONCENTRATE INTRAVENOUS at 23:42

## 2023-11-30 RX ADMIN — ESOMEPRAZOLE MAGNESIUM 20 MG: 20 FOR SUSPENSION ORAL at 06:42

## 2023-11-30 RX ADMIN — GABAPENTIN 300 MG: 300 CAPSULE ORAL at 18:02

## 2023-11-30 RX ADMIN — AMLODIPINE BESYLATE 10 MG: 10 TABLET ORAL at 08:44

## 2023-11-30 RX ADMIN — ASPIRIN 81 MG CHEWABLE TABLET 81 MG: 81 TABLET CHEWABLE at 08:43

## 2023-11-30 ASSESSMENT — COGNITIVE AND FUNCTIONAL STATUS - GENERAL
DAILY ACTIVITIY SCORE: 22
WALKING IN HOSPITAL ROOM: A LITTLE
CLIMB 3 TO 5 STEPS WITH RAILING: A LITTLE
DAILY ACTIVITIY SCORE: 23
DRESSING REGULAR LOWER BODY CLOTHING: A LITTLE
HELP NEEDED FOR BATHING: A LITTLE
MOBILITY SCORE: 21
MOBILITY SCORE: 22
WALKING IN HOSPITAL ROOM: A LITTLE
STANDING UP FROM CHAIR USING ARMS: A LITTLE
DRESSING REGULAR LOWER BODY CLOTHING: A LITTLE
CLIMB 3 TO 5 STEPS WITH RAILING: A LITTLE

## 2023-11-30 ASSESSMENT — PAIN SCALES - GENERAL
PAINLEVEL_OUTOF10: 8
PAINLEVEL_OUTOF10: 5 - MODERATE PAIN

## 2023-11-30 NOTE — CONSULTS
SUPPORTIVE AND PALLIATIVE ONCOLOGY CONSULT    Inpatient consult to [facility] Adult Supportive Oncology  Consult performed by: JEFF Saldivar-CNP  Consult ordered by: Natty Husain MD MPH  Reason for consult: pain Management        SERVICE DATE: 2023      PALLIATIVE MEDICINE OUTPATIENT PROVIDER:  none  CURRENT ATTENDING PROVIDER: Natty Husain MD MPH     Medical Oncologist: Vero Boucher MD   Radiation Oncologist: No care team member to display  Primary Physician: Alma Cesar  [phone]    REASON FOR CONSULT/CHIEF CONSULT COMPLAINT: pain management    Subjective   HISTORY OF PRESENT ILLNESS: Mike Olson is a 68 y.o. male diagnosed with poorly differentiated metastatic SCC (2023) who presents for worsening dysphagia. PMH significant for HTN, CVA (2023). Admitted 2023 for further evaluation and management of worsening dysphagia.Supportive and Palliative Oncology is consulted for pain management.     Patient narrated how complicated his health has been in the last couple of months. He reports that he was just diagnosed with cancer and his wife has had cancer since . He is just sad that they both have cancer, he was caring for her now with his new cancer diagnosis he has to take care of himself in order to effectively care for him.      Pain Assessment:  Location: Right shoulder and arm  Duration: Constant  Characteristics:   Ratin   Descriptors: sharp   Aggravating: movement    Relieving: Analgesics oxycodone    Treatment/Home Regimen: Acetaminophen 650mg    Intolerances:Mike Olson has No Known Allergies.   Personal Pain Goal: 0    Interference with Function: Very Much   Coping Strategies: Relaxation   Emotional Response:  poor sleep   Barriers to Pain Management:  none    Opioid Use  Past 24 h prn opioid use: Oxycodone 10 mg   x 2 doses = 20 mg = 30 OME  Total 24h OME use:  30mg OME    OARRS/PDMP reviewed no aberrant behavior noted.    Symptom Assessment:    Pain:somewhat  Headache:  none  Dizziness:none  Lack of energy: somewhat  Difficulty sleeping: very much  Worrying: a little  Anxiety: none  Depression: none  Pain in mouth/swallowing:  difficulty swallowing   Dry mouth: a little  Taste changes: none  Shortness of breath: none  Lack of appetite: a little   Nausea: none  Vomiting: none  Constipation:  LBM  11/25  Diarrhea: none  Sore muscles: none  Numbness or tingling in hands/feet/other: none  Weight loss:  unknown  Other: none        Information obtained from: chart review, interview of patient, and discussion with RN  ______________________________________________________________________     Oncology History    No history exists.       Past Medical History:   Diagnosis Date    Cancer (CMS/HCC)      Past Surgical History:   Procedure Laterality Date    MR HEAD ANGIO WO IV CONTRAST  9/18/2023    MR HEAD ANGIO WO IV CONTRAST 9/18/2023 [facility] MRI     Family History   Problem Relation Name Age of Onset    Hypertension Mother      Prostate cancer Father      Stroke Maternal Grandfather          SOCIAL HISTORY:  Marital Status  to wife and enjoys just sitting around by the fireplace    Social History:  reports that he has quit smoking. His smoking use included cigarettes. He has never used smokeless tobacco. He reports that he does not currently use alcohol. He reports current drug use. Drug: Marijuana.    Amish and Importance of Amish:  Non-practicing  Role of judy in daily life/Role of spirituality in health care decision-making: minimal      REVIEW OF SYSTEMS:  Review of systems negative unless noted in HPI.       Objective       Lab Results   Component Value Date    WBC 18.6 (H) 11/30/2023    HGB 9.4 (L) 11/30/2023    HCT 29.9 (L) 11/30/2023    MCV 90 11/30/2023     11/30/2023      Lab Results   Component Value Date    GLUCOSE 118 (H) 11/30/2023    CALCIUM 9.1 11/30/2023     11/30/2023    K 3.4 (L) 11/30/2023    CO2 28 11/30/2023     11/30/2023    BUN 16  11/30/2023    CREATININE 0.67 11/30/2023     Lab Results   Component Value Date    ALT 18 11/22/2023    AST 21 11/22/2023    ALKPHOS 87 11/22/2023    BILITOT 0.6 11/22/2023     Estimated Creatinine Clearance: 97 mL/min (by C-G formula based on SCr of 0.67 mg/dL).     Current Outpatient Medications   Medication Instructions    amLODIPine (Norvasc) 10 mg tablet Take 1 tablet (10 mg) by mouth once daily.    aspirin 81 mg chewable tablet Chew 1 tablet (81 mg) once daily.    atorvastatin (Lipitor) 80 mg tablet Take 1 tablet (80 mg) by mouth once daily.    chlorthalidone (Hygroton) 25 mg tablet Take 1 tablet (25 mg) by mouth once daily.    losartan (Cozaar) 100 mg tablet Take 1 tablet (100 mg) by mouth once daily.    omeprazole (PriLOSEC) 20 mg DR capsule Take 1 capsule (20 mg) by mouth once daily.    potassium chloride CR 10 mEq ER tablet Take 1 tablet (10 mEq) by mouth 2 times a day.    thiamine (VITAMIN B-1) 100 mg, oral, Daily     Scheduled medications   acetaminophen, 650 mg, g-tube, q4h  amLODIPine, 10 mg, g-tube, Daily  aspirin, 81 mg, g-tube, Daily  atorvastatin, 80 mg, g-tube, Daily  chlorthalidone, 25 mg, g-tube, Daily  enoxaparin, 40 mg, subcutaneous, q24h  esomeprazole, 20 mg, nasogastric tube, Daily before breakfast  fentaNYL PF, , ,   losartan, 100 mg, g-tube, Daily  metroNIDAZOLE, 500 mg, oral, q8h LEORA  midazolam, , ,   [Held by provider] polyethylene glycol, 17 g, oral, Daily  potassium chloride CR, 40 mEq, g-tube, Once  thiamine, 100 mg, intravenous, Daily      Continuous medications     PRN medications  PRN medications: fentaNYL PF, melatonin, meperidine, meperidine PF, midazolam, midazolam, oxyCODONE, oxyCODONE     Allergies: No Known Allergies             PHYSICAL EXAMINATION:  Vital Signs:   Vital signs reviewed  Vitals:    11/30/23 1339   BP: 136/67   Pulse: 91   Resp: 16   Temp: 36.5 °C (97.7 °F)   SpO2: 94%     Pain Score: 8     Physical Exam  Constitutional:       Appearance: Normal appearance.  "  HENT:      Nose: Nose normal.   Eyes:      Pupils: Pupils are equal, round, and reactive to light.   Cardiovascular:      Rate and Rhythm: Regular rhythm.   Pulmonary:      Breath sounds: Normal breath sounds.   Abdominal:      General: Abdomen is flat.      Palpations: Abdomen is soft.   Skin:     General: Skin is warm.   Neurological:      Mental Status: He is alert and oriented to person, place, and time.   Psychiatric:         Mood and Affect: Mood normal.         Thought Content: Thought content normal.         ASSESSMENT/PLAN:   Mike Olson is a 68 y.o. 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 PMH significant for HTN, CVA (9/2023). Admitted 11/22/2023 for further evaluation and management of worsening dysphagia.Supportive and Palliative Oncology is consulted for pain management.     Pain:  cancer pain related to cancer  Pain is: cancer related pain  Type: somatic  Pain control: well-controlled  Home regimen:  Acetaminophen 650mg q 8hrs   Intolerances/previously tried: tramadol 50mg tablet was used in the past   Personalized pain goal: 0  Pain rated 4/10, described as \"sharp pain\".  Total OME usage for the past 24 hours:  30mg OME  Change Oxycodone to 10mg q 4hours [it is effective, we just need to provide it more frequently so patient can attain control]  Continue acetaminophen 650mg q4hrs PRN   Add gabapentin 300mg at night [hold for sedation]      Constipation  At risk for constipation related to opioids, currently constipated  Usual bowel pattern: every day  Home regimen: none  LBM 11/25/23  Continue miralax 17 grams daily    Add senna 5ml liquid  q 12 hrs PRN        Sleeping Difficulty:  Impaired sleep related to pain  Home regimen:  none  Gabapentin as above.  Patient reports that he cannot get comfortable, especially sleeping on right arm.     Decreased appetite:  Appetite loss related to chemotherapy, taste changes, and disease " "process  Nutrition not consulted   Home regimen:  none  Patient has a pegtube and would continue to do well.     Goals of Care/ Minimum acceptable Quality of Life Measures   Patient's current clinical condition, including diagnosis, prognosis, and management plan, and goals of care were discussed.     Patients endorsed Goals: \"to get the hell out here\".   Jazzy: \"sitting around doing nothing with [his] wife and dog\"  Minimum acceptable outcome/QOL:  patient needs to discuss with wife and would be ready to provide details in later encounter.   Code status discussion/patients Code status:       #Palliative Medicine encounter: Palliative care was introduced as a service for patients with serious illness to improve quality of life, including helping with pain and other bothersome symptoms, clarifying patient's values and priorities to help align the patient's care with their goals, and providing support to patients and families.   Declined  for spiritual Support   Declined Music therapy for coping    Declined Art Therapy for legacy building   Welcomed Pet Therapy for Emotional Support   Coordination of Care   Supportive Listening      Advance Directives/Advance Care Planning  Existence of Advance Directives:   Decision maker/ NOK: Surrogate decision maker is wife Kavitha Black @[phone]  Code Status: Full code    [place] Surrogate Decision Maker Hierarchy    1. Court-appointed Guardian  2. Healthcare Power of   3. Legal Spouse  4. Majority of Adult Children (consensus if possible)  5. Parents  6. Majority of Adult Siblings (consensus if possible)  7. Nearest Blood Relative        Supportive Interventions: Interventions: Music Therapy: offered declined, Art Therapy: offered declined    Disposition:  Please  start the process of having prior authorization with meds to beds deliver medications to patient prior to discharge via [facility] pharmacy. Prescriptions will need to be sent 48-72 hours prior to discharge " so that a prior authorization can be completed.     Discharge date: unknown pending acute issues  Will assess if patient needs an appointment with Outpatient Supportive Oncology as appropriate      Signature and billing:  Thank you for allowing us to participate in the care of this patient. Recommendations will be communicated back to the consulting service by way of shared electronic medical record or face-to-face.    Medical complexity was high level due to due to complexity of problems, extensive data review, and high risk of management/treatment.    I spent 75 minutes in the care of this patient which included chart review, interviewing patient/family, discussion with primary team, coordination of care, and documentation.      DATA   Diagnostic tests and information reviewed for today's visit:  Conversation with primary team          Plan of Care discussed with: Provider, RN, Patient    Thank you for asking Supportive and Palliative Oncology to assist with care of this patient.  We will continue to follow.  Please contact us for additional questions or concerns.      SIGNATURE: JEFF Saldivar-CNP  PAGER/CONTACT:  Contact information:  Supportive and Palliative Oncology  Monday-Friday 8 AM-5 PM  Epic Secure chat or pager [phone].  After hours and weekends:  pager [phone]

## 2023-11-30 NOTE — PROGRESS NOTES
Mike Olson is a 68 y.o. male on day 8 of admission presenting with Difficulty swallowing.    [facility] contacted to get patient his tube feeds ordered for home. Order, demographics, H&P, RD notes, and swallow evaluation faxed.    [id number]-[phone]  [phone]    When patient is discharged, he will have [facility] as his home care agency. They have been updated on ADOD.    11/30/23 @ 1400   [facility] to fax over medical necessity form. They plan to visit the patient on Monday to do teaching with him for his tube feeds and PEG.    12/1/23 @ 1059  Medical Necessity form was faxed back to [facility] with MD signature. Patient updated on home care and supplier for his tube feeds and how that works when he is discharged. Spoke to Amie from [facility], and she was trying to reach patient. Gave patient her number, so he can call her to schedule a good time for her to come on Monday. Patient asked appropriate questions.    YANELY SERVIN RN [facility]

## 2023-11-30 NOTE — PROGRESS NOTES
"Mike Olson is a 68 y.o. male on day 8 of admission presenting with Difficulty swallowing.    Subjective   No acute events overnight.  PEG tube in place.  All meds through PEG tube.     Patient did well overnight.  Patient is currently on tube feeds at 20 cc/h.  Goal rate is 55 cc/h.  Patient's potassium repleted yesterday overnight. Patient will go for second fraction of palliative radiotherapy today.  Will hold tube feeds after and assess with evening RFP.  Denies worsening throat pain or dysphagia, fever/chills, N/V, diarrhea/constipation. Patient had BM yesterday. Pain is well controlled.        Objective     Physical Exam  Constitutional:       General: He is not in acute distress.     Appearance: Normal appearance.   HENT:      Head: Normocephalic and atraumatic.      Mouth/Throat:      Mouth: Mucous membranes are moist.   Eyes:      Extraocular Movements: Extraocular movements intact.      Conjunctiva/sclera: Conjunctivae normal.      Pupils: Pupils are equal, round, and reactive to light.   Cardiovascular:      Rate and Rhythm: Normal rate and regular rhythm.      Heart sounds: No murmur heard.  Pulmonary:      Effort: Pulmonary effort is normal.   Abdominal:      General: Bowel sounds are normal. There is no distension.      Palpations: Abdomen is soft.      Tenderness: There is no abdominal tenderness.   Skin:     General: Skin is warm and dry.      Comments: R axilla fungating mass covered with clean bandage, no signs of erythema, non tender to palpation in surrounding area.   Neurological:      General: No focal deficit present.      Mental Status: He is alert and oriented to person, place, and time.      Gait: Gait normal.   Psychiatric:      Comments: Appropriate mood and affect    Last Recorded Vitals  Blood pressure 132/66, pulse 105, temperature 36.5 °C (97.7 °F), temperature source Temporal, resp. rate 16, height 1.69 m (5' 6.54\"), weight 76.5 kg (168 lb 11.2 oz), SpO2 95 %.  Intake/Output last 3 " Shifts:  I/O last 3 completed shifts:  In: 1179 (15.4 mL/kg) [NG/GT:979; IV Piggyback:200]  Out: 1725 (22.5 mL/kg) [Urine:1725 (0.6 mL/kg/hr)]  Weight: 76.5 kg     Relevant Results  Scheduled medications  acetaminophen, 650 mg, g-tube, q4h  amLODIPine, 10 mg, g-tube, Daily  aspirin, 81 mg, g-tube, Daily  atorvastatin, 80 mg, g-tube, Daily  chlorthalidone, 25 mg, g-tube, Daily  enoxaparin, 40 mg, subcutaneous, q24h  esomeprazole, 20 mg, nasogastric tube, Daily before breakfast  fentaNYL PF, , ,   losartan, 100 mg, g-tube, Daily  magnesium sulfate, 2 g, intravenous, Once  midazolam, , ,   [Held by provider] polyethylene glycol, 17 g, oral, Daily  potassium chloride CR, 40 mEq, g-tube, Once  thiamine, 100 mg, intravenous, Daily      Continuous medications       PRN medications  PRN medications: fentaNYL PF, melatonin, meperidine, meperidine PF, midazolam, midazolam, oxyCODONE, oxyCODONE  Results for orders placed or performed during the hospital encounter of 11/22/23 (from the past 24 hour(s))   Renal function panel   Result Value Ref Range    Glucose 91 74 - 99 mg/dL    Sodium 143 136 - 145 mmol/L    Potassium 3.4 (L) 3.5 - 5.3 mmol/L    Chloride 102 98 - 107 mmol/L    Bicarbonate 31 21 - 32 mmol/L    Anion Gap 13 10 - 20 mmol/L    Urea Nitrogen 16 6 - 23 mg/dL    Creatinine 0.69 0.50 - 1.30 mg/dL    eGFR >90 >60 mL/min/1.73m*2    Calcium 9.4 8.6 - 10.6 mg/dL    Phosphorus 2.7 2.5 - 4.9 mg/dL    Albumin 2.7 (L) 3.4 - 5.0 g/dL   CBC and Auto Differential   Result Value Ref Range    WBC 18.6 (H) 4.4 - 11.3 x10*3/uL    nRBC 0.0 0.0 - 0.0 /100 WBCs    RBC 3.31 (L) 4.50 - 5.90 x10*6/uL    Hemoglobin 9.4 (L) 13.5 - 17.5 g/dL    Hematocrit 29.9 (L) 41.0 - 52.0 %    MCV 90 80 - 100 fL    MCH 28.4 26.0 - 34.0 pg    MCHC 31.4 (L) 32.0 - 36.0 g/dL    RDW 12.1 11.5 - 14.5 %    Platelets 251 150 - 450 x10*3/uL    Neutrophils % 82.1 40.0 - 80.0 %    Immature Granulocytes %, Automated 1.5 (H) 0.0 - 0.9 %    Lymphocytes % 6.2 13.0 -  44.0 %    Monocytes % 8.0 2.0 - 10.0 %    Eosinophils % 2.0 0.0 - 6.0 %    Basophils % 0.2 0.0 - 2.0 %    Neutrophils Absolute 15.30 (H) 1.20 - 7.70 x10*3/uL    Immature Granulocytes Absolute, Automated 0.28 0.00 - 0.70 x10*3/uL    Lymphocytes Absolute 1.15 (L) 1.20 - 4.80 x10*3/uL    Monocytes Absolute 1.50 (H) 0.10 - 1.00 x10*3/uL    Eosinophils Absolute 0.37 0.00 - 0.70 x10*3/uL    Basophils Absolute 0.04 0.00 - 0.10 x10*3/uL   Renal function panel   Result Value Ref Range    Glucose 118 (H) 74 - 99 mg/dL    Sodium 141 136 - 145 mmol/L    Potassium 3.4 (L) 3.5 - 5.3 mmol/L    Chloride 103 98 - 107 mmol/L    Bicarbonate 28 21 - 32 mmol/L    Anion Gap 13 10 - 20 mmol/L    Urea Nitrogen 16 6 - 23 mg/dL    Creatinine 0.67 0.50 - 1.30 mg/dL    eGFR >90 >60 mL/min/1.73m*2    Calcium 9.1 8.6 - 10.6 mg/dL    Phosphorus 2.5 2.5 - 4.9 mg/dL    Albumin 2.5 (L) 3.4 - 5.0 g/dL   Magnesium   Result Value Ref Range    Magnesium 1.72 1.60 - 2.40 mg/dL                Scheduled medications  acetaminophen, 650 mg, g-tube, q4h  amLODIPine, 10 mg, g-tube, Daily  aspirin, 81 mg, g-tube, Daily  atorvastatin, 80 mg, g-tube, Daily  chlorthalidone, 25 mg, g-tube, Daily  enoxaparin, 40 mg, subcutaneous, q24h  esomeprazole, 20 mg, nasogastric tube, Daily before breakfast  fentaNYL PF, , ,   losartan, 100 mg, g-tube, Daily  magnesium sulfate, 2 g, intravenous, Once  midazolam, , ,   [Held by provider] polyethylene glycol, 17 g, oral, Daily  potassium chloride CR, 40 mEq, g-tube, Once  thiamine, 100 mg, intravenous, Daily      Continuous medications     PRN medications  PRN medications: fentaNYL PF, melatonin, meperidine, meperidine PF, midazolam, midazolam, oxyCODONE, oxyCODONE                    Assessment/Plan   Principal Problem:    Difficulty swallowing  Active Problems:    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 Given new diagnosis, no treatment has been started yet and no known origin yet. Will discuss with Med Onc (Dr. Yin was to be his primary oncologist), Surg Onc (Dr Corona had already seen pt) and will also consult GI for EGD/biopsy. EGD on 11/24 showed large, nearly fully obstructing esophageal mass. Patient is not a surgical candidate given metastatic disease. Managing nutrition and need for chemo/radiation.      Updates 11/30:  -Continue tube feeds after radiation therapy.  - PEG tube in place, TF isosource 1.5, goal rate 55 ml/hr; hold 2 hrs before radiation  -Patient has a scheduled outpatient appointment with Dr. Slater on Monday, will call to reschedule.  - Meds per PEG tube  - RFP evening for s/s refeeding  - S/p 2/5 first radiation treatment of esophageal mass  - monitor RFPs, increase/decrease FWF as needed  - Patient is not a surgical candidate for esophageal stenting     #Metastatic SCC, poorly differentiated   #Dysphagia   - Pathology Microscopic examination of H&E sections demonstrates a poorly differentiated carcinoma , immunoreactive with CK7 and p40 , infiltrating soft tissue.  No residual lymph node parenchyma is seen.  The following immunostains are negative: NKX3.1, GATA3, CK20, TTF-1 and CDX2   -Diagnosed 11/2023; no treatment yet; unknown origin  -Surg Onc consulted; appreciate recs (saw Dr. Corona outpatient 11/10/23)  -SLP consulted for swallow evaluation and recommended NPO  -EGD/biopsy 11/24:  large, nearly fully obstructing esophageal mass. Patient is not a surgical candidate because of metastatic disease.   -Plan to inform Dr. Yin (future medical oncologist-1st appt scheduled 12/1)  -Consider consulting pulm for possible lung biopsy as potential  origin   -Tylenol PRN for pain; patient denies any pain currently  - Nutrition consulted: 1. Start 100mg IV thiamine x5 days;  Isosource 1.5 @ 25 ml/hr. Advance slowly by 10 ml/hr q8h, as tolerated, to goal rate of 55 ml/hr.   - CT SIM 11/27  -- Palliative Radiotherapy: 3/5 fractions today  - s/p PEG tube placement 11/27  - Tube Feeds: 1.5 @ 25 ml/hr. Advance slowly by 10 ml/hr q8h, as tolerated, to goal rate of 55 ml/hr. Currently, at 20 ml/hr  - 100mg IV thiamine x5 days   - FWF: 250 ml 4x/day  - Evening RFPs        #MARYELLEN (resolved)  -Baseline Cr ~1; on admission 1.48, was 1.50 on 11/16 -> now at baseline 1.04 on 11/23  -S/p 1L LR, IV LR infusion at 125cc/hr, given improvement in Cr likely pre-renal  -Restarted home losartan 100mg daily  -Continue to monitor      #HTN  -Continue home amlodipine 10mg daily  -Holding chlorithalidone 25mg daily given hypercalcemia  -Continue ARB as MARYELLEN has resolved     #CVA 9/2023  #Carotid stenosis  -2 acute or subacute infarcts seen on MRI 9/18/2023  -Continue ASA 81mg daily  -Completed 21 days of plavix (started 9/18/23)  -Continue atorvastatin 80mg daily  -Vascular Surgery follow up outpatient for the carotid stenosis     #Hypercalcemia  -Ca 11.5 on admission  -S/p 1L LR in the ED, on LR infusion at 125cc/hr  -Repeat was 11.3 on 11/23, so avoiding LR in favor of NS     #Elevated PSA  -Urology follow up outpatient     Disposition: tele  Follow-ups: PCP, Onc, Surg Onc,      F: prn  E: prn   N: CLD as tolerated +Boost  A: PIVs     DVT ppx: lovenox  GI ppx: home PPI    Code Status: FULL (confirmed on 11/22/23)  Surrogate Medical Decision-maker: Wife April [phone]           Sid Jose MD

## 2023-11-30 NOTE — CARE PLAN
The patient's goals for the shift include      The clinical goals for the shift include Pt will tolerate tube feeds through end of shift 11/30/2023 1900      Problem: Fall/Injury  Goal: Not fall by end of shift  Outcome: Progressing  Goal: Be free from injury by end of the shift  Outcome: Progressing  Goal: Verbalize understanding of personal risk factors for fall in the hospital  Outcome: Progressing  Goal: Verbalize understanding of risk factor reduction measures to prevent injury from fall in the home  Outcome: Progressing  Goal: Use assistive devices by end of the shift  Outcome: Progressing  Goal: Pace activities to prevent fatigue by end of the shift  Outcome: Progressing     Problem: Skin  Goal: Decreased wound size/increased tissue granulation at next dressing change  Outcome: Progressing  Flowsheets (Taken 11/30/2023 0958)  Decreased wound size/increased tissue granulation at next dressing change: Protective dressings over bony prominences  Goal: Participates in plan/prevention/treatment measures  Outcome: Progressing  Flowsheets (Taken 11/30/2023 0958)  Participates in plan/prevention/treatment measures: Increase activity/out of bed for meals  Goal: Prevent/manage excess moisture  Outcome: Progressing  Flowsheets (Taken 11/30/2023 0958)  Prevent/manage excess moisture: Moisturize dry skin  Goal: Prevent/minimize sheer/friction injuries  Outcome: Progressing  Flowsheets (Taken 11/30/2023 0958)  Prevent/minimize sheer/friction injuries: Increase activity/out of bed for meals  Goal: Promote/optimize nutrition  Outcome: Progressing  Flowsheets (Taken 11/30/2023 0958)  Promote/optimize nutrition:   Consume > 50% meals/supplements   Monitor/record intake including meals  Goal: Promote skin healing  Outcome: Progressing  Flowsheets (Taken 11/30/2023 0958)  Promote skin healing: Assess skin/pad under line(s)/device(s)

## 2023-12-01 ENCOUNTER — HOSPITAL ENCOUNTER (OUTPATIENT)
Dept: RADIATION ONCOLOGY | Facility: HOSPITAL | Age: 68
Setting detail: RADIATION/ONCOLOGY SERIES
Discharge: HOME | End: 2023-12-01
Payer: MEDICARE

## 2023-12-01 ENCOUNTER — HOSPITAL ENCOUNTER (OUTPATIENT)
Dept: RADIOLOGY | Facility: EXTERNAL LOCATION | Age: 68
Discharge: HOME | End: 2023-12-01

## 2023-12-01 DIAGNOSIS — C15.3 MALIGNANT NEOPLASM OF UPPER THIRD OF ESOPHAGUS (MULTI): ICD-10-CM

## 2023-12-01 DIAGNOSIS — Z51.0 ENCOUNTER FOR ANTINEOPLASTIC RADIATION THERAPY: ICD-10-CM

## 2023-12-01 LAB
ALBUMIN SERPL BCP-MCNC: 2.5 G/DL (ref 3.4–5)
ANION GAP SERPL CALC-SCNC: 13 MMOL/L (ref 10–20)
BASOPHILS # BLD AUTO: 0.05 X10*3/UL (ref 0–0.1)
BASOPHILS NFR BLD AUTO: 0.3 %
BUN SERPL-MCNC: 16 MG/DL (ref 6–23)
CALCIUM SERPL-MCNC: 9 MG/DL (ref 8.6–10.6)
CHLORIDE SERPL-SCNC: 101 MMOL/L (ref 98–107)
CO2 SERPL-SCNC: 31 MMOL/L (ref 21–32)
CREAT SERPL-MCNC: 0.66 MG/DL (ref 0.5–1.3)
ELECTRONICALLY SIGNED BY: NORMAL
EOSINOPHIL # BLD AUTO: 0.63 X10*3/UL (ref 0–0.7)
EOSINOPHIL NFR BLD AUTO: 3.3 %
ERYTHROCYTE [DISTWIDTH] IN BLOOD BY AUTOMATED COUNT: 12.2 % (ref 11.5–14.5)
FOCUSED SOLID TUMOR DNA/RNA RESULTS: NORMAL
GFR SERPL CREATININE-BSD FRML MDRD: >90 ML/MIN/1.73M*2
GLUCOSE BLD MANUAL STRIP-MCNC: 131 MG/DL (ref 74–99)
GLUCOSE BLD MANUAL STRIP-MCNC: 132 MG/DL (ref 74–99)
GLUCOSE BLD MANUAL STRIP-MCNC: 139 MG/DL (ref 74–99)
GLUCOSE SERPL-MCNC: 137 MG/DL (ref 74–99)
HCT VFR BLD AUTO: 29.7 % (ref 41–52)
HGB BLD-MCNC: 9.6 G/DL (ref 13.5–17.5)
IMM GRANULOCYTES # BLD AUTO: 0.36 X10*3/UL (ref 0–0.7)
IMM GRANULOCYTES NFR BLD AUTO: 1.9 % (ref 0–0.9)
LYMPHOCYTES # BLD AUTO: 1.28 X10*3/UL (ref 1.2–4.8)
LYMPHOCYTES NFR BLD AUTO: 6.7 %
MAGNESIUM SERPL-MCNC: 1.65 MG/DL (ref 1.6–2.4)
MCH RBC QN AUTO: 29.3 PG (ref 26–34)
MCHC RBC AUTO-ENTMCNC: 32.3 G/DL (ref 32–36)
MCV RBC AUTO: 91 FL (ref 80–100)
MONOCYTES # BLD AUTO: 1.55 X10*3/UL (ref 0.1–1)
MONOCYTES NFR BLD AUTO: 8.2 %
NEUTROPHILS # BLD AUTO: 15.11 X10*3/UL (ref 1.2–7.7)
NEUTROPHILS NFR BLD AUTO: 79.6 %
NRBC BLD-RTO: 0 /100 WBCS (ref 0–0)
PHOSPHATE SERPL-MCNC: 3.7 MG/DL (ref 2.5–4.9)
PLATELET # BLD AUTO: 245 X10*3/UL (ref 150–450)
POTASSIUM SERPL-SCNC: 3.6 MMOL/L (ref 3.5–5.3)
RAD ONC MSQ ACTUAL FRACTIONS DELIVERED: 4
RAD ONC MSQ ACTUAL SESSION DELIVERED DOSE: 400 CGRAY
RAD ONC MSQ ACTUAL TOTAL DOSE: 1600 CGRAY
RAD ONC MSQ ELAPSED DAYS: 3
RAD ONC MSQ LAST DATE: NORMAL
RAD ONC MSQ PRESCRIBED FRACTIONAL DOSE: 400 CGRAY
RAD ONC MSQ PRESCRIBED NUMBER OF FRACTIONS: 5
RAD ONC MSQ PRESCRIBED TECHNIQUE: NORMAL
RAD ONC MSQ PRESCRIBED TOTAL DOSE: 2000 CGRAY
RAD ONC MSQ PRESCRIPTION PATTERN COMMENT: NORMAL
RAD ONC MSQ START DATE: NORMAL
RAD ONC MSQ TREATMENT COURSE NUMBER: 1
RAD ONC MSQ TREATMENT SITE: NORMAL
RBC # BLD AUTO: 3.28 X10*6/UL (ref 4.5–5.9)
SODIUM SERPL-SCNC: 141 MMOL/L (ref 136–145)
WBC # BLD AUTO: 19 X10*3/UL (ref 4.4–11.3)

## 2023-12-01 PROCEDURE — 1170000001 HC PRIVATE ONCOLOGY ROOM DAILY

## 2023-12-01 PROCEDURE — 2500000004 HC RX 250 GENERAL PHARMACY W/ HCPCS (ALT 636 FOR OP/ED)

## 2023-12-01 PROCEDURE — 85025 COMPLETE CBC W/AUTO DIFF WBC: CPT

## 2023-12-01 PROCEDURE — 99233 SBSQ HOSP IP/OBS HIGH 50: CPT | Performed by: NURSE PRACTITIONER

## 2023-12-01 PROCEDURE — 2500000001 HC RX 250 WO HCPCS SELF ADMINISTERED DRUGS (ALT 637 FOR MEDICARE OP)

## 2023-12-01 PROCEDURE — 82947 ASSAY GLUCOSE BLOOD QUANT: CPT

## 2023-12-01 PROCEDURE — 77014 CHG CT GUIDANCE RADIATION THERAPY FLDS PLACEMENT: CPT | Performed by: STUDENT IN AN ORGANIZED HEALTH CARE EDUCATION/TRAINING PROGRAM

## 2023-12-01 PROCEDURE — 77263 THER RADIOLOGY TX PLNG CPLX: CPT | Performed by: STUDENT IN AN ORGANIZED HEALTH CARE EDUCATION/TRAINING PROGRAM

## 2023-12-01 PROCEDURE — 83735 ASSAY OF MAGNESIUM: CPT

## 2023-12-01 PROCEDURE — 80069 RENAL FUNCTION PANEL: CPT

## 2023-12-01 PROCEDURE — 77387 GUIDANCE FOR RADJ TX DLVR: CPT | Performed by: STUDENT IN AN ORGANIZED HEALTH CARE EDUCATION/TRAINING PROGRAM

## 2023-12-01 PROCEDURE — 96372 THER/PROPH/DIAG INJ SC/IM: CPT | Performed by: STUDENT IN AN ORGANIZED HEALTH CARE EDUCATION/TRAINING PROGRAM

## 2023-12-01 PROCEDURE — 2500000004 HC RX 250 GENERAL PHARMACY W/ HCPCS (ALT 636 FOR OP/ED): Performed by: STUDENT IN AN ORGANIZED HEALTH CARE EDUCATION/TRAINING PROGRAM

## 2023-12-01 PROCEDURE — 77412 RADIATION TX DELIVERY LVL 3: CPT | Performed by: STUDENT IN AN ORGANIZED HEALTH CARE EDUCATION/TRAINING PROGRAM

## 2023-12-01 RX ORDER — POTASSIUM CHLORIDE 14.9 MG/ML
20 INJECTION INTRAVENOUS ONCE
Status: COMPLETED | OUTPATIENT
Start: 2023-12-01 | End: 2023-12-01

## 2023-12-01 RX ORDER — SENNOSIDES 8.8 MG/5ML
5 LIQUID ORAL EVERY 12 HOURS PRN
Status: DISCONTINUED | OUTPATIENT
Start: 2023-12-01 | End: 2023-12-01

## 2023-12-01 RX ORDER — SENNOSIDES 8.8 MG/5ML
5 LIQUID ORAL DAILY
Status: DISCONTINUED | OUTPATIENT
Start: 2023-12-01 | End: 2023-12-12

## 2023-12-01 RX ORDER — MAGNESIUM SULFATE HEPTAHYDRATE 40 MG/ML
2 INJECTION, SOLUTION INTRAVENOUS ONCE
Status: COMPLETED | OUTPATIENT
Start: 2023-12-01 | End: 2023-12-01

## 2023-12-01 RX ORDER — BISACODYL 10 MG/1
10 SUPPOSITORY RECTAL ONCE AS NEEDED
Status: DISCONTINUED | OUTPATIENT
Start: 2023-12-02 | End: 2023-12-15 | Stop reason: HOSPADM

## 2023-12-01 RX ORDER — POLYETHYLENE GLYCOL 3350 17 G/17G
17 POWDER, FOR SOLUTION ORAL 2 TIMES DAILY
Status: DISCONTINUED | OUTPATIENT
Start: 2023-12-01 | End: 2023-12-14

## 2023-12-01 RX ADMIN — ACETAMINOPHEN 650 MG: 650 SOLUTION ORAL at 16:31

## 2023-12-01 RX ADMIN — AMLODIPINE BESYLATE 10 MG: 10 TABLET ORAL at 08:22

## 2023-12-01 RX ADMIN — CHLORTHALIDONE 25 MG: 25 TABLET ORAL at 08:22

## 2023-12-01 RX ADMIN — THIAMINE HYDROCHLORIDE 100 MG: 100 INJECTION, SOLUTION INTRAMUSCULAR; INTRAVENOUS at 08:22

## 2023-12-01 RX ADMIN — LOSARTAN POTASSIUM 100 MG: 50 TABLET, FILM COATED ORAL at 08:22

## 2023-12-01 RX ADMIN — OXYCODONE HYDROCHLORIDE 10 MG: 5 TABLET ORAL at 12:33

## 2023-12-01 RX ADMIN — ATORVASTATIN CALCIUM 80 MG: 80 TABLET, FILM COATED ORAL at 08:22

## 2023-12-01 RX ADMIN — ENOXAPARIN SODIUM 40 MG: 100 INJECTION SUBCUTANEOUS at 20:21

## 2023-12-01 RX ADMIN — POTASSIUM CHLORIDE 20 MEQ: 14.9 INJECTION, SOLUTION INTRAVENOUS at 08:22

## 2023-12-01 RX ADMIN — ACETAMINOPHEN 650 MG: 650 SOLUTION ORAL at 08:21

## 2023-12-01 RX ADMIN — OXYCODONE HYDROCHLORIDE 10 MG: 5 TABLET ORAL at 05:19

## 2023-12-01 RX ADMIN — ACETAMINOPHEN 650 MG: 650 SOLUTION ORAL at 12:32

## 2023-12-01 RX ADMIN — ACETAMINOPHEN 650 MG: 650 SOLUTION ORAL at 20:20

## 2023-12-01 RX ADMIN — POLYETHYLENE GLYCOL 3350 17 G: 17 POWDER, FOR SOLUTION ORAL at 20:21

## 2023-12-01 RX ADMIN — ASPIRIN 81 MG CHEWABLE TABLET 81 MG: 81 TABLET CHEWABLE at 08:22

## 2023-12-01 RX ADMIN — METRONIDAZOLE 500 MG: 500 TABLET ORAL at 14:34

## 2023-12-01 RX ADMIN — GABAPENTIN 300 MG: 300 CAPSULE ORAL at 16:31

## 2023-12-01 RX ADMIN — METRONIDAZOLE 500 MG: 500 TABLET ORAL at 21:48

## 2023-12-01 RX ADMIN — ACETAMINOPHEN 650 MG: 650 SOLUTION ORAL at 05:18

## 2023-12-01 RX ADMIN — ESOMEPRAZOLE MAGNESIUM 20 MG: 20 FOR SUSPENSION ORAL at 05:18

## 2023-12-01 RX ADMIN — METRONIDAZOLE 500 MG: 500 TABLET ORAL at 05:18

## 2023-12-01 RX ADMIN — MAGNESIUM SULFATE HEPTAHYDRATE 2 G: 40 INJECTION, SOLUTION INTRAVENOUS at 18:32

## 2023-12-01 RX ADMIN — OXYCODONE HYDROCHLORIDE 10 MG: 5 TABLET ORAL at 20:20

## 2023-12-01 ASSESSMENT — COGNITIVE AND FUNCTIONAL STATUS - GENERAL
DRESSING REGULAR LOWER BODY CLOTHING: A LITTLE
DAILY ACTIVITIY SCORE: 23
CLIMB 3 TO 5 STEPS WITH RAILING: A LITTLE
MOBILITY SCORE: 23

## 2023-12-01 ASSESSMENT — PAIN SCALES - GENERAL
PAINLEVEL_OUTOF10: 7
PAINLEVEL_OUTOF10: 8
PAINLEVEL_OUTOF10: 8

## 2023-12-01 ASSESSMENT — PAIN - FUNCTIONAL ASSESSMENT: PAIN_FUNCTIONAL_ASSESSMENT: 0-10

## 2023-12-01 NOTE — CARE PLAN
The patient's goals for the shift include      The clinical goals for the shift include Pt will sytate pain <5 this shift      Problem: Fall/Injury  Goal: Not fall by end of shift  11/30/2023 2135 by Pratibha Romero RN  Outcome: Progressing  11/30/2023 2134 by Pratibha Romero RN  Outcome: Progressing  11/30/2023 2134 by Pratibha Romero RN  Outcome: Progressing  Goal: Be free from injury by end of the shift  11/30/2023 2135 by Pratibha Romero RN  Outcome: Progressing  11/30/2023 2134 by Pratibha Romero RN  Outcome: Progressing  11/30/2023 2134 by Pratibha Romero RN  Outcome: Progressing  Goal: Verbalize understanding of personal risk factors for fall in the hospital  11/30/2023 2135 by Pratibha Romero RN  Outcome: Progressing  11/30/2023 2134 by Pratibha Romero RN  Outcome: Progressing  11/30/2023 2134 by Pratibha Romero RN  Outcome: Progressing  Goal: Verbalize understanding of risk factor reduction measures to prevent injury from fall in the home  11/30/2023 2135 by Pratibha Romero RN  Outcome: Progressing  11/30/2023 2134 by Pratibha Romero RN  Outcome: Progressing  11/30/2023 2134 by Pratibha Romero RN  Outcome: Progressing  Goal: Use assistive devices by end of the shift  11/30/2023 2135 by Pratibha Romero RN  Outcome: Progressing  11/30/2023 2134 by Pratibha Romero RN  Outcome: Progressing  11/30/2023 2134 by Pratibha Romero RN  Outcome: Progressing  Goal: Pace activities to prevent fatigue by end of the shift  11/30/2023 2135 by Pratibha Romero RN  Outcome: Progressing  11/30/2023 2134 by Pratibha Romero RN  Outcome: Progressing  11/30/2023 2134 by Pratibha Romero RN  Outcome: Progressing     Problem: Skin  Goal: Decreased wound size/increased tissue granulation at next dressing change  11/30/2023 2135 by Pratibha Romero RN  Outcome: Progressing  Flowsheets (Taken 11/30/2023 2135)  Decreased wound size/increased tissue granulation at next dressing  change: Promote sleep for wound healing  11/30/2023 2134 by Pratibha Romero RN  Outcome: Progressing  11/30/2023 2134 by Pratibha Romero RN  Outcome: Progressing  Goal: Participates in plan/prevention/treatment measures  11/30/2023 2135 by Pratibha Romero RN  Outcome: Progressing  Flowsheets (Taken 11/30/2023 2135)  Participates in plan/prevention/treatment measures: Elevate heels  11/30/2023 2134 by Pratibha Romero RN  Outcome: Progressing  11/30/2023 2134 by Pratibha Romero RN  Outcome: Progressing  Goal: Prevent/manage excess moisture  11/30/2023 2135 by Pratibha Romero RN  Outcome: Progressing  Flowsheets (Taken 11/30/2023 2135)  Prevent/manage excess moisture:   Cleanse incontinence/protect with barrier cream   Moisturize dry skin   Follow provider orders for dressing changes   Monitor for/manage infection if present  11/30/2023 2134 by Pratibha Romero RN  Outcome: Progressing  11/30/2023 2134 by Pratibha Romero RN  Outcome: Progressing  Goal: Prevent/minimize sheer/friction injuries  11/30/2023 2135 by Pratibha Romero RN  Outcome: Progressing  Flowsheets (Taken 11/30/2023 2135)  Prevent/minimize sheer/friction injuries:   Complete micro-shifts as needed if patient unable. Adjust patient position to relieve pressure points, not a full turn   Increase activity/out of bed for meals   Use pull sheet   HOB 30 degrees or less   Turn/reposition every 2 hours/use positioning/transfer devices  11/30/2023 2134 by Pratibha Romero RN  Outcome: Progressing  11/30/2023 2134 by Pratibha Romero RN  Outcome: Progressing  Goal: Promote/optimize nutrition  11/30/2023 2135 by Pratibha Romero RN  Outcome: Progressing  Flowsheets (Taken 11/30/2023 2135)  Promote/optimize nutrition:   Assist with feeding   Monitor/record intake including meals   Offer water/supplements/favorite foods  11/30/2023 2134 by Pratibha Romero RN  Outcome: Progressing  11/30/2023 2134 by Pratibha Romero RN  Outcome:  Progressing  Goal: Promote skin healing  11/30/2023 2135 by Pratibha Romero RN  Outcome: Progressing  Flowsheets (Taken 11/30/2023 2135)  Promote skin healing:   Assess skin/pad under line(s)/device(s)   Protective dressings over bony prominences   Turn/reposition every 2 hours/use positioning/transfer devices  11/30/2023 2134 by Pratibha Romero RN  Outcome: Progressing  11/30/2023 2134 by Pratibha Romero RN  Outcome: Progressing     Problem: Pain  Goal: My pain/discomfort is manageable  Outcome: Progressing     Problem: Safety  Goal: Patient will be injury free during hospitalization  Outcome: Progressing  Goal: I will remain free of falls  Outcome: Progressing     Problem: Daily Care  Goal: Daily care needs are met  Outcome: Progressing     Problem: Psychosocial Needs  Goal: Demonstrates ability to cope with hospitalization/illness  Outcome: Progressing  Goal: Collaborate with me, my family, and caregiver to identify my specific goals  Outcome: Progressing     Problem: Discharge Barriers  Goal: My discharge needs are met  Outcome: Progressing

## 2023-12-01 NOTE — PROGRESS NOTES
Spiritual Care Visit      attempted to visit, however the patient was sleeping and the  let them continue to rest.  will attempt to follow-up next week.    Rev. Bakari Edge, Supportive Oncology

## 2023-12-01 NOTE — SIGNIFICANT EVENT
Patient is NPO and all tube feeds and meds through the PEG tube. All else by mouth would be for pleasure.

## 2023-12-01 NOTE — PROGRESS NOTES
SUPPORTIVE AND PALLIATIVE ONCOLOGY INPATIENT FOLLOW-UP      SERVICE DATE: 23     SUBJECTIVE:  Interval Events:    Patients daughter Mary and son in law Pete where at bedside providing support. Patient reports that he is in pain rated at 7/10. Report that his pain is well controlled with current regimen, he just took his PRN pain medication 10 minutes prior to my encounter and it was not effective yet. Patient then states that he had a good night sleep. He did not have any side effects with gabapentin and report that he was able to sleep through the night as much as he could.     Pain Assessment:  Location: Right arm  Duration: Constant  Characteristics:   Ratin   Descriptors: aching   Aggravating: movement    Relieving: Analgesics oxycodone    Interference with Function: None    Opioid Use  Past 24 h prn opioid use:  Oxycodone 10mg used 3 doses = 45mg OME   Total 24h OME use:  45mg OME    Symptom Assessment:       Information obtained from: chart review, interview of patient, interview of family, discussion with RN, and discussion with primary team  ______________________________________________________________________        OBJECTIVE:    Lab Results   Component Value Date    WBC 19.0 (H) 2023    HGB 9.6 (L) 2023    HCT 29.7 (L) 2023    MCV 91 2023     2023      Lab Results   Component Value Date    GLUCOSE 137 (H) 2023    CALCIUM 9.0 2023     2023    K 3.6 2023    CO2 31 2023     2023    BUN 16 2023    CREATININE 0.66 2023     Lab Results   Component Value Date    ALT 18 2023    AST 21 2023    ALKPHOS 87 2023    BILITOT 0.6 2023     Estimated Creatinine Clearance: 98.5 mL/min (by C-G formula based on SCr of 0.66 mg/dL).     Scheduled medications  acetaminophen, 650 mg, g-tube, q4h  amLODIPine, 10 mg, g-tube, Daily  aspirin, 81 mg, g-tube, Daily  atorvastatin, 80 mg, g-tube,  Daily  chlorthalidone, 25 mg, g-tube, Daily  enoxaparin, 40 mg, subcutaneous, q24h  esomeprazole, 20 mg, nasogastric tube, Daily before breakfast  fentaNYL PF, , ,   gabapentin, 300 mg, oral, Daily with evening meal  losartan, 100 mg, g-tube, Daily  metroNIDAZOLE, 500 mg, oral, q8h LEORA  midazolam, , ,   [Held by provider] polyethylene glycol, 17 g, oral, Daily  potassium chloride, 20 mEq, intravenous, Once  potassium chloride CR, 40 mEq, g-tube, Once  thiamine, 100 mg, intravenous, Daily      Continuous medications     PRN medications  PRN medications: fentaNYL PF, melatonin, meperidine, meperidine PF, midazolam, midazolam, oxyCODONE, oxyCODONE, senna   }     PHYSICAL EXAMINATION:    Vital Signs:   Vital signs reviewed  Visit Vitals  /71   Pulse 85   Temp 36 °C (96.8 °F)   Resp 16        Pain Score: 8       Physical Exam  Constitutional:       Appearance: Normal appearance.   Eyes:      Pupils: Pupils are equal, round, and reactive to light.   Cardiovascular:      Heart sounds: Normal heart sounds.   Pulmonary:      Breath sounds: Normal breath sounds.   Abdominal:      General: Bowel sounds are normal.      Palpations: Abdomen is soft.   Skin:     General: Skin is warm.   Neurological:      Mental Status: He is alert and oriented to person, place, and time.   Psychiatric:         Mood and Affect: Mood normal.         Behavior: Behavior normal.        ASSESSMENT/PLAN:  Mike Olson is a 68 y.o. 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 PMH significant for HTN, CVA (9/2023). Admitted 11/22/2023 for further evaluation and management of worsening dysphagia.Supportive and Palliative Oncology is consulted for pain management.      Pain:  cancer pain related to cancer  Pain is: cancer related pain  Type: somatic  Pain control: well-controlled  Home regimen:  Acetaminophen 650mg q 8hrs   Intolerances/previously tried: tramadol 50mg tablet was used in the  "past   Personalized pain goal: 0  Pain rated 4/10, described as \"sharp pain\".  Total OME usage for the past 24 hours:  30mg OME  Continue Oxycodone 10mg q 4hours [it is effective, we just need to provide it more frequently so patient can attain control]  Continue acetaminophen 650mg q4hrs PRN   Continue gabapentin 300mg at night [hold for sedation]        Constipation  At risk for constipation related to opioids, currently constipated  Usual bowel pattern: every day  Home regimen: none  LBM 11/25/23  Change miralax to 17 grams BID  Add senna 5ml liquid q day     Add bisacodyl suppository 10mg daily/PRN   Informed team that they should maximize      Sleeping Difficulty:  Impaired sleep related to pain  Home regimen:  none  Gabapentin as above.  Patient reports that he cannot get comfortable, especially sleeping on right arm.      Decreased appetite:  Appetite loss related to chemotherapy, taste changes, and disease process  Nutrition not consulted   Home regimen:  none  Patient has a pegtube and would continue to do well.      Goals of Care/ Minimum acceptable Quality of Life Measures   Patient's current clinical condition, including diagnosis, prognosis, and management plan, and goals of care were discussed.      Patients endorsed Goals: \"to get the hell out here\".   Jazzy: \"sitting around doing nothing with [his] wife and dog\"  Minimum acceptable outcome/QOL:  patient needs to discuss with wife and would be ready to provide details in later encounter.   Code status discussion/patients Code status:        #Palliative Medicine encounter: Palliative care was introduced as a service for patients with serious illness to improve quality of life, including helping with pain and other bothersome symptoms, clarifying patient's values and priorities to help align the patient's care with their goals, and providing support to patients and families.   Declined  for spiritual Support   Declined Music therapy for coping  "   Declined Art Therapy for legacy building   Welcomed Pet Therapy for Emotional Support   Coordination of Care   Supportive Listening        Advance Directives/Advance Care Planning  Existence of Advance Directives:   Decision maker/ NOK: Surrogate decision maker is chey Black @[phone]  Code Status: Full code     [place] Surrogate Decision Maker Hierarchy     1. Court-appointed Guardian  2. Healthcare Power of   3. Legal Spouse  4. Majority of Adult Children (consensus if possible)  5. Parents  6. Majority of Adult Siblings (consensus if possible)  7. Nearest Blood Relative     Supportive Interventions: Interventions: Music Therapy: offered declined, Art Therapy: offered declined     Disposition:  Please  start the process of having prior authorization with meds to beds deliver medications to patient prior to discharge via [facility]. Prescriptions will need to be sent 48-72 hours prior to discharge so that a prior authorization can be completed.      Discharge date: unknown pending acute issues  Will assess if patient needs an appointment with Outpatient Supportive Oncology as appropriate.    Signature and billing:  Thank you for allowing us to participate in the care of this patient. Recommendations will be communicated back to the consulting service by way of shared electronic medical record or face-to-face.    Medical complexity was high level due to due to complexity of problems, extensive data review, and high risk of management/treatment.    I spent 50 minutes in the care of this patient which included chart review, interviewing patient/family, discussion with primary team, coordination of care, and documentation.    Data:   Diagnostic tests and information reviewed for today's visit:  Conversation with primary team       Some elements copied from supportive oncology note on 11/30/23, the elements have been updated and all reflect current decision making from today, 12/01/23       Plan  of Care discussed with: Provider, RN, Patient, Provider, and Pharmacist    Thank you for asking Supportive and Palliative Oncology to assist with care of this patient.  We will continue to follow  Please contact us for additional questions or concerns.      SIGNATURE: ANKUSH Saldivar   PAGER/CONTACT:  Contact information:  Supportive and Palliative Oncology  Monday-Friday 8 AM-5 PM  Epic Secure chat or pager [phone].  After hours and weekends:  pager [phone]

## 2023-12-02 ENCOUNTER — APPOINTMENT (OUTPATIENT)
Dept: RADIOLOGY | Facility: HOSPITAL | Age: 68
DRG: 356 | End: 2023-12-02
Payer: MEDICARE

## 2023-12-02 LAB
ALBUMIN SERPL BCP-MCNC: 2.5 G/DL (ref 3.4–5)
ALBUMIN SERPL BCP-MCNC: 2.6 G/DL (ref 3.4–5)
ANION GAP SERPL CALC-SCNC: 12 MMOL/L (ref 10–20)
ANION GAP SERPL CALC-SCNC: 15 MMOL/L (ref 10–20)
BASOPHILS # BLD AUTO: 0.07 X10*3/UL (ref 0–0.1)
BASOPHILS NFR BLD AUTO: 0.3 %
BUN SERPL-MCNC: 17 MG/DL (ref 6–23)
BUN SERPL-MCNC: 20 MG/DL (ref 6–23)
CALCIUM SERPL-MCNC: 9.1 MG/DL (ref 8.6–10.6)
CALCIUM SERPL-MCNC: 9.1 MG/DL (ref 8.6–10.6)
CHLORIDE SERPL-SCNC: 100 MMOL/L (ref 98–107)
CHLORIDE SERPL-SCNC: 100 MMOL/L (ref 98–107)
CO2 SERPL-SCNC: 29 MMOL/L (ref 21–32)
CO2 SERPL-SCNC: 31 MMOL/L (ref 21–32)
CREAT SERPL-MCNC: 0.65 MG/DL (ref 0.5–1.3)
CREAT SERPL-MCNC: 0.72 MG/DL (ref 0.5–1.3)
EOSINOPHIL # BLD AUTO: 0.55 X10*3/UL (ref 0–0.7)
EOSINOPHIL NFR BLD AUTO: 2.7 %
ERYTHROCYTE [DISTWIDTH] IN BLOOD BY AUTOMATED COUNT: 12.3 % (ref 11.5–14.5)
GFR SERPL CREATININE-BSD FRML MDRD: >90 ML/MIN/1.73M*2
GFR SERPL CREATININE-BSD FRML MDRD: >90 ML/MIN/1.73M*2
GLUCOSE SERPL-MCNC: 122 MG/DL (ref 74–99)
GLUCOSE SERPL-MCNC: 90 MG/DL (ref 74–99)
HCT VFR BLD AUTO: 30.9 % (ref 41–52)
HGB BLD-MCNC: 9.5 G/DL (ref 13.5–17.5)
IMM GRANULOCYTES # BLD AUTO: 0.53 X10*3/UL (ref 0–0.7)
IMM GRANULOCYTES NFR BLD AUTO: 2.6 % (ref 0–0.9)
LYMPHOCYTES # BLD AUTO: 1.29 X10*3/UL (ref 1.2–4.8)
LYMPHOCYTES NFR BLD AUTO: 6.4 %
MAGNESIUM SERPL-MCNC: 1.89 MG/DL (ref 1.6–2.4)
MCH RBC QN AUTO: 28.1 PG (ref 26–34)
MCHC RBC AUTO-ENTMCNC: 30.7 G/DL (ref 32–36)
MCV RBC AUTO: 91 FL (ref 80–100)
MONOCYTES # BLD AUTO: 1.66 X10*3/UL (ref 0.1–1)
MONOCYTES NFR BLD AUTO: 8.3 %
NEUTROPHILS # BLD AUTO: 16 X10*3/UL (ref 1.2–7.7)
NEUTROPHILS NFR BLD AUTO: 79.7 %
NRBC BLD-RTO: 0 /100 WBCS (ref 0–0)
PHOSPHATE SERPL-MCNC: 3.1 MG/DL (ref 2.5–4.9)
PHOSPHATE SERPL-MCNC: 3.4 MG/DL (ref 2.5–4.9)
PLATELET # BLD AUTO: 263 X10*3/UL (ref 150–450)
POTASSIUM SERPL-SCNC: 3.7 MMOL/L (ref 3.5–5.3)
POTASSIUM SERPL-SCNC: 3.7 MMOL/L (ref 3.5–5.3)
RBC # BLD AUTO: 3.38 X10*6/UL (ref 4.5–5.9)
SODIUM SERPL-SCNC: 139 MMOL/L (ref 136–145)
SODIUM SERPL-SCNC: 140 MMOL/L (ref 136–145)
WBC # BLD AUTO: 20.1 X10*3/UL (ref 4.4–11.3)

## 2023-12-02 PROCEDURE — 1170000001 HC PRIVATE ONCOLOGY ROOM DAILY

## 2023-12-02 PROCEDURE — 71045 X-RAY EXAM CHEST 1 VIEW: CPT | Mod: FY

## 2023-12-02 PROCEDURE — 2500000004 HC RX 250 GENERAL PHARMACY W/ HCPCS (ALT 636 FOR OP/ED)

## 2023-12-02 PROCEDURE — 2500000001 HC RX 250 WO HCPCS SELF ADMINISTERED DRUGS (ALT 637 FOR MEDICARE OP)

## 2023-12-02 PROCEDURE — 80069 RENAL FUNCTION PANEL: CPT

## 2023-12-02 PROCEDURE — 83735 ASSAY OF MAGNESIUM: CPT

## 2023-12-02 PROCEDURE — 99233 SBSQ HOSP IP/OBS HIGH 50: CPT

## 2023-12-02 PROCEDURE — 85025 COMPLETE CBC W/AUTO DIFF WBC: CPT

## 2023-12-02 PROCEDURE — 96372 THER/PROPH/DIAG INJ SC/IM: CPT | Performed by: STUDENT IN AN ORGANIZED HEALTH CARE EDUCATION/TRAINING PROGRAM

## 2023-12-02 PROCEDURE — 87205 SMEAR GRAM STAIN: CPT

## 2023-12-02 PROCEDURE — 2500000004 HC RX 250 GENERAL PHARMACY W/ HCPCS (ALT 636 FOR OP/ED): Performed by: STUDENT IN AN ORGANIZED HEALTH CARE EDUCATION/TRAINING PROGRAM

## 2023-12-02 PROCEDURE — 71045 X-RAY EXAM CHEST 1 VIEW: CPT | Performed by: RADIOLOGY

## 2023-12-02 RX ORDER — SULFAMETHOXAZOLE AND TRIMETHOPRIM 800; 160 MG/1; MG/1
160 TABLET ORAL EVERY 12 HOURS SCHEDULED
Status: DISCONTINUED | OUTPATIENT
Start: 2023-12-02 | End: 2023-12-02

## 2023-12-02 RX ORDER — SULFAMETHOXAZOLE AND TRIMETHOPRIM 800; 160 MG/1; MG/1
160 TABLET ORAL EVERY 12 HOURS SCHEDULED
Status: DISCONTINUED | OUTPATIENT
Start: 2023-12-02 | End: 2023-12-07

## 2023-12-02 RX ADMIN — SULFAMETHOXAZOLE AND TRIMETHOPRIM 160 MG: 800; 160 TABLET ORAL at 21:35

## 2023-12-02 RX ADMIN — CHLORTHALIDONE 25 MG: 25 TABLET ORAL at 08:56

## 2023-12-02 RX ADMIN — ENOXAPARIN SODIUM 40 MG: 100 INJECTION SUBCUTANEOUS at 21:35

## 2023-12-02 RX ADMIN — AMLODIPINE BESYLATE 10 MG: 10 TABLET ORAL at 08:56

## 2023-12-02 RX ADMIN — METRONIDAZOLE 500 MG: 500 TABLET ORAL at 13:41

## 2023-12-02 RX ADMIN — ATORVASTATIN CALCIUM 80 MG: 80 TABLET, FILM COATED ORAL at 08:56

## 2023-12-02 RX ADMIN — ACETAMINOPHEN 650 MG: 650 SOLUTION ORAL at 18:05

## 2023-12-02 RX ADMIN — POLYETHYLENE GLYCOL 3350 17 G: 17 POWDER, FOR SOLUTION ORAL at 08:55

## 2023-12-02 RX ADMIN — GABAPENTIN 300 MG: 300 CAPSULE ORAL at 18:05

## 2023-12-02 RX ADMIN — ACETAMINOPHEN 650 MG: 650 SOLUTION ORAL at 05:06

## 2023-12-02 RX ADMIN — OXYCODONE HYDROCHLORIDE 10 MG: 5 TABLET ORAL at 22:07

## 2023-12-02 RX ADMIN — ASPIRIN 81 MG CHEWABLE TABLET 81 MG: 81 TABLET CHEWABLE at 08:56

## 2023-12-02 RX ADMIN — THIAMINE HYDROCHLORIDE 100 MG: 100 INJECTION, SOLUTION INTRAMUSCULAR; INTRAVENOUS at 08:56

## 2023-12-02 RX ADMIN — ACETAMINOPHEN 650 MG: 650 SOLUTION ORAL at 13:41

## 2023-12-02 RX ADMIN — LOSARTAN POTASSIUM 100 MG: 50 TABLET, FILM COATED ORAL at 08:56

## 2023-12-02 RX ADMIN — SENNOSIDES 5 ML: 8.8 LIQUID ORAL at 08:55

## 2023-12-02 RX ADMIN — ACETAMINOPHEN 650 MG: 650 SOLUTION ORAL at 01:51

## 2023-12-02 RX ADMIN — ACETAMINOPHEN 650 MG: 650 SOLUTION ORAL at 21:35

## 2023-12-02 RX ADMIN — OXYCODONE HYDROCHLORIDE 10 MG: 5 TABLET ORAL at 08:55

## 2023-12-02 RX ADMIN — OXYCODONE HYDROCHLORIDE 10 MG: 5 TABLET ORAL at 18:05

## 2023-12-02 RX ADMIN — METRONIDAZOLE 500 MG: 500 TABLET ORAL at 21:35

## 2023-12-02 RX ADMIN — ESOMEPRAZOLE MAGNESIUM 20 MG: 20 FOR SUSPENSION ORAL at 05:06

## 2023-12-02 RX ADMIN — METRONIDAZOLE 500 MG: 500 TABLET ORAL at 05:06

## 2023-12-02 RX ADMIN — ACETAMINOPHEN 650 MG: 650 SOLUTION ORAL at 08:56

## 2023-12-02 RX ADMIN — SULFAMETHOXAZOLE AND TRIMETHOPRIM 160 MG: 800; 160 TABLET ORAL at 13:41

## 2023-12-02 ASSESSMENT — COGNITIVE AND FUNCTIONAL STATUS - GENERAL
MOVING FROM LYING ON BACK TO SITTING ON SIDE OF FLAT BED WITH BEDRAILS: A LITTLE
STANDING UP FROM CHAIR USING ARMS: A LITTLE
MOVING TO AND FROM BED TO CHAIR: A LITTLE
CLIMB 3 TO 5 STEPS WITH RAILING: A LITTLE
DAILY ACTIVITIY SCORE: 23
TURNING FROM BACK TO SIDE WHILE IN FLAT BAD: A LITTLE
DRESSING REGULAR LOWER BODY CLOTHING: A LITTLE
WALKING IN HOSPITAL ROOM: A LITTLE
MOBILITY SCORE: 18

## 2023-12-02 ASSESSMENT — PAIN - FUNCTIONAL ASSESSMENT
PAIN_FUNCTIONAL_ASSESSMENT: 0-10
PAIN_FUNCTIONAL_ASSESSMENT: 0-10

## 2023-12-02 ASSESSMENT — PAIN SCALES - GENERAL
PAINLEVEL_OUTOF10: 7
PAINLEVEL_OUTOF10: 8
PAINLEVEL_OUTOF10: 7

## 2023-12-02 NOTE — CARE PLAN
The patient's goals for the shift include      The clinical goals for the shift include Pt will rest well this shift      Problem: Fall/Injury  Goal: Not fall by end of shift  Outcome: Progressing  Goal: Be free from injury by end of the shift  Outcome: Progressing  Goal: Verbalize understanding of personal risk factors for fall in the hospital  Outcome: Progressing  Goal: Verbalize understanding of risk factor reduction measures to prevent injury from fall in the home  Outcome: Progressing  Goal: Use assistive devices by end of the shift  Outcome: Progressing  Goal: Pace activities to prevent fatigue by end of the shift  Outcome: Progressing     Problem: Skin  Goal: Decreased wound size/increased tissue granulation at next dressing change  Outcome: Progressing  Goal: Participates in plan/prevention/treatment measures  Outcome: Progressing  Goal: Prevent/manage excess moisture  Outcome: Progressing  Goal: Prevent/minimize sheer/friction injuries  Outcome: Progressing  Goal: Promote/optimize nutrition  Outcome: Progressing  Goal: Promote skin healing  Outcome: Progressing     Problem: Pain  Goal: My pain/discomfort is manageable  Outcome: Progressing     Problem: Safety  Goal: Patient will be injury free during hospitalization  Outcome: Progressing  Goal: I will remain free of falls  Outcome: Progressing     Problem: Daily Care  Goal: Daily care needs are met  Outcome: Progressing     Problem: Psychosocial Needs  Goal: Demonstrates ability to cope with hospitalization/illness  Outcome: Progressing  Goal: Collaborate with me, my family, and caregiver to identify my specific goals  Outcome: Progressing     Problem: Discharge Barriers  Goal: My discharge needs are met  Outcome: Progressing

## 2023-12-02 NOTE — PROGRESS NOTES
Mike Olson is a 68 y.o. male on day 10 of admission presenting with Difficulty swallowing.    Subjective   No acute events overnight.  PEG tube in place.  All meds through PEG tube.     Patient did well and tolerated his 4/5 fraction of pRT yesterday. No BM yet. No abdominal pain reported. Tube feeds at goal 55 ml/hr. Patient is tolerating tube feeds.  We will switch him to bolus feeds tomorrow.  Patient denies any chest pain, shortness of breath, nausea, vomiting, vision changes, headache, fevers or chills.  Patient resting comfortably this morning.  Currently waiting to undergo fifth fraction of PRT on Monday.  Patient is still having ongoing discussions with wife regarding inpatient versus outpatient targeting of the right axillary fungating metastasis with radiation.    Patient does have elevated white count which could be possibly explained by recent treatments with radiation.  Patient also does have a superficial fungating axillary mass that is currently being treated with topical metronidazole.  We will add coverage for SSTI today.  And order a respiratory sputum culture as patient is coughing up mixed white/yellow sputum.       Objective     Physical Exam  Constitutional:       General: He is not in acute distress.     Appearance: Normal appearance.   HENT:      Head: Normocephalic and atraumatic.      Mouth/Throat:      Mouth: Mucous membranes are moist.   Eyes:      Extraocular Movements: Extraocular movements intact.      Conjunctiva/sclera: Conjunctivae normal.      Pupils: Pupils are equal, round, and reactive to light.   Cardiovascular:      Rate and Rhythm: Normal rate and regular rhythm.      Heart sounds: No murmur heard.  Pulmonary:      Effort: Pulmonary effort is normal.   Abdominal:      General: Bowel sounds are normal. There is no distension. PEG tube in place. TF at goal. No leakage from the PEG tube. Clean and dry.      Palpations: Abdomen is soft.      Tenderness: There is no abdominal  "tenderness.   Skin:     General: Skin is warm and dry.      Comments: R axilla fungating mass covered with clean bandage, no signs of erythema, non tender to palpation in surrounding area.   Neurological:      General: No focal deficit present.      Mental Status: He is alert and oriented to person, place, and time.      Gait: Gait normal.   Psychiatric:      Comments: Appropriate mood and affect    Last Recorded Vitals  Blood pressure 130/70, pulse 93, temperature 35.7 °C (96.3 °F), resp. rate 16, height 1.69 m (5' 6.54\"), weight 76.5 kg (168 lb 11.2 oz), SpO2 93 %.  Intake/Output last 3 Shifts:  I/O last 3 completed shifts:  In: 462 (6 mL/kg) [NG/GT:200; IV Piggyback:262]  Out: 750 (9.8 mL/kg) [Urine:750 (0.3 mL/kg/hr)]  Weight: 76.5 kg     Relevant Results  Scheduled medications  acetaminophen, 650 mg, g-tube, q4h  amLODIPine, 10 mg, g-tube, Daily  aspirin, 81 mg, g-tube, Daily  atorvastatin, 80 mg, g-tube, Daily  chlorthalidone, 25 mg, g-tube, Daily  enoxaparin, 40 mg, subcutaneous, q24h  esomeprazole, 20 mg, nasogastric tube, Daily before breakfast  fentaNYL PF, , ,   gabapentin, 300 mg, oral, Daily with evening meal  losartan, 100 mg, g-tube, Daily  metroNIDAZOLE, 500 mg, oral, q8h LEORA  midazolam, , ,   polyethylene glycol, 17 g, g-tube, BID  senna, 5 mL, g-tube, Daily  sulfamethoxazole-trimethoprim, 160 mg, g-tube, q12h LEORA  thiamine, 100 mg, intravenous, Daily      Continuous medications       PRN medications  PRN medications: bisacodyl, fentaNYL PF, melatonin, meperidine, meperidine PF, midazolam, midazolam, oxyCODONE, oxyCODONE  Results for orders placed or performed during the hospital encounter of 11/22/23 (from the past 24 hour(s))   POCT GLUCOSE   Result Value Ref Range    POCT Glucose 139 (H) 74 - 99 mg/dL   Renal function panel   Result Value Ref Range    Glucose 90 74 - 99 mg/dL    Sodium 139 136 - 145 mmol/L    Potassium 3.7 3.5 - 5.3 mmol/L    Chloride 100 98 - 107 mmol/L    Bicarbonate 31 21 - 32 " mmol/L    Anion Gap 12 10 - 20 mmol/L    Urea Nitrogen 17 6 - 23 mg/dL    Creatinine 0.65 0.50 - 1.30 mg/dL    eGFR >90 >60 mL/min/1.73m*2    Calcium 9.1 8.6 - 10.6 mg/dL    Phosphorus 3.4 2.5 - 4.9 mg/dL    Albumin 2.6 (L) 3.4 - 5.0 g/dL   Magnesium   Result Value Ref Range    Magnesium 1.89 1.60 - 2.40 mg/dL   CBC and Auto Differential   Result Value Ref Range    WBC 20.1 (H) 4.4 - 11.3 x10*3/uL    nRBC 0.0 0.0 - 0.0 /100 WBCs    RBC 3.38 (L) 4.50 - 5.90 x10*6/uL    Hemoglobin 9.5 (L) 13.5 - 17.5 g/dL    Hematocrit 30.9 (L) 41.0 - 52.0 %    MCV 91 80 - 100 fL    MCH 28.1 26.0 - 34.0 pg    MCHC 30.7 (L) 32.0 - 36.0 g/dL    RDW 12.3 11.5 - 14.5 %    Platelets 263 150 - 450 x10*3/uL    Neutrophils % 79.7 40.0 - 80.0 %    Immature Granulocytes %, Automated 2.6 (H) 0.0 - 0.9 %    Lymphocytes % 6.4 13.0 - 44.0 %    Monocytes % 8.3 2.0 - 10.0 %    Eosinophils % 2.7 0.0 - 6.0 %    Basophils % 0.3 0.0 - 2.0 %    Neutrophils Absolute 16.00 (H) 1.20 - 7.70 x10*3/uL    Immature Granulocytes Absolute, Automated 0.53 0.00 - 0.70 x10*3/uL    Lymphocytes Absolute 1.29 1.20 - 4.80 x10*3/uL    Monocytes Absolute 1.66 (H) 0.10 - 1.00 x10*3/uL    Eosinophils Absolute 0.55 0.00 - 0.70 x10*3/uL    Basophils Absolute 0.07 0.00 - 0.10 x10*3/uL                Scheduled medications  acetaminophen, 650 mg, g-tube, q4h  amLODIPine, 10 mg, g-tube, Daily  aspirin, 81 mg, g-tube, Daily  atorvastatin, 80 mg, g-tube, Daily  chlorthalidone, 25 mg, g-tube, Daily  enoxaparin, 40 mg, subcutaneous, q24h  esomeprazole, 20 mg, nasogastric tube, Daily before breakfast  fentaNYL PF, , ,   gabapentin, 300 mg, oral, Daily with evening meal  losartan, 100 mg, g-tube, Daily  metroNIDAZOLE, 500 mg, oral, q8h LEORA  midazolam, , ,   polyethylene glycol, 17 g, g-tube, BID  senna, 5 mL, g-tube, Daily  sulfamethoxazole-trimethoprim, 160 mg, g-tube, q12h LEORA  thiamine, 100 mg, intravenous, Daily      Continuous medications     PRN medications  PRN medications:  bisacodyl, fentaNYL PF, melatonin, meperidine, meperidine PF, midazolam, midazolam, oxyCODONE, oxyCODONE                    Assessment/Plan   Principal Problem:    Difficulty swallowing  Active Problems:    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 Given new diagnosis, no treatment has been started yet and no known origin yet. Will discuss with Med Onc (Dr. Yin was to be his primary oncologist), Surg Onc (Dr Corona had already seen pt) and will also consult GI for EGD/biopsy. EGD on 11/24 showed large, nearly fully obstructing esophageal mass. Patient is not a surgical candidate given metastatic disease. Managing nutrition and need for chemo/radiation. PEG in place.      Updates 12/2:  -Fifth and final fraction of pRT for esophageal mass on monday  -Will switch to bolus feeds tomorrow as patient is tolerating  -added bactrim for SSTi  -aggressive bowel regimen  -Patient has a scheduled outpatient appointment with Dr. Slater on Monday, will call to reschedule.  - RFP evening for s/s refeeding  - S/p 4/5 first radiation treatment of esophageal mass    #Metastatic SCC, poorly differentiated   #Dysphagia   #Leukocytosis  - Pathology Microscopic examination of H&E sections demonstrates a poorly differentiated carcinoma , immunoreactive with CK7 and p40 , infiltrating soft tissue.  No residual lymph node parenchyma is seen.  The following immunostains are negative: NKX3.1, GATA3, CK20, TTF-1 and CDX2   -Diagnosed 11/2023; no treatment yet; unknown origin  -Surg Onc consulted; appreciate recs (saw Dr. Corona outpatient 11/10/23)  -SLP consulted for swallow evaluation and  recommended NPO  -EGD/biopsy 11/24:  large, nearly fully obstructing esophageal mass. Patient is not a surgical candidate because of metastatic disease.   -Plan to inform Dr. Yin (future medical oncologist-1st appt scheduled 12/1)  -Consider consulting pulm for possible lung biopsy as potential origin   -Tylenol PRN for pain; patient denies any pain currently  - Nutrition consulted: 1. Start 100mg IV thiamine x5 days;  Isosource 1.5 @ 25 ml/hr. Advance slowly by 10 ml/hr q8h, as tolerated, to goal rate of 55 ml/hr.   - CT SIM 11/27  -- Palliative Radiotherapy: 4/5 fractions ; fifth fraction on Monday  - s/p PEG tube placement 11/27  - Tube Feeds: 1.5 @ 25 ml/hr. Advance slowly by 10 ml/hr q8h, as tolerated, to goal rate of 55 ml/hr. Currently, at goal.   - Switch to Bolus feeds tomorrow  - 100mg IV thiamine x5 days   - FWF: 250 ml 4x/day  - Evening RFPs; okay to discontinue tomorrow  -Adding Bactrim for SSTI for 7 days  -Respiratory sputum culture pending     #MARYELLEN (resolved)  -Baseline Cr ~1; on admission 1.48, was 1.50 on 11/16 -> now at baseline 1.04 on 11/23  -S/p 1L LR, IV LR infusion at 125cc/hr, given improvement in Cr likely pre-renal  -Restarted home losartan 100mg daily  -Continue to monitor      #HTN  -Continue home amlodipine 10mg daily  -Holding chlorithalidone 25mg daily given hypercalcemia  -Continue ARB as MARYELLEN has resolved     #CVA 9/2023  #Carotid stenosis  -2 acute or subacute infarcts seen on MRI 9/18/2023  -Continue ASA 81mg daily  -Completed 21 days of plavix (started 9/18/23)  -Continue atorvastatin 80mg daily  -Vascular Surgery follow up outpatient for the carotid stenosis     #Hypercalcemia, resolved  -Ca 11.5 on admission  -S/p 1L LR in the ED, on LR infusion at 125cc/hr  -Repeat was 11.3 on 11/23, so avoiding LR in favor of NS     #Elevated PSA  -Urology follow up outpatient     Disposition: tele  Follow-ups: PCP, Onc, Surg Onc,      F: prn  E: prn   N: CLD as tolerated +Boost  A: PIVs      DVT ppx: lovenox  GI ppx: home PPI    Code Status: FULL (confirmed on 11/22/23)  Surrogate Medical Decision-maker: Wife April [phone]           Sid Jose MD

## 2023-12-03 LAB
ALBUMIN SERPL BCP-MCNC: 2.6 G/DL (ref 3.4–5)
ANION GAP SERPL CALC-SCNC: 15 MMOL/L (ref 10–20)
BACTERIA SPEC RESP CULT: ABNORMAL
BASOPHILS # BLD AUTO: 0.07 X10*3/UL (ref 0–0.1)
BASOPHILS NFR BLD AUTO: 0.4 %
BUN SERPL-MCNC: 22 MG/DL (ref 6–23)
CALCIUM SERPL-MCNC: 9.3 MG/DL (ref 8.6–10.6)
CHLORIDE SERPL-SCNC: 101 MMOL/L (ref 98–107)
CO2 SERPL-SCNC: 29 MMOL/L (ref 21–32)
CREAT SERPL-MCNC: 0.83 MG/DL (ref 0.5–1.3)
EOSINOPHIL # BLD AUTO: 0.44 X10*3/UL (ref 0–0.7)
EOSINOPHIL NFR BLD AUTO: 2.4 %
ERYTHROCYTE [DISTWIDTH] IN BLOOD BY AUTOMATED COUNT: 12.4 % (ref 11.5–14.5)
GFR SERPL CREATININE-BSD FRML MDRD: >90 ML/MIN/1.73M*2
GLUCOSE SERPL-MCNC: 150 MG/DL (ref 74–99)
GRAM STN SPEC: ABNORMAL
HCT VFR BLD AUTO: 29.4 % (ref 41–52)
HGB BLD-MCNC: 9.4 G/DL (ref 13.5–17.5)
IMM GRANULOCYTES # BLD AUTO: 0.51 X10*3/UL (ref 0–0.7)
IMM GRANULOCYTES NFR BLD AUTO: 2.8 % (ref 0–0.9)
LYMPHOCYTES # BLD AUTO: 1 X10*3/UL (ref 1.2–4.8)
LYMPHOCYTES NFR BLD AUTO: 5.4 %
MAGNESIUM SERPL-MCNC: 1.8 MG/DL (ref 1.6–2.4)
MCH RBC QN AUTO: 28.9 PG (ref 26–34)
MCHC RBC AUTO-ENTMCNC: 32 G/DL (ref 32–36)
MCV RBC AUTO: 91 FL (ref 80–100)
MONOCYTES # BLD AUTO: 1.65 X10*3/UL (ref 0.1–1)
MONOCYTES NFR BLD AUTO: 8.9 %
NEUTROPHILS # BLD AUTO: 14.87 X10*3/UL (ref 1.2–7.7)
NEUTROPHILS NFR BLD AUTO: 80.1 %
NRBC BLD-RTO: 0 /100 WBCS (ref 0–0)
PHOSPHATE SERPL-MCNC: 3.6 MG/DL (ref 2.5–4.9)
PLATELET # BLD AUTO: 280 X10*3/UL (ref 150–450)
POTASSIUM SERPL-SCNC: 3.7 MMOL/L (ref 3.5–5.3)
RBC # BLD AUTO: 3.25 X10*6/UL (ref 4.5–5.9)
SODIUM SERPL-SCNC: 141 MMOL/L (ref 136–145)
WBC # BLD AUTO: 18.5 X10*3/UL (ref 4.4–11.3)

## 2023-12-03 PROCEDURE — 2500000004 HC RX 250 GENERAL PHARMACY W/ HCPCS (ALT 636 FOR OP/ED): Performed by: STUDENT IN AN ORGANIZED HEALTH CARE EDUCATION/TRAINING PROGRAM

## 2023-12-03 PROCEDURE — 85025 COMPLETE CBC W/AUTO DIFF WBC: CPT

## 2023-12-03 PROCEDURE — 96372 THER/PROPH/DIAG INJ SC/IM: CPT | Performed by: STUDENT IN AN ORGANIZED HEALTH CARE EDUCATION/TRAINING PROGRAM

## 2023-12-03 PROCEDURE — 1170000001 HC PRIVATE ONCOLOGY ROOM DAILY

## 2023-12-03 PROCEDURE — 80069 RENAL FUNCTION PANEL: CPT

## 2023-12-03 PROCEDURE — 2500000001 HC RX 250 WO HCPCS SELF ADMINISTERED DRUGS (ALT 637 FOR MEDICARE OP)

## 2023-12-03 PROCEDURE — 99233 SBSQ HOSP IP/OBS HIGH 50: CPT | Performed by: STUDENT IN AN ORGANIZED HEALTH CARE EDUCATION/TRAINING PROGRAM

## 2023-12-03 PROCEDURE — 2500000004 HC RX 250 GENERAL PHARMACY W/ HCPCS (ALT 636 FOR OP/ED)

## 2023-12-03 PROCEDURE — 83735 ASSAY OF MAGNESIUM: CPT

## 2023-12-03 RX ADMIN — CHLORTHALIDONE 25 MG: 25 TABLET ORAL at 09:43

## 2023-12-03 RX ADMIN — ASPIRIN 81 MG CHEWABLE TABLET 81 MG: 81 TABLET CHEWABLE at 09:43

## 2023-12-03 RX ADMIN — GABAPENTIN 300 MG: 300 CAPSULE ORAL at 17:25

## 2023-12-03 RX ADMIN — THIAMINE HYDROCHLORIDE 100 MG: 100 INJECTION, SOLUTION INTRAMUSCULAR; INTRAVENOUS at 09:44

## 2023-12-03 RX ADMIN — AMLODIPINE BESYLATE 10 MG: 10 TABLET ORAL at 09:43

## 2023-12-03 RX ADMIN — METRONIDAZOLE 500 MG: 500 TABLET ORAL at 05:47

## 2023-12-03 RX ADMIN — OXYCODONE HYDROCHLORIDE 10 MG: 5 TABLET ORAL at 14:25

## 2023-12-03 RX ADMIN — OXYCODONE HYDROCHLORIDE 10 MG: 5 TABLET ORAL at 09:43

## 2023-12-03 RX ADMIN — ENOXAPARIN SODIUM 40 MG: 100 INJECTION SUBCUTANEOUS at 20:40

## 2023-12-03 RX ADMIN — METRONIDAZOLE 500 MG: 500 TABLET ORAL at 16:12

## 2023-12-03 RX ADMIN — ATORVASTATIN CALCIUM 80 MG: 80 TABLET, FILM COATED ORAL at 09:43

## 2023-12-03 RX ADMIN — ACETAMINOPHEN 650 MG: 650 SOLUTION ORAL at 10:08

## 2023-12-03 RX ADMIN — SULFAMETHOXAZOLE AND TRIMETHOPRIM 160 MG: 800; 160 TABLET ORAL at 20:40

## 2023-12-03 RX ADMIN — SENNOSIDES 5 ML: 8.8 LIQUID ORAL at 09:43

## 2023-12-03 RX ADMIN — SULFAMETHOXAZOLE AND TRIMETHOPRIM 160 MG: 800; 160 TABLET ORAL at 09:43

## 2023-12-03 RX ADMIN — ACETAMINOPHEN 650 MG: 650 SOLUTION ORAL at 17:25

## 2023-12-03 RX ADMIN — ACETAMINOPHEN 650 MG: 650 SOLUTION ORAL at 03:21

## 2023-12-03 RX ADMIN — OXYCODONE HYDROCHLORIDE 10 MG: 5 TABLET ORAL at 03:21

## 2023-12-03 RX ADMIN — OXYCODONE HYDROCHLORIDE 10 MG: 5 TABLET ORAL at 20:46

## 2023-12-03 RX ADMIN — ESOMEPRAZOLE MAGNESIUM 20 MG: 20 FOR SUSPENSION ORAL at 05:47

## 2023-12-03 RX ADMIN — LOSARTAN POTASSIUM 100 MG: 50 TABLET, FILM COATED ORAL at 09:43

## 2023-12-03 ASSESSMENT — COGNITIVE AND FUNCTIONAL STATUS - GENERAL
CLIMB 3 TO 5 STEPS WITH RAILING: A LOT
TURNING FROM BACK TO SIDE WHILE IN FLAT BAD: A LITTLE
WALKING IN HOSPITAL ROOM: A LOT
DAILY ACTIVITIY SCORE: 19
DAILY ACTIVITIY SCORE: 18
MOVING FROM LYING ON BACK TO SITTING ON SIDE OF FLAT BED WITH BEDRAILS: A LITTLE
DRESSING REGULAR UPPER BODY CLOTHING: A LITTLE
STANDING UP FROM CHAIR USING ARMS: A LITTLE
TURNING FROM BACK TO SIDE WHILE IN FLAT BAD: A LITTLE
CLIMB 3 TO 5 STEPS WITH RAILING: A LOT
TURNING FROM BACK TO SIDE WHILE IN FLAT BAD: A LITTLE
TOILETING: A LITTLE
MOVING TO AND FROM BED TO CHAIR: A LITTLE
WALKING IN HOSPITAL ROOM: A LOT
STANDING UP FROM CHAIR USING ARMS: A LOT
MOVING FROM LYING ON BACK TO SITTING ON SIDE OF FLAT BED WITH BEDRAILS: A LITTLE
PERSONAL GROOMING: A LITTLE
CLIMB 3 TO 5 STEPS WITH RAILING: A LOT
HELP NEEDED FOR BATHING: A LITTLE
DRESSING REGULAR UPPER BODY CLOTHING: A LITTLE
MOVING FROM LYING ON BACK TO SITTING ON SIDE OF FLAT BED WITH BEDRAILS: A LITTLE
TOILETING: A LITTLE
DRESSING REGULAR LOWER BODY CLOTHING: A LITTLE
MOBILITY SCORE: 16
EATING MEALS: A LITTLE
DAILY ACTIVITIY SCORE: 19
MOVING TO AND FROM BED TO CHAIR: A LITTLE
HELP NEEDED FOR BATHING: A LITTLE
MOBILITY SCORE: 15
TOILETING: A LITTLE
MOBILITY SCORE: 15
PERSONAL GROOMING: A LITTLE
WALKING IN HOSPITAL ROOM: A LOT
STANDING UP FROM CHAIR USING ARMS: A LOT
MOVING TO AND FROM BED TO CHAIR: A LITTLE
DRESSING REGULAR UPPER BODY CLOTHING: A LITTLE
DRESSING REGULAR LOWER BODY CLOTHING: A LITTLE
PERSONAL GROOMING: A LITTLE
HELP NEEDED FOR BATHING: A LITTLE
DRESSING REGULAR LOWER BODY CLOTHING: A LITTLE

## 2023-12-03 ASSESSMENT — PAIN SCALES - GENERAL
PAINLEVEL_OUTOF10: 7
PAINLEVEL_OUTOF10: 6
PAINLEVEL_OUTOF10: 7
PAINLEVEL_OUTOF10: 8
PAINLEVEL_OUTOF10: 7

## 2023-12-03 ASSESSMENT — PAIN - FUNCTIONAL ASSESSMENT
PAIN_FUNCTIONAL_ASSESSMENT: 0-10
PAIN_FUNCTIONAL_ASSESSMENT: 0-10

## 2023-12-03 ASSESSMENT — PAIN DESCRIPTION - ORIENTATION: ORIENTATION: LEFT

## 2023-12-03 ASSESSMENT — PAIN DESCRIPTION - LOCATION: LOCATION: SHOULDER

## 2023-12-03 NOTE — PROGRESS NOTES
Mike Olson is a 68 y.o. male on day 11 of admission presenting with Difficulty swallowing.    Subjective   States that pain this morning is 7.5/10. Notes that the axillary mass has less odor than yesterday but otherwise is similar in appearance. Continues to tolerate feeding tube well.        Objective     Physical Exam  Constitutional:       Appearance: He is not ill-appearing or toxic-appearing.      Comments: Mildly uncomfortable   HENT:      Nose: No congestion or rhinorrhea.      Mouth/Throat:      Mouth: Mucous membranes are moist.   Eyes:      General: No scleral icterus.     Extraocular Movements: Extraocular movements intact.      Pupils: Pupils are equal, round, and reactive to light.   Cardiovascular:      Rate and Rhythm: Normal rate and regular rhythm.      Heart sounds: No murmur heard.     No friction rub. No gallop.   Pulmonary:      Effort: Pulmonary effort is normal. No respiratory distress.      Breath sounds: Normal breath sounds. No stridor. No wheezing, rhonchi or rales.   Abdominal:      General: Bowel sounds are normal. There is no distension.      Palpations: Abdomen is soft. There is no mass.      Tenderness: There is no abdominal tenderness. There is no guarding or rebound.      Comments: PEG tube in place with small amount of crusted drainage surrounding the outside    Musculoskeletal:         General: No swelling or tenderness. Normal range of motion.      Cervical back: Normal range of motion.      Right lower leg: No edema.      Left lower leg: No edema.   Skin:     General: Skin is warm.      Capillary Refill: Capillary refill takes less than 2 seconds.      Coloration: Skin is not jaundiced.      Findings: No bruising or rash.      Comments: Axillary mass covered with gauze with no strikethrough appreciated    Neurological:      General: No focal deficit present.      Mental Status: He is oriented to person, place, and time. Mental status is at baseline.      Cranial Nerves: No  "cranial nerve deficit.         Last Recorded Vitals  Blood pressure 129/67, pulse 101, temperature 36.7 °C (98.1 °F), temperature source Temporal, resp. rate 16, height 1.69 m (5' 6.54\"), weight 76.5 kg (168 lb 11.2 oz), SpO2 92 %.  Intake/Output last 3 Shifts:  I/O last 3 completed shifts:  In: 680 (8.9 mL/kg) [NG/GT:680]  Out: 425 (5.6 mL/kg) [Urine:425 (0.2 mL/kg/hr)]  Weight: 76.5 kg     Relevant Results  Scheduled medications  acetaminophen, 650 mg, g-tube, q4h  amLODIPine, 10 mg, g-tube, Daily  aspirin, 81 mg, g-tube, Daily  atorvastatin, 80 mg, g-tube, Daily  chlorthalidone, 25 mg, g-tube, Daily  enoxaparin, 40 mg, subcutaneous, q24h  esomeprazole, 20 mg, nasogastric tube, Daily before breakfast  fentaNYL PF, , ,   gabapentin, 300 mg, oral, Daily with evening meal  losartan, 100 mg, g-tube, Daily  metroNIDAZOLE, 500 mg, oral, q8h LEORA  midazolam, , ,   polyethylene glycol, 17 g, g-tube, BID  senna, 5 mL, g-tube, Daily  sulfamethoxazole-trimethoprim, 160 mg, g-tube, q12h LEORA  thiamine, 100 mg, intravenous, Daily      Continuous medications     PRN medications  PRN medications: bisacodyl, fentaNYL PF, melatonin, meperidine, meperidine PF, midazolam, midazolam, oxyCODONE, oxyCODONE      Results for orders placed or performed during the hospital encounter of 11/22/23 (from the past 24 hour(s))   CBC and Auto Differential   Result Value Ref Range    WBC 20.1 (H) 4.4 - 11.3 x10*3/uL    nRBC 0.0 0.0 - 0.0 /100 WBCs    RBC 3.38 (L) 4.50 - 5.90 x10*6/uL    Hemoglobin 9.5 (L) 13.5 - 17.5 g/dL    Hematocrit 30.9 (L) 41.0 - 52.0 %    MCV 91 80 - 100 fL    MCH 28.1 26.0 - 34.0 pg    MCHC 30.7 (L) 32.0 - 36.0 g/dL    RDW 12.3 11.5 - 14.5 %    Platelets 263 150 - 450 x10*3/uL    Neutrophils % 79.7 40.0 - 80.0 %    Immature Granulocytes %, Automated 2.6 (H) 0.0 - 0.9 %    Lymphocytes % 6.4 13.0 - 44.0 %    Monocytes % 8.3 2.0 - 10.0 %    Eosinophils % 2.7 0.0 - 6.0 %    Basophils % 0.3 0.0 - 2.0 %    Neutrophils Absolute " 16.00 (H) 1.20 - 7.70 x10*3/uL    Immature Granulocytes Absolute, Automated 0.53 0.00 - 0.70 x10*3/uL    Lymphocytes Absolute 1.29 1.20 - 4.80 x10*3/uL    Monocytes Absolute 1.66 (H) 0.10 - 1.00 x10*3/uL    Eosinophils Absolute 0.55 0.00 - 0.70 x10*3/uL    Basophils Absolute 0.07 0.00 - 0.10 x10*3/uL   Renal function panel   Result Value Ref Range    Glucose 122 (H) 74 - 99 mg/dL    Sodium 140 136 - 145 mmol/L    Potassium 3.7 3.5 - 5.3 mmol/L    Chloride 100 98 - 107 mmol/L    Bicarbonate 29 21 - 32 mmol/L    Anion Gap 15 10 - 20 mmol/L    Urea Nitrogen 20 6 - 23 mg/dL    Creatinine 0.72 0.50 - 1.30 mg/dL    eGFR >90 >60 mL/min/1.73m*2    Calcium 9.1 8.6 - 10.6 mg/dL    Phosphorus 3.1 2.5 - 4.9 mg/dL    Albumin 2.5 (L) 3.4 - 5.0 g/dL   Renal Function Panel   Result Value Ref Range    Glucose 150 (H) 74 - 99 mg/dL    Sodium 141 136 - 145 mmol/L    Potassium 3.7 3.5 - 5.3 mmol/L    Chloride 101 98 - 107 mmol/L    Bicarbonate 29 21 - 32 mmol/L    Anion Gap 15 10 - 20 mmol/L    Urea Nitrogen 22 6 - 23 mg/dL    Creatinine 0.83 0.50 - 1.30 mg/dL    eGFR >90 >60 mL/min/1.73m*2    Calcium 9.3 8.6 - 10.6 mg/dL    Phosphorus 3.6 2.5 - 4.9 mg/dL    Albumin 2.6 (L) 3.4 - 5.0 g/dL   Magnesium   Result Value Ref Range    Magnesium 1.80 1.60 - 2.40 mg/dL                Malnutrition Diagnosis Status: New  Malnutrition Diagnosis: Severe malnutrition related to acute disease or injury  As Evidenced by: >5% wt loss in 1 month; reported >7.5% wt loss in 3 months; PO intake likely meeting <75% of nutr needs for >7 days  I agree with the dietitian's malnutrition diagnosis.      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 Given new diagnosis, no treatment has been started yet and no known origin yet. Will discuss with Med Onc (Dr. Yin was to be his primary oncologist), Surg Onc (Dr Corona had already seen pt) and will also consult GI for EGD/biopsy. EGD on 11/24 showed large, nearly fully obstructing esophageal mass. Patient is not a surgical candidate given metastatic disease. Managing nutrition and need for chemo/radiation. PEG in place.      Updates 12/3:  - Had BM  yesterday after week of no stool   - Condensed feeds to bolus 345mL 4 times per day, will and pre/post FWF with 60mL and then additional 260mg BID        #Metastatic SCC, poorly differentiated   #Dysphagia   #Leukocytosis  - Pathology Microscopic examination of H&E sections demonstrates a poorly differentiated carcinoma , immunoreactive with CK7 and p40 , infiltrating soft tissue.  No residual lymph node parenchyma is seen.  The following immunostains are negative: NKX3.1, GATA3, CK20, TTF-1 and CDX2   -Diagnosed 11/2023; no treatment yet; unknown origin  -Surg Onc consulted; appreciate recs (saw Dr. Corona outpatient 11/10/23)  -SLP consulted for swallow evaluation and recommended NPO  -EGD/biopsy 11/24:  large, nearly fully obstructing esophageal mass. Patient is not a surgical candidate because of metastatic disease.   -Plan to inform Dr. Yin (future medical oncologist-1st appt scheduled 12/1)  -Consider consulting pulm for possible lung biopsy as potential origin   -Tylenol PRN for pain; patient denies any pain currently  - Nutrition consulted: 1. Start 100mg IV thiamine x5 days;  Isosource 1.5 @ 25 ml/hr. Advance slowly by 10 ml/hr q8h, as tolerated, to goal rate of 55 ml/hr.   - CT SIM 11/27  -- Palliative Radiotherapy: 4/5 fractions ; fifth fraction on Monday  - s/p PEG tube placement 11/27  - Tube Feeds: 1.5 @ 25 ml/hr. Advance slowly by 10  ml/hr q8h, as tolerated, to goal rate of 55 ml/hr. Currently, at goal.   - 345ml QID bolus feeds, FWF 60ml before and after feed as well as 260ml BID   - 100mg IV thiamine x5 days   - FWF: 250 ml 4x/day  - Daily RFPs   -Bactrim for SSTI for 7 days (12/2- 12/8)  -Respiratory sputum culture with salivary contimination      #MARYELLEN (resolved)  -Baseline Cr ~1; on admission 1.48, was 1.50 on 11/16 -> now at baseline 1.04 on 11/23  -S/p 1L LR, IV LR infusion at 125cc/hr, given improvement in Cr likely pre-renal  -Restarted home losartan 100mg daily  -Continue to monitor      #HTN  -Continue home amlodipine 10mg daily  -Holding chlorithalidone 25mg daily given hypercalcemia  -Continue ARB as MARYELLEN has resolved     #CVA 9/2023  #Carotid stenosis  -2 acute or subacute infarcts seen on MRI 9/18/2023  -Continue ASA 81mg daily  -Completed 21 days of plavix (started 9/18/23)  -Continue atorvastatin 80mg daily  -Vascular Surgery follow up outpatient for the carotid stenosis     #Hypercalcemia, resolved  -Ca 11.5 on admission  -S/p 1L LR in the ED, on LR infusion at 125cc/hr  -Repeat was 11.3 on 11/23, so avoiding LR in favor of NS     #Elevated PSA  -Urology follow up outpatient     Disposition: tele  Follow-ups: PCP, Onc, Surg Onc,      F: prn  E: prn   N: CLD as tolerated +Boost  A: PIVs     DVT ppx: lovenox  GI ppx: home PPI    Code Status: FULL (confirmed on 11/22/23)  Surrogate Medical Decision-maker: Wife April [phone]                 Maggy Arellano MD

## 2023-12-03 NOTE — CARE PLAN
Problem: Fall/Injury  Goal: Not fall by end of shift  Outcome: Progressing  Goal: Be free from injury by end of the shift  Outcome: Progressing  Goal: Verbalize understanding of personal risk factors for fall in the hospital  Outcome: Progressing  Goal: Verbalize understanding of risk factor reduction measures to prevent injury from fall in the home  Outcome: Progressing  Goal: Use assistive devices by end of the shift  Outcome: Progressing  Goal: Pace activities to prevent fatigue by end of the shift  Outcome: Progressing     Problem: Skin  Goal: Decreased wound size/increased tissue granulation at next dressing change  Outcome: Progressing  Goal: Participates in plan/prevention/treatment measures  Outcome: Progressing  Goal: Prevent/manage excess moisture  Outcome: Progressing  Goal: Prevent/minimize sheer/friction injuries  Outcome: Progressing  Goal: Promote/optimize nutrition  Outcome: Progressing  Goal: Promote skin healing  Outcome: Progressing     Problem: Safety  Goal: Patient will be injury free during hospitalization  Outcome: Progressing  Goal: I will remain free of falls  Outcome: Progressing

## 2023-12-03 NOTE — CARE PLAN
The clinical goals for the shift include patient will remain HDS throughout shift 12/3 at 0700      Problem: Fall/Injury  Goal: Not fall by end of shift  Outcome: Progressing     Problem: Fall/Injury  Goal: Be free from injury by end of the shift  Outcome: Progressing     Problem: Skin  Goal: Promote/optimize nutrition  Outcome: Progressing     Problem: Skin  Goal: Promote skin healing  Outcome: Progressing     Problem: Pain  Goal: My pain/discomfort is manageable  Outcome: Progressing     Problem: Daily Care  Goal: Daily care needs are met  Outcome: Progressing

## 2023-12-04 ENCOUNTER — HOSPITAL ENCOUNTER (OUTPATIENT)
Dept: RADIATION ONCOLOGY | Facility: HOSPITAL | Age: 68
Setting detail: RADIATION/ONCOLOGY SERIES
Discharge: HOME | End: 2023-12-04
Payer: OTHER MISCELLANEOUS

## 2023-12-04 ENCOUNTER — DOCUMENTATION (OUTPATIENT)
Dept: RADIATION ONCOLOGY | Facility: HOSPITAL | Age: 68
End: 2023-12-04

## 2023-12-04 DIAGNOSIS — C15.3 MALIGNANT NEOPLASM OF UPPER THIRD OF ESOPHAGUS (MULTI): Primary | ICD-10-CM

## 2023-12-04 DIAGNOSIS — C15.3 MALIGNANT NEOPLASM OF UPPER THIRD OF ESOPHAGUS (MULTI): ICD-10-CM

## 2023-12-04 DIAGNOSIS — Z51.0 ENCOUNTER FOR ANTINEOPLASTIC RADIATION THERAPY: ICD-10-CM

## 2023-12-04 LAB
ALBUMIN SERPL BCP-MCNC: 2.5 G/DL (ref 3.4–5)
ANION GAP SERPL CALC-SCNC: 17 MMOL/L (ref 10–20)
BASOPHILS # BLD AUTO: 0.1 X10*3/UL (ref 0–0.1)
BASOPHILS NFR BLD AUTO: 0.6 %
BUN SERPL-MCNC: 28 MG/DL (ref 6–23)
CALCIUM SERPL-MCNC: 9.4 MG/DL (ref 8.6–10.6)
CHLORIDE SERPL-SCNC: 100 MMOL/L (ref 98–107)
CO2 SERPL-SCNC: 26 MMOL/L (ref 21–32)
CREAT SERPL-MCNC: 0.99 MG/DL (ref 0.5–1.3)
EOSINOPHIL # BLD AUTO: 0.27 X10*3/UL (ref 0–0.7)
EOSINOPHIL NFR BLD AUTO: 1.6 %
ERYTHROCYTE [DISTWIDTH] IN BLOOD BY AUTOMATED COUNT: 12.8 % (ref 11.5–14.5)
GFR SERPL CREATININE-BSD FRML MDRD: 83 ML/MIN/1.73M*2
GLUCOSE BLD MANUAL STRIP-MCNC: 110 MG/DL (ref 74–99)
GLUCOSE SERPL-MCNC: 128 MG/DL (ref 74–99)
HCT VFR BLD AUTO: 28.6 % (ref 41–52)
HGB BLD-MCNC: 9.2 G/DL (ref 13.5–17.5)
IMM GRANULOCYTES # BLD AUTO: 0.8 X10*3/UL (ref 0–0.7)
IMM GRANULOCYTES NFR BLD AUTO: 4.8 % (ref 0–0.9)
LYMPHOCYTES # BLD AUTO: 1.09 X10*3/UL (ref 1.2–4.8)
LYMPHOCYTES NFR BLD AUTO: 6.5 %
MAGNESIUM SERPL-MCNC: 1.92 MG/DL (ref 1.6–2.4)
MCH RBC QN AUTO: 29.1 PG (ref 26–34)
MCHC RBC AUTO-ENTMCNC: 32.2 G/DL (ref 32–36)
MCV RBC AUTO: 91 FL (ref 80–100)
MONOCYTES # BLD AUTO: 1.47 X10*3/UL (ref 0.1–1)
MONOCYTES NFR BLD AUTO: 8.7 %
NEUTROPHILS # BLD AUTO: 13.09 X10*3/UL (ref 1.2–7.7)
NEUTROPHILS NFR BLD AUTO: 77.8 %
NRBC BLD-RTO: 0 /100 WBCS (ref 0–0)
PHOSPHATE SERPL-MCNC: 4.3 MG/DL (ref 2.5–4.9)
PLATELET # BLD AUTO: 287 X10*3/UL (ref 150–450)
POTASSIUM SERPL-SCNC: 4.4 MMOL/L (ref 3.5–5.3)
RAD ONC MSQ ACTUAL FRACTIONS DELIVERED: 5
RAD ONC MSQ ACTUAL SESSION DELIVERED DOSE: 400 CGRAY
RAD ONC MSQ ACTUAL TOTAL DOSE: 2000 CGRAY
RAD ONC MSQ ELAPSED DAYS: 6
RAD ONC MSQ LAST DATE: NORMAL
RAD ONC MSQ PRESCRIBED FRACTIONAL DOSE: 400 CGRAY
RAD ONC MSQ PRESCRIBED NUMBER OF FRACTIONS: 5
RAD ONC MSQ PRESCRIBED TECHNIQUE: NORMAL
RAD ONC MSQ PRESCRIBED TOTAL DOSE: 2000 CGRAY
RAD ONC MSQ PRESCRIPTION PATTERN COMMENT: NORMAL
RAD ONC MSQ START DATE: NORMAL
RAD ONC MSQ TREATMENT COURSE NUMBER: 1
RAD ONC MSQ TREATMENT SITE: NORMAL
RBC # BLD AUTO: 3.16 X10*6/UL (ref 4.5–5.9)
SODIUM SERPL-SCNC: 139 MMOL/L (ref 136–145)
WBC # BLD AUTO: 16.8 X10*3/UL (ref 4.4–11.3)

## 2023-12-04 PROCEDURE — 96372 THER/PROPH/DIAG INJ SC/IM: CPT | Performed by: STUDENT IN AN ORGANIZED HEALTH CARE EDUCATION/TRAINING PROGRAM

## 2023-12-04 PROCEDURE — 97110 THERAPEUTIC EXERCISES: CPT | Mod: GP,CQ

## 2023-12-04 PROCEDURE — 2500000004 HC RX 250 GENERAL PHARMACY W/ HCPCS (ALT 636 FOR OP/ED)

## 2023-12-04 PROCEDURE — 77333 RADIATION TREATMENT AID(S): CPT | Performed by: RADIOLOGY

## 2023-12-04 PROCEDURE — 77290 THER RAD SIMULAJ FIELD CPLX: CPT | Performed by: RADIOLOGY

## 2023-12-04 PROCEDURE — 2500000001 HC RX 250 WO HCPCS SELF ADMINISTERED DRUGS (ALT 637 FOR MEDICARE OP)

## 2023-12-04 PROCEDURE — 99232 SBSQ HOSP IP/OBS MODERATE 35: CPT | Performed by: PHYSICIAN ASSISTANT

## 2023-12-04 PROCEDURE — 80069 RENAL FUNCTION PANEL: CPT

## 2023-12-04 PROCEDURE — 85025 COMPLETE CBC W/AUTO DIFF WBC: CPT

## 2023-12-04 PROCEDURE — 77412 RADIATION TX DELIVERY LVL 3: CPT | Performed by: STUDENT IN AN ORGANIZED HEALTH CARE EDUCATION/TRAINING PROGRAM

## 2023-12-04 PROCEDURE — 99233 SBSQ HOSP IP/OBS HIGH 50: CPT | Performed by: INTERNAL MEDICINE

## 2023-12-04 PROCEDURE — 82947 ASSAY GLUCOSE BLOOD QUANT: CPT

## 2023-12-04 PROCEDURE — 77336 RADIATION PHYSICS CONSULT: CPT | Mod: 59 | Performed by: STUDENT IN AN ORGANIZED HEALTH CARE EDUCATION/TRAINING PROGRAM

## 2023-12-04 PROCEDURE — 2500000004 HC RX 250 GENERAL PHARMACY W/ HCPCS (ALT 636 FOR OP/ED): Performed by: STUDENT IN AN ORGANIZED HEALTH CARE EDUCATION/TRAINING PROGRAM

## 2023-12-04 PROCEDURE — 1170000001 HC PRIVATE ONCOLOGY ROOM DAILY

## 2023-12-04 PROCEDURE — 97116 GAIT TRAINING THERAPY: CPT | Mod: GP,CQ

## 2023-12-04 PROCEDURE — 83735 ASSAY OF MAGNESIUM: CPT

## 2023-12-04 RX ADMIN — GABAPENTIN 300 MG: 300 CAPSULE ORAL at 16:42

## 2023-12-04 RX ADMIN — ESOMEPRAZOLE MAGNESIUM 20 MG: 20 FOR SUSPENSION ORAL at 06:44

## 2023-12-04 RX ADMIN — SULFAMETHOXAZOLE AND TRIMETHOPRIM 160 MG: 800; 160 TABLET ORAL at 21:10

## 2023-12-04 RX ADMIN — OXYCODONE HYDROCHLORIDE 10 MG: 5 TABLET ORAL at 11:03

## 2023-12-04 RX ADMIN — ACETAMINOPHEN 650 MG: 650 SOLUTION ORAL at 06:44

## 2023-12-04 RX ADMIN — OXYCODONE HYDROCHLORIDE 10 MG: 5 TABLET ORAL at 16:42

## 2023-12-04 RX ADMIN — METRONIDAZOLE 500 MG: 500 TABLET ORAL at 21:11

## 2023-12-04 RX ADMIN — METRONIDAZOLE 500 MG: 500 TABLET ORAL at 16:49

## 2023-12-04 RX ADMIN — AMLODIPINE BESYLATE 10 MG: 10 TABLET ORAL at 09:03

## 2023-12-04 RX ADMIN — ASPIRIN 81 MG CHEWABLE TABLET 81 MG: 81 TABLET CHEWABLE at 09:03

## 2023-12-04 RX ADMIN — LOSARTAN POTASSIUM 100 MG: 50 TABLET, FILM COATED ORAL at 09:03

## 2023-12-04 RX ADMIN — POLYETHYLENE GLYCOL 3350 17 G: 17 POWDER, FOR SOLUTION ORAL at 09:04

## 2023-12-04 RX ADMIN — OXYCODONE HYDROCHLORIDE 10 MG: 5 TABLET ORAL at 06:44

## 2023-12-04 RX ADMIN — ACETAMINOPHEN 650 MG: 650 SOLUTION ORAL at 11:03

## 2023-12-04 RX ADMIN — METRONIDAZOLE 500 MG: 500 TABLET ORAL at 06:44

## 2023-12-04 RX ADMIN — SENNOSIDES 5 ML: 8.8 LIQUID ORAL at 09:04

## 2023-12-04 RX ADMIN — ACETAMINOPHEN 650 MG: 650 SOLUTION ORAL at 21:11

## 2023-12-04 RX ADMIN — ENOXAPARIN SODIUM 40 MG: 100 INJECTION SUBCUTANEOUS at 21:11

## 2023-12-04 RX ADMIN — CHLORTHALIDONE 25 MG: 25 TABLET ORAL at 09:04

## 2023-12-04 RX ADMIN — OXYCODONE HYDROCHLORIDE 10 MG: 5 TABLET ORAL at 21:10

## 2023-12-04 RX ADMIN — SULFAMETHOXAZOLE AND TRIMETHOPRIM 160 MG: 800; 160 TABLET ORAL at 09:04

## 2023-12-04 RX ADMIN — ATORVASTATIN CALCIUM 80 MG: 80 TABLET, FILM COATED ORAL at 09:03

## 2023-12-04 RX ADMIN — ACETAMINOPHEN 650 MG: 650 SOLUTION ORAL at 16:42

## 2023-12-04 RX ADMIN — THIAMINE HYDROCHLORIDE 100 MG: 100 INJECTION, SOLUTION INTRAMUSCULAR; INTRAVENOUS at 09:04

## 2023-12-04 RX ADMIN — POLYETHYLENE GLYCOL 3350 17 G: 17 POWDER, FOR SOLUTION ORAL at 21:11

## 2023-12-04 ASSESSMENT — PAIN - FUNCTIONAL ASSESSMENT
PAIN_FUNCTIONAL_ASSESSMENT: 0-10

## 2023-12-04 ASSESSMENT — COGNITIVE AND FUNCTIONAL STATUS - GENERAL
DRESSING REGULAR LOWER BODY CLOTHING: A LITTLE
DAILY ACTIVITIY SCORE: 19
WALKING IN HOSPITAL ROOM: A LITTLE
MOBILITY SCORE: 17
MOVING TO AND FROM BED TO CHAIR: A LITTLE
HELP NEEDED FOR BATHING: A LITTLE
STANDING UP FROM CHAIR USING ARMS: A LOT
TOILETING: A LITTLE
CLIMB 3 TO 5 STEPS WITH RAILING: A LOT
MOVING TO AND FROM BED TO CHAIR: A LITTLE
PERSONAL GROOMING: A LITTLE
CLIMB 3 TO 5 STEPS WITH RAILING: A LOT
WALKING IN HOSPITAL ROOM: A LOT
TURNING FROM BACK TO SIDE WHILE IN FLAT BAD: A LOT
STANDING UP FROM CHAIR USING ARMS: A LITTLE
DRESSING REGULAR UPPER BODY CLOTHING: A LITTLE
MOBILITY SCORE: 16
MOVING FROM LYING ON BACK TO SITTING ON SIDE OF FLAT BED WITH BEDRAILS: A LITTLE

## 2023-12-04 ASSESSMENT — PAIN SCALES - GENERAL
PAINLEVEL_OUTOF10: 8
PAINLEVEL_OUTOF10: 8
PAINLEVEL_OUTOF10: 7
PAINLEVEL_OUTOF10: 7

## 2023-12-04 NOTE — PROGRESS NOTES
Physical Therapy                 Therapy Communication Note    Patient Name: Mike Olson  MRN: [id number]  Today's Date: 12/4/2023     Discipline: Physical Therapy    Missed Visit Reason: Missed Visit Reason:  (Attempted follow up however wound care in with pt. and family for education.)    Missed Time: Attempt    Comment:

## 2023-12-04 NOTE — SIGNIFICANT EVENT
Patient will have fifth fraction of pRT for esophageal mass today. CT sim tomorrow. Plan for first of five fractions on Wednesay to target right axillary mass.

## 2023-12-04 NOTE — PROGRESS NOTES
Mike Olson is a 68 y.o. male on day 12 of admission presenting with Difficulty swallowing.    Amie, from [facility], came in this morning to give education to the family and patient for his tube feeds for when he is discharged home. She will have a shipment sent to his house for when he gets home.  She states family feels comfortable doing this at home. Will continue to support patient while in the hospital for any additional discharge needs.    YANELY SERVIN RN [facility]

## 2023-12-04 NOTE — PROGRESS NOTES
Mike Olson is a 68 y.o. male on day 12 of admission presenting with Difficulty swallowing.    Subjective   Patient states pain is well controlled this morning.  Tube feeds are at goal.  Patient's wife, April, is scheduled to visit patient this morning to learn how to provide tube feeds.  Patient will go for fifth fraction of palliative radiotherapy to his esophageal mass today.  Patient is planning on staying for inpatient treatment of his right axillary metastasis with 5 more fractions of palliative radiotherapy.  Patient had a bowel movement yesterday.    Patient denying fevers, chills, shortness of breath, chest pain, headache, vision changes, abdominal pain, diarrhea, constipation.       Objective     Physical Exam  Constitutional:       Appearance: He is not ill-appearing or toxic-appearing.      Comments: Mildly uncomfortable   HENT:      Nose: No congestion or rhinorrhea.      Mouth/Throat:      Mouth: Mucous membranes are moist.   Eyes:      General: No scleral icterus.     Extraocular Movements: Extraocular movements intact.      Pupils: Pupils are equal, round, and reactive to light.   Cardiovascular:      Rate and Rhythm: Normal rate and regular rhythm.      Heart sounds: No murmur heard.     No friction rub. No gallop.   Pulmonary:      Effort: Pulmonary effort is normal. No respiratory distress.      Breath sounds: Normal breath sounds. No stridor. No wheezing, rhonchi or rales.   Abdominal:      General: Bowel sounds are normal. There is no distension.      Palpations: Abdomen is soft. There is no mass.      Tenderness: There is no abdominal tenderness. There is no guarding or rebound.      Comments: PEG tube in place with small amount of crusted drainage surrounding the outside    Musculoskeletal:         General: No swelling or tenderness. Normal range of motion.      Cervical back: Normal range of motion.      Right lower leg: No edema.      Left lower leg: No edema.   Skin:     General: Skin is  "warm.      Capillary Refill: Capillary refill takes less than 2 seconds.      Coloration: Skin is not jaundiced.      Findings: No bruising or rash.      Comments: Axillary mass covered with gauze with no strikethrough appreciated    Neurological:      General: No focal deficit present.      Mental Status: He is oriented to person, place, and time. Mental status is at baseline.      Cranial Nerves: No cranial nerve deficit.         Last Recorded Vitals  Blood pressure 124/65, pulse 104, temperature 36.2 °C (97.2 °F), temperature source Temporal, resp. rate 18, height 1.69 m (5' 6.54\"), weight 76.5 kg (168 lb 11.2 oz), SpO2 93 %.  Intake/Output last 3 Shifts:  I/O last 3 completed shifts:  In: 3764 (49.2 mL/kg) [NG/GT:3764]  Out: 1575 (20.6 mL/kg) [Urine:1575 (0.6 mL/kg/hr)]  Weight: 76.5 kg     Relevant Results  Scheduled medications  acetaminophen, 650 mg, g-tube, q4h  amLODIPine, 10 mg, g-tube, Daily  aspirin, 81 mg, g-tube, Daily  atorvastatin, 80 mg, g-tube, Daily  chlorthalidone, 25 mg, g-tube, Daily  enoxaparin, 40 mg, subcutaneous, q24h  esomeprazole, 20 mg, nasogastric tube, Daily before breakfast  fentaNYL PF, , ,   gabapentin, 300 mg, oral, Daily with evening meal  losartan, 100 mg, g-tube, Daily  metroNIDAZOLE, 500 mg, oral, q8h LEORA  midazolam, , ,   polyethylene glycol, 17 g, g-tube, BID  senna, 5 mL, g-tube, Daily  sulfamethoxazole-trimethoprim, 160 mg, g-tube, q12h LEORA  thiamine, 100 mg, intravenous, Daily      Continuous medications     PRN medications  PRN medications: bisacodyl, fentaNYL PF, melatonin, meperidine, meperidine PF, midazolam, midazolam, oxyCODONE, oxyCODONE      Results for orders placed or performed during the hospital encounter of 11/22/23 (from the past 24 hour(s))   POCT GLUCOSE   Result Value Ref Range    POCT Glucose 110 (H) 74 - 99 mg/dL                Malnutrition Diagnosis Status: New  Malnutrition Diagnosis: Severe malnutrition related to acute disease or injury  As Evidenced " by: >5% wt loss in 1 month; reported >7.5% wt loss in 3 months; PO intake likely meeting <75% of nutr needs for >7 days  I agree with the dietitian's malnutrition diagnosis.      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 Given new diagnosis, no treatment has been started yet and no known origin yet. Will discuss with Med Onc (Dr. Yin was to be his primary oncologist), Surg Onc (Dr Corona had already seen pt) and will also consult GI for EGD/biopsy. EGD on 11/24 showed large, nearly fully obstructing esophageal mass. Patient is not a surgical candidate given metastatic disease. Managing nutrition and need for chemo/radiation. PEG in place.      Updates 12/4:  - 5/5 fraction of pRT to esophagus today  - To begin pRT to right axillary mass tomorrow for 5 fractions  - Continue Bactrim for 7 days  - Condensed feeds to bolus 345mL 4 times per day, will and pre/post FWF with 60mL and then additional 260mg BID        #Metastatic SCC, poorly differentiated   #Dysphagia   #Leukocytosis  - Pathology Microscopic examination of H&E sections demonstrates a poorly differentiated carcinoma , immunoreactive with CK7 and p40 , infiltrating soft tissue.  No residual lymph node parenchyma is seen.  The following immunostains are negative: NKX3.1, GATA3, CK20, TTF-1 and CDX2   -Diagnosed 11/2023; no treatment yet; unknown origin  -Surg Onc consulted; appreciate recs (saw Dr. Corona outpatient 11/10/23)  -SLP consulted for swallow evaluation and recommended NPO  -EGD/biopsy 11/24:  large, nearly fully  obstructing esophageal mass. Patient is not a surgical candidate because of metastatic disease.   -Plan to inform Dr. Yin (future medical oncologist-1st appt scheduled 12/1)  -Consider consulting pulm for possible lung biopsy as potential origin   -Tylenol PRN for pain; patient denies any pain currently  - Nutrition consulted: 1. Start 100mg IV thiamine x5 days;  Isosource 1.5 @ 25 ml/hr. Advance slowly by 10 ml/hr q8h, as tolerated, to goal rate of 55 ml/hr.   - CT SIM 11/27  - 5/5 fraction of pRT to esophageal mass today  [ ] To begin pRT to right axillary mass tomorrow for 5 fractions  - s/p PEG tube placement 11/27  - Tube Feeds: 1.5 @ 25 ml/hr. Advance slowly by 10 ml/hr q8h, as tolerated, to goal rate of 55 ml/hr. Currently, at goal.   - 345ml QID bolus feeds, FWF 60ml before and after feed as well as 260ml BID   - 100mg IV thiamine x5 days   - FWF: 250 ml 4x/day  - Daily RFPs   -Bactrim for SSTI for 7 days (12/2- 12/8)  -Respiratory sputum culture with salivary contimination      #MARYELLEN (resolved)  -Baseline Cr ~1; on admission 1.48, was 1.50 on 11/16 -> now at baseline 1.04 on 11/23  -S/p 1L LR, IV LR infusion at 125cc/hr, given improvement in Cr likely pre-renal  -Restarted home losartan 100mg daily  -Continue to monitor      #HTN  -Continue home amlodipine 10mg daily  -Holding chlorithalidone 25mg daily given hypercalcemia  -Continue ARB as MARYELLEN has resolved     #CVA 9/2023  #Carotid stenosis  -2 acute or subacute infarcts seen on MRI 9/18/2023  -Continue ASA 81mg daily  -Completed 21 days of plavix (started 9/18/23)  -Continue atorvastatin 80mg daily  -Vascular Surgery follow up outpatient for the carotid stenosis     #Hypercalcemia, resolved  -Ca 11.5 on admission  -S/p 1L LR in the ED, on LR infusion at 125cc/hr  -Repeat was 11.3 on 11/23, so avoiding LR in favor of NS     #Elevated PSA  -Urology follow up outpatient     Disposition: tele  Follow-ups: PCP, Onc, Surg Onc,      F: prn  E: prn   N: CLD as  tolerated +Boost  A: PIVs     DVT ppx: lovenox  GI ppx: home PPI    Code Status: FULL (confirmed on 11/22/23)  Surrogate Medical Decision-maker: Wife April [phone]                 Sid Jose MD

## 2023-12-04 NOTE — PROGRESS NOTES
Mike Olson is a 68 y.o. male on day 12 of admission presenting with Difficulty swallowing.      Subjective   Tolerating palliative radiation to esophagus without signs of toxicity.  Complains of right axillary pain from necrotic neoplasm.       Objective     Last Recorded Vitals  /65 (BP Location: Right arm, Patient Position: Lying)   Pulse 104   Temp 36.2 °C (97.2 °F) (Temporal)   Resp 18   Wt 76.5 kg (168 lb 11.2 oz)   SpO2 93%         Physical Exam  General: awake, alert, resting comfortably, well developed and well nourished in appearance  HEENT: pupils equal and round, no scleral icterus  SKIN: large exophytic necrotic mass involving right axilla  Pulmonary: Breathing comfortably at rest   Cardiac: regular rate  Abdomen: Tight, protuberant, feeding tube in place, no peritoneal signs  Neuro: A&O x 3, cranial nerves II through XII grossly intact, sensation and strength intact  Psych: Normal affect    Relevant Results  Lab Results   Component Value Date    WBC 16.8 (H) 12/04/2023    HGB 9.2 (L) 12/04/2023    HCT 28.6 (L) 12/04/2023    MCV 91 12/04/2023     12/04/2023     Lab Results   Component Value Date    GLUCOSE 128 (H) 12/04/2023    CALCIUM 9.4 12/04/2023     12/04/2023    K 4.4 12/04/2023    CO2 26 12/04/2023     12/04/2023    BUN 28 (H) 12/04/2023    CREATININE 0.99 12/04/2023                Assessment/Plan   This is a 68-year-old male with poorly differentiated squamous cell carcinoma of unknown origin, with diffuse metastatic disease including changes to the proximal esophagus causing severe dysphagia, as well as a large necrotic mass with skin breakdown of the right axilla.  He is currently receiving palliative radiation to his esophagus.  Radiation oncology has been asked to assess right axillary mass for additional palliative radiation.    Patient reports first noticing changes to the right axilla about 5 months ago.  Biopsy was performed on 10/23/2023 with pathology  showing poorly differentiated squamous cell carcinoma.  PET/CT on 11/17/2023, revealing extensive osseous metastatic disease involving the axial and appendicular skeleton and proximal esophagus, a hypermetabolic nodule within the left lower lobe, and a left anterior pleural-based implant favoring metastatic disease.  The patient presented to the ED on 11/23/2023 with severe dysphagia.  Gastrostomy tube was placed on 11/27.  Palliative radiation to the esophagus was initiated on 11/28, 20Gy in 5 fractions.        Patient has been offered palliative radiation to right axilla, 20Gy in 5 fractions.  The associated risks, benefits, alternatives were discussed.  The patient has provided informed consent to proceed.    -Palliative RT to R axilla, 20Gy in 5Fx  -CT SIM 12/4, likely begin on 12/5, complete treatment on 12/11      I spent _35____ minutes with this patient. Greater than 50% of this time was spent in the counseling and/or coordination of care of this patient.    Jonathan Morocho PA-C

## 2023-12-04 NOTE — PROGRESS NOTES
Physical Therapy    Physical Therapy Treatment    Patient Name: Mike Olson  MRN: [id number]  Today's Date: 12/4/2023  Time Calculation  Start Time: 1020  Stop Time: 1054  Time Calculation (min): 34 min       Assessment/Plan   PT Assessment  PT Assessment Results: Decreased endurance, Impaired balance, Decreased mobility, Decreased strength  Rehab Prognosis: Good  Evaluation/Treatment Tolerance: Patient tolerated treatment well  End of Session Communication: Bedside nurse  Assessment Comment: 68 year old male admitted with worsening dysphagia.. Pt presents with decreased endurance and balance impacting functional mobility. Pt would benefit from continued PT while in hospital to improve functional mobility and return to PLOF.  End of Session Patient Position: Alarm off, not on at start of session  PT Plan  Inpatient/Swing Bed or Outpatient: Inpatient  PT Plan  Treatment/Interventions: Transfer training, Gait training, Stair training, Balance training, Neuromuscular re-education, Endurance training, Strengthening, Therapeutic exercise, Therapeutic activity, Home exercise program, Positioning, Postural re-education  PT Plan: Skilled PT  PT Frequency: 2 times per week  PT Discharge Recommendations:  (NN at this time however will continue to assess.)  PT Recommended Transfer Status: Stand by assist  PT - OK to Discharge: Yes      General Visit Information:   PT  Visit  PT Received On: 12/04/23  General  Reason for Referral: worsening dysphagia  Past Medical History Relevant to Rehab: HTN, CVA (9/2023), recently diagnosed poorly differentiated metastatic SCC (11/2023)  Family/Caregiver Present: Yes  Caregiver Feedback: Daughter and Son in law.  Prior to Session Communication: Bedside nurse  Patient Position Received: Bed, 2 rail up, Alarm off, not on at start of session  General Comment: Pt. appears slightly irritated/frustrated upon arrival most likely due to discomfort in right axillary where they will be doing radiation  later today.    Subjective   Precautions:     Vital Signs:       Objective   Pain:     Cognition:  Cognition  Overall Cognitive Status: Within Functional Limits  Postural Control:     Extremity/Trunk Assessments:                    Activity Tolerance:  Activity Tolerance  Endurance: Tolerates 10 - 20 min exercise with multiple rests  Treatments:  Therapeutic Exercise  Therapeutic Exercise Performed: Yes  Therapeutic Exercise Activity 1: Pt. performed supine bilat le ex AP'S, QS, SAQ'S, HS, AND ABD X 15 REPS. Assist to maintain alignment.    Bed Mobility  Bed Mobility: Yes  Bed Mobility 1  Bed Mobility 1: Rolling left, Side lying left to sit (Encouraged pt. to log roll in order to get to EOB due to ease of transition and to prepare pt. for home without a hospital bed.)  Level of Assistance 1:  (min/mod - min for rolling and min/mod for proping up from sidelying to sitting.)  Bed Mobility 2  Bed Mobility  2:  (Initially pt. at bottom of bed therefore worked on a functional scooting exercise - Pt. struggling to initiate movement. Cues to flex knees and bridge to scoot. Increased time to get in proper position.)    Ambulation/Gait Training  Ambulation/Gait Training Performed: Yes  Ambulation/Gait Training 1  Surface 1:  (Pt. amb. ~28' using a wh. walker and min assist. Pt. presenting stiff/rigid and shaky - Pt. required cues to turn around as pt. wanted to keep walking- Pt. too shaky for safety.)  Transfers  Transfer: Yes  Transfer 1  Transfer From 1: Sit to, Stand to  Transfer to 1: Stand, Sit  Technique 1:  (Pt. required instruction to wait a moment before walking after standing - Pt. required cga to steady. Cues needed for hand placement.)    Outcome Measures:  [facility] Basic Mobility  Turning from your back to your side while in a flat bed without using bedrails: A little  Moving from lying on your back to sitting on the side of a flat bed without using bedrails: A lot  Moving to and from bed to chair (including a  wheelchair): A little  Standing up from a chair using your arms (e.g. wheelchair or bedside chair): A little  To walk in hospital room: A little  Climbing 3-5 steps with railing: A lot  Basic Mobility - Total Score: 16    Education Documentation  Mobility Training, taught by Domonique Erazo PTA at 12/4/2023 11:14 AM.  Learner: Patient  Readiness: Acceptance  Method: Explanation, Demonstration  Response: Needs Reinforcement    Education Comments  No comments found.        OP EDUCATION:       Encounter Problems       Encounter Problems (Active)       Mobility       Pt will ambulate >250ft with LRAD and MI with no LOB and VSS.   (Progressing)       Start:  11/24/23    Expected End:  12/15/23            Pt will complete the 6mwt and score within age related normative values. (Progressing)       Start:  11/24/23    Expected End:  12/15/23            Pt will complete 5 x STS and score within age related normative values.   (Progressing)       Start:  11/24/23    Expected End:  12/15/23               Mobility       Pt will ascend and descend 1 flight of Stairs with sba.       Start:  11/30/23    Expected End:  12/20/23               Transfers       Pt will perform all functional transfers with LRAD and MI. (Progressing)       Start:  11/24/23    Expected End:  12/15/23                  Encounter Problems (Resolved)       Balance       Pt will score >24 On the Tinetti to reduce the risk for falls.   (Met)       Start:  11/24/23    Expected End:  12/15/23    Resolved:  11/29/23

## 2023-12-04 NOTE — CARE PLAN
The clinical goals for the shift include patient will remain HDS throughout shift 12/4 at 1900        Problem: Fall/Injury  Goal: Not fall by end of shift  Outcome: Progressing     Problem: Fall/Injury  Goal: Be free from injury by end of the shift  Outcome: Progressing     Problem: Skin  Goal: Prevent/manage excess moisture  Outcome: Progressing  Flowsheets (Taken 12/4/2023 6484)  Prevent/manage excess moisture: Moisturize dry skin     Problem: Pain  Goal: My pain/discomfort is manageable  Outcome: Progressing     Problem: Daily Care  Goal: Daily care needs are met  Outcome: Progressing     Problem: Safety  Goal: I will remain free of falls  Outcome: Progressing

## 2023-12-05 ENCOUNTER — HOSPITAL ENCOUNTER (OUTPATIENT)
Dept: RADIATION ONCOLOGY | Facility: HOSPITAL | Age: 68
Setting detail: RADIATION/ONCOLOGY SERIES
Discharge: HOME | End: 2023-12-05
Payer: OTHER MISCELLANEOUS

## 2023-12-05 ENCOUNTER — APPOINTMENT (OUTPATIENT)
Dept: RADIOLOGY | Facility: HOSPITAL | Age: 68
DRG: 356 | End: 2023-12-05
Payer: MEDICARE

## 2023-12-05 DIAGNOSIS — C15.3 MALIGNANT NEOPLASM OF UPPER THIRD OF ESOPHAGUS (MULTI): ICD-10-CM

## 2023-12-05 DIAGNOSIS — Z51.0 ENCOUNTER FOR ANTINEOPLASTIC RADIATION THERAPY: ICD-10-CM

## 2023-12-05 DIAGNOSIS — C77.3 SECONDARY AND UNSPECIFIED MALIGNANT NEOPLASM OF AXILLA AND UPPER LIMB LYMPH NODES (MULTI): ICD-10-CM

## 2023-12-05 LAB
ALBUMIN SERPL BCP-MCNC: 2.5 G/DL (ref 3.4–5)
ANION GAP SERPL CALC-SCNC: 18 MMOL/L (ref 10–20)
BACTERIA SPEC RESP CULT: ABNORMAL
BASOPHILS # BLD AUTO: 0.08 X10*3/UL (ref 0–0.1)
BASOPHILS NFR BLD AUTO: 0.5 %
BUN SERPL-MCNC: 34 MG/DL (ref 6–23)
CALCIUM SERPL-MCNC: 9.3 MG/DL (ref 8.6–10.6)
CHLORIDE SERPL-SCNC: 98 MMOL/L (ref 98–107)
CO2 SERPL-SCNC: 26 MMOL/L (ref 21–32)
CREAT SERPL-MCNC: 1.04 MG/DL (ref 0.5–1.3)
EOSINOPHIL # BLD AUTO: 0.39 X10*3/UL (ref 0–0.7)
EOSINOPHIL NFR BLD AUTO: 2.2 %
ERYTHROCYTE [DISTWIDTH] IN BLOOD BY AUTOMATED COUNT: 13 % (ref 11.5–14.5)
GFR SERPL CREATININE-BSD FRML MDRD: 78 ML/MIN/1.73M*2
GLUCOSE SERPL-MCNC: 81 MG/DL (ref 74–99)
GRAM STN SPEC: ABNORMAL
GRAM STN SPEC: ABNORMAL
HCT VFR BLD AUTO: 29.4 % (ref 41–52)
HGB BLD-MCNC: 9.3 G/DL (ref 13.5–17.5)
IMM GRANULOCYTES # BLD AUTO: 0.64 X10*3/UL (ref 0–0.7)
IMM GRANULOCYTES NFR BLD AUTO: 3.6 % (ref 0–0.9)
LYMPHOCYTES # BLD AUTO: 0.84 X10*3/UL (ref 1.2–4.8)
LYMPHOCYTES NFR BLD AUTO: 4.8 %
MAGNESIUM SERPL-MCNC: 2.05 MG/DL (ref 1.6–2.4)
MCH RBC QN AUTO: 28.7 PG (ref 26–34)
MCHC RBC AUTO-ENTMCNC: 31.6 G/DL (ref 32–36)
MCV RBC AUTO: 91 FL (ref 80–100)
MONOCYTES # BLD AUTO: 1.57 X10*3/UL (ref 0.1–1)
MONOCYTES NFR BLD AUTO: 8.9 %
NEUTROPHILS # BLD AUTO: 14.14 X10*3/UL (ref 1.2–7.7)
NEUTROPHILS NFR BLD AUTO: 80 %
NRBC BLD-RTO: 0 /100 WBCS (ref 0–0)
PHOSPHATE SERPL-MCNC: 4.6 MG/DL (ref 2.5–4.9)
PLATELET # BLD AUTO: 324 X10*3/UL (ref 150–450)
POTASSIUM SERPL-SCNC: 4.5 MMOL/L (ref 3.5–5.3)
PROCALCITONIN SERPL-MCNC: 0.79 NG/ML
RAD ONC MSQ ACTUAL FRACTIONS DELIVERED: 1
RAD ONC MSQ ACTUAL SESSION DELIVERED DOSE: 400 CGRAY
RAD ONC MSQ ACTUAL TOTAL DOSE: 400 CGRAY
RAD ONC MSQ ELAPSED DAYS: 0
RAD ONC MSQ LAST DATE: NORMAL
RAD ONC MSQ PRESCRIBED FRACTIONAL DOSE: 400 CGRAY
RAD ONC MSQ PRESCRIBED NUMBER OF FRACTIONS: 5
RAD ONC MSQ PRESCRIBED TECHNIQUE: NORMAL
RAD ONC MSQ PRESCRIBED TOTAL DOSE: 2000 CGRAY
RAD ONC MSQ PRESCRIPTION PATTERN COMMENT: NORMAL
RAD ONC MSQ START DATE: NORMAL
RAD ONC MSQ TREATMENT COURSE NUMBER: 2
RAD ONC MSQ TREATMENT SITE: NORMAL
RBC # BLD AUTO: 3.24 X10*6/UL (ref 4.5–5.9)
SODIUM SERPL-SCNC: 137 MMOL/L (ref 136–145)
WBC # BLD AUTO: 17.7 X10*3/UL (ref 4.4–11.3)

## 2023-12-05 PROCEDURE — 2500000004 HC RX 250 GENERAL PHARMACY W/ HCPCS (ALT 636 FOR OP/ED)

## 2023-12-05 PROCEDURE — 2500000001 HC RX 250 WO HCPCS SELF ADMINISTERED DRUGS (ALT 637 FOR MEDICARE OP)

## 2023-12-05 PROCEDURE — 77334 RADIATION TREATMENT AID(S): CPT | Performed by: STUDENT IN AN ORGANIZED HEALTH CARE EDUCATION/TRAINING PROGRAM

## 2023-12-05 PROCEDURE — 77300 RADIATION THERAPY DOSE PLAN: CPT | Performed by: STUDENT IN AN ORGANIZED HEALTH CARE EDUCATION/TRAINING PROGRAM

## 2023-12-05 PROCEDURE — 77295 3-D RADIOTHERAPY PLAN: CPT | Performed by: STUDENT IN AN ORGANIZED HEALTH CARE EDUCATION/TRAINING PROGRAM

## 2023-12-05 PROCEDURE — 99233 SBSQ HOSP IP/OBS HIGH 50: CPT | Performed by: INTERNAL MEDICINE

## 2023-12-05 PROCEDURE — 77280 THER RAD SIMULAJ FIELD SMPL: CPT | Performed by: RADIOLOGY

## 2023-12-05 PROCEDURE — 85025 COMPLETE CBC W/AUTO DIFF WBC: CPT

## 2023-12-05 PROCEDURE — 71045 X-RAY EXAM CHEST 1 VIEW: CPT | Performed by: RADIOLOGY

## 2023-12-05 PROCEDURE — 2500000002 HC RX 250 W HCPCS SELF ADMINISTERED DRUGS (ALT 637 FOR MEDICARE OP, ALT 636 FOR OP/ED)

## 2023-12-05 PROCEDURE — 2500000004 HC RX 250 GENERAL PHARMACY W/ HCPCS (ALT 636 FOR OP/ED): Performed by: STUDENT IN AN ORGANIZED HEALTH CARE EDUCATION/TRAINING PROGRAM

## 2023-12-05 PROCEDURE — 93010 ELECTROCARDIOGRAM REPORT: CPT | Performed by: INTERNAL MEDICINE

## 2023-12-05 PROCEDURE — 96372 THER/PROPH/DIAG INJ SC/IM: CPT | Performed by: STUDENT IN AN ORGANIZED HEALTH CARE EDUCATION/TRAINING PROGRAM

## 2023-12-05 PROCEDURE — 71045 X-RAY EXAM CHEST 1 VIEW: CPT

## 2023-12-05 PROCEDURE — 80069 RENAL FUNCTION PANEL: CPT

## 2023-12-05 PROCEDURE — 1170000001 HC PRIVATE ONCOLOGY ROOM DAILY

## 2023-12-05 PROCEDURE — 83735 ASSAY OF MAGNESIUM: CPT

## 2023-12-05 PROCEDURE — S0109 METHADONE ORAL 5MG: HCPCS

## 2023-12-05 PROCEDURE — 87205 SMEAR GRAM STAIN: CPT

## 2023-12-05 PROCEDURE — 99233 SBSQ HOSP IP/OBS HIGH 50: CPT | Performed by: NURSE PRACTITIONER

## 2023-12-05 PROCEDURE — 84145 PROCALCITONIN (PCT): CPT

## 2023-12-05 PROCEDURE — 77412 RADIATION TX DELIVERY LVL 3: CPT | Performed by: STUDENT IN AN ORGANIZED HEALTH CARE EDUCATION/TRAINING PROGRAM

## 2023-12-05 RX ORDER — HYDROMORPHONE HYDROCHLORIDE 1 MG/ML
0.4 INJECTION, SOLUTION INTRAMUSCULAR; INTRAVENOUS; SUBCUTANEOUS ONCE
Status: COMPLETED | OUTPATIENT
Start: 2023-12-05 | End: 2023-12-05

## 2023-12-05 RX ORDER — IPRATROPIUM BROMIDE AND ALBUTEROL SULFATE 2.5; .5 MG/3ML; MG/3ML
3 SOLUTION RESPIRATORY (INHALATION) ONCE
Status: COMPLETED | OUTPATIENT
Start: 2023-12-05 | End: 2023-12-05

## 2023-12-05 RX ORDER — FUROSEMIDE 10 MG/ML
20 INJECTION INTRAMUSCULAR; INTRAVENOUS ONCE
Status: COMPLETED | OUTPATIENT
Start: 2023-12-05 | End: 2023-12-05

## 2023-12-05 RX ORDER — METHADONE HYDROCHLORIDE 5 MG/5ML
2.5 SOLUTION ORAL EVERY 12 HOURS
Status: DISCONTINUED | OUTPATIENT
Start: 2023-12-05 | End: 2023-12-11

## 2023-12-05 RX ORDER — GABAPENTIN 250 MG/5ML
300 SOLUTION ORAL 2 TIMES DAILY
Status: DISCONTINUED | OUTPATIENT
Start: 2023-12-05 | End: 2023-12-14

## 2023-12-05 RX ADMIN — METRONIDAZOLE 500 MG: 500 TABLET ORAL at 09:12

## 2023-12-05 RX ADMIN — ACETAMINOPHEN 650 MG: 650 SOLUTION ORAL at 23:46

## 2023-12-05 RX ADMIN — ASPIRIN 81 MG CHEWABLE TABLET 81 MG: 81 TABLET CHEWABLE at 09:11

## 2023-12-05 RX ADMIN — HYDROMORPHONE HYDROCHLORIDE 0.4 MG: 1 INJECTION, SOLUTION INTRAMUSCULAR; INTRAVENOUS; SUBCUTANEOUS at 20:29

## 2023-12-05 RX ADMIN — ACETAMINOPHEN 650 MG: 650 SOLUTION ORAL at 06:36

## 2023-12-05 RX ADMIN — METHADONE HYDROCHLORIDE 2.5 MG: 5 SOLUTION ORAL at 20:29

## 2023-12-05 RX ADMIN — OXYCODONE HYDROCHLORIDE 10 MG: 5 TABLET ORAL at 06:36

## 2023-12-05 RX ADMIN — OXYCODONE HYDROCHLORIDE 10 MG: 5 TABLET ORAL at 17:26

## 2023-12-05 RX ADMIN — POLYETHYLENE GLYCOL 3350 17 G: 17 POWDER, FOR SOLUTION ORAL at 09:12

## 2023-12-05 RX ADMIN — ENOXAPARIN SODIUM 40 MG: 100 INJECTION SUBCUTANEOUS at 20:29

## 2023-12-05 RX ADMIN — AMLODIPINE BESYLATE 10 MG: 10 TABLET ORAL at 09:11

## 2023-12-05 RX ADMIN — GABAPENTIN 300 MG: 250 SOLUTION ORAL at 23:43

## 2023-12-05 RX ADMIN — ACETAMINOPHEN 650 MG: 650 SOLUTION ORAL at 17:26

## 2023-12-05 RX ADMIN — ACETAMINOPHEN 650 MG: 650 SOLUTION ORAL at 10:43

## 2023-12-05 RX ADMIN — CHLORTHALIDONE 25 MG: 25 TABLET ORAL at 09:12

## 2023-12-05 RX ADMIN — OXYCODONE HYDROCHLORIDE 10 MG: 5 TABLET ORAL at 10:44

## 2023-12-05 RX ADMIN — Medication 5 MG: at 20:29

## 2023-12-05 RX ADMIN — HYDROMORPHONE HYDROCHLORIDE 0.4 MG: 1 INJECTION, SOLUTION INTRAMUSCULAR; INTRAVENOUS; SUBCUTANEOUS at 09:08

## 2023-12-05 RX ADMIN — ESOMEPRAZOLE MAGNESIUM 20 MG: 20 FOR SUSPENSION ORAL at 06:36

## 2023-12-05 RX ADMIN — LOSARTAN POTASSIUM 100 MG: 50 TABLET, FILM COATED ORAL at 09:11

## 2023-12-05 RX ADMIN — SENNOSIDES 5 ML: 8.8 LIQUID ORAL at 09:12

## 2023-12-05 RX ADMIN — SULFAMETHOXAZOLE AND TRIMETHOPRIM 160 MG: 800; 160 TABLET ORAL at 09:11

## 2023-12-05 RX ADMIN — ATORVASTATIN CALCIUM 80 MG: 80 TABLET, FILM COATED ORAL at 10:43

## 2023-12-05 RX ADMIN — POLYETHYLENE GLYCOL 3350 17 G: 17 POWDER, FOR SOLUTION ORAL at 20:29

## 2023-12-05 RX ADMIN — METRONIDAZOLE 500 MG: 500 TABLET ORAL at 23:43

## 2023-12-05 RX ADMIN — SULFAMETHOXAZOLE AND TRIMETHOPRIM 160 MG: 800; 160 TABLET ORAL at 20:29

## 2023-12-05 RX ADMIN — FUROSEMIDE 20 MG: 10 INJECTION, SOLUTION INTRAVENOUS at 13:13

## 2023-12-05 RX ADMIN — OXYCODONE HYDROCHLORIDE 10 MG: 5 TABLET ORAL at 23:43

## 2023-12-05 RX ADMIN — IPRATROPIUM BROMIDE AND ALBUTEROL SULFATE 3 ML: .5; 3 SOLUTION RESPIRATORY (INHALATION) at 17:42

## 2023-12-05 ASSESSMENT — COGNITIVE AND FUNCTIONAL STATUS - GENERAL
TOILETING: A LITTLE
MOVING TO AND FROM BED TO CHAIR: A LITTLE
MOVING FROM LYING ON BACK TO SITTING ON SIDE OF FLAT BED WITH BEDRAILS: A LITTLE
MOBILITY SCORE: 17
STANDING UP FROM CHAIR USING ARMS: A LITTLE
CLIMB 3 TO 5 STEPS WITH RAILING: A LOT
DRESSING REGULAR UPPER BODY CLOTHING: A LOT
TURNING FROM BACK TO SIDE WHILE IN FLAT BAD: A LITTLE
EATING MEALS: A LITTLE
DAILY ACTIVITIY SCORE: 16
DRESSING REGULAR LOWER BODY CLOTHING: A LOT
WALKING IN HOSPITAL ROOM: A LITTLE
PERSONAL GROOMING: A LITTLE
HELP NEEDED FOR BATHING: A LITTLE

## 2023-12-05 ASSESSMENT — PAIN SCALES - GENERAL
PAINLEVEL_OUTOF10: 7
PAINLEVEL_OUTOF10: 8
PAINLEVEL_OUTOF10: 7
PAINLEVEL_OUTOF10: 7

## 2023-12-05 ASSESSMENT — PAIN - FUNCTIONAL ASSESSMENT
PAIN_FUNCTIONAL_ASSESSMENT: 0-10

## 2023-12-05 NOTE — CARE PLAN
The clinical goals for the shift include patient will rate pain less than an 8/10 throughout shift 12/5 at 1900    Problem: Fall/Injury  Goal: Not fall by end of shift  Outcome: Progressing     Problem: Skin  Goal: Prevent/minimize sheer/friction injuries  Outcome: Progressing     Problem: Skin  Goal: Promote/optimize nutrition  Outcome: Progressing     Problem: Pain  Goal: My pain/discomfort is manageable  Outcome: Progressing     Problem: Safety  Goal: I will remain free of falls  Outcome: Progressing     Problem: Safety  Goal: Patient will be injury free during hospitalization  Outcome: Progressing

## 2023-12-05 NOTE — PROGRESS NOTES
"Nutrition Follow Up Assessment:   Nutrition Assessment    The patient is a 68 y.o. male who is hospital day #13.  PEG  and mediport placement on 11/27. Palliative rad to esophageal mass started 11/28 - now completed (20 Gy in 5 fx). Plan to start rad to axillary mass on 12/06.    PMHx: poorly differentiated metastatic SCC      Nutrition History:  Energy Intake: Good > 75 % (through nutrition support)  Food and Nutrient History: TF at 20 ml/hr on 11/30. TF at goal rate on 12/02 - tolerated well - transitioned to bolus regimen on 12/03. [employer] representative saw pt and wife on 12/04 to provide education on TF/bolus feeds and information on supplies delivery. Pt reports he has been getting 4 bolus feeds per day and that today he has only gotten 2 so far. States he is tolerating regimen well and denied N/V/abdominal pain. Endorses some constipation (no BM since 12/02 or 12/03). Usually would go everyday. Denied having any questions on TFs. Pt confirms he has received education on how to administer a bolus feed.  Vitamin/Herbal Supplement Use: thiamine during admission (now discontinued)  Food Allergies/Intolerances:  None  GI Symptoms: None         Anthropometrics:  Start of admission anthropometrics:  Height: 169 cm (5' 6.54\")  Weight: 76.5 kg (168 lb 11.2 oz)  BMI (Calculated): 26.79    IBW/kg (Dietitian Calculated): 66 kg  Percent of IBW: 116 %       Weight Change %:  Weight History / % Weight Change: No new wt since start of admission. Unable to determine if pt has had significant wt changes. Pt with significant wt loss PTA.             Nutrition Focused Physical Exam Findings:  defer: Pt appeared in pain/uncomfortable - deffered NFPE for pt's comfort    Edema:  Edema: none  Physical Findings:  Skin: Negative (mass @axilla)    Objective Data:    Last BM Date: 12/04/23    Nutrition Significant Labs:       Lab Units 12/05/23  0644 12/04/23  0926 12/03/23  0549 12/02/23  0909 12/01/23  0612   HEMOGLOBIN g/dL 9.3* 9.2* " 9.4* 9.5* 9.6*   MCV fL 91 91 91 91 91   GLUCOSE mg/dL 81 128* 150*  --  137*   POTASSIUM mmol/L 4.5 4.4 3.7  --  3.6   SODIUM mmol/L 137 139 141  --  141   PHOSPHORUS mg/dL 4.6 4.3 3.6  --  3.7   MAGNESIUM mg/dL 2.05 1.92 1.80  --  1.65   CREATININE mg/dL 1.04 0.99 0.83  --  0.66   BUN mg/dL 34* 28* 22  --  16    < > = values in this interval not displayed.       Nutrition Specific Medications:  atorvastatin, 80 mg, g-tube, Daily  esomeprazole, 20 mg, nasogastric tube, Daily before breakfast  gabapentin, 300 mg, g-tube, BID  polyethylene glycol, 17 g, g-tube, BID  senna, 5 mL, g-tube, Daily  sulfamethoxazole-trimethoprim, 160 mg, g-tube, q12h LEORA    I/O:   I/O last 2 completed shifts:  In: 1370 (17.9 mL/kg) [NG/GT:1370]  Out: - (0 mL/kg)   Weight: 76.5 kg     Dietary Orders (From admission, onward)       Start     Ordered    12/03/23 1132  Enteral feeding with NPO Isosource 1.5; PEG (percutaneous endoscopic gastric); 345; 4 times per day; 250; Water; Other (specify); Please give 60 ml FWF before and after each tube feed. Also give 260mL FWF BID.  Diet effective now        Comments: Hold 2 hours prior to radiation treatment   Question Answer Comment   Tube feeding formula: Isosource 1.5    Feeding route: PEG (percutaneous endoscopic gastric)    Tube feeding bolus (mL): 345    Tube feeding bolus frequency: 4 times per day    Tube feeding flush (mL): 250    Flush type: Water    Flush frequency: Other (specify)    Additional Details Please give 60 ml FWF before and after each tube feed. Also give 260mL FWF BID.        12/03/23 1133    11/26/23 0855  Oral nutritional supplements  Until discontinued        Comments: As tolerated, please hold if concerns for aspiration   Question Answer Comment   Deliver with All meals    Select supplement: Ensure Clear        11/26/23 0854                     Estimated Needs:   Total Energy Estimated Needs (kCal): 2050 kCal  Method for Estimating Needs: admit wt (76.5kg) * 27  kcal/kg    Total Protein Estimated Needs (g): 100 g  Method for Estimating Needs: admit wt (76.5kg) * 1.3 g/kg    Total Fluid Estimated Needs (mL): 2050 mL  Method for Estimating Needs: 1 ml/kcal or per team          Nutrition Diagnosis   Nutrition Diagnosis:  Malnutrition Diagnosis  Patient has Malnutrition Diagnosis: Yes  Diagnosis Status: Ongoing  Malnutrition Diagnosis: Severe malnutrition related to acute disease or injury  As Evidenced by: >5% wt loss in 1 month; reported >7.5% wt loss in 3 months; PO intake likely meeting <75% of nutr needs for >7 days  Additional Assessment Information: Nutrition status likely to improve in the upcoming weeks given TF initiation that meets 100% of nutr needs. Timeframe of malnutrition characteristics ~3 months -->borderline to meet chronic malnutrition dx.            Nutrition Interventions/Recommendations   Nutrition Interventions and Recommendations:    Obtain updated wt to determine if pt continues with wt loss  Can adjust TF regimen accordingly  Continue with current TF regimen of:   Isosource 1.5 bolus @ 345 ml x4/d (~5.5 cartons /d)  FWF: 60 ml pre/post feeds w/ additional 260 ml BID  This regimen provides: 1380 ml, 2070 kcal, 94 g protein, and 2054 ml free water         Nutrition Prescription:  Individualized Nutrition Prescription Provided for : 2050 kcal and 100g protein via enteral nutrition (Isosource bolus @345ml x4/d = 2070 kcal and 94g pro)        Nutrition Recommendation Details:   Food and/or Nutrient Delivery Interventions  Interventions: Enteral intake  Enteral Intake: Other (Comment) (Continuation of TF regimen)  Goal: TF to decrease wt loss rate    Coordination of Nutrition Care by a Nutrition Professional  Collaboration and Referral of Nutrition Care: Team meeting involving nutrition professional    Nutrition Education:    N/A - pt denied having questions + education on TF already given        Nutrition Monitoring and Evaluation   Monitoring/Evaluation:    Food/Nutrient Related History Monitoring  Monitoring and Evaluation Plan: Enteral and parenteral nutrition intake  Enteral and Parenteral Nutrition Intake: Enteral nutrition formula/solution, Enteral nutrition intake  Criteria: 100% of nutr needs    Body Composition/Growth/Weight History  Monitoring and Evaluation Plan: Weight  Weight: Weight change  Criteria: no wt change                Time Spent/Follow-up Reminder:   Follow Up  Time Spent (min): 45 minutes  Last Date of Nutrition Visit: 12/05/23  Nutrition Follow-Up Needed?: Dietitian to reassess per policy  Follow up Comment: bolus feeds via PEG

## 2023-12-05 NOTE — PROGRESS NOTES
SUPPORTIVE AND PALLIATIVE ONCOLOGY INPATIENT FOLLOW-UP      SERVICE DATE: 23     SUBJECTIVE:  Interval Events:  Patients wife April was at bedside providing support. She narrated how she is also a cancer patient and they are trying to do the best they can supporting each other. Patient reports discomfort to his right shoulder, stating that radiation will start tomorrow to that shoulder. Discussed starting him on long acting pain medication, methadone 2.5mg BID. Discussed this dose because patient is NPO, and uses peg tube for nutrition, thus using a long acting opioid that can be crushed was very important. Patient verbalized understanding and was receptive to plan.  Discussed also increasing his gabapentin to 300mg BID, and he was receptive.    Pain Assessment:  Location: Right arm  Duration: Constant  Characteristics:   Ratin   Descriptors: aching   Aggravating: movement    Relieving: Analgesics oxycodone    Interference with Function: None    Opioid Use  Past 24 h prn opioid use:  Oxycodone 10mg used 4 doses = 60mg OME   Total 24h OME use:  60mg OME    Symptom Assessment:       Information obtained from: chart review, interview of patient, interview of family, discussion with RN, and discussion with primary team  ______________________________________________________________________        OBJECTIVE:    Lab Results   Component Value Date    WBC 17.7 (H) 2023    HGB 9.3 (L) 2023    HCT 29.4 (L) 2023    MCV 91 2023     2023      Lab Results   Component Value Date    GLUCOSE 81 2023    CALCIUM 9.3 2023     2023    K 4.5 2023    CO2 26 2023    CL 98 2023    BUN 34 (H) 2023    CREATININE 1.04 2023     Lab Results   Component Value Date    ALT 18 2023    AST 21 2023    ALKPHOS 87 2023    BILITOT 0.6 2023     Estimated Creatinine Clearance: 62.5 mL/min (by C-G formula based on SCr of 1.04 mg/dL).      Scheduled medications  acetaminophen, 650 mg, g-tube, q4h  amLODIPine, 10 mg, g-tube, Daily  aspirin, 81 mg, g-tube, Daily  atorvastatin, 80 mg, g-tube, Daily  chlorthalidone, 25 mg, g-tube, Daily  enoxaparin, 40 mg, subcutaneous, q24h  esomeprazole, 20 mg, nasogastric tube, Daily before breakfast  fentaNYL PF, , ,   furosemide, 20 mg, intravenous, Once  gabapentin, 300 mg, oral, Daily with evening meal  losartan, 100 mg, g-tube, Daily  metroNIDAZOLE, 500 mg, oral, q8h LEORA  midazolam, , ,   polyethylene glycol, 17 g, g-tube, BID  senna, 5 mL, g-tube, Daily  sulfamethoxazole-trimethoprim, 160 mg, g-tube, q12h LEORA      Continuous medications     PRN medications  PRN medications: bisacodyl, fentaNYL PF, melatonin, meperidine, meperidine PF, midazolam, midazolam, oxyCODONE, oxyCODONE   }     PHYSICAL EXAMINATION:    Vital Signs:   Vital signs reviewed  Visit Vitals  /67 (BP Location: Left arm, Patient Position: Lying)   Pulse 96   Temp 36.2 °C (97.2 °F) (Temporal)   Resp 18        Pain Score: 7       Physical Exam  Constitutional:       Appearance: Normal appearance.   Eyes:      Pupils: Pupils are equal, round, and reactive to light.   Cardiovascular:      Heart sounds: Normal heart sounds.   Pulmonary:      Breath sounds: Normal breath sounds.   Abdominal:      General: Bowel sounds are normal.      Palpations: Abdomen is soft.   Skin:     General: Skin is warm.   Neurological:      Mental Status: He is alert and oriented to person, place, and time.   Psychiatric:         Mood and Affect: Mood normal.         Behavior: Behavior normal.        ASSESSMENT/PLAN:  Mike Olson is a 68 y.o. 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 PMH significant for HTN, CVA (9/2023). Admitted 11/22/2023 for further evaluation and management of worsening dysphagia.Supportive and Palliative Oncology is consulted for pain management.      Pain:  cancer pain related to  "cancer  Pain is: cancer related pain  Type: somatic  Pain control: well-controlled  Home regimen:  Acetaminophen 650mg q 8hrs   Intolerances/previously tried: tramadol 50mg tablet was used in the past   Personalized pain goal: 0  Pain rated 4/10, described as \"sharp pain\".  Total OME usage for the past 24 hours:  30mg OME  Continue Oxycodone 10mg q 4hours   Consider adding methadone 2.5mg liquid bid   Get Baseline EKG today with initiation of methadone.  Continue acetaminophen 650mg q4hrs PRN   Consider changing gabapentin to 300mg BID [hold for sedation]     Constipation  At risk for constipation related to opioids, currently constipated  Usual bowel pattern: every day  Home regimen: none  LBM 12/3/23  Continue Miralax 17 grams BID  continue senna 5ml liquid q day     continue bisacodyl suppository 10mg daily/PRN       Sleeping Difficulty:  Impaired sleep related to pain  Home regimen:  none  Gabapentin as above.  Patient reports that he cannot get comfortable, especially sleeping on right arm.      Decreased appetite:  Appetite loss related to chemotherapy, taste changes, and disease process  Nutrition not consulted   Home regimen:  none  Patient has a pegtube and would continue to do well.   Artificial nutrition initiated.      Goals of Care/ Minimum acceptable Quality of Life Measures   Patient's current clinical condition, including diagnosis, prognosis, and management plan, and goals of care were discussed.      Patients endorsed Goals: \"to get the hell out here\".   Jazzy: \"sitting around doing nothing with [his] wife and dog\"  Minimum acceptable outcome/QOL:  patient needs to discuss with wife and would be ready to provide details in later encounter.   Code status discussion/patients Code status:        #Palliative Medicine encounter: Palliative care was introduced as a service for patients with serious illness to improve quality of life, including helping with pain and other bothersome symptoms, clarifying " patient's values and priorities to help align the patient's care with their goals, and providing support to patients and families.   Declined  for spiritual Support   Declined Music therapy for coping    Declined Art Therapy for legacy building   Welcomed Pet Therapy for Emotional Support   Coordination of Care   Supportive Listening        Advance Directives/Advance Care Planning  Existence of Advance Directives:   Decision maker/ NOK: Surrogate decision maker is chey Black @[phone]  Code Status: Full code     [place] Surrogate Decision Maker Hierarchy     1. Court-appointed Guardian  2. Healthcare Power of   3. Legal Spouse  4. Majority of Adult Children (consensus if possible)  5. Parents  6. Majority of Adult Siblings (consensus if possible)  7. Nearest Blood Relative     Supportive Interventions: Interventions: Music Therapy: offered declined, Art Therapy: offered declined     Disposition:  Please  start the process of having prior authorization with meds to beds deliver medications [facility] patient [email or web address]. Prescriptions will need to be sent 48-72 hours prior to discharge so that a prior authorization can be completed.      Discharge date: unknown pending acute issues  Will assess if patient needs an appointment with Outpatient Supportive Oncology as appropriate.    Signature and billing:  Thank you for allowing us to participate in the care of this patient. Recommendations will be communicated back to the consulting service by way of shared electronic medical record or face-to-face.    Medical complexity was high level due to due to complexity of problems, extensive data review, and high risk of management/treatment.    I spent 50 minutes in the care of this patient which included chart review, interviewing patient/family, discussion with primary team, coordination of care, and documentation.    Data:   Diagnostic tests and information reviewed for today's  visit:  Conversation with primary team       Some elements copied from supportive oncology note on 12/01/2023, the elements have been updated and all reflect current decision making from today, 12/05/23       Plan of Care discussed with: Provider, RN, Patient, Provider, and Pharmacist    Thank you for asking Supportive and Palliative Oncology to assist with care of this patient.  We will continue to follow  Please contact us for additional questions or concerns.      SIGNATURE: JEFF Saldivar-CNP   PAGER/CONTACT:  Contact information:  Supportive and Palliative Oncology  Monday-Friday 8 AM-5 PM  Epic Secure chat or pager [phone].  After hours and weekends:  pager [phone]

## 2023-12-05 NOTE — PROGRESS NOTES
Physical Therapy                 Therapy Communication Note    Patient Name: Mike Olson  MRN: [id number]  Today's Date: 12/5/2023     Discipline: Physical Therapy    Missed Visit Reason: Missed Visit Reason:  (Attempted follow up however pt. unwilling- Wife? in room states that he is in pain because different staff members have been touching his underarm where the pain is currently.)    Missed Time: Attempt    Comment:

## 2023-12-05 NOTE — PROGRESS NOTES
Mike Olson is a 68 y.o. male on day 13 of admission presenting with Difficulty swallowing.    Subjective   Patient seen and evaluated at the bedside this morning. He is having more pain this morning in the same right axilla. He was given IV dilaudid once for breakthrough that brought the pain down. We will speak with supportive oncology as patient needs better long term pain control. He will go for CT sim today and begin first fraction of pRT to right axilla tomorrow. Will repeat a chest xray as patient has steady white count, yellow sputum, and subjective warmth overnight. No reported fevers.     Patient denying fevers, chills, shortness of breath, chest pain, headache, vision changes, abdominal pain, diarrhea, constipation.       Objective     Physical Exam  Constitutional:       Appearance: He is not ill-appearing or toxic-appearing.      Comments: Mildly uncomfortable   HENT:      Nose: No congestion or rhinorrhea.      Mouth/Throat:      Mouth: Mucous membranes are moist.   Eyes:      General: No scleral icterus.     Extraocular Movements: Extraocular movements intact.      Pupils: Pupils are equal, round, and reactive to light.   Cardiovascular:      Rate and Rhythm: Normal rate and regular rhythm.      Heart sounds: No murmur heard.     No friction rub. No gallop.   Pulmonary:      Effort: Pulmonary effort is normal. No respiratory distress.      Breath sounds: No stridor. No wheezing, rhonchi or rales.      Comments: Diminished breath sounds on the right LL  Abdominal:      General: Bowel sounds are normal. There is no distension.      Palpations: Abdomen is soft. There is no mass.      Tenderness: There is no abdominal tenderness. There is no guarding or rebound.      Comments: PEG tube in place with small amount of crusted drainage surrounding the outside    Musculoskeletal:         General: No swelling or tenderness. Normal range of motion.      Cervical back: Normal range of motion.      Right lower leg:  "No edema.      Left lower leg: No edema.   Skin:     General: Skin is warm.      Capillary Refill: Capillary refill takes less than 2 seconds.      Coloration: Skin is not jaundiced.      Findings: No bruising or rash.      Comments: Axillary mass covered with gauze with no strikethrough appreciated    Neurological:      General: No focal deficit present.      Mental Status: He is oriented to person, place, and time. Mental status is at baseline.      Cranial Nerves: No cranial nerve deficit.         Last Recorded Vitals  Blood pressure 108/67, pulse 96, temperature 36.2 °C (97.2 °F), temperature source Temporal, resp. rate 18, height 1.69 m (5' 6.54\"), weight 76.5 kg (168 lb 11.2 oz), SpO2 95 %.  Intake/Output last 3 Shifts:  I/O last 3 completed shifts:  In: 1550 (20.3 mL/kg) [NG/GT:1550]  Out: 850 (11.1 mL/kg) [Urine:850 (0.3 mL/kg/hr)]  Weight: 76.5 kg     Relevant Results  Scheduled medications  acetaminophen, 650 mg, g-tube, q4h  amLODIPine, 10 mg, g-tube, Daily  aspirin, 81 mg, g-tube, Daily  atorvastatin, 80 mg, g-tube, Daily  chlorthalidone, 25 mg, g-tube, Daily  enoxaparin, 40 mg, subcutaneous, q24h  esomeprazole, 20 mg, nasogastric tube, Daily before breakfast  fentaNYL PF, , ,   gabapentin, 300 mg, oral, Daily with evening meal  losartan, 100 mg, g-tube, Daily  metroNIDAZOLE, 500 mg, oral, q8h LEORA  midazolam, , ,   polyethylene glycol, 17 g, g-tube, BID  senna, 5 mL, g-tube, Daily  sulfamethoxazole-trimethoprim, 160 mg, g-tube, q12h LEORA      Continuous medications     PRN medications  PRN medications: bisacodyl, fentaNYL PF, melatonin, meperidine, meperidine PF, midazolam, midazolam, oxyCODONE, oxyCODONE      Results for orders placed or performed during the hospital encounter of 11/22/23 (from the past 24 hour(s))   CBC and Auto Differential   Result Value Ref Range    WBC 17.7 (H) 4.4 - 11.3 x10*3/uL    nRBC 0.0 0.0 - 0.0 /100 WBCs    RBC 3.24 (L) 4.50 - 5.90 x10*6/uL    Hemoglobin 9.3 (L) 13.5 - 17.5 " g/dL    Hematocrit 29.4 (L) 41.0 - 52.0 %    MCV 91 80 - 100 fL    MCH 28.7 26.0 - 34.0 pg    MCHC 31.6 (L) 32.0 - 36.0 g/dL    RDW 13.0 11.5 - 14.5 %    Platelets 324 150 - 450 x10*3/uL    Neutrophils % 80.0 40.0 - 80.0 %    Immature Granulocytes %, Automated 3.6 (H) 0.0 - 0.9 %    Lymphocytes % 4.8 13.0 - 44.0 %    Monocytes % 8.9 2.0 - 10.0 %    Eosinophils % 2.2 0.0 - 6.0 %    Basophils % 0.5 0.0 - 2.0 %    Neutrophils Absolute 14.14 (H) 1.20 - 7.70 x10*3/uL    Immature Granulocytes Absolute, Automated 0.64 0.00 - 0.70 x10*3/uL    Lymphocytes Absolute 0.84 (L) 1.20 - 4.80 x10*3/uL    Monocytes Absolute 1.57 (H) 0.10 - 1.00 x10*3/uL    Eosinophils Absolute 0.39 0.00 - 0.70 x10*3/uL    Basophils Absolute 0.08 0.00 - 0.10 x10*3/uL   Renal Function Panel   Result Value Ref Range    Glucose 81 74 - 99 mg/dL    Sodium 137 136 - 145 mmol/L    Potassium 4.5 3.5 - 5.3 mmol/L    Chloride 98 98 - 107 mmol/L    Bicarbonate 26 21 - 32 mmol/L    Anion Gap 18 10 - 20 mmol/L    Urea Nitrogen 34 (H) 6 - 23 mg/dL    Creatinine 1.04 0.50 - 1.30 mg/dL    eGFR 78 >60 mL/min/1.73m*2    Calcium 9.3 8.6 - 10.6 mg/dL    Phosphorus 4.6 2.5 - 4.9 mg/dL    Albumin 2.5 (L) 3.4 - 5.0 g/dL   Magnesium   Result Value Ref Range    Magnesium 2.05 1.60 - 2.40 mg/dL                Malnutrition Diagnosis Status: New  Malnutrition Diagnosis: Severe malnutrition related to acute disease or injury  As Evidenced by: >5% wt loss in 1 month; reported >7.5% wt loss in 3 months; PO intake likely meeting <75% of nutr needs for >7 days  I agree with the dietitian's malnutrition diagnosis.      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 Given new diagnosis, no treatment has been started yet and no known origin yet. Will discuss with Med Onc (Dr. Yin was to be his primary oncologist), Surg Onc (Dr Corona had already seen pt) and will also consult GI for EGD/biopsy. EGD on 11/24 showed large, nearly fully obstructing esophageal mass. Patient is not a surgical candidate given metastatic disease. Managing nutrition and need for chemo/radiation. PEG in place.      Updates 12/5:  - Chest xray repeat today   - Give 20 IV lasix  - CT SIM today, pRT to begin to right axilla tomorrow  - Condensed feeds to bolus 345mL 4 times per day, will and pre/post FWF with 60mL and then additional 260mg BID        #Metastatic SCC, poorly differentiated   #Dysphagia   #Leukocytosis  - Pathology Microscopic examination of H&E sections demonstrates a poorly differentiated carcinoma , immunoreactive with CK7 and p40 , infiltrating soft tissue.  No residual lymph node parenchyma is seen.  The following immunostains are negative: NKX3.1, GATA3, CK20, TTF-1 and CDX2   -Diagnosed 11/2023; no treatment yet; unknown origin  -Surg Onc consulted; appreciate recs (saw Dr. Corona outpatient 11/10/23)  -SLP consulted for swallow evaluation and recommended NPO  -EGD/biopsy 11/24:  large, nearly fully obstructing esophageal mass. Patient is not a surgical candidate because of metastatic disease.   -Plan to inform Dr. Yin (future medical oncologist-1st appt scheduled 12/1)  -Consider consulting pulm for possible lung biopsy as potential origin   -Tylenol PRN for pain; patient denies any pain currently  - Nutrition consulted: 1. Start 100mg IV thiamine x5 days;  Isosource 1.5 @ 25 ml/hr. Advance slowly by 10 ml/hr q8h, as tolerated, to goal rate of 55 ml/hr.   - CT SIM 11/27  - 5/5 fraction of pRT to esophageal mass 12/4 completed  [ ] CT SIM today for right axillary  mass  [ ] To begin pRT to right axillary mass tomorrow for 5 fractions  - Chest X-ray today;  will give V Lasix 20 mg   [ ] Pending Procal   - s/p PEG tube placement 11/27  - Tube Feeds: 1.5 @ 25 ml/hr. Advance slowly by 10 ml/hr q8h, as tolerated, to goal rate of 55 ml/hr. Currently, at goal.   - 345ml QID bolus feeds, FWF 60ml before and after feed as well as 260ml BID   - 100mg IV thiamine x5 days   - FWF: 250 ml 4x/day  -Bactrim for SSTI for 7 days (12/2- 12/8)  -Respiratory sputum culture with salivary contimination      #MARYELLEN (resolved)  -Baseline Cr ~1; on admission 1.48, was 1.50 on 11/16 -> now at baseline 1.04 on 11/23  -S/p 1L LR, IV LR infusion at 125cc/hr, given improvement in Cr likely pre-renal  -Restarted home losartan 100mg daily  -Continue to monitor      #HTN  -Continue home amlodipine 10mg daily  -Holding chlorithalidone 25mg daily given hypercalcemia  -Continue ARB as MARYELLEN has resolved     #CVA 9/2023  #Carotid stenosis  -2 acute or subacute infarcts seen on MRI 9/18/2023  -Continue ASA 81mg daily  -Completed 21 days of plavix (started 9/18/23)  -Continue atorvastatin 80mg daily  -Vascular Surgery follow up outpatient for the carotid stenosis     #Hypercalcemia, resolved  -Ca 11.5 on admission  -S/p 1L LR in the ED, on LR infusion at 125cc/hr  -Repeat was 11.3 on 11/23, so avoiding LR in favor of NS     #Elevated PSA  -Urology follow up outpatient     Disposition: tele  Follow-ups: PCP, Onc, Surg Onc,      F: prn  E: prn   N: CLD as tolerated +Boost  A: PIVs     DVT ppx: lovenox  GI ppx: home PPI    Code Status: FULL (confirmed on 11/22/23)  Surrogate Medical Decision-maker: Wife April [phone]                 Sid Jose MD

## 2023-12-06 ENCOUNTER — HOSPITAL ENCOUNTER (OUTPATIENT)
Dept: RADIATION ONCOLOGY | Facility: HOSPITAL | Age: 68
Setting detail: RADIATION/ONCOLOGY SERIES
Discharge: HOME | End: 2023-12-06
Payer: OTHER MISCELLANEOUS

## 2023-12-06 DIAGNOSIS — C77.3 SECONDARY AND UNSPECIFIED MALIGNANT NEOPLASM OF AXILLA AND UPPER LIMB LYMPH NODES (MULTI): ICD-10-CM

## 2023-12-06 DIAGNOSIS — C15.3 MALIGNANT NEOPLASM OF UPPER THIRD OF ESOPHAGUS (MULTI): ICD-10-CM

## 2023-12-06 DIAGNOSIS — Z51.0 ENCOUNTER FOR ANTINEOPLASTIC RADIATION THERAPY: ICD-10-CM

## 2023-12-06 LAB
ALBUMIN SERPL BCP-MCNC: 2.3 G/DL (ref 3.4–5)
ALBUMIN SERPL BCP-MCNC: 2.4 G/DL (ref 3.4–5)
ANION GAP SERPL CALC-SCNC: 14 MMOL/L (ref 10–20)
ANION GAP SERPL CALC-SCNC: 15 MMOL/L (ref 10–20)
APPEARANCE UR: CLEAR
BASOPHILS # BLD AUTO: 0.05 X10*3/UL (ref 0–0.1)
BASOPHILS NFR BLD AUTO: 0.3 %
BILIRUB UR STRIP.AUTO-MCNC: NEGATIVE MG/DL
BUN SERPL-MCNC: 39 MG/DL (ref 6–23)
BUN SERPL-MCNC: 42 MG/DL (ref 6–23)
CALCIUM SERPL-MCNC: 8.9 MG/DL (ref 8.6–10.6)
CALCIUM SERPL-MCNC: 9 MG/DL (ref 8.6–10.6)
CHLORIDE SERPL-SCNC: 95 MMOL/L (ref 98–107)
CHLORIDE SERPL-SCNC: 96 MMOL/L (ref 98–107)
CO2 SERPL-SCNC: 28 MMOL/L (ref 21–32)
CO2 SERPL-SCNC: 28 MMOL/L (ref 21–32)
COLOR UR: YELLOW
CREAT SERPL-MCNC: 1.17 MG/DL (ref 0.5–1.3)
CREAT SERPL-MCNC: 1.24 MG/DL (ref 0.5–1.3)
EOSINOPHIL # BLD AUTO: 0.38 X10*3/UL (ref 0–0.7)
EOSINOPHIL NFR BLD AUTO: 2.2 %
ERYTHROCYTE [DISTWIDTH] IN BLOOD BY AUTOMATED COUNT: 12.9 % (ref 11.5–14.5)
GFR SERPL CREATININE-BSD FRML MDRD: 63 ML/MIN/1.73M*2
GFR SERPL CREATININE-BSD FRML MDRD: 68 ML/MIN/1.73M*2
GLUCOSE SERPL-MCNC: 119 MG/DL (ref 74–99)
GLUCOSE SERPL-MCNC: 90 MG/DL (ref 74–99)
GLUCOSE UR STRIP.AUTO-MCNC: NEGATIVE MG/DL
HCT VFR BLD AUTO: 24.8 % (ref 41–52)
HGB BLD-MCNC: 8.4 G/DL (ref 13.5–17.5)
HOLD SPECIMEN: NORMAL
IMM GRANULOCYTES # BLD AUTO: 0.74 X10*3/UL (ref 0–0.7)
IMM GRANULOCYTES NFR BLD AUTO: 4.3 % (ref 0–0.9)
KETONES UR STRIP.AUTO-MCNC: NEGATIVE MG/DL
LEUKOCYTE ESTERASE UR QL STRIP.AUTO: NEGATIVE
LYMPHOCYTES # BLD AUTO: 1.13 X10*3/UL (ref 1.2–4.8)
LYMPHOCYTES NFR BLD AUTO: 6.5 %
MAGNESIUM SERPL-MCNC: 2.04 MG/DL (ref 1.6–2.4)
MCH RBC QN AUTO: 28.2 PG (ref 26–34)
MCHC RBC AUTO-ENTMCNC: 33.9 G/DL (ref 32–36)
MCV RBC AUTO: 83 FL (ref 80–100)
MONOCYTES # BLD AUTO: 1.79 X10*3/UL (ref 0.1–1)
MONOCYTES NFR BLD AUTO: 10.4 %
NEUTROPHILS # BLD AUTO: 13.17 X10*3/UL (ref 1.2–7.7)
NEUTROPHILS NFR BLD AUTO: 76.3 %
NITRITE UR QL STRIP.AUTO: NEGATIVE
NRBC BLD-RTO: 0 /100 WBCS (ref 0–0)
PH UR STRIP.AUTO: 5 [PH]
PHOSPHATE SERPL-MCNC: 4.2 MG/DL (ref 2.5–4.9)
PHOSPHATE SERPL-MCNC: 4.2 MG/DL (ref 2.5–4.9)
PLATELET # BLD AUTO: 295 X10*3/UL (ref 150–450)
POTASSIUM SERPL-SCNC: 4.5 MMOL/L (ref 3.5–5.3)
POTASSIUM SERPL-SCNC: 4.7 MMOL/L (ref 3.5–5.3)
PROT UR STRIP.AUTO-MCNC: NEGATIVE MG/DL
RAD ONC MSQ ACTUAL FRACTIONS DELIVERED: 2
RAD ONC MSQ ACTUAL SESSION DELIVERED DOSE: 400 CGRAY
RAD ONC MSQ ACTUAL TOTAL DOSE: 800 CGRAY
RAD ONC MSQ ELAPSED DAYS: 1
RAD ONC MSQ LAST DATE: NORMAL
RAD ONC MSQ PRESCRIBED FRACTIONAL DOSE: 400 CGRAY
RAD ONC MSQ PRESCRIBED NUMBER OF FRACTIONS: 5
RAD ONC MSQ PRESCRIBED TECHNIQUE: NORMAL
RAD ONC MSQ PRESCRIBED TOTAL DOSE: 2000 CGRAY
RAD ONC MSQ PRESCRIPTION PATTERN COMMENT: NORMAL
RAD ONC MSQ START DATE: NORMAL
RAD ONC MSQ TREATMENT COURSE NUMBER: 2
RAD ONC MSQ TREATMENT SITE: NORMAL
RBC # BLD AUTO: 2.98 X10*6/UL (ref 4.5–5.9)
RBC # UR STRIP.AUTO: NEGATIVE /UL
SODIUM SERPL-SCNC: 133 MMOL/L (ref 136–145)
SODIUM SERPL-SCNC: 133 MMOL/L (ref 136–145)
SP GR UR STRIP.AUTO: 1.02
UROBILINOGEN UR STRIP.AUTO-MCNC: <2 MG/DL
WBC # BLD AUTO: 17.3 X10*3/UL (ref 4.4–11.3)

## 2023-12-06 PROCEDURE — 83735 ASSAY OF MAGNESIUM: CPT

## 2023-12-06 PROCEDURE — S0109 METHADONE ORAL 5MG: HCPCS

## 2023-12-06 PROCEDURE — 2500000001 HC RX 250 WO HCPCS SELF ADMINISTERED DRUGS (ALT 637 FOR MEDICARE OP)

## 2023-12-06 PROCEDURE — 77014 CHG CT GUIDANCE RADIATION THERAPY FLDS PLACEMENT: CPT | Performed by: STUDENT IN AN ORGANIZED HEALTH CARE EDUCATION/TRAINING PROGRAM

## 2023-12-06 PROCEDURE — 80069 RENAL FUNCTION PANEL: CPT

## 2023-12-06 PROCEDURE — 81003 URINALYSIS AUTO W/O SCOPE: CPT

## 2023-12-06 PROCEDURE — 77427 RADIATION TX MANAGEMENT X5: CPT | Performed by: STUDENT IN AN ORGANIZED HEALTH CARE EDUCATION/TRAINING PROGRAM

## 2023-12-06 PROCEDURE — 97530 THERAPEUTIC ACTIVITIES: CPT | Mod: GP,CQ

## 2023-12-06 PROCEDURE — 2500000002 HC RX 250 W HCPCS SELF ADMINISTERED DRUGS (ALT 637 FOR MEDICARE OP, ALT 636 FOR OP/ED)

## 2023-12-06 PROCEDURE — 77336 RADIATION PHYSICS CONSULT: CPT | Performed by: STUDENT IN AN ORGANIZED HEALTH CARE EDUCATION/TRAINING PROGRAM

## 2023-12-06 PROCEDURE — 99233 SBSQ HOSP IP/OBS HIGH 50: CPT | Performed by: NURSE PRACTITIONER

## 2023-12-06 PROCEDURE — 77412 RADIATION TX DELIVERY LVL 3: CPT | Performed by: STUDENT IN AN ORGANIZED HEALTH CARE EDUCATION/TRAINING PROGRAM

## 2023-12-06 PROCEDURE — 2500000004 HC RX 250 GENERAL PHARMACY W/ HCPCS (ALT 636 FOR OP/ED)

## 2023-12-06 PROCEDURE — 1170000001 HC PRIVATE ONCOLOGY ROOM DAILY

## 2023-12-06 PROCEDURE — 96372 THER/PROPH/DIAG INJ SC/IM: CPT | Performed by: STUDENT IN AN ORGANIZED HEALTH CARE EDUCATION/TRAINING PROGRAM

## 2023-12-06 PROCEDURE — 77387 GUIDANCE FOR RADJ TX DLVR: CPT | Performed by: STUDENT IN AN ORGANIZED HEALTH CARE EDUCATION/TRAINING PROGRAM

## 2023-12-06 PROCEDURE — 85025 COMPLETE CBC W/AUTO DIFF WBC: CPT

## 2023-12-06 PROCEDURE — 99233 SBSQ HOSP IP/OBS HIGH 50: CPT | Performed by: INTERNAL MEDICINE

## 2023-12-06 PROCEDURE — 2500000004 HC RX 250 GENERAL PHARMACY W/ HCPCS (ALT 636 FOR OP/ED): Performed by: STUDENT IN AN ORGANIZED HEALTH CARE EDUCATION/TRAINING PROGRAM

## 2023-12-06 RX ORDER — FUROSEMIDE 10 MG/ML
20 INJECTION INTRAMUSCULAR; INTRAVENOUS ONCE
Status: COMPLETED | OUTPATIENT
Start: 2023-12-06 | End: 2023-12-06

## 2023-12-06 RX ADMIN — AMLODIPINE BESYLATE 10 MG: 10 TABLET ORAL at 08:43

## 2023-12-06 RX ADMIN — ACETAMINOPHEN 650 MG: 650 SOLUTION ORAL at 15:46

## 2023-12-06 RX ADMIN — SULFAMETHOXAZOLE AND TRIMETHOPRIM 160 MG: 800; 160 TABLET ORAL at 08:43

## 2023-12-06 RX ADMIN — POLYETHYLENE GLYCOL 3350 17 G: 17 POWDER, FOR SOLUTION ORAL at 08:42

## 2023-12-06 RX ADMIN — OXYCODONE HYDROCHLORIDE 10 MG: 5 TABLET ORAL at 20:51

## 2023-12-06 RX ADMIN — METHADONE HYDROCHLORIDE 2.5 MG: 5 SOLUTION ORAL at 19:00

## 2023-12-06 RX ADMIN — SENNOSIDES 5 ML: 8.8 LIQUID ORAL at 08:40

## 2023-12-06 RX ADMIN — FUROSEMIDE 20 MG: 10 INJECTION, SOLUTION INTRAVENOUS at 13:53

## 2023-12-06 RX ADMIN — OXYCODONE HYDROCHLORIDE 10 MG: 5 TABLET ORAL at 05:07

## 2023-12-06 RX ADMIN — METHADONE HYDROCHLORIDE 2.5 MG: 5 SOLUTION ORAL at 08:39

## 2023-12-06 RX ADMIN — GABAPENTIN 300 MG: 250 SOLUTION ORAL at 09:00

## 2023-12-06 RX ADMIN — ASPIRIN 81 MG CHEWABLE TABLET 81 MG: 81 TABLET CHEWABLE at 08:42

## 2023-12-06 RX ADMIN — SULFAMETHOXAZOLE AND TRIMETHOPRIM 160 MG: 800; 160 TABLET ORAL at 20:51

## 2023-12-06 RX ADMIN — OXYCODONE HYDROCHLORIDE 10 MG: 5 TABLET ORAL at 08:40

## 2023-12-06 RX ADMIN — ESOMEPRAZOLE MAGNESIUM 20 MG: 20 FOR SUSPENSION ORAL at 05:07

## 2023-12-06 RX ADMIN — CHLORTHALIDONE 25 MG: 25 TABLET ORAL at 09:50

## 2023-12-06 RX ADMIN — METRONIDAZOLE 500 MG: 500 TABLET ORAL at 13:56

## 2023-12-06 RX ADMIN — OXYCODONE HYDROCHLORIDE 10 MG: 5 TABLET ORAL at 15:46

## 2023-12-06 RX ADMIN — ATORVASTATIN CALCIUM 80 MG: 80 TABLET, FILM COATED ORAL at 08:42

## 2023-12-06 RX ADMIN — GABAPENTIN 300 MG: 250 SOLUTION ORAL at 20:51

## 2023-12-06 RX ADMIN — ACETAMINOPHEN 650 MG: 650 SOLUTION ORAL at 11:28

## 2023-12-06 RX ADMIN — ENOXAPARIN SODIUM 40 MG: 100 INJECTION SUBCUTANEOUS at 20:50

## 2023-12-06 RX ADMIN — ACETAMINOPHEN 650 MG: 650 SOLUTION ORAL at 05:07

## 2023-12-06 RX ADMIN — LOSARTAN POTASSIUM 100 MG: 50 TABLET, FILM COATED ORAL at 08:42

## 2023-12-06 ASSESSMENT — COGNITIVE AND FUNCTIONAL STATUS - GENERAL
PERSONAL GROOMING: A LOT
TURNING FROM BACK TO SIDE WHILE IN FLAT BAD: A LITTLE
EATING MEALS: A LITTLE
PERSONAL GROOMING: A LITTLE
DAILY ACTIVITIY SCORE: 11
TOILETING: A LOT
HELP NEEDED FOR BATHING: A LOT
STANDING UP FROM CHAIR USING ARMS: A LOT
WALKING IN HOSPITAL ROOM: A LITTLE
CLIMB 3 TO 5 STEPS WITH RAILING: TOTAL
MOBILITY SCORE: 11
DRESSING REGULAR UPPER BODY CLOTHING: A LITTLE
MOBILITY SCORE: 11
MOBILITY SCORE: 17
MOVING FROM LYING ON BACK TO SITTING ON SIDE OF FLAT BED WITH BEDRAILS: A LITTLE
WALKING IN HOSPITAL ROOM: A LOT
DRESSING REGULAR LOWER BODY CLOTHING: A LOT
MOVING FROM LYING ON BACK TO SITTING ON SIDE OF FLAT BED WITH BEDRAILS: A LOT
STANDING UP FROM CHAIR USING ARMS: A LITTLE
STANDING UP FROM CHAIR USING ARMS: A LOT
MOVING TO AND FROM BED TO CHAIR: A LOT
MOVING TO AND FROM BED TO CHAIR: A LITTLE
CLIMB 3 TO 5 STEPS WITH RAILING: TOTAL
DRESSING REGULAR UPPER BODY CLOTHING: A LOT
TURNING FROM BACK TO SIDE WHILE IN FLAT BAD: A LOT
CLIMB 3 TO 5 STEPS WITH RAILING: A LOT
EATING MEALS: TOTAL
MOVING TO AND FROM BED TO CHAIR: A LOT
TURNING FROM BACK TO SIDE WHILE IN FLAT BAD: A LOT
WALKING IN HOSPITAL ROOM: A LOT
TOILETING: A LITTLE
DRESSING REGULAR LOWER BODY CLOTHING: A LOT
DAILY ACTIVITIY SCORE: 17
HELP NEEDED FOR BATHING: A LITTLE
MOVING FROM LYING ON BACK TO SITTING ON SIDE OF FLAT BED WITH BEDRAILS: A LOT

## 2023-12-06 ASSESSMENT — PAIN SCALES - GENERAL
PAINLEVEL_OUTOF10: 7
PAINLEVEL_OUTOF10: 7
PAINLEVEL_OUTOF10: 5 - MODERATE PAIN
PAINLEVEL_OUTOF10: 7

## 2023-12-06 ASSESSMENT — PAIN - FUNCTIONAL ASSESSMENT
PAIN_FUNCTIONAL_ASSESSMENT: 0-10
PAIN_FUNCTIONAL_ASSESSMENT: 0-10

## 2023-12-06 NOTE — CARE PLAN
Problem: Fall/Injury  Goal: Not fall by end of shift  Outcome: Progressing  Goal: Be free from injury by end of the shift  Outcome: Progressing  Goal: Verbalize understanding of personal risk factors for fall in the hospital  Outcome: Progressing  Goal: Verbalize understanding of risk factor reduction measures to prevent injury from fall in the home  Outcome: Progressing  Goal: Use assistive devices by end of the shift  Outcome: Progressing  Goal: Pace activities to prevent fatigue by end of the shift  Outcome: Progressing     Problem: Skin  Goal: Decreased wound size/increased tissue granulation at next dressing change  Outcome: Progressing  Flowsheets (Taken 12/6/2023 1206)  Decreased wound size/increased tissue granulation at next dressing change:   Promote sleep for wound healing   Protective dressings over bony prominences  Goal: Participates in plan/prevention/treatment measures  Outcome: Progressing  Flowsheets (Taken 12/6/2023 1206)  Participates in plan/prevention/treatment measures:   Discuss with provider PT/OT consult   Elevate heels  Goal: Prevent/manage excess moisture  Outcome: Progressing  Flowsheets (Taken 12/6/2023 1206)  Prevent/manage excess moisture:   Moisturize dry skin   Follow provider orders for dressing changes   Cleanse incontinence/protect with barrier cream  Goal: Prevent/minimize sheer/friction injuries  Outcome: Progressing  Flowsheets (Taken 12/6/2023 1206)  Prevent/minimize sheer/friction injuries:   HOB 30 degrees or less   Use pull sheet  Goal: Promote/optimize nutrition  Outcome: Progressing  Flowsheets (Taken 12/6/2023 1206)  Promote/optimize nutrition:   Consume > 50% meals/supplements   Offer water/supplements/favorite foods   Monitor/record intake including meals  Goal: Promote skin healing  Outcome: Progressing  Flowsheets (Taken 12/6/2023 1206)  Promote skin healing:   Assess skin/pad under line(s)/device(s)   Turn/reposition every 2 hours/use positioning/transfer  devices   Rotate device position/do not position patient on device     Problem: Pain  Goal: My pain/discomfort is manageable  Outcome: Progressing     Problem: Safety  Goal: Patient will be injury free during hospitalization  Outcome: Progressing  Goal: I will remain free of falls  Outcome: Progressing     Problem: Daily Care  Goal: Daily care needs are met  Outcome: Progressing     Problem: Psychosocial Needs  Goal: Demonstrates ability to cope with hospitalization/illness  Outcome: Progressing  Goal: Collaborate with me, my family, and caregiver to identify my specific goals  Outcome: Progressing     Problem: Discharge Barriers  Goal: My discharge needs are met  Outcome: Progressing   The patient's goals for the shift include  rate pain at acceptable level    The clinical goals for the shift include pt will have controlled pain throughout shift    Over the shift, the patient did not make progress toward the following goals. Barriers to progression include flat affect. Recommendations to address these barriers include active pain assessment by staff.

## 2023-12-06 NOTE — PROGRESS NOTES
Physical Therapy    Physical Therapy Treatment    Patient Name: Mike Olson  MRN: [id number]  Today's Date: 12/6/2023  Time Calculation  Start Time: 1513  Stop Time: 1533  Time Calculation (min): 20 min       Assessment/Plan   PT Assessment  PT Assessment Results: Decreased endurance, Impaired balance, Decreased mobility, Decreased strength  Evaluation/Treatment Tolerance: Patient limited by fatigue  End of Session Communication: Bedside nurse  Assessment Comment: 68 year old male admitted with worsening dysphagia.. Pt presents with decreased endurance and balance impacting functional mobility. Pt would benefit from continued PT while in hospital to improve functional mobility and return to PLOF.  End of Session Patient Position: Bed, 3 rail up  PT Plan  Inpatient/Swing Bed or Outpatient: Inpatient  PT Plan  Treatment/Interventions: Transfer training, Gait training, Stair training, Balance training, Neuromuscular re-education, Endurance training, Strengthening, Therapeutic exercise, Therapeutic activity, Home exercise program, Positioning, Postural re-education  PT Plan: Skilled PT  PT Frequency: 2 times per week  PT Discharge Recommendations:  (Will continue to assess.)  PT Recommended Transfer Status: min/mod  PT - OK to Discharge: Yes      General Visit Information:   PT  Visit  PT Received On: 12/06/23  General  Reason for Referral: worsening dysphagia  Past Medical History Relevant to Rehab: HTN, CVA (9/2023), recently diagnosed poorly differentiated metastatic SCC (11/2023)  Family/Caregiver Present: No  Prior to Session Communication: Bedside nurse  General Comment: Pt. seated on toilet with student RN attempting to stand pt. however pt. not feeling well from having to stand too long to get cleaned up.    Subjective   Precautions:     Vital Signs:  Vital Signs  Heart Rate:  (HR fluctuating between 103-109bpm after amb. to bed.)  SpO2:  (Saturation fluctuating beginning at 95% however dropping to 78% on 2L of 02  (pt. had been off the 02 while in the bathroom) steadily improved to 92%)  BP: 117/69  Patient Position: Lying    Objective   Pain:  Pain Assessment  Pain Assessment:  (Pt. unable to state if anything was hurting when asked.)  Cognition:     Postural Control:     Extremity/Trunk Assessments:                    Activity Tolerance:     Treatments:       Bed Mobility  Bed Mobility: Yes  Bed Mobility 1  Bed Mobility 1: Sitting to supine  Level of Assistance 1:  (min/mod - min for rolling and min/mod for proping up from sidelying to sitting.)  Bed Mobility Comments 1: Mod assist due to decreased overall strength.  Bed Mobility 2  Bed Mobility  2:  (Initially pt. at bottom of bed therefore worked on a functional scooting exercise - Pt. struggling to initiate movement. Cues to flex knees and bridge to scoot. Increased time to get in proper position.)    Ambulation/Gait Training  Ambulation/Gait Training Performed: Yes  Ambulation/Gait Training 1  Surface 1:  (Pt. amb ~12' using a wh. walker and mod assist. Pt. required reminders to focus on knee extension in stance however pt. not following therefore assist was given to block knees janeth. right vs left. Pt. stopping and starting several times.)  Device 1: Rolling walker  Transfers  Transfer: Yes  Transfer 1  Transfer From 1:  (Several unsuccessful attempts to stand from toilet but once repositioned and cued for technique pt. able to stand with mod assist. Pt. utilized grab bar and walker.)  Transfer to 1: Stand, Sit  Technique 1:  (Pt. required instruction to wait a moment before walking after standing - Pt. required cga to steady. Cues needed for hand placement.)    Outcome Measures:  [facility] Basic Mobility  Turning from your back to your side while in a flat bed without using bedrails: A lot  Moving from lying on your back to sitting on the side of a flat bed without using bedrails: A lot  Moving to and from bed to chair (including a wheelchair): A lot  Standing up from a  chair using your arms (e.g. wheelchair or bedside chair): A lot  To walk in hospital room: A lot  Climbing 3-5 steps with railing: Total  Basic Mobility - Total Score: 11    Education Documentation  Mobility Training, taught by Domonique Erazo PTA at 12/6/2023  3:46 PM.  Learner: Patient  Readiness: Acceptance  Method: Explanation, Demonstration  Response: Needs Reinforcement    Education Comments  No comments found.        OP EDUCATION:       Encounter Problems       Encounter Problems (Active)       Mobility       Pt will ambulate >250ft with LRAD and MI with no LOB and VSS.   (Progressing)       Start:  11/24/23    Expected End:  12/13/23            Pt will complete the 6mwt and score within age related normative values. (Progressing)       Start:  11/24/23    Expected End:  12/13/23            Pt will complete 5 x STS and score within age related normative values.   (Progressing)       Start:  11/24/23    Expected End:  12/13/23               Mobility       Pt will ascend and descend 1 flight of Stairs with sba.       Start:  11/30/23    Expected End:  12/13/23               Transfers       Pt will perform all functional transfers with LRAD and MI. (Progressing)       Start:  11/24/23    Expected End:  12/13/23                  Encounter Problems (Resolved)       Balance       Pt will score >24 On the [place]etti to reduce the risk for falls.   (Met)       Start:  11/24/23    Expected End:  12/15/23    Resolved:  11/29/23

## 2023-12-06 NOTE — PROGRESS NOTES
nRadiation Oncology On Treatment Visit    Patient Name:  Mike Olson  MRN:  [id number]  :  1955    Referring Provider: No ref. 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 Still reports mild  right axillary pain. Denies bleeding, fever, chest pain, SOB  OBJECTIVE:   Alert and oriented x3, respirations unlabored  Vital Signs:  There were no vitals taken for this visit.   Pain Scale: The patient's current pain level was assessed.  They report currently having a pain of 1 out of 10.    Other Pertinent Findings:     Toxicity Assessment          2023    10:24   Toxicity Assessment   Adverse Events Reviewed (WDL) No (Exceptions to WDL)   Treatment Site Abdomen   Anorexia Grade 1   Anxiety Grade 0   Dehydration Grade 0   Depression Grade 0   Dermatitis Radiation Grade 0   Diarrhea Grade 0   Fatigue Grade  0   Fracture Grade 0   Nausea Grade 0   Pain Grade 2   Tumor Pain Grade 2   Vomiting Grade 0   Abdominal Pain Grade 0   Bloating Grade 0   Constipation Grade 0   Dyspepsia Grade 0   Dysphagia Grade 2   Fecal Incontinence Grade 0   Rectal Pain Grade 0   Peripheral Motor Neuropathy Grade 0   Peripheral Sensory Neuropathy Grade 0   Hematuria Grade 0   Urinary Incontinence Grade 0   Dyspareunia Grade 0   Erectile Dysfunction Grade 0   Pelvic Pain Grade 0        Assessment / Plan:  Tolerating treatment well, chart and imaging reviewed.   - Continue treatment as planned.   - Med. Onc follow up. Plan to start chemo soon      Seen with Dr. Tonya Padilla MD  PGY-4, Radiation Oncology  12/6/2023 11:45 AM

## 2023-12-06 NOTE — PROGRESS NOTES
SUPPORTIVE AND PALLIATIVE ONCOLOGY INPATIENT FOLLOW-UP      SERVICE DATE: 12/06/23     SUBJECTIVE:  Interval Events:    Patient was seen at bedside and he reports that he is doing really well. His pain is well controlled with current regimen and reports that his pain is 3.5/10.     Patient reports that he is very appreciative of changes made. He had a good night sleep last night and is looking forward to further pain improvement.     Pain Assessment:  Location: Right shoulder  Duration: Constant  Characteristics:   Rating: 3   Descriptors: aching   Aggravating: movement    Relieving: Analgesics oxycodone    Interference with Function: None    Opioid Use  Past 24 h prn opioid use:  Oxycodone 10mg used 4 doses = 60mg OME   Total 24h OME use:  60mg OME    Symptom Assessment:       Information obtained from: chart review, interview of patient, interview of family, discussion with RN, and discussion with primary team  ______________________________________________________________________        OBJECTIVE:    Lab Results   Component Value Date    WBC 17.3 (H) 12/06/2023    HGB 8.4 (L) 12/06/2023    HCT 24.8 (L) 12/06/2023    MCV 83 12/06/2023     12/06/2023      Lab Results   Component Value Date    GLUCOSE 90 12/06/2023    CALCIUM 8.9 12/06/2023     (L) 12/06/2023    K 4.5 12/06/2023    CO2 28 12/06/2023    CL 96 (L) 12/06/2023    BUN 39 (H) 12/06/2023    CREATININE 1.17 12/06/2023     Lab Results   Component Value Date    ALT 18 11/22/2023    AST 21 11/22/2023    ALKPHOS 87 11/22/2023    BILITOT 0.6 11/22/2023     Estimated Creatinine Clearance: 55.6 mL/min (by C-G formula based on SCr of 1.17 mg/dL).     Scheduled medications  acetaminophen, 650 mg, g-tube, q4h  amLODIPine, 10 mg, g-tube, Daily  aspirin, 81 mg, g-tube, Daily  atorvastatin, 80 mg, g-tube, Daily  chlorthalidone, 25 mg, g-tube, Daily  enoxaparin, 40 mg, subcutaneous, q24h  esomeprazole, 20 mg, nasogastric tube, Daily before  "breakfast  fentaNYL PF, , ,   gabapentin, 300 mg, g-tube, BID  losartan, 100 mg, g-tube, Daily  methadone, 2.5 mg, g-tube, q12h  metroNIDAZOLE, 500 mg, oral, q8h LEORA  midazolam, , ,   polyethylene glycol, 17 g, g-tube, BID  senna, 5 mL, g-tube, Daily  sulfamethoxazole-trimethoprim, 160 mg, g-tube, q12h LEORA      Continuous medications     PRN medications  PRN medications: bisacodyl, fentaNYL PF, melatonin, meperidine, meperidine PF, midazolam, midazolam, oxyCODONE, oxyCODONE   }     PHYSICAL EXAMINATION:    Vital Signs:   Vital signs reviewed  Visit Vitals  /67 (BP Location: Left arm, Patient Position: Lying)   Pulse 95   Temp 36.8 °C (98.2 °F) (Temporal)   Resp 20        Pain Score: 7       Physical Exam  Constitutional:       Appearance: Normal appearance.   Eyes:      Pupils: Pupils are equal, round, and reactive to light.   Cardiovascular:      Heart sounds: Normal heart sounds.   Pulmonary:      Breath sounds: Normal breath sounds.   Abdominal:      General: Bowel sounds are normal.      Palpations: Abdomen is soft.   Skin:     General: Skin is warm.   Neurological:      Mental Status: He is alert and oriented to person, place, and time.   Psychiatric:         Mood and Affect: Mood normal.         Behavior: Behavior normal.        ASSESSMENT/PLAN:  Mike Olson is a 68 y.o. 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 PMH significant for HTN, CVA (9/2023). Admitted 11/22/2023 for further evaluation and management of worsening dysphagia.Supportive and Palliative Oncology is consulted for pain management.      Pain:  cancer pain related to cancer  Pain is: cancer related pain  Type: somatic  Pain control: well-controlled  Home regimen:  Acetaminophen 650mg q 8hrs   Intolerances/previously tried: tramadol 50mg tablet was used in the past   Personalized pain goal: 0  Pain rated 4/10, described as \"sharp pain\".  Total OME usage for the past 24 hours:  " "30mg OME  Continue Oxycodone 10mg q 4hours   Continue methadone 2.5mg liquid bid   Get Baseline EKG today with initiation of methadone.  Continue acetaminophen 650mg q4hrs PRN   Continue Gabapentin 300mg BID [hold for sedation]     Constipation  At risk for constipation related to opioids, currently constipated  Usual bowel pattern: every day  Home regimen: none  LBM 12/3/23  Continue Miralax 17 grams BID  continue senna 5ml liquid q day     continue bisacodyl suppository 10mg daily/PRN   Encouraged him to use his suppository and PRN laxatives.       Sleeping Difficulty:  Impaired sleep related to pain  Home regimen:  none  Gabapentin as above.  Patient reports that he cannot get comfortable, especially sleeping on right arm.   Reports good night sleep last night.      Decreased appetite:  Appetite loss related to chemotherapy, taste changes, and disease process  Nutrition not consulted   Home regimen:  none  Patient has a pegtube and would continue to do well.   Artificial nutrition initiated.      Goals of Care/ Minimum acceptable Quality of Life Measures   Patient's current clinical condition, including diagnosis, prognosis, and management plan, and goals of care were discussed.      Patients endorsed Goals: \"to get the hell out here\".   Jazzy: \"sitting around doing nothing with [his] wife and dog\"  Minimum acceptable outcome/QOL:  patient needs to discuss with wife and would be ready to provide details in later encounter.   Code status discussion/patients Code status:        #Palliative Medicine encounter: Palliative care was introduced as a service for patients with serious illness to improve quality of life, including helping with pain and other bothersome symptoms, clarifying patient's values and priorities to help align the patient's care with their goals, and providing support to patients and families.   Declined  for spiritual Support   Declined Music therapy for coping    Declined Art Therapy for " [facility] Pet Therapy for Emotional Support   Coordination of Care   Supportive Listening        Advance Directives/Advance Care Planning  Existence of Advance Directives:   Decision maker/ NOK: Surrogate decision maker is wife Kavitha Black @[phone]  Code Status: Full code     [place] Surrogate Decision Maker Hierarchy     1. Court-appointed Guardian  2. Healthcare Power of   3. Legal Spouse  4. Majority of Adult Children (consensus if possible)  5. Parents  6. Majority of Adult Siblings (consensus if possible)  7. Nearest Blood Relative     Supportive Interventions: Interventions: Music Therapy: offered declined, Art Therapy: offered declined     Disposition:  Please  start the process of having prior authorization with meds to beds deliver medications to patient prior to discharge via [facility]. Prescriptions will need to be sent 48-72 hours prior to discharge so that a prior authorization can be completed.      Discharge date: unknown pending acute issues  Will assess if patient needs an appointment with Outpatient Supportive Oncology as appropriate.    Signature and billing:  Thank you for allowing us to participate in the care of this patient. Recommendations will be communicated back to the consulting service by way of shared electronic medical record or face-to-face.    Medical complexity was high level due to due to complexity of problems, extensive data review, and high risk of management/treatment.    I spent 50 minutes in the care of this patient which included chart review, interviewing patient/family, discussion with primary team, coordination of care, and documentation.    Data:   Diagnostic tests and information reviewed for today's visit:  Conversation with primary team       Some elements copied from supportive oncology note on 12/01/2023, the elements have been updated and all reflect current decision making from today, 12/06/23       Plan of Care discussed with:  Provider, RN, Patient, Provider, and Pharmacist    Thank you for asking Supportive and Palliative Oncology to assist with care of this patient.  We will continue to follow  Please contact us for additional questions or concerns.      SIGNATURE: ANKUSH Saldivar   PAGER/CONTACT:  Contact information:  Supportive and Palliative Oncology  Monday-Friday 8 AM-5 PM  Epic Secure chat or pager [phone].  After hours and weekends:  pager [phone]

## 2023-12-06 NOTE — PROGRESS NOTES
Mike Olson is a 68 y.o. male on day 14 of admission presenting with Difficulty swallowing.    Subjective   Patient seen and evaluated at the bedside this morning.  He is better controlled with the initiation of methadone last night.  Patient is still on 2 L nasal cannula as he had a couple desaturations down to 89% yesterday.  Likely this has been driven by the patient's uncontrolled pain in his hesitation to except pain medications.  The scheduled methadone should be helping.  Will continue to wean as tolerated.  Chest x-ray from yesterday demonstrating no acute processes. Mild pulm edema on last xray, suspect minor fluid overload as patient is net positive 4 L this admission. Will decrease FWF.  Will continue with Bactrim for seven day course.  No new fevers overnight.    Patient is set to begin palliative radiation to the right axilla today.    Patient denying fevers, chills, shortness of breath, chest pain, headache, vision changes, abdominal pain, diarrhea, constipation.       Objective     Physical Exam  Constitutional:       Appearance: He is not ill-appearing or toxic-appearing.      Comments: Mildly uncomfortable   HENT:      Nose: No congestion or rhinorrhea.      Mouth/Throat:      Mouth: Mucous membranes are moist.   Eyes:      General: No scleral icterus.     Extraocular Movements: Extraocular movements intact.      Pupils: Pupils are equal, round, and reactive to light.   Cardiovascular:      Rate and Rhythm: Normal rate and regular rhythm.      Heart sounds: No murmur heard.     No friction rub. No gallop.   Pulmonary:      Effort: Pulmonary effort is normal. No respiratory distress.      Breath sounds: No stridor. No wheezing, rhonchi or rales.      Comments: Diminished breath sounds on the right LL  Abdominal:      General: Bowel sounds are normal. There is no distension.      Palpations: Abdomen is soft. There is no mass.      Tenderness: There is no abdominal tenderness. There is no guarding or  "rebound.      Comments: PEG tube in place with small amount of crusted drainage surrounding the outside    Musculoskeletal:         General: No swelling or tenderness. Normal range of motion.      Cervical back: Normal range of motion.      Right lower leg: No edema.      Left lower leg: No edema.   Skin:     General: Skin is warm.      Capillary Refill: Capillary refill takes less than 2 seconds.      Coloration: Skin is not jaundiced.      Findings: No bruising or rash.      Comments: Axillary mass covered with gauze with no strikethrough appreciated    Neurological:      General: No focal deficit present.      Mental Status: He is oriented to person, place, and time. Mental status is at baseline.      Cranial Nerves: No cranial nerve deficit.         Last Recorded Vitals  Blood pressure 110/56, pulse 93, temperature 36.2 °C (97.2 °F), resp. rate 20, height 1.69 m (5' 6.54\"), weight 76.5 kg (168 lb 11.2 oz), SpO2 95 %.  Intake/Output last 3 Shifts:  I/O last 3 completed shifts:  In: 2120 (27.7 mL/kg) [NG/GT:2120]  Out: 1525 (19.9 mL/kg) [Urine:1525 (0.6 mL/kg/hr)]  Weight: 76.5 kg     Relevant Results  Scheduled medications  acetaminophen, 650 mg, g-tube, q4h  amLODIPine, 10 mg, g-tube, Daily  aspirin, 81 mg, g-tube, Daily  atorvastatin, 80 mg, g-tube, Daily  chlorthalidone, 25 mg, g-tube, Daily  enoxaparin, 40 mg, subcutaneous, q24h  esomeprazole, 20 mg, nasogastric tube, Daily before breakfast  fentaNYL PF, , ,   furosemide, 20 mg, intravenous, Once  gabapentin, 300 mg, g-tube, BID  losartan, 100 mg, g-tube, Daily  methadone, 2.5 mg, g-tube, q12h  metroNIDAZOLE, 500 mg, oral, q8h LEORA  midazolam, , ,   polyethylene glycol, 17 g, g-tube, BID  senna, 5 mL, g-tube, Daily  sulfamethoxazole-trimethoprim, 160 mg, g-tube, q12h LEORA      Continuous medications     PRN medications  PRN medications: bisacodyl, fentaNYL PF, melatonin, meperidine, meperidine PF, midazolam, midazolam, oxyCODONE, oxyCODONE      Results for " orders placed or performed during the hospital encounter of 11/22/23 (from the past 24 hour(s))   Procalcitonin   Result Value Ref Range    Procalcitonin 0.79 (H) <=0.07 ng/mL   CBC and Auto Differential   Result Value Ref Range    WBC 17.3 (H) 4.4 - 11.3 x10*3/uL    nRBC 0.0 0.0 - 0.0 /100 WBCs    RBC 2.98 (L) 4.50 - 5.90 x10*6/uL    Hemoglobin 8.4 (L) 13.5 - 17.5 g/dL    Hematocrit 24.8 (L) 41.0 - 52.0 %    MCV 83 80 - 100 fL    MCH 28.2 26.0 - 34.0 pg    MCHC 33.9 32.0 - 36.0 g/dL    RDW 12.9 11.5 - 14.5 %    Platelets 295 150 - 450 x10*3/uL    Neutrophils % 76.3 40.0 - 80.0 %    Immature Granulocytes %, Automated 4.3 (H) 0.0 - 0.9 %    Lymphocytes % 6.5 13.0 - 44.0 %    Monocytes % 10.4 2.0 - 10.0 %    Eosinophils % 2.2 0.0 - 6.0 %    Basophils % 0.3 0.0 - 2.0 %    Neutrophils Absolute 13.17 (H) 1.20 - 7.70 x10*3/uL    Immature Granulocytes Absolute, Automated 0.74 (H) 0.00 - 0.70 x10*3/uL    Lymphocytes Absolute 1.13 (L) 1.20 - 4.80 x10*3/uL    Monocytes Absolute 1.79 (H) 0.10 - 1.00 x10*3/uL    Eosinophils Absolute 0.38 0.00 - 0.70 x10*3/uL    Basophils Absolute 0.05 0.00 - 0.10 x10*3/uL   Renal Function Panel   Result Value Ref Range    Glucose 90 74 - 99 mg/dL    Sodium 133 (L) 136 - 145 mmol/L    Potassium 4.5 3.5 - 5.3 mmol/L    Chloride 96 (L) 98 - 107 mmol/L    Bicarbonate 28 21 - 32 mmol/L    Anion Gap 14 10 - 20 mmol/L    Urea Nitrogen 39 (H) 6 - 23 mg/dL    Creatinine 1.17 0.50 - 1.30 mg/dL    eGFR 68 >60 mL/min/1.73m*2    Calcium 8.9 8.6 - 10.6 mg/dL    Phosphorus 4.2 2.5 - 4.9 mg/dL    Albumin 2.4 (L) 3.4 - 5.0 g/dL   Magnesium   Result Value Ref Range    Magnesium 2.04 1.60 - 2.40 mg/dL   Urinalysis with Reflex Microscopic and Culture   Result Value Ref Range    Color, Urine Yellow Straw, Yellow    Appearance, Urine Clear Clear    Specific Gravity, Urine 1.019 1.005 - 1.035    pH, Urine 5.0 5.0, 5.5, 6.0, 6.5, 7.0, 7.5, 8.0    Protein, Urine NEGATIVE NEGATIVE mg/dL    Glucose, Urine NEGATIVE  NEGATIVE mg/dL    Blood, Urine NEGATIVE NEGATIVE    Ketones, Urine NEGATIVE NEGATIVE mg/dL    Bilirubin, Urine NEGATIVE NEGATIVE    Urobilinogen, Urine <2.0 <2.0 mg/dL    Nitrite, Urine NEGATIVE NEGATIVE    Leukocyte Esterase, Urine NEGATIVE NEGATIVE   Extra Urine Gray Tube   Result Value Ref Range    Extra Tube Hold for add-ons.                  Malnutrition Diagnosis Status: Ongoing  Malnutrition Diagnosis: Severe malnutrition related to acute disease or injury  As Evidenced by: >5% wt loss in 1 month; reported >7.5% wt loss in 3 months; PO intake likely meeting <75% of nutr needs for >7 days  I agree with the dietitian's malnutrition diagnosis.      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 Given new diagnosis, no treatment has been started yet and no known origin yet. Will discuss with Med Onc (Dr. Yin was to be his primary oncologist), Surg Onc (Dr Corona had already seen pt) and will also consult GI for EGD/biopsy. EGD on 11/24 showed large, nearly fully obstructing esophageal mass. Patient is not a surgical candidate given metastatic disease. Managing nutrition and need for chemo/radiation. PEG in place. Patient is slightly fluid overloaded, receiving 2L      Updates 12/6:  -1/5 fractions of palliative radiation to right axilla  -c/w methadone  -IV lasix x1 dose today  -strict Is/Os  -Decrease  ml four times daily and 60 before and after every feed and 150 ml additional BID    #Metastatic SCC, poorly differentiated   #Dysphagia   #Leukocytosis  - Pathology Microscopic  examination of H&E sections demonstrates a poorly differentiated carcinoma , immunoreactive with CK7 and p40 , infiltrating soft tissue.  No residual lymph node parenchyma is seen.  The following immunostains are negative: NKX3.1, GATA3, CK20, TTF-1 and CDX2   -Diagnosed 11/2023; no treatment yet; unknown origin  -Surg Onc consulted; appreciate recs (saw Dr. Corona outpatient 11/10/23)  -SLP consulted for swallow evaluation and recommended NPO  -EGD/biopsy 11/24:  large, nearly fully obstructing esophageal mass. Patient is not a surgical candidate because of metastatic disease.   -Plan to inform Dr. Yin (future medical oncologist-1st appt scheduled 12/1)  -Consider consulting pulm for possible lung biopsy as potential origin   -Tylenol PRN for pain; patient denies any pain currently  - Nutrition consulted: 1. Start 100mg IV thiamine x5 days;  Isosource 1.5 @ 25 ml/hr. Advance slowly by 10 ml/hr q8h, as tolerated, to goal rate of 55 ml/hr.   - CT SIM 11/27  - 5/5 fraction of pRT to esophageal mass 12/4 completed  -CT SIM 12/6  -c/w pRT to right axillary mass 1/5 completed  -s/p 1 dose IV lasix, additional dose 20 mg IV today  -Procal elevated 0.79  - s/p PEG tube placement 11/27  - 345ml QID bolus feeds,  ml four times daily and 60 before and after every feed and 150 ml additional BID  - 100mg IV thiamine x5 days   -Bactrim for SSTI for 7 days (12/2- 12/8)  -Respiratory sputum culture with salivary contimination      #MARYELLEN (resolved)  -Baseline Cr ~1; on admission 1.48, was 1.50 on 11/16 -> now at baseline 1.04 on 11/23  -S/p 1L LR, IV LR infusion at 125cc/hr, given improvement in Cr likely pre-renal  -Restarted home losartan 100mg daily  -Continue to monitor      #HTN  -Continue home amlodipine 10mg daily  -Holding chlorithalidone 25mg daily given hypercalcemia  -Continue ARB as MARYELLEN has resolved     #CVA 9/2023  #Carotid stenosis  -2 acute or subacute infarcts seen on MRI 9/18/2023  -Continue ASA 81mg  daily  -Completed 21 days of plavix (started 9/18/23)  -Continue atorvastatin 80mg daily  -Vascular Surgery follow up outpatient for the carotid stenosis     #Hypercalcemia, resolved  -Ca 11.5 on admission  -S/p 1L LR in the ED, on LR infusion at 125cc/hr  -Repeat was 11.3 on 11/23, so avoiding LR in favor of NS     #Elevated PSA  -Urology follow up outpatient     Disposition: tele  Follow-ups: PCP, Onc, Surg Onc,      F: prn  E: prn   N: CLD as tolerated +Boost  A: PIVs     DVT ppx: lovenox  GI ppx: home PPI    Code Status: FULL (confirmed on 11/22/23)  Surrogate Medical Decision-maker: Wife April [phone]                 Sid Jose MD

## 2023-12-07 ENCOUNTER — APPOINTMENT (OUTPATIENT)
Dept: RADIOLOGY | Facility: HOSPITAL | Age: 68
DRG: 356 | End: 2023-12-07
Payer: MEDICARE

## 2023-12-07 ENCOUNTER — APPOINTMENT (OUTPATIENT)
Dept: CARDIOLOGY | Facility: HOSPITAL | Age: 68
End: 2023-12-07
Payer: MEDICARE

## 2023-12-07 ENCOUNTER — HOSPITAL ENCOUNTER (OUTPATIENT)
Dept: RADIATION ONCOLOGY | Facility: HOSPITAL | Age: 68
Setting detail: RADIATION/ONCOLOGY SERIES
Discharge: HOME | End: 2023-12-07
Payer: OTHER MISCELLANEOUS

## 2023-12-07 DIAGNOSIS — Z51.0 ENCOUNTER FOR ANTINEOPLASTIC RADIATION THERAPY: ICD-10-CM

## 2023-12-07 DIAGNOSIS — C15.3 MALIGNANT NEOPLASM OF UPPER THIRD OF ESOPHAGUS (MULTI): ICD-10-CM

## 2023-12-07 DIAGNOSIS — C77.3 SECONDARY AND UNSPECIFIED MALIGNANT NEOPLASM OF AXILLA AND UPPER LIMB LYMPH NODES (MULTI): ICD-10-CM

## 2023-12-07 LAB
ALBUMIN SERPL BCP-MCNC: 2.2 G/DL (ref 3.4–5)
ALBUMIN SERPL BCP-MCNC: 2.5 G/DL (ref 3.4–5)
ANION GAP BLDV CALCULATED.4IONS-SCNC: 8 MMOL/L (ref 10–25)
ANION GAP BLDV CALCULATED.4IONS-SCNC: ABNORMAL MMOL/L
ANION GAP SERPL CALC-SCNC: 15 MMOL/L (ref 10–20)
ANION GAP SERPL CALC-SCNC: 16 MMOL/L (ref 10–20)
BASE EXCESS BLDV CALC-SCNC: 2 MMOL/L (ref -2–3)
BASE EXCESS BLDV CALC-SCNC: ABNORMAL MMOL/L
BASOPHILS # BLD AUTO: 0.05 X10*3/UL (ref 0–0.1)
BASOPHILS NFR BLD AUTO: 0.3 %
BODY TEMPERATURE: 37 DEGREES CELSIUS
BODY TEMPERATURE: 37 DEGREES CELSIUS
BUN SERPL-MCNC: 46 MG/DL (ref 6–23)
BUN SERPL-MCNC: 54 MG/DL (ref 6–23)
CA-I BLDV-SCNC: 1.33 MMOL/L (ref 1.1–1.33)
CA-I BLDV-SCNC: ABNORMAL MMOL/L
CALCIUM SERPL-MCNC: 9.6 MG/DL (ref 8.6–10.6)
CALCIUM SERPL-MCNC: 9.6 MG/DL (ref 8.6–10.6)
CHLORIDE BLDV-SCNC: 100 MMOL/L (ref 98–107)
CHLORIDE BLDV-SCNC: ABNORMAL MMOL/L
CHLORIDE SERPL-SCNC: 95 MMOL/L (ref 98–107)
CHLORIDE SERPL-SCNC: 96 MMOL/L (ref 98–107)
CO2 SERPL-SCNC: 28 MMOL/L (ref 21–32)
CO2 SERPL-SCNC: 28 MMOL/L (ref 21–32)
CREAT SERPL-MCNC: 1.52 MG/DL (ref 0.5–1.3)
CREAT SERPL-MCNC: 1.64 MG/DL (ref 0.5–1.3)
EOSINOPHIL # BLD AUTO: 0.28 X10*3/UL (ref 0–0.7)
EOSINOPHIL NFR BLD AUTO: 1.5 %
ERYTHROCYTE [DISTWIDTH] IN BLOOD BY AUTOMATED COUNT: 13.1 % (ref 11.5–14.5)
GFR SERPL CREATININE-BSD FRML MDRD: 45 ML/MIN/1.73M*2
GFR SERPL CREATININE-BSD FRML MDRD: 50 ML/MIN/1.73M*2
GLUCOSE BLDV-MCNC: 75 MG/DL (ref 74–99)
GLUCOSE BLDV-MCNC: 81 MG/DL (ref 74–99)
GLUCOSE SERPL-MCNC: 84 MG/DL (ref 74–99)
GLUCOSE SERPL-MCNC: 98 MG/DL (ref 74–99)
HCO3 BLDV-SCNC: 27.7 MMOL/L (ref 22–26)
HCO3 BLDV-SCNC: ABNORMAL MMOL/L
HCT VFR BLD AUTO: 26.6 % (ref 41–52)
HCT VFR BLD EST: 25 % (ref 41–52)
HCT VFR BLD EST: 32 % (ref 41–52)
HGB BLD-MCNC: 8.1 G/DL (ref 13.5–17.5)
HGB BLDV-MCNC: 10.6 G/DL (ref 13.5–17.5)
HGB BLDV-MCNC: 8.4 G/DL (ref 13.5–17.5)
IMM GRANULOCYTES # BLD AUTO: 0.86 X10*3/UL (ref 0–0.7)
IMM GRANULOCYTES NFR BLD AUTO: 4.6 % (ref 0–0.9)
INHALED O2 CONCENTRATION: 28 %
INHALED O2 CONCENTRATION: 28 %
LACTATE BLDV-SCNC: 1.5 MMOL/L (ref 0.4–2)
LACTATE BLDV-SCNC: 1.6 MMOL/L (ref 0.4–2)
LYMPHOCYTES # BLD AUTO: 1.07 X10*3/UL (ref 1.2–4.8)
LYMPHOCYTES NFR BLD AUTO: 5.7 %
MAGNESIUM SERPL-MCNC: 2.27 MG/DL (ref 1.6–2.4)
MCH RBC QN AUTO: 27.8 PG (ref 26–34)
MCHC RBC AUTO-ENTMCNC: 30.5 G/DL (ref 32–36)
MCV RBC AUTO: 91 FL (ref 80–100)
MONOCYTES # BLD AUTO: 1.75 X10*3/UL (ref 0.1–1)
MONOCYTES NFR BLD AUTO: 9.4 %
MRSA DNA SPEC QL NAA+PROBE: NOT DETECTED
NEUTROPHILS # BLD AUTO: 14.66 X10*3/UL (ref 1.2–7.7)
NEUTROPHILS NFR BLD AUTO: 78.5 %
NRBC BLD-RTO: 0 /100 WBCS (ref 0–0)
OXYHGB MFR BLDV: 79.4 % (ref 45–75)
OXYHGB MFR BLDV: 85.3 % (ref 45–75)
PCO2 BLDV: 48 MM HG (ref 41–51)
PCO2 BLDV: ABNORMAL MM[HG]
PH BLDV: 7.37 PH (ref 7.33–7.43)
PH BLDV: ABNORMAL [PH]
PHOSPHATE SERPL-MCNC: 4.8 MG/DL (ref 2.5–4.9)
PHOSPHATE SERPL-MCNC: 5.9 MG/DL (ref 2.5–4.9)
PLATELET # BLD AUTO: 323 X10*3/UL (ref 150–450)
PO2 BLDV: 51 MM HG (ref 35–45)
PO2 BLDV: 57 MM HG (ref 35–45)
POTASSIUM BLDV-SCNC: 5.1 MMOL/L (ref 3.5–5.3)
POTASSIUM BLDV-SCNC: 5.2 MMOL/L (ref 3.5–5.3)
POTASSIUM SERPL-SCNC: 5.2 MMOL/L (ref 3.5–5.3)
POTASSIUM SERPL-SCNC: 5.2 MMOL/L (ref 3.5–5.3)
RAD ONC MSQ ACTUAL FRACTIONS DELIVERED: 3
RAD ONC MSQ ACTUAL SESSION DELIVERED DOSE: 400 CGRAY
RAD ONC MSQ ACTUAL TOTAL DOSE: 1200 CGRAY
RAD ONC MSQ ELAPSED DAYS: 2
RAD ONC MSQ LAST DATE: NORMAL
RAD ONC MSQ PRESCRIBED FRACTIONAL DOSE: 400 CGRAY
RAD ONC MSQ PRESCRIBED NUMBER OF FRACTIONS: 5
RAD ONC MSQ PRESCRIBED TECHNIQUE: NORMAL
RAD ONC MSQ PRESCRIBED TOTAL DOSE: 2000 CGRAY
RAD ONC MSQ PRESCRIPTION PATTERN COMMENT: NORMAL
RAD ONC MSQ START DATE: NORMAL
RAD ONC MSQ TREATMENT COURSE NUMBER: 2
RAD ONC MSQ TREATMENT SITE: NORMAL
RBC # BLD AUTO: 2.91 X10*6/UL (ref 4.5–5.9)
SAO2 % BLDV: 82 % (ref 45–75)
SAO2 % BLDV: 88 % (ref 45–75)
SODIUM BLDV-SCNC: 130 MMOL/L (ref 136–145)
SODIUM BLDV-SCNC: ABNORMAL MMOL/L
SODIUM SERPL-SCNC: 133 MMOL/L (ref 136–145)
SODIUM SERPL-SCNC: 135 MMOL/L (ref 136–145)
WBC # BLD AUTO: 18.7 X10*3/UL (ref 4.4–11.3)

## 2023-12-07 PROCEDURE — 71045 X-RAY EXAM CHEST 1 VIEW: CPT | Performed by: RADIOLOGY

## 2023-12-07 PROCEDURE — 77412 RADIATION TX DELIVERY LVL 3: CPT | Performed by: STUDENT IN AN ORGANIZED HEALTH CARE EDUCATION/TRAINING PROGRAM

## 2023-12-07 PROCEDURE — 80069 RENAL FUNCTION PANEL: CPT

## 2023-12-07 PROCEDURE — 85025 COMPLETE CBC W/AUTO DIFF WBC: CPT

## 2023-12-07 PROCEDURE — 2500000001 HC RX 250 WO HCPCS SELF ADMINISTERED DRUGS (ALT 637 FOR MEDICARE OP)

## 2023-12-07 PROCEDURE — 84132 ASSAY OF SERUM POTASSIUM: CPT

## 2023-12-07 PROCEDURE — 2500000004 HC RX 250 GENERAL PHARMACY W/ HCPCS (ALT 636 FOR OP/ED)

## 2023-12-07 PROCEDURE — 93005 ELECTROCARDIOGRAM TRACING: CPT

## 2023-12-07 PROCEDURE — 71045 X-RAY EXAM CHEST 1 VIEW: CPT

## 2023-12-07 PROCEDURE — 83735 ASSAY OF MAGNESIUM: CPT

## 2023-12-07 PROCEDURE — A4217 STERILE WATER/SALINE, 500 ML: HCPCS

## 2023-12-07 PROCEDURE — 87640 STAPH A DNA AMP PROBE: CPT

## 2023-12-07 PROCEDURE — 2500000002 HC RX 250 W HCPCS SELF ADMINISTERED DRUGS (ALT 637 FOR MEDICARE OP, ALT 636 FOR OP/ED)

## 2023-12-07 PROCEDURE — 2500000004 HC RX 250 GENERAL PHARMACY W/ HCPCS (ALT 636 FOR OP/ED): Performed by: STUDENT IN AN ORGANIZED HEALTH CARE EDUCATION/TRAINING PROGRAM

## 2023-12-07 PROCEDURE — 96372 THER/PROPH/DIAG INJ SC/IM: CPT | Performed by: STUDENT IN AN ORGANIZED HEALTH CARE EDUCATION/TRAINING PROGRAM

## 2023-12-07 PROCEDURE — 99233 SBSQ HOSP IP/OBS HIGH 50: CPT | Performed by: INTERNAL MEDICINE

## 2023-12-07 PROCEDURE — 1170000001 HC PRIVATE ONCOLOGY ROOM DAILY

## 2023-12-07 PROCEDURE — 77387 GUIDANCE FOR RADJ TX DLVR: CPT | Performed by: RADIOLOGY

## 2023-12-07 PROCEDURE — 77014 CHG CT GUIDANCE RADIATION THERAPY FLDS PLACEMENT: CPT | Performed by: RADIOLOGY

## 2023-12-07 PROCEDURE — S0109 METHADONE ORAL 5MG: HCPCS

## 2023-12-07 PROCEDURE — 94640 AIRWAY INHALATION TREATMENT: CPT

## 2023-12-07 RX ORDER — GUAIFENESIN 600 MG/1
600 TABLET, EXTENDED RELEASE ORAL 2 TIMES DAILY PRN
Status: DISCONTINUED | OUTPATIENT
Start: 2023-12-07 | End: 2023-12-15 | Stop reason: HOSPADM

## 2023-12-07 RX ORDER — VANCOMYCIN HYDROCHLORIDE 1 G/20ML
INJECTION, POWDER, LYOPHILIZED, FOR SOLUTION INTRAVENOUS DAILY PRN
Status: DISCONTINUED | OUTPATIENT
Start: 2023-12-07 | End: 2023-12-08

## 2023-12-07 RX ORDER — ACETYLCYSTEINE 100 MG/ML
6 SOLUTION ORAL; RESPIRATORY (INHALATION)
Status: DISCONTINUED | OUTPATIENT
Start: 2023-12-07 | End: 2023-12-12

## 2023-12-07 RX ORDER — FUROSEMIDE 10 MG/ML
40 INJECTION INTRAMUSCULAR; INTRAVENOUS ONCE
Status: COMPLETED | OUTPATIENT
Start: 2023-12-07 | End: 2023-12-07

## 2023-12-07 RX ORDER — ACETYLCYSTEINE 100 MG/ML
6 SOLUTION ORAL; RESPIRATORY (INHALATION) 4 TIMES DAILY
Status: DISCONTINUED | OUTPATIENT
Start: 2023-12-07 | End: 2023-12-07

## 2023-12-07 RX ORDER — GLYCOPYRROLATE 0.2 MG/ML
0.2 INJECTION INTRAMUSCULAR; INTRAVENOUS EVERY 6 HOURS PRN
Status: DISCONTINUED | OUTPATIENT
Start: 2023-12-07 | End: 2023-12-10

## 2023-12-07 RX ADMIN — AMLODIPINE BESYLATE 10 MG: 10 TABLET ORAL at 08:30

## 2023-12-07 RX ADMIN — ACETAMINOPHEN 650 MG: 650 SOLUTION ORAL at 22:00

## 2023-12-07 RX ADMIN — ACETYLCYSTEINE 6 ML: 100 SOLUTION ORAL; RESPIRATORY (INHALATION) at 17:22

## 2023-12-07 RX ADMIN — ACETYLCYSTEINE 6 ML: 100 SOLUTION ORAL; RESPIRATORY (INHALATION) at 14:31

## 2023-12-07 RX ADMIN — ACETYLCYSTEINE 6 ML: 100 SOLUTION ORAL; RESPIRATORY (INHALATION) at 11:19

## 2023-12-07 RX ADMIN — ACETAMINOPHEN 650 MG: 650 SOLUTION ORAL at 10:52

## 2023-12-07 RX ADMIN — PIPERACILLIN SODIUM AND TAZOBACTAM SODIUM 3.38 G: 3; .375 INJECTION, SOLUTION INTRAVENOUS at 11:58

## 2023-12-07 RX ADMIN — ACETYLCYSTEINE 6 ML: 100 SOLUTION ORAL; RESPIRATORY (INHALATION) at 22:04

## 2023-12-07 RX ADMIN — POLYETHYLENE GLYCOL 3350 17 G: 17 POWDER, FOR SOLUTION ORAL at 20:39

## 2023-12-07 RX ADMIN — GABAPENTIN 300 MG: 250 SOLUTION ORAL at 08:30

## 2023-12-07 RX ADMIN — ACETAMINOPHEN 650 MG: 650 SOLUTION ORAL at 02:53

## 2023-12-07 RX ADMIN — LOSARTAN POTASSIUM 100 MG: 50 TABLET, FILM COATED ORAL at 08:30

## 2023-12-07 RX ADMIN — METRONIDAZOLE 500 MG: 500 TABLET ORAL at 14:32

## 2023-12-07 RX ADMIN — FUROSEMIDE 40 MG: 10 INJECTION, SOLUTION INTRAMUSCULAR; INTRAVENOUS at 11:01

## 2023-12-07 RX ADMIN — OXYCODONE HYDROCHLORIDE 10 MG: 5 TABLET ORAL at 06:15

## 2023-12-07 RX ADMIN — GABAPENTIN 300 MG: 250 SOLUTION ORAL at 20:41

## 2023-12-07 RX ADMIN — ESOMEPRAZOLE MAGNESIUM 20 MG: 20 FOR SUSPENSION ORAL at 06:15

## 2023-12-07 RX ADMIN — PIPERACILLIN SODIUM AND TAZOBACTAM SODIUM 3.38 G: 3; .375 INJECTION, SOLUTION INTRAVENOUS at 17:56

## 2023-12-07 RX ADMIN — ASPIRIN 81 MG CHEWABLE TABLET 81 MG: 81 TABLET CHEWABLE at 08:29

## 2023-12-07 RX ADMIN — ATORVASTATIN CALCIUM 80 MG: 80 TABLET, FILM COATED ORAL at 08:30

## 2023-12-07 RX ADMIN — OXYCODONE HYDROCHLORIDE 10 MG: 5 TABLET ORAL at 02:53

## 2023-12-07 RX ADMIN — OXYCODONE HYDROCHLORIDE 10 MG: 5 TABLET ORAL at 10:53

## 2023-12-07 RX ADMIN — ACETAMINOPHEN 650 MG: 650 SOLUTION ORAL at 14:31

## 2023-12-07 RX ADMIN — SULFAMETHOXAZOLE AND TRIMETHOPRIM 160 MG: 800; 160 TABLET ORAL at 08:29

## 2023-12-07 RX ADMIN — VANCOMYCIN HYDROCHLORIDE 1500 MG: 5 INJECTION, POWDER, LYOPHILIZED, FOR SOLUTION INTRAVENOUS at 14:31

## 2023-12-07 RX ADMIN — METHADONE HYDROCHLORIDE 2.5 MG: 5 SOLUTION ORAL at 20:00

## 2023-12-07 RX ADMIN — METHADONE HYDROCHLORIDE 2.5 MG: 5 SOLUTION ORAL at 08:30

## 2023-12-07 RX ADMIN — CHLORTHALIDONE 25 MG: 25 TABLET ORAL at 08:30

## 2023-12-07 RX ADMIN — ENOXAPARIN SODIUM 40 MG: 100 INJECTION SUBCUTANEOUS at 20:38

## 2023-12-07 RX ADMIN — ACETAMINOPHEN 650 MG: 650 SOLUTION ORAL at 06:15

## 2023-12-07 RX ADMIN — ACETAMINOPHEN 650 MG: 650 SOLUTION ORAL at 17:54

## 2023-12-07 ASSESSMENT — COGNITIVE AND FUNCTIONAL STATUS - GENERAL
TURNING FROM BACK TO SIDE WHILE IN FLAT BAD: A LOT
DRESSING REGULAR LOWER BODY CLOTHING: A LOT
WALKING IN HOSPITAL ROOM: A LOT
MOVING FROM LYING ON BACK TO SITTING ON SIDE OF FLAT BED WITH BEDRAILS: A LOT
STANDING UP FROM CHAIR USING ARMS: A LOT
DAILY ACTIVITIY SCORE: 17
CLIMB 3 TO 5 STEPS WITH RAILING: TOTAL
MOBILITY SCORE: 11
EATING MEALS: A LITTLE
MOBILITY SCORE: 12
MOVING TO AND FROM BED TO CHAIR: A LOT
STANDING UP FROM CHAIR USING ARMS: A LOT
PERSONAL GROOMING: A LITTLE
CLIMB 3 TO 5 STEPS WITH RAILING: A LOT
DAILY ACTIVITIY SCORE: 17
TURNING FROM BACK TO SIDE WHILE IN FLAT BAD: A LOT
DRESSING REGULAR LOWER BODY CLOTHING: A LOT
DRESSING REGULAR UPPER BODY CLOTHING: A LITTLE
HELP NEEDED FOR BATHING: A LITTLE
HELP NEEDED FOR BATHING: A LITTLE
TOILETING: A LITTLE
WALKING IN HOSPITAL ROOM: A LOT
DRESSING REGULAR UPPER BODY CLOTHING: A LITTLE
TOILETING: A LITTLE
MOVING TO AND FROM BED TO CHAIR: A LOT
PERSONAL GROOMING: A LITTLE
EATING MEALS: A LITTLE
MOVING FROM LYING ON BACK TO SITTING ON SIDE OF FLAT BED WITH BEDRAILS: A LOT

## 2023-12-07 ASSESSMENT — PAIN SCALES - GENERAL
PAINLEVEL_OUTOF10: 7
PAINLEVEL_OUTOF10: 2
PAINLEVEL_OUTOF10: 4

## 2023-12-07 ASSESSMENT — PAIN DESCRIPTION - LOCATION: LOCATION: ARM

## 2023-12-07 ASSESSMENT — PAIN - FUNCTIONAL ASSESSMENT: PAIN_FUNCTIONAL_ASSESSMENT: 0-10

## 2023-12-07 NOTE — SIGNIFICANT EVENT
On reevaluation this morning, patient was more somnolent during examination. Chest xray demonstrated worsening pulmonary edema. Will give 40 IV lasix and broaden antibiotics. Will repeat VBG.

## 2023-12-07 NOTE — PROGRESS NOTES
"Vancomycin Dosing by Pharmacy- INITIAL    Mike Olson is a 68 y.o. year old male who Pharmacy has been consulted for vancomycin dosing for pneumonia. Based on the patient's indication and renal status this patient will be dosed based on a goal trough/random level of 15-20.     Renal function is currently declining. Will dose by levels initially.     Visit Vitals  /62 (BP Location: Left arm, Patient Position: Lying)   Pulse 87   Temp 36.3 °C (97.3 °F) (Temporal)   Resp 20        Lab Results   Component Value Date    CREATININE 1.52 (H) 12/07/2023    CREATININE 1.24 12/06/2023    CREATININE 1.17 12/06/2023    CREATININE 1.04 12/05/2023        Patient weight is No results found for: \"PTWEIGHT\"    No results found for: \"CULTURE\"     I/O last 3 completed shifts:  In: 345 (4.5 mL/kg) [NG/GT:345]  Out: 1200 (15.7 mL/kg) [Urine:1200 (0.4 mL/kg/hr)]  Weight: 76.5 kg   [unfilled]    No results found for: \"PATIENTTEMP\"       Assessment/Plan     Patient will be given a loading dose of 1500 mg.  Follow-up level will be ordered on 12/8 ~24hr after today's dose (needs scheduled), unless clinically indicated sooner.  Will continue to monitor renal function daily while on vancomycin and order serum creatinine at least every 48 hours if not already ordered.  Follow for continued vancomycin needs, clinical response, and signs/symptoms of toxicity.       Tru Espinosa, PharmD, BCPS       "

## 2023-12-07 NOTE — PROGRESS NOTES
Physical Therapy                 Therapy Communication Note    Patient Name: Mike Olson  MRN: [id number]  Today's Date: 12/7/2023     Discipline: Physical Therapy    Missed Visit Reason: Missed Visit Reason:  (Attempted follow up however pt. sound sleeping)    Missed Time: Attempt    Comment:

## 2023-12-07 NOTE — PROGRESS NOTES
Spiritual Care Visit      attempted to visit, however the patient was sleeping and the  let them continue to rest.  will attempt to follow-up later.    Rev. Bakari Edge, Supportive Oncology

## 2023-12-07 NOTE — PROGRESS NOTES
"Mike Olson is a 68 y.o. male on day 15 of admission presenting with Difficulty swallowing.    Subjective   Patient seen and evaluated at the bedside this morning.  He states that he \"slept like a bear\" last night. He seems more congested today and is coughing up yellow tinged sputum. Called RT to perform BP hygiene. Mucomyst ordered. Repeat chest Xray ordered. Will continue to monitor. Patient states pain control is \"okay\" but seems a bit uncomfortable on exam. Placed on 2L overnight, no desaturations in chart.     Patient denying fevers, chills, chest pain, headache, vision changes, abdominal pain, diarrhea, constipation.       Objective     Physical Exam  Constitutional:       Appearance: He is not ill-appearing or toxic-appearing.      Comments: Mildly uncomfortable   HENT:      Nose: No congestion or rhinorrhea.      Mouth/Throat:      Mouth: Mucous membranes are moist.   Eyes:      General: No scleral icterus.     Extraocular Movements: Extraocular movements intact.      Pupils: Pupils are equal, round, and reactive to light.   Cardiovascular:      Rate and Rhythm: Normal rate and regular rhythm.      Heart sounds: No murmur heard.     No friction rub. No gallop.   Pulmonary:      Effort: Pulmonary effort is normal. No respiratory distress.      Breath sounds: No stridor. No wheezing, rhonchi or rales.      Comments: Diminished breath sounds on the right LL  Abdominal:      General: Bowel sounds are normal. There is no distension.      Palpations: Abdomen is soft. There is no mass.      Tenderness: There is no abdominal tenderness. There is no guarding or rebound.      Comments: PEG tube in place with small amount of crusted drainage surrounding the outside    Musculoskeletal:         General: No swelling or tenderness. Normal range of motion.      Cervical back: Normal range of motion.      Right lower leg: No edema.      Left lower leg: No edema.   Skin:     General: Skin is warm.      Capillary Refill: " "Capillary refill takes less than 2 seconds.      Coloration: Skin is not jaundiced.      Findings: No bruising or rash.      Comments: Axillary mass covered with gauze with no strikethrough appreciated    Neurological:      General: No focal deficit present.      Mental Status: He is oriented to person, place, and time. Mental status is at baseline.      Cranial Nerves: No cranial nerve deficit.         Last Recorded Vitals  Blood pressure 123/66, pulse 97, temperature 36.4 °C (97.5 °F), temperature source Temporal, resp. rate 20, height 1.69 m (5' 6.54\"), weight 76.5 kg (168 lb 11.2 oz), SpO2 95 %.  Intake/Output last 3 Shifts:  I/O last 3 completed shifts:  In: 345 (4.5 mL/kg) [NG/GT:345]  Out: 1200 (15.7 mL/kg) [Urine:1200 (0.4 mL/kg/hr)]  Weight: 76.5 kg     Relevant Results  Scheduled medications  acetaminophen, 650 mg, g-tube, q4h  acetylcysteine, 6 mL, nebulization, 4x daily  amLODIPine, 10 mg, g-tube, Daily  aspirin, 81 mg, g-tube, Daily  atorvastatin, 80 mg, g-tube, Daily  chlorthalidone, 25 mg, g-tube, Daily  enoxaparin, 40 mg, subcutaneous, q24h  esomeprazole, 20 mg, nasogastric tube, Daily before breakfast  fentaNYL PF, , ,   gabapentin, 300 mg, g-tube, BID  losartan, 100 mg, g-tube, Daily  methadone, 2.5 mg, g-tube, q12h  metroNIDAZOLE, 500 mg, oral, q8h LEORA  midazolam, , ,   polyethylene glycol, 17 g, g-tube, BID  senna, 5 mL, g-tube, Daily  sulfamethoxazole-trimethoprim, 160 mg, g-tube, q12h LEORA      Continuous medications     PRN medications  PRN medications: bisacodyl, fentaNYL PF, melatonin, meperidine, meperidine PF, midazolam, oxyCODONE, oxyCODONE      Results for orders placed or performed during the hospital encounter of 11/22/23 (from the past 24 hour(s))   Renal function panel   Result Value Ref Range    Glucose 119 (H) 74 - 99 mg/dL    Sodium 133 (L) 136 - 145 mmol/L    Potassium 4.7 3.5 - 5.3 mmol/L    Chloride 95 (L) 98 - 107 mmol/L    Bicarbonate 28 21 - 32 mmol/L    Anion Gap 15 10 - 20 " mmol/L    Urea Nitrogen 42 (H) 6 - 23 mg/dL    Creatinine 1.24 0.50 - 1.30 mg/dL    eGFR 63 >60 mL/min/1.73m*2    Calcium 9.0 8.6 - 10.6 mg/dL    Phosphorus 4.2 2.5 - 4.9 mg/dL    Albumin 2.3 (L) 3.4 - 5.0 g/dL   Renal Function Panel   Result Value Ref Range    Glucose 98 74 - 99 mg/dL    Sodium 135 (L) 136 - 145 mmol/L    Potassium 5.2 3.5 - 5.3 mmol/L    Chloride 96 (L) 98 - 107 mmol/L    Bicarbonate 28 21 - 32 mmol/L    Anion Gap 16 10 - 20 mmol/L    Urea Nitrogen 46 (H) 6 - 23 mg/dL    Creatinine 1.52 (H) 0.50 - 1.30 mg/dL    eGFR 50 (L) >60 mL/min/1.73m*2    Calcium 9.6 8.6 - 10.6 mg/dL    Phosphorus 4.8 2.5 - 4.9 mg/dL    Albumin 2.5 (L) 3.4 - 5.0 g/dL   CBC and Auto Differential   Result Value Ref Range    WBC 18.7 (H) 4.4 - 11.3 x10*3/uL    nRBC 0.0 0.0 - 0.0 /100 WBCs    RBC 2.91 (L) 4.50 - 5.90 x10*6/uL    Hemoglobin 8.1 (L) 13.5 - 17.5 g/dL    Hematocrit 26.6 (L) 41.0 - 52.0 %    MCV 91 80 - 100 fL    MCH 27.8 26.0 - 34.0 pg    MCHC 30.5 (L) 32.0 - 36.0 g/dL    RDW 13.1 11.5 - 14.5 %    Platelets 323 150 - 450 x10*3/uL    Neutrophils % 78.5 40.0 - 80.0 %    Immature Granulocytes %, Automated 4.6 (H) 0.0 - 0.9 %    Lymphocytes % 5.7 13.0 - 44.0 %    Monocytes % 9.4 2.0 - 10.0 %    Eosinophils % 1.5 0.0 - 6.0 %    Basophils % 0.3 0.0 - 2.0 %    Neutrophils Absolute 14.66 (H) 1.20 - 7.70 x10*3/uL    Immature Granulocytes Absolute, Automated 0.86 (H) 0.00 - 0.70 x10*3/uL    Lymphocytes Absolute 1.07 (L) 1.20 - 4.80 x10*3/uL    Monocytes Absolute 1.75 (H) 0.10 - 1.00 x10*3/uL    Eosinophils Absolute 0.28 0.00 - 0.70 x10*3/uL    Basophils Absolute 0.05 0.00 - 0.10 x10*3/uL   Magnesium   Result Value Ref Range    Magnesium 2.27 1.60 - 2.40 mg/dL                 Malnutrition Diagnosis Status: Ongoing  Malnutrition Diagnosis: Severe malnutrition related to acute disease or injury  As Evidenced by: >5% wt loss in 1 month; reported >7.5% wt loss in 3 months; PO intake likely meeting <75% of nutr needs for >7 days  I  agree with the dietitian's malnutrition diagnosis.      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 Given new diagnosis, no treatment has been started yet and no known origin yet. Will discuss with Med Onc (Dr. Yin was to be his primary oncologist), Surg Onc (Dr Corona had already seen pt) and will also consult GI for EGD/biopsy. EGD on 11/24 showed large, nearly fully obstructing esophageal mass. Patient is not a surgical candidate given metastatic disease. Managing nutrition and need for chemo/radiation. PEG in place. Patient is slightly fluid overloaded, receiving 2L. Ongoing elevation of white count w/o spiking fevers but increased white/yellow sputum productions consistent with pulmonary edema vs silent aspiration from underlying esophageal dysfunctions 2/2 malignancy.      Updates 12/7:  -s/p 1/5 fractions of palliative radiation to right axilla, 2/5 today  -c/w methadone  -Repeat chest x-ray, adding mucomyst, RT for BP hygiene, potentially needs more lasix, Is/Os not properly documented yesterday  -consider broadening or changing abx regimen today  -strict Is/Os  -c/w  ml four times daily and 60 before and after every feed and 150 ml additional BID    #Metastatic SCC, poorly differentiated   #Dysphagia   #Leukocytosis  - Pathology Microscopic examination of H&E sections demonstrates a poorly differentiated carcinoma , immunoreactive with CK7 and p40 , infiltrating soft tissue.  No residual lymph node parenchyma is seen.  The following immunostains are negative: NKX3.1, GATA3,  CK20, TTF-1 and CDX2   -Diagnosed 11/2023; no treatment yet; unknown origin  -Surg Onc consulted; appreciate recs (saw Dr. Corona outpatient 11/10/23)  -SLP consulted for swallow evaluation and recommended NPO  -EGD/biopsy 11/24:  large, nearly fully obstructing esophageal mass. Patient is not a surgical candidate because of metastatic disease.   -Plan to inform Dr. Yin (future medical oncologist-1st appt scheduled 12/1)  -Consider consulting pulm for possible lung biopsy as potential origin   -Tylenol PRN for pain; patient denies any pain currently  - Nutrition consulted: 1. Start 100mg IV thiamine x5 days;  Isosource 1.5 @ 25 ml/hr. Advance slowly by 10 ml/hr q8h, as tolerated, to goal rate of 55 ml/hr.   - CT SIM 11/27  - 5/5 fraction of pRT to esophageal mass 12/4 completed  -CT SIM 12/6  -c/w pRT to right axillary mass fraction 2 today (1/5 completed)  -s/p 2 doses IV lasix 20 mg, consider additional dose today  -RT For BP hygiene, repeat chest x ray  -Start Mucomyst  -Procal elevated 0.79  - s/p PEG tube placement 11/27  - 345ml QID bolus feeds,  ml four times daily and 60 before and after every feed and 150 ml additional BID  - 100mg IV thiamine x5 days   -Bactrim for SSTI for 7 days (12/2- 12/8)  -Respiratory sputum culture with salivary contimination      #MARYELLEN (resolved)  -Baseline Cr ~1; on admission 1.48, was 1.50 on 11/16 -> now at baseline 1.04 on 11/23  -S/p 1L LR, IV LR infusion at 125cc/hr, given improvement in Cr likely pre-renal  -Restarted home losartan 100mg daily  -Continue to monitor      #HTN  -Continue home amlodipine 10mg daily  -Holding chlorithalidone 25mg daily given hypercalcemia  -Continue ARB as MARYELLEN has resolved     #CVA 9/2023  #Carotid stenosis  -2 acute or subacute infarcts seen on MRI 9/18/2023  -Continue ASA 81mg daily  -Completed 21 days of plavix (started 9/18/23)  -Continue atorvastatin 80mg daily  -Vascular Surgery follow up outpatient for the carotid stenosis      #Hypercalcemia, resolved  -Ca 11.5 on admission  -S/p 1L LR in the ED, on LR infusion at 125cc/hr  -Repeat was 11.3 on 11/23, so avoiding LR in favor of NS     #Elevated PSA  -Urology follow up outpatient     Disposition: tele  Follow-ups: PCP, Onc, Surg Onc,      F: prn  E: prn   N: CLD as tolerated +Boost  A: PIVs     DVT ppx: lovenox  GI ppx: home PPI    Code Status: FULL (confirmed on 11/22/23)  Surrogate Medical Decision-maker: Wife April [phone]                 Sid Jose MD

## 2023-12-08 ENCOUNTER — HOSPITAL ENCOUNTER (OUTPATIENT)
Dept: RADIATION ONCOLOGY | Facility: HOSPITAL | Age: 68
Setting detail: RADIATION/ONCOLOGY SERIES
Discharge: HOME | End: 2023-12-08
Payer: OTHER MISCELLANEOUS

## 2023-12-08 ENCOUNTER — APPOINTMENT (OUTPATIENT)
Dept: RADIOLOGY | Facility: HOSPITAL | Age: 68
DRG: 356 | End: 2023-12-08
Payer: MEDICARE

## 2023-12-08 DIAGNOSIS — C77.3 SECONDARY AND UNSPECIFIED MALIGNANT NEOPLASM OF AXILLA AND UPPER LIMB LYMPH NODES (MULTI): ICD-10-CM

## 2023-12-08 DIAGNOSIS — C15.3 MALIGNANT NEOPLASM OF UPPER THIRD OF ESOPHAGUS (MULTI): ICD-10-CM

## 2023-12-08 DIAGNOSIS — Z51.0 ENCOUNTER FOR ANTINEOPLASTIC RADIATION THERAPY: ICD-10-CM

## 2023-12-08 LAB
ABO GROUP (TYPE) IN BLOOD: NORMAL
ALBUMIN SERPL BCP-MCNC: 2.3 G/DL (ref 3.4–5)
ANION GAP SERPL CALC-SCNC: 17 MMOL/L (ref 10–20)
ANTIBODY SCREEN: NORMAL
BASOPHILS # BLD AUTO: 0.05 X10*3/UL (ref 0–0.1)
BASOPHILS NFR BLD AUTO: 0.3 %
BUN SERPL-MCNC: 62 MG/DL (ref 6–23)
CALCIUM SERPL-MCNC: 8.9 MG/DL (ref 8.6–10.6)
CHLORIDE SERPL-SCNC: 92 MMOL/L (ref 98–107)
CO2 SERPL-SCNC: 27 MMOL/L (ref 21–32)
CREAT SERPL-MCNC: 2.19 MG/DL (ref 0.5–1.3)
EOSINOPHIL # BLD AUTO: 0.29 X10*3/UL (ref 0–0.7)
EOSINOPHIL NFR BLD AUTO: 1.8 %
ERYTHROCYTE [DISTWIDTH] IN BLOOD BY AUTOMATED COUNT: 13.3 % (ref 11.5–14.5)
GFR SERPL CREATININE-BSD FRML MDRD: 32 ML/MIN/1.73M*2
GLUCOSE BLD MANUAL STRIP-MCNC: 74 MG/DL (ref 74–99)
GLUCOSE BLD MANUAL STRIP-MCNC: 93 MG/DL (ref 74–99)
GLUCOSE SERPL-MCNC: 101 MG/DL (ref 74–99)
HCT VFR BLD AUTO: 23.3 % (ref 41–52)
HGB BLD-MCNC: 7.2 G/DL (ref 13.5–17.5)
IMM GRANULOCYTES # BLD AUTO: 0.78 X10*3/UL (ref 0–0.7)
IMM GRANULOCYTES NFR BLD AUTO: 4.7 % (ref 0–0.9)
LYMPHOCYTES # BLD AUTO: 0.92 X10*3/UL (ref 1.2–4.8)
LYMPHOCYTES NFR BLD AUTO: 5.6 %
MAGNESIUM SERPL-MCNC: 2.41 MG/DL (ref 1.6–2.4)
MCH RBC QN AUTO: 27.9 PG (ref 26–34)
MCHC RBC AUTO-ENTMCNC: 30.9 G/DL (ref 32–36)
MCV RBC AUTO: 90 FL (ref 80–100)
MONOCYTES # BLD AUTO: 1.16 X10*3/UL (ref 0.1–1)
MONOCYTES NFR BLD AUTO: 7 %
NEUTROPHILS # BLD AUTO: 13.28 X10*3/UL (ref 1.2–7.7)
NEUTROPHILS NFR BLD AUTO: 80.6 %
NRBC BLD-RTO: 0 /100 WBCS (ref 0–0)
PHOSPHATE SERPL-MCNC: 5.7 MG/DL (ref 2.5–4.9)
PLATELET # BLD AUTO: 331 X10*3/UL (ref 150–450)
POTASSIUM SERPL-SCNC: 5.1 MMOL/L (ref 3.5–5.3)
RAD ONC MSQ ACTUAL FRACTIONS DELIVERED: 4
RAD ONC MSQ ACTUAL SESSION DELIVERED DOSE: 400 CGRAY
RAD ONC MSQ ACTUAL TOTAL DOSE: 1600 CGRAY
RAD ONC MSQ ELAPSED DAYS: 3
RAD ONC MSQ LAST DATE: NORMAL
RAD ONC MSQ PRESCRIBED FRACTIONAL DOSE: 400 CGRAY
RAD ONC MSQ PRESCRIBED NUMBER OF FRACTIONS: 5
RAD ONC MSQ PRESCRIBED TECHNIQUE: NORMAL
RAD ONC MSQ PRESCRIBED TOTAL DOSE: 2000 CGRAY
RAD ONC MSQ PRESCRIPTION PATTERN COMMENT: NORMAL
RAD ONC MSQ START DATE: NORMAL
RAD ONC MSQ TREATMENT COURSE NUMBER: 2
RAD ONC MSQ TREATMENT SITE: NORMAL
RBC # BLD AUTO: 2.58 X10*6/UL (ref 4.5–5.9)
RH FACTOR (ANTIGEN D): NORMAL
SODIUM SERPL-SCNC: 131 MMOL/L (ref 136–145)
WBC # BLD AUTO: 16.5 X10*3/UL (ref 4.4–11.3)

## 2023-12-08 PROCEDURE — 77014 CHG CT GUIDANCE RADIATION THERAPY FLDS PLACEMENT: CPT | Performed by: STUDENT IN AN ORGANIZED HEALTH CARE EDUCATION/TRAINING PROGRAM

## 2023-12-08 PROCEDURE — 2500000002 HC RX 250 W HCPCS SELF ADMINISTERED DRUGS (ALT 637 FOR MEDICARE OP, ALT 636 FOR OP/ED)

## 2023-12-08 PROCEDURE — 2500000001 HC RX 250 WO HCPCS SELF ADMINISTERED DRUGS (ALT 637 FOR MEDICARE OP)

## 2023-12-08 PROCEDURE — 85025 COMPLETE CBC W/AUTO DIFF WBC: CPT

## 2023-12-08 PROCEDURE — 1170000001 HC PRIVATE ONCOLOGY ROOM DAILY

## 2023-12-08 PROCEDURE — 83735 ASSAY OF MAGNESIUM: CPT

## 2023-12-08 PROCEDURE — 77412 RADIATION TX DELIVERY LVL 3: CPT | Performed by: STUDENT IN AN ORGANIZED HEALTH CARE EDUCATION/TRAINING PROGRAM

## 2023-12-08 PROCEDURE — 80069 RENAL FUNCTION PANEL: CPT

## 2023-12-08 PROCEDURE — 71045 X-RAY EXAM CHEST 1 VIEW: CPT | Performed by: RADIOLOGY

## 2023-12-08 PROCEDURE — 71045 X-RAY EXAM CHEST 1 VIEW: CPT

## 2023-12-08 PROCEDURE — 94640 AIRWAY INHALATION TREATMENT: CPT

## 2023-12-08 PROCEDURE — S0109 METHADONE ORAL 5MG: HCPCS

## 2023-12-08 PROCEDURE — 36415 COLL VENOUS BLD VENIPUNCTURE: CPT

## 2023-12-08 PROCEDURE — 96372 THER/PROPH/DIAG INJ SC/IM: CPT

## 2023-12-08 PROCEDURE — 2500000004 HC RX 250 GENERAL PHARMACY W/ HCPCS (ALT 636 FOR OP/ED)

## 2023-12-08 PROCEDURE — 99233 SBSQ HOSP IP/OBS HIGH 50: CPT | Performed by: INTERNAL MEDICINE

## 2023-12-08 PROCEDURE — 86901 BLOOD TYPING SEROLOGIC RH(D): CPT

## 2023-12-08 PROCEDURE — 77387 GUIDANCE FOR RADJ TX DLVR: CPT | Performed by: STUDENT IN AN ORGANIZED HEALTH CARE EDUCATION/TRAINING PROGRAM

## 2023-12-08 PROCEDURE — 82947 ASSAY GLUCOSE BLOOD QUANT: CPT

## 2023-12-08 RX ORDER — DEXTROSE MONOHYDRATE 100 MG/ML
0.3 INJECTION, SOLUTION INTRAVENOUS ONCE AS NEEDED
Status: DISCONTINUED | OUTPATIENT
Start: 2023-12-08 | End: 2023-12-15 | Stop reason: HOSPADM

## 2023-12-08 RX ORDER — HEPARIN SODIUM 5000 [USP'U]/ML
5000 INJECTION, SOLUTION INTRAVENOUS; SUBCUTANEOUS EVERY 8 HOURS
Status: DISCONTINUED | OUTPATIENT
Start: 2023-12-08 | End: 2023-12-11

## 2023-12-08 RX ORDER — DEXTROSE 50 % IN WATER (D50W) INTRAVENOUS SYRINGE
25
Status: DISCONTINUED | OUTPATIENT
Start: 2023-12-08 | End: 2023-12-15 | Stop reason: HOSPADM

## 2023-12-08 RX ORDER — SILVER NITRATE 38.21; 12.74 MG/1; MG/1
STICK TOPICAL
Status: DISPENSED | OUTPATIENT
Start: 2023-12-08 | End: 2023-12-09

## 2023-12-08 RX ADMIN — ACETAMINOPHEN 650 MG: 650 SOLUTION ORAL at 21:01

## 2023-12-08 RX ADMIN — AMLODIPINE BESYLATE 10 MG: 10 TABLET ORAL at 08:36

## 2023-12-08 RX ADMIN — PIPERACILLIN SODIUM AND TAZOBACTAM SODIUM 3.38 G: 3; .375 INJECTION, SOLUTION INTRAVENOUS at 05:31

## 2023-12-08 RX ADMIN — GABAPENTIN 300 MG: 250 SOLUTION ORAL at 08:36

## 2023-12-08 RX ADMIN — CHLORTHALIDONE 25 MG: 25 TABLET ORAL at 08:37

## 2023-12-08 RX ADMIN — METHADONE HYDROCHLORIDE 2.5 MG: 5 SOLUTION ORAL at 08:37

## 2023-12-08 RX ADMIN — METRONIDAZOLE 500 MG: 500 TABLET ORAL at 22:00

## 2023-12-08 RX ADMIN — GABAPENTIN 300 MG: 250 SOLUTION ORAL at 21:01

## 2023-12-08 RX ADMIN — ESOMEPRAZOLE MAGNESIUM 20 MG: 20 FOR SUSPENSION ORAL at 05:31

## 2023-12-08 RX ADMIN — ATORVASTATIN CALCIUM 80 MG: 80 TABLET, FILM COATED ORAL at 08:37

## 2023-12-08 RX ADMIN — ACETYLCYSTEINE 6 ML: 100 SOLUTION ORAL; RESPIRATORY (INHALATION) at 14:48

## 2023-12-08 RX ADMIN — ACETAMINOPHEN 650 MG: 650 SOLUTION ORAL at 06:00

## 2023-12-08 RX ADMIN — ACETYLCYSTEINE 6 ML: 100 SOLUTION ORAL; RESPIRATORY (INHALATION) at 02:50

## 2023-12-08 RX ADMIN — POLYETHYLENE GLYCOL 3350 17 G: 17 POWDER, FOR SOLUTION ORAL at 21:01

## 2023-12-08 RX ADMIN — ACETAMINOPHEN 650 MG: 650 SOLUTION ORAL at 10:48

## 2023-12-08 RX ADMIN — ASPIRIN 81 MG CHEWABLE TABLET 81 MG: 81 TABLET CHEWABLE at 08:36

## 2023-12-08 RX ADMIN — PIPERACILLIN SODIUM AND TAZOBACTAM SODIUM 3.38 G: 3; .375 INJECTION, SOLUTION INTRAVENOUS at 18:43

## 2023-12-08 RX ADMIN — LOSARTAN POTASSIUM 100 MG: 50 TABLET, FILM COATED ORAL at 08:36

## 2023-12-08 RX ADMIN — SENNOSIDES 5 ML: 8.8 LIQUID ORAL at 08:37

## 2023-12-08 RX ADMIN — PIPERACILLIN SODIUM AND TAZOBACTAM SODIUM 3.38 G: 3; .375 INJECTION, SOLUTION INTRAVENOUS at 00:00

## 2023-12-08 RX ADMIN — ACETYLCYSTEINE 6 ML: 100 SOLUTION ORAL; RESPIRATORY (INHALATION) at 08:36

## 2023-12-08 RX ADMIN — METRONIDAZOLE 500 MG: 500 TABLET ORAL at 14:48

## 2023-12-08 RX ADMIN — PIPERACILLIN SODIUM AND TAZOBACTAM SODIUM 3.38 G: 3; .375 INJECTION, SOLUTION INTRAVENOUS at 13:33

## 2023-12-08 RX ADMIN — OXYCODONE HYDROCHLORIDE 10 MG: 5 TABLET ORAL at 04:34

## 2023-12-08 RX ADMIN — HEPARIN SODIUM 5000 UNITS: 5000 INJECTION INTRAVENOUS; SUBCUTANEOUS at 10:48

## 2023-12-08 RX ADMIN — ACETAMINOPHEN 650 MG: 650 SOLUTION ORAL at 14:46

## 2023-12-08 RX ADMIN — ACETYLCYSTEINE 6 ML: 100 SOLUTION ORAL; RESPIRATORY (INHALATION) at 22:34

## 2023-12-08 ASSESSMENT — COGNITIVE AND FUNCTIONAL STATUS - GENERAL
PERSONAL GROOMING: A LOT
HELP NEEDED FOR BATHING: A LOT
MOVING FROM LYING ON BACK TO SITTING ON SIDE OF FLAT BED WITH BEDRAILS: A LITTLE
DAILY ACTIVITIY SCORE: 13
STANDING UP FROM CHAIR USING ARMS: A LOT
WALKING IN HOSPITAL ROOM: A LOT
DRESSING REGULAR UPPER BODY CLOTHING: A LOT
TOILETING: A LOT
DRESSING REGULAR LOWER BODY CLOTHING: A LITTLE
CLIMB 3 TO 5 STEPS WITH RAILING: TOTAL
MOVING TO AND FROM BED TO CHAIR: A LOT
HELP NEEDED FOR BATHING: A LOT
WALKING IN HOSPITAL ROOM: A LOT
EATING MEALS: A LOT
MOBILITY SCORE: 13
CLIMB 3 TO 5 STEPS WITH RAILING: TOTAL
MOVING FROM LYING ON BACK TO SITTING ON SIDE OF FLAT BED WITH BEDRAILS: A LITTLE
EATING MEALS: A LOT
TURNING FROM BACK TO SIDE WHILE IN FLAT BAD: A LITTLE
DAILY ACTIVITIY SCORE: 12
DRESSING REGULAR UPPER BODY CLOTHING: A LOT
PERSONAL GROOMING: A LOT
MOVING TO AND FROM BED TO CHAIR: A LOT
DRESSING REGULAR LOWER BODY CLOTHING: A LOT
TURNING FROM BACK TO SIDE WHILE IN FLAT BAD: A LITTLE
MOBILITY SCORE: 13
TOILETING: A LOT
STANDING UP FROM CHAIR USING ARMS: A LOT

## 2023-12-08 ASSESSMENT — PAIN SCALES - GENERAL
PAINLEVEL_OUTOF10: 4
PAINLEVEL_OUTOF10: 6
PAINLEVEL_OUTOF10: 4

## 2023-12-08 NOTE — PROGRESS NOTES
Mike Olson is a 68 y.o. male on day 16 of admission presenting with Difficulty swallowing.    Subjective   Patient seen and evaluated at the bedside this morning.  Overnight, patient was straight cathed x1 last night and 1900 ml out and this morning at 0430. Patient is exhibiting more s/s of delirium this morning however patient is oriented to person place and situation. He intermittently become tangential but will reorient when redirected. Sounds less congested this morning, HDS overnight, not hypoxic, on 2L. Will revisit med list and HOLD methadone and other anticholinergics if possible. Patient set for 3/5 fractions of pRT to the right axilla which he states is helping with the pain.     Patient denying fevers, chills, chest pain, headache, vision changes, abdominal pain, diarrhea, constipation.       Objective     Physical Exam  Constitutional:       Appearance: He is not ill-appearing or toxic-appearing.      Comments: Mildly uncomfortable   HENT:      Nose: No congestion or rhinorrhea.      Mouth/Throat:      Mouth: Mucous membranes are moist.   Eyes:      General: No scleral icterus.     Extraocular Movements: Extraocular movements intact.      Pupils: Pupils are equal, round, and reactive to light.   Cardiovascular:      Rate and Rhythm: Normal rate and regular rhythm.      Heart sounds: No murmur heard.     No friction rub. No gallop.   Pulmonary:      Effort: Pulmonary effort is normal. No respiratory distress.      Breath sounds: No stridor. No wheezing, rhonchi or rales.      Comments: Diminished breath sounds on the right LL  Abdominal:      General: Bowel sounds are normal. There is no distension.      Palpations: Abdomen is soft. There is no mass.      Tenderness: There is no abdominal tenderness. There is no guarding or rebound.      Comments: PEG tube in place with small amount of crusted drainage surrounding the outside, some greenish fluid noted around the site of the PEG    Musculoskeletal:        "  General: No swelling or tenderness. Normal range of motion.      Cervical back: Normal range of motion.      Right lower leg: No edema.      Left lower leg: No edema.   Skin:     General: Skin is warm.      Capillary Refill: Capillary refill takes less than 2 seconds.      Coloration: Skin is not jaundiced.      Findings: No bruising or rash.      Comments: Axillary mass covered with gauze with no strikethrough appreciated    Neurological:      General: No focal deficit present.      Mental Status: He is oriented to person, place, and time. Mental status is at baseline.      Cranial Nerves: No cranial nerve deficit.         Last Recorded Vitals  Blood pressure 127/58, pulse 93, temperature 36.8 °C (98.2 °F), temperature source Temporal, resp. rate 19, height 1.69 m (5' 6.54\"), weight 76.5 kg (168 lb 11.2 oz), SpO2 99 %.  Intake/Output last 3 Shifts:  I/O last 3 completed shifts:  In: 1505 (19.7 mL/kg) [P.O.:240; NG/GT:665; IV Piggyback:600]  Out: 1475 (19.3 mL/kg) [Urine:1475 (0.5 mL/kg/hr)]  Weight: 76.5 kg     Relevant Results  Scheduled medications  acetaminophen, 650 mg, g-tube, q4h  acetylcysteine, 6 mL, nebulization, q6h  amLODIPine, 10 mg, g-tube, Daily  aspirin, 81 mg, g-tube, Daily  atorvastatin, 80 mg, g-tube, Daily  chlorthalidone, 25 mg, g-tube, Daily  enoxaparin, 40 mg, subcutaneous, q24h  esomeprazole, 20 mg, nasogastric tube, Daily before breakfast  fentaNYL PF, , ,   gabapentin, 300 mg, g-tube, BID  losartan, 100 mg, g-tube, Daily  [Held by provider] methadone, 2.5 mg, g-tube, q12h  metroNIDAZOLE, 500 mg, oral, q8h LEORA  midazolam, , ,   piperacillin-tazobactam, 3.375 g, intravenous, q6h  polyethylene glycol, 17 g, g-tube, BID  senna, 5 mL, g-tube, Daily      Continuous medications     PRN medications  PRN medications: bisacodyl, fentaNYL PF, glycopyrrolate, guaiFENesin, melatonin, meperidine, meperidine PF, midazolam, oxyCODONE, oxyCODONE, vancomycin      Results for orders placed or performed " during the hospital encounter of 11/22/23 (from the past 24 hour(s))   Electrocardiogram, 12-lead PRN ACS symptoms   Result Value Ref Range    Ventricular Rate 92 BPM    Atrial Rate 92 BPM    UT Interval 152 ms    QRS Duration 80 ms    QT Interval 342 ms    QTC Calculation(Bazett) 422 ms    P Axis 49 degrees    R Axis -5 degrees    T Axis 34 degrees    QRS Count 15 beats    Q Onset 215 ms    P Onset 139 ms    P Offset 191 ms    T Offset 386 ms    QTC Fredericia 394 ms   Blood Gas Venous Full Panel   Result Value Ref Range    POCT pH, Venous      POCT pCO2, Venous      POCT pO2, Venous 51 (H) 35 - 45 mm Hg    POCT SO2, Venous 82 (H) 45 - 75 %    POCT Oxy Hemoglobin, Venous 79.4 (H) 45.0 - 75.0 %    POCT Hematocrit Calculated, Venous 32.0 (L) 41.0 - 52.0 %    POCT Sodium, Venous      POCT Potassium, Venous 5.1 3.5 - 5.3 mmol/L    POCT Chloride, Venous      POCT Ionized Calicum, Venous      POCT Glucose, Venous 75 74 - 99 mg/dL    POCT Lactate, Venous 1.6 0.4 - 2.0 mmol/L    POCT Base Excess, Venous      POCT HCO3 Calculated, Venous      POCT Hemoglobin, Venous 10.6 (L) 13.5 - 17.5 g/dL    POCT Anion Gap, Venous      Patient Temperature 37.0 degrees Celsius    FiO2 28 %   MRSA Surveillance for Vancomycin De-escalation, PCR    Specimen: Anterior Nares; Swab   Result Value Ref Range    MRSA PCR Not Detected Not Detected   Blood Gas Venous Full Panel   Result Value Ref Range    POCT pH, Venous 7.37 7.33 - 7.43 pH    POCT pCO2, Venous 48 41 - 51 mm Hg    POCT pO2, Venous 57 (H) 35 - 45 mm Hg    POCT SO2, Venous 88 (H) 45 - 75 %    POCT Oxy Hemoglobin, Venous 85.3 (H) 45.0 - 75.0 %    POCT Hematocrit Calculated, Venous 25.0 (L) 41.0 - 52.0 %    POCT Sodium, Venous 130 (L) 136 - 145 mmol/L    POCT Potassium, Venous 5.2 3.5 - 5.3 mmol/L    POCT Chloride, Venous 100 98 - 107 mmol/L    POCT Ionized Calicum, Venous 1.33 1.10 - 1.33 mmol/L    POCT Glucose, Venous 81 74 - 99 mg/dL    POCT Lactate, Venous 1.5 0.4 - 2.0 mmol/L     POCT Base Excess, Venous 2.0 -2.0 - 3.0 mmol/L    POCT HCO3 Calculated, Venous 27.7 (H) 22.0 - 26.0 mmol/L    POCT Hemoglobin, Venous 8.4 (L) 13.5 - 17.5 g/dL    POCT Anion Gap, Venous 8.0 (L) 10.0 - 25.0 mmol/L    Patient Temperature 37.0 degrees Celsius    FiO2 28 %   Renal function panel   Result Value Ref Range    Glucose 84 74 - 99 mg/dL    Sodium 133 (L) 136 - 145 mmol/L    Potassium 5.2 3.5 - 5.3 mmol/L    Chloride 95 (L) 98 - 107 mmol/L    Bicarbonate 28 21 - 32 mmol/L    Anion Gap 15 10 - 20 mmol/L    Urea Nitrogen 54 (H) 6 - 23 mg/dL    Creatinine 1.64 (H) 0.50 - 1.30 mg/dL    eGFR 45 (L) >60 mL/min/1.73m*2    Calcium 9.6 8.6 - 10.6 mg/dL    Phosphorus 5.9 (H) 2.5 - 4.9 mg/dL    Albumin 2.2 (L) 3.4 - 5.0 g/dL   CBC and Auto Differential   Result Value Ref Range    WBC 16.5 (H) 4.4 - 11.3 x10*3/uL    nRBC 0.0 0.0 - 0.0 /100 WBCs    RBC 2.58 (L) 4.50 - 5.90 x10*6/uL    Hemoglobin 7.2 (L) 13.5 - 17.5 g/dL    Hematocrit 23.3 (L) 41.0 - 52.0 %    MCV 90 80 - 100 fL    MCH 27.9 26.0 - 34.0 pg    MCHC 30.9 (L) 32.0 - 36.0 g/dL    RDW 13.3 11.5 - 14.5 %    Platelets 331 150 - 450 x10*3/uL    Neutrophils % 80.6 40.0 - 80.0 %    Immature Granulocytes %, Automated 4.7 (H) 0.0 - 0.9 %    Lymphocytes % 5.6 13.0 - 44.0 %    Monocytes % 7.0 2.0 - 10.0 %    Eosinophils % 1.8 0.0 - 6.0 %    Basophils % 0.3 0.0 - 2.0 %    Neutrophils Absolute 13.28 (H) 1.20 - 7.70 x10*3/uL    Immature Granulocytes Absolute, Automated 0.78 (H) 0.00 - 0.70 x10*3/uL    Lymphocytes Absolute 0.92 (L) 1.20 - 4.80 x10*3/uL    Monocytes Absolute 1.16 (H) 0.10 - 1.00 x10*3/uL    Eosinophils Absolute 0.29 0.00 - 0.70 x10*3/uL    Basophils Absolute 0.05 0.00 - 0.10 x10*3/uL   Renal Function Panel   Result Value Ref Range    Glucose 101 (H) 74 - 99 mg/dL    Sodium 131 (L) 136 - 145 mmol/L    Potassium 5.1 3.5 - 5.3 mmol/L    Chloride 92 (L) 98 - 107 mmol/L    Bicarbonate 27 21 - 32 mmol/L    Anion Gap 17 10 - 20 mmol/L    Urea Nitrogen 62 (H) 6 - 23  mg/dL    Creatinine 2.19 (H) 0.50 - 1.30 mg/dL    eGFR 32 (L) >60 mL/min/1.73m*2    Calcium 8.9 8.6 - 10.6 mg/dL    Phosphorus 5.7 (H) 2.5 - 4.9 mg/dL    Albumin 2.3 (L) 3.4 - 5.0 g/dL   Magnesium   Result Value Ref Range    Magnesium 2.41 (H) 1.60 - 2.40 mg/dL                 Malnutrition Diagnosis Status: Ongoing  Malnutrition Diagnosis: Severe malnutrition related to acute disease or injury  As Evidenced by: >5% wt loss in 1 month; reported >7.5% wt loss in 3 months; PO intake likely meeting <75% of nutr needs for >7 days  I agree with the dietitian's malnutrition diagnosis.      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 Given new diagnosis, no treatment has been started yet and no known origin yet. Will discuss with Med Onc (Dr. Yin was to be his primary oncologist), Surg Onc (Dr Corona had already seen pt) and will also consult GI for EGD/biopsy. EGD on 11/24 showed large, nearly fully obstructing esophageal mass. Patient is not a surgical candidate given metastatic disease. Managing nutrition and need for chemo/radiation. PEG in place. Patient is slightly fluid overloaded, receiving 2L. Ongoing elevation of white count w/o spiking fevers but increased white/yellow sputum productions consistent with pulmonary edema vs silent aspiration from underlying esophageal dysfunctions 2/2 malignancy. Today, patient exhibiting more signs of delirium and somnolence, possibly related to initiation of methadone on 12/5.     Updates 12/8:  -3/5 fraction of today for pRT of the right axilla  -New  retention, 1.9 L out on straight cath, will repeat PVR today  -HOLD methadone, likely contributing to patient's somnolence and delirium  -Green leakage from PEG, small volume, if worsens today, will reengage surg onc to interrogate PEG, HOLDING TF for today   -Repeat chest x-ray  -Continue BP hygiene  -MRSA negative / Vancomycin discontinued, continue zosyn  -strict Is/Os    #Metastatic SCC, poorly differentiated   #Dysphagia   #Leukocytosis  - Pathology Microscopic examination of H&E sections demonstrates a poorly differentiated carcinoma , immunoreactive with CK7 and p40 , infiltrating soft tissue.  No residual lymph node parenchyma is seen.  The following immunostains are negative: NKX3.1, GATA3, CK20, TTF-1 and CDX2   -Diagnosed 11/2023; no treatment yet; unknown origin  -Surg Onc consulted; appreciate recs (saw Dr. Cheungermal outpatient 11/10/23)  -SLP consulted for swallow evaluation and recommended NPO  -EGD/biopsy 11/24:  large, nearly fully obstructing esophageal mass. Patient is not a surgical candidate because of metastatic disease.   -Plan to inform Dr. Yin (future medical oncologist-1st appt scheduled 12/1)  -Consider consulting pulm for possible lung biopsy as potential origin   -Tylenol PRN for pain; patient denies any pain currently  - Nutrition consulted: 1. Start 100mg IV thiamine x5 days;  Isosource 1.5 @ 25 ml/hr. Advance slowly by 10 ml/hr q8h, as tolerated, to goal rate of 55 ml/hr.   - CT SIM 11/27  - 5/5 fraction of pRT to esophageal mass 12/4 completed  -CT SIM 12/6  -c/w pRT to right axillary mass fraction 3 today (2/5 completed)  -s/p 2 doses IV lasix 20 mg, one dose of 40 IV lasix  -Repeat chest x-ray today, still on 2L  -c/w Mucomyst  -Procal elevated 0.79  - s/p PEG tube placement 11/27  - HOLD Today 345ml QID bolus feeds,  ml four times daily and 60 before and after every feed and 150 ml additional BID  - 100mg IV thiamine x5 days   -discontinue Vancomycin, MRSA nares  negative  -c/w IV Zosyn (12/7-  -Respiratory sputum culture with salivary contimination     #Urinary Retention:  -1.9 L out w/straight cat 12/7  -Repeat PVR this morning  -Hold methadone and limit anticholinergics     #MARYELLEN (resolved)  -Baseline Cr ~1; on admission 1.48, was 1.50 on 11/16 -> now at baseline 1.04 on 11/23  -S/p 1L LR, IV LR infusion at 125cc/hr, given improvement in Cr likely pre-renal  -Restarted home losartan 100mg daily  -Continue to monitor      #HTN  -Continue home amlodipine 10mg daily  -Holding chlorithalidone 25mg daily given hypercalcemia  -Continue ARB as MARYELLEN has resolved     #CVA 9/2023  #Carotid stenosis  -2 acute or subacute infarcts seen on MRI 9/18/2023  -Continue ASA 81mg daily  -Completed 21 days of plavix (started 9/18/23)  -Continue atorvastatin 80mg daily  -Vascular Surgery follow up outpatient for the carotid stenosis     #Hypercalcemia, resolved  -Ca 11.5 on admission  -S/p 1L LR in the ED, on LR infusion at 125cc/hr  -Repeat was 11.3 on 11/23, so avoiding LR in favor of NS     #Elevated PSA  -Urology follow up outpatient      #Cancer Pain Control:  -HOLD Methadone  -continue 10 mg PRN Oxycodone      Disposition: tele  Follow-ups: PCP, Onc, Surg Onc,      F: prn  E: prn   N: PEG  A: PIVs     DVT ppx: lovenox  GI ppx: home PPI    Code Status: FULL (confirmed on 11/22/23)  Surrogate Medical Decision-maker: Wife April [phone]                 Sid Jose MD

## 2023-12-08 NOTE — PROGRESS NOTES
Physical Therapy                 Therapy Communication Note    Patient Name: Mike Olson  MRN: [id number]  Today's Date: 12/8/2023     Discipline: Physical Therapy    Missed Visit Reason: Missed Visit Reason:  (Attempted follow up however pt.'s daughter in room declined due to pt.'s current status- Daughter states that pt. is hallucinating, had radiation and had a worthy placed today.)    Missed Time: Attempt    Comment:

## 2023-12-08 NOTE — CONSULTS
Vancomycin Dosing by Pharmacy- Cessation of Therapy    Consult to pharmacy for vancomycin dosing has been discontinued by the prescriber, pharmacy will sign off at this time.    Please call pharmacy if there are further questions or re-enter a consult if vancomycin is resumed.     Becky Ferris [facility]

## 2023-12-08 NOTE — CARE PLAN
The patient's goals for the shift include      The clinical goals for the shift include pt will urinate more than once during the shift 12/8 @0700      Problem: Fall/Injury  Goal: Not fall by end of shift  Outcome: Progressing  Goal: Be free from injury by end of the shift  Outcome: Progressing  Goal: Verbalize understanding of personal risk factors for fall in the hospital  Outcome: Progressing  Goal: Verbalize understanding of risk factor reduction measures to prevent injury from fall in the home  Outcome: Progressing  Goal: Use assistive devices by end of the shift  Outcome: Progressing  Goal: Pace activities to prevent fatigue by end of the shift  Outcome: Progressing     Problem: Skin  Goal: Decreased wound size/increased tissue granulation at next dressing change  Outcome: Progressing  Flowsheets (Taken 12/7/2023 2119)  Decreased wound size/increased tissue granulation at next dressing change: Protective dressings over bony prominences  Goal: Participates in plan/prevention/treatment measures  Outcome: Progressing  Flowsheets (Taken 12/7/2023 2119)  Participates in plan/prevention/treatment measures: Elevate heels  Goal: Prevent/manage excess moisture  Outcome: Progressing  Flowsheets (Taken 12/7/2023 2119)  Prevent/manage excess moisture:   Cleanse incontinence/protect with barrier cream   Follow provider orders for dressing changes  Goal: Prevent/minimize sheer/friction injuries  Outcome: Progressing  Flowsheets (Taken 12/7/2023 2119)  Prevent/minimize sheer/friction injuries: Turn/reposition every 2 hours/use positioning/transfer devices  Goal: Promote/optimize nutrition  Outcome: Progressing  Flowsheets (Taken 12/7/2023 2119)  Promote/optimize nutrition: Assist with feeding  Goal: Promote skin healing  Outcome: Progressing  Flowsheets (Taken 12/7/2023 2119)  Promote skin healing: Protective dressings over bony prominences     Problem: Pain  Goal: My pain/discomfort is manageable  Outcome: Progressing      Problem: Safety  Goal: Patient will be injury free during hospitalization  Outcome: Progressing  Goal: I will remain free of falls  Outcome: Progressing     Problem: Daily Care  Goal: Daily care needs are met  Outcome: Progressing     Problem: Psychosocial Needs  Goal: Demonstrates ability to cope with hospitalization/illness  Outcome: Progressing  Goal: Collaborate with me, my family, and caregiver to identify my specific goals  Outcome: Progressing     Problem: Discharge Barriers  Goal: My discharge needs are met  Outcome: Progressing

## 2023-12-09 ENCOUNTER — APPOINTMENT (OUTPATIENT)
Dept: RADIOLOGY | Facility: HOSPITAL | Age: 68
DRG: 356 | End: 2023-12-09
Payer: MEDICARE

## 2023-12-09 LAB
ALBUMIN SERPL BCP-MCNC: 2.4 G/DL (ref 3.4–5)
ANION GAP SERPL CALC-SCNC: 18 MMOL/L (ref 10–20)
BASOPHILS # BLD AUTO: 0.07 X10*3/UL (ref 0–0.1)
BASOPHILS NFR BLD AUTO: 0.4 %
BUN SERPL-MCNC: 58 MG/DL (ref 6–23)
CALCIUM SERPL-MCNC: 9.4 MG/DL (ref 8.6–10.6)
CHLORIDE SERPL-SCNC: 95 MMOL/L (ref 98–107)
CO2 SERPL-SCNC: 25 MMOL/L (ref 21–32)
CREAT SERPL-MCNC: 1.89 MG/DL (ref 0.5–1.3)
EOSINOPHIL # BLD AUTO: 0.18 X10*3/UL (ref 0–0.7)
EOSINOPHIL NFR BLD AUTO: 1 %
ERYTHROCYTE [DISTWIDTH] IN BLOOD BY AUTOMATED COUNT: 13.2 % (ref 11.5–14.5)
GFR SERPL CREATININE-BSD FRML MDRD: 38 ML/MIN/1.73M*2
GLUCOSE BLD MANUAL STRIP-MCNC: 144 MG/DL (ref 74–99)
GLUCOSE SERPL-MCNC: 110 MG/DL (ref 74–99)
HCT VFR BLD AUTO: 24.7 % (ref 41–52)
HGB BLD-MCNC: 7.8 G/DL (ref 13.5–17.5)
IMM GRANULOCYTES # BLD AUTO: 1.06 X10*3/UL (ref 0–0.7)
IMM GRANULOCYTES NFR BLD AUTO: 5.7 % (ref 0–0.9)
LYMPHOCYTES # BLD AUTO: 0.85 X10*3/UL (ref 1.2–4.8)
LYMPHOCYTES NFR BLD AUTO: 4.6 %
MAGNESIUM SERPL-MCNC: 2.47 MG/DL (ref 1.6–2.4)
MCH RBC QN AUTO: 28.1 PG (ref 26–34)
MCHC RBC AUTO-ENTMCNC: 31.6 G/DL (ref 32–36)
MCV RBC AUTO: 89 FL (ref 80–100)
MONOCYTES # BLD AUTO: 1.45 X10*3/UL (ref 0.1–1)
MONOCYTES NFR BLD AUTO: 7.8 %
NEUTROPHILS # BLD AUTO: 14.92 X10*3/UL (ref 1.2–7.7)
NEUTROPHILS NFR BLD AUTO: 80.5 %
NRBC BLD-RTO: 0 /100 WBCS (ref 0–0)
PHOSPHATE SERPL-MCNC: 5.2 MG/DL (ref 2.5–4.9)
PLATELET # BLD AUTO: 380 X10*3/UL (ref 150–450)
POTASSIUM SERPL-SCNC: 4.8 MMOL/L (ref 3.5–5.3)
RBC # BLD AUTO: 2.78 X10*6/UL (ref 4.5–5.9)
SODIUM SERPL-SCNC: 133 MMOL/L (ref 136–145)
WBC # BLD AUTO: 18.5 X10*3/UL (ref 4.4–11.3)

## 2023-12-09 PROCEDURE — 2500000001 HC RX 250 WO HCPCS SELF ADMINISTERED DRUGS (ALT 637 FOR MEDICARE OP)

## 2023-12-09 PROCEDURE — 99233 SBSQ HOSP IP/OBS HIGH 50: CPT | Performed by: INTERNAL MEDICINE

## 2023-12-09 PROCEDURE — 1170000001 HC PRIVATE ONCOLOGY ROOM DAILY

## 2023-12-09 PROCEDURE — 96372 THER/PROPH/DIAG INJ SC/IM: CPT

## 2023-12-09 PROCEDURE — 87449 NOS EACH ORGANISM AG IA: CPT

## 2023-12-09 PROCEDURE — 2500000004 HC RX 250 GENERAL PHARMACY W/ HCPCS (ALT 636 FOR OP/ED)

## 2023-12-09 PROCEDURE — 85025 COMPLETE CBC W/AUTO DIFF WBC: CPT

## 2023-12-09 PROCEDURE — 87305 ASPERGILLUS AG IA: CPT

## 2023-12-09 PROCEDURE — 71250 CT THORAX DX C-: CPT | Performed by: RADIOLOGY

## 2023-12-09 PROCEDURE — 82947 ASSAY GLUCOSE BLOOD QUANT: CPT

## 2023-12-09 PROCEDURE — 80069 RENAL FUNCTION PANEL: CPT

## 2023-12-09 PROCEDURE — 94668 MNPJ CHEST WALL SBSQ: CPT

## 2023-12-09 PROCEDURE — A4217 STERILE WATER/SALINE, 500 ML: HCPCS

## 2023-12-09 PROCEDURE — 83735 ASSAY OF MAGNESIUM: CPT

## 2023-12-09 PROCEDURE — 94640 AIRWAY INHALATION TREATMENT: CPT

## 2023-12-09 PROCEDURE — 71250 CT THORAX DX C-: CPT

## 2023-12-09 RX ORDER — HYDROMORPHONE HYDROCHLORIDE 1 MG/ML
0.4 INJECTION, SOLUTION INTRAMUSCULAR; INTRAVENOUS; SUBCUTANEOUS ONCE
Status: COMPLETED | OUTPATIENT
Start: 2023-12-09 | End: 2023-12-09

## 2023-12-09 RX ADMIN — GABAPENTIN 300 MG: 250 SOLUTION ORAL at 20:52

## 2023-12-09 RX ADMIN — ACETAMINOPHEN 650 MG: 650 SOLUTION ORAL at 09:15

## 2023-12-09 RX ADMIN — POLYETHYLENE GLYCOL 3350 17 G: 17 POWDER, FOR SOLUTION ORAL at 20:52

## 2023-12-09 RX ADMIN — ACETYLCYSTEINE 6 ML: 100 SOLUTION ORAL; RESPIRATORY (INHALATION) at 09:15

## 2023-12-09 RX ADMIN — VANCOMYCIN HYDROCHLORIDE 1250 MG: 5 INJECTION, POWDER, LYOPHILIZED, FOR SOLUTION INTRAVENOUS at 13:25

## 2023-12-09 RX ADMIN — GABAPENTIN 300 MG: 250 SOLUTION ORAL at 09:12

## 2023-12-09 RX ADMIN — HEPARIN SODIUM 5000 UNITS: 5000 INJECTION INTRAVENOUS; SUBCUTANEOUS at 10:19

## 2023-12-09 RX ADMIN — PIPERACILLIN SODIUM AND TAZOBACTAM SODIUM 3.38 G: 3; .375 INJECTION, SOLUTION INTRAVENOUS at 05:14

## 2023-12-09 RX ADMIN — ATORVASTATIN CALCIUM 80 MG: 80 TABLET, FILM COATED ORAL at 09:12

## 2023-12-09 RX ADMIN — HEPARIN SODIUM 5000 UNITS: 5000 INJECTION INTRAVENOUS; SUBCUTANEOUS at 18:31

## 2023-12-09 RX ADMIN — ACETAMINOPHEN 650 MG: 650 SOLUTION ORAL at 05:14

## 2023-12-09 RX ADMIN — ACETAMINOPHEN 650 MG: 650 SOLUTION ORAL at 22:00

## 2023-12-09 RX ADMIN — ACETAMINOPHEN 650 MG: 650 SOLUTION ORAL at 14:16

## 2023-12-09 RX ADMIN — ACETAMINOPHEN 650 MG: 650 SOLUTION ORAL at 18:31

## 2023-12-09 RX ADMIN — METRONIDAZOLE 500 MG: 500 TABLET ORAL at 05:14

## 2023-12-09 RX ADMIN — METRONIDAZOLE 500 MG: 500 TABLET ORAL at 22:00

## 2023-12-09 RX ADMIN — PIPERACILLIN SODIUM AND TAZOBACTAM SODIUM 3.38 G: 3; .375 INJECTION, SOLUTION INTRAVENOUS at 15:25

## 2023-12-09 RX ADMIN — ASPIRIN 81 MG CHEWABLE TABLET 81 MG: 81 TABLET CHEWABLE at 09:12

## 2023-12-09 RX ADMIN — SENNOSIDES 5 ML: 8.8 LIQUID ORAL at 09:12

## 2023-12-09 RX ADMIN — ACETYLCYSTEINE 4 ML: 100 SOLUTION ORAL; RESPIRATORY (INHALATION) at 15:14

## 2023-12-09 RX ADMIN — PIPERACILLIN SODIUM AND TAZOBACTAM SODIUM 3.38 G: 3; .375 INJECTION, SOLUTION INTRAVENOUS at 18:31

## 2023-12-09 RX ADMIN — OXYCODONE HYDROCHLORIDE 10 MG: 5 TABLET ORAL at 18:31

## 2023-12-09 RX ADMIN — ESOMEPRAZOLE MAGNESIUM 20 MG: 20 FOR SUSPENSION ORAL at 05:14

## 2023-12-09 RX ADMIN — HYDROMORPHONE HYDROCHLORIDE 0.4 MG: 1 INJECTION, SOLUTION INTRAMUSCULAR; INTRAVENOUS; SUBCUTANEOUS at 22:00

## 2023-12-09 RX ADMIN — METRONIDAZOLE 500 MG: 500 TABLET ORAL at 14:16

## 2023-12-09 RX ADMIN — ACETYLCYSTEINE 4 ML: 100 SOLUTION ORAL; RESPIRATORY (INHALATION) at 20:28

## 2023-12-09 RX ADMIN — HYDROMORPHONE HYDROCHLORIDE 0.4 MG: 1 INJECTION, SOLUTION INTRAMUSCULAR; INTRAVENOUS; SUBCUTANEOUS at 13:08

## 2023-12-09 RX ADMIN — PIPERACILLIN SODIUM AND TAZOBACTAM SODIUM 3.38 G: 3; .375 INJECTION, SOLUTION INTRAVENOUS at 01:03

## 2023-12-09 RX ADMIN — Medication 5 MG: at 20:52

## 2023-12-09 RX ADMIN — POLYETHYLENE GLYCOL 3350 17 G: 17 POWDER, FOR SOLUTION ORAL at 09:12

## 2023-12-09 RX ADMIN — ACETAMINOPHEN 650 MG: 650 SOLUTION ORAL at 02:00

## 2023-12-09 RX ADMIN — AMLODIPINE BESYLATE 10 MG: 10 TABLET ORAL at 09:12

## 2023-12-09 ASSESSMENT — COGNITIVE AND FUNCTIONAL STATUS - GENERAL
CLIMB 3 TO 5 STEPS WITH RAILING: TOTAL
DAILY ACTIVITIY SCORE: 12
TURNING FROM BACK TO SIDE WHILE IN FLAT BAD: A LITTLE
HELP NEEDED FOR BATHING: A LOT
TOILETING: A LOT
DAILY ACTIVITIY SCORE: 12
EATING MEALS: A LOT
STANDING UP FROM CHAIR USING ARMS: A LOT
MOBILITY SCORE: 13
HELP NEEDED FOR BATHING: A LOT
DRESSING REGULAR LOWER BODY CLOTHING: A LOT
PERSONAL GROOMING: A LOT
MOBILITY SCORE: 13
TURNING FROM BACK TO SIDE WHILE IN FLAT BAD: A LITTLE
DRESSING REGULAR UPPER BODY CLOTHING: A LOT
DRESSING REGULAR LOWER BODY CLOTHING: A LOT
MOVING FROM LYING ON BACK TO SITTING ON SIDE OF FLAT BED WITH BEDRAILS: A LITTLE
TOILETING: A LOT
MOVING FROM LYING ON BACK TO SITTING ON SIDE OF FLAT BED WITH BEDRAILS: A LITTLE
DRESSING REGULAR UPPER BODY CLOTHING: A LOT
EATING MEALS: A LOT
MOVING TO AND FROM BED TO CHAIR: A LOT
MOVING TO AND FROM BED TO CHAIR: A LOT
PERSONAL GROOMING: A LOT
CLIMB 3 TO 5 STEPS WITH RAILING: TOTAL
WALKING IN HOSPITAL ROOM: A LOT
STANDING UP FROM CHAIR USING ARMS: A LOT
WALKING IN HOSPITAL ROOM: A LOT

## 2023-12-09 ASSESSMENT — PAIN SCALES - PAIN ASSESSMENT IN ADVANCED DEMENTIA (PAINAD)
FACIALEXPRESSION: SAD, FRIGHTENED, FROWN
CONSOLABILITY: DISTRACTED OR REASSURED BY VOICE/TOUCH
NEGVOCALIZATION: REPEATED TROUBLED CALLING OUT, LOUD MOANING/GROANING, CRYING
BREATHING: OCCASIONAL LABORED BREATHING, SHORT PERIOD OF HYPERVENTILATION
TOTALSCORE: 6
BODYLANGUAGE: TENSE, DISTRESSED PACING, FIDGETING

## 2023-12-09 ASSESSMENT — PAIN SCALES - GENERAL
PAINLEVEL_OUTOF10: 6
PAINLEVEL_OUTOF10: 6

## 2023-12-09 NOTE — PROGRESS NOTES
"Mike Olson is a 68 y.o. male on day 17 of admission presenting with Difficulty swallowing.    Subjective   Patient seen and evaluated at the bedside this morning.  After yesterday's radiation treatment to patient's right axilla, provider was called to the bedside due to pinpoint bleeding that eventually resolved on its own. Hgb stable. Patient had temp 100.6 yesterday night. This morning temp 97.2. Otherwise HDS on 2L of O2. He is more oriented this morning to person, place, situation but still not at baseline. Vazquez catheter placed yesterday due to chronic retention. States that his pain control is \"ok.\"     Patient denying fevers, chills, chest pain, headache, vision changes, abdominal pain, diarrhea, constipation.       Objective     Physical Exam  Constitutional:       Appearance: He is ill-appearing. He is not toxic-appearing.      Comments: Mildly uncomfortable   HENT:      Nose: No congestion or rhinorrhea.      Mouth/Throat:      Mouth: Mucous membranes are moist.   Eyes:      General: No scleral icterus.     Extraocular Movements: Extraocular movements intact.      Pupils: Pupils are equal, round, and reactive to light.   Cardiovascular:      Rate and Rhythm: Normal rate and regular rhythm.      Heart sounds: No murmur heard.     No friction rub. No gallop.   Pulmonary:      Effort: Pulmonary effort is normal. No respiratory distress.      Breath sounds: No stridor. No wheezing, rhonchi or rales.      Comments: Diminished breath sounds on the right LL  Abdominal:      General: Bowel sounds are normal. There is no distension.      Palpations: Abdomen is soft. There is no mass.      Tenderness: There is no abdominal tenderness. There is no guarding or rebound.      Comments: PEG tube in place with small amount of crusted drainage surrounding the outside, some greenish fluid noted around the site of the PEG    Genitourinary:     Comments: Vazquez catheter in place draining, clear yellow urine  Musculoskeletal:   " "      General: No swelling or tenderness. Normal range of motion.      Cervical back: Normal range of motion.      Right lower leg: No edema.      Left lower leg: No edema.   Skin:     General: Skin is warm.      Capillary Refill: Capillary refill takes less than 2 seconds.      Coloration: Skin is not jaundiced.      Findings: No bruising or rash.      Comments: Axillary mass covered with gauze with no strikethrough appreciated    Neurological:      General: No focal deficit present.      Mental Status: He is oriented to person, place, and time. Mental status is at baseline.      Cranial Nerves: No cranial nerve deficit.         Last Recorded Vitals  Blood pressure 119/59, pulse 95, temperature 36.3 °C (97.3 °F), resp. rate 20, height 1.69 m (5' 6.54\"), weight 76.5 kg (168 lb 11.2 oz), SpO2 96 %.  Intake/Output last 3 Shifts:  I/O last 3 completed shifts:  In: 860 (11.2 mL/kg) [P.O.:240; NG/GT:620]  Out: 3000 (39.2 mL/kg) [Urine:3000 (1.1 mL/kg/hr)]  Weight: 76.5 kg     Relevant Results  Scheduled medications  acetaminophen, 650 mg, g-tube, q4h  acetylcysteine, 6 mL, nebulization, q6h  amLODIPine, 10 mg, g-tube, Daily  aspirin, 81 mg, g-tube, Daily  atorvastatin, 80 mg, g-tube, Daily  [Held by provider] chlorthalidone, 25 mg, g-tube, Daily  esomeprazole, 20 mg, nasogastric tube, Daily before breakfast  fentaNYL PF, , ,   gabapentin, 300 mg, g-tube, BID  heparin (porcine), 5,000 Units, subcutaneous, q8h  losartan, 100 mg, g-tube, Daily  [Held by provider] methadone, 2.5 mg, g-tube, q12h  metroNIDAZOLE, 500 mg, oral, q8h LEORA  midazolam, , ,   piperacillin-tazobactam, 3.375 g, intravenous, q6h  polyethylene glycol, 17 g, g-tube, BID  senna, 5 mL, g-tube, Daily      Continuous medications     PRN medications  PRN medications: bisacodyl, dextrose 10 % in water (D10W), dextrose, fentaNYL PF, glucagon, glycopyrrolate, guaiFENesin, melatonin, meperidine, meperidine PF, midazolam, oxyCODONE, oxyCODONE      Results for " orders placed or performed during the hospital encounter of 11/22/23 (from the past 24 hour(s))   POCT GLUCOSE   Result Value Ref Range    POCT Glucose 93 74 - 99 mg/dL   POCT GLUCOSE   Result Value Ref Range    POCT Glucose 74 74 - 99 mg/dL   CBC and Auto Differential   Result Value Ref Range    WBC 18.5 (H) 4.4 - 11.3 x10*3/uL    nRBC 0.0 0.0 - 0.0 /100 WBCs    RBC 2.78 (L) 4.50 - 5.90 x10*6/uL    Hemoglobin 7.8 (L) 13.5 - 17.5 g/dL    Hematocrit 24.7 (L) 41.0 - 52.0 %    MCV 89 80 - 100 fL    MCH 28.1 26.0 - 34.0 pg    MCHC 31.6 (L) 32.0 - 36.0 g/dL    RDW 13.2 11.5 - 14.5 %    Platelets 380 150 - 450 x10*3/uL    Neutrophils % 80.5 40.0 - 80.0 %    Immature Granulocytes %, Automated 5.7 (H) 0.0 - 0.9 %    Lymphocytes % 4.6 13.0 - 44.0 %    Monocytes % 7.8 2.0 - 10.0 %    Eosinophils % 1.0 0.0 - 6.0 %    Basophils % 0.4 0.0 - 2.0 %    Neutrophils Absolute 14.92 (H) 1.20 - 7.70 x10*3/uL    Immature Granulocytes Absolute, Automated 1.06 (H) 0.00 - 0.70 x10*3/uL    Lymphocytes Absolute 0.85 (L) 1.20 - 4.80 x10*3/uL    Monocytes Absolute 1.45 (H) 0.10 - 1.00 x10*3/uL    Eosinophils Absolute 0.18 0.00 - 0.70 x10*3/uL    Basophils Absolute 0.07 0.00 - 0.10 x10*3/uL   Renal Function Panel   Result Value Ref Range    Glucose 110 (H) 74 - 99 mg/dL    Sodium 133 (L) 136 - 145 mmol/L    Potassium 4.8 3.5 - 5.3 mmol/L    Chloride 95 (L) 98 - 107 mmol/L    Bicarbonate 25 21 - 32 mmol/L    Anion Gap 18 10 - 20 mmol/L    Urea Nitrogen 58 (H) 6 - 23 mg/dL    Creatinine 1.89 (H) 0.50 - 1.30 mg/dL    eGFR 38 (L) >60 mL/min/1.73m*2    Calcium 9.4 8.6 - 10.6 mg/dL    Phosphorus 5.2 (H) 2.5 - 4.9 mg/dL    Albumin 2.4 (L) 3.4 - 5.0 g/dL   Magnesium   Result Value Ref Range    Magnesium 2.47 (H) 1.60 - 2.40 mg/dL                 Malnutrition Diagnosis Status: Ongoing  Malnutrition Diagnosis: Severe malnutrition related to acute disease or injury  As Evidenced by: >5% wt loss in 1 month; reported >7.5% wt loss in 3 months; PO intake  likely meeting <75% of nutr needs for >7 days  I agree with the dietitian's malnutrition diagnosis.      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 Given new diagnosis, no treatment has been started yet and no known origin yet. Will discuss with Med Onc (Dr. Yin was to be his primary oncologist), Surg Onc (Dr Corona had already seen pt) and will also consult GI for EGD/biopsy. EGD on 11/24 showed large, nearly fully obstructing esophageal mass. Patient is not a surgical candidate given metastatic disease. Managing nutrition and need for chemo/radiation. PEG in place. Patient is slightly fluid overloaded, receiving 2L. Ongoing elevation of white count w/o spiking fevers but increased white/yellow sputum productions consistent with pulmonary edema vs silent aspiration from underlying esophageal dysfunctions 2/2 malignancy. 12/8, patient exhibiting more signs of delirium and somnolence, possibly related to initiation of methadone on 12/5. Now, mentation improving off methadone with worthy catheter in place.      Updates 12/9:  -s/p 3/5 fraction of today for pRT of the right axilla  -leukocytosis is worsening 18.5 (16.5)  -MARYELLEN improving  -Worthy in place: 1.9L out yesterday, net negative 1L  -Dry crusting around PEG tube, no active leakage  -Continue BP hygiene and Oscillating Positive Expiratory Pressure Therapy  -strict Is/Os    #Metastatic SCC, poorly differentiated   #Dysphagia   #Leukocytosis, worsening  - Pathology Microscopic examination of H&E sections demonstrates a poorly differentiated  carcinoma , immunoreactive with CK7 and p40 , infiltrating soft tissue.  No residual lymph node parenchyma is seen.  The following immunostains are negative: NKX3.1, GATA3, CK20, TTF-1 and CDX2   -Diagnosed 11/2023; no treatment yet; unknown origin  -Surg Onc consulted; appreciate recs (saw Dr. Cheungermal outpatient 11/10/23)  -SLP consulted for swallow evaluation and recommended NPO  -EGD/biopsy 11/24:  large, nearly fully obstructing esophageal mass. Patient is not a surgical candidate because of metastatic disease.   -Plan to inform Dr. Yin (future medical oncologist-1st appt scheduled 12/1)  -Consider consulting pulm for possible lung biopsy as potential origin   -Tylenol PRN for pain; patient denies any pain currently  - Nutrition consulted: 1. Start 100mg IV thiamine x5 days;  Isosource 1.5 @ 25 ml/hr. Advance slowly by 10 ml/hr q8h, as tolerated, to goal rate of 55 ml/hr.   - CT SIM 11/27  - 5/5 fraction of pRT to esophageal mass 12/4 completed  -CT SIM 12/6  -c/w pRT to right axillary mass fraction (3/5 completed)- scheduled for Monday  -s/p 2 doses IV lasix 20 mg, one dose of 40 IV lasix  -c/w Mucomyst, BP hygiene and PEP oscillatory therapy  -Procal elevated 0.79  - s/p PEG tube placement 11/27  - c/w 345ml QID bolus feeds, FWF 60 ml four times daily and 60 before and after every feed and 150 ml additional BID  - 100mg IV thiamine x5 days   -discontinue Vancomycin, MRSA nares negative  -c/w IV Zosyn (12/7-  -Respiratory sputum culture with salivary contimination     #Urinary Retention, Worthy catheter in place   -1.9 L out w/straight cat 12/7  -Repeat PVR this morning  -HOLD methadone and limit anticholinergics     #MARYELLEN, 2/2 retention, nephrotoxic meds, diuresis improving  -Baseline Cr ~1; on admission 1.48, was 1.50 on 11/16 -> now at baseline 1.04 on 11/23 -->2.19 12/8 (retention), worthy placed --> 1.89  -S/p 1L LR, IV LR infusion at 125cc/hr, given improvement in Cr likely pre-renal  -HOLD home  losartan 100mg daily  -Continue to monitor      #HTN  -Continue home amlodipine 10mg daily  -Holding chlorithalidone 25mg daily given hypercalcemia  -Continue ARB as MARYELLEN has resolved     #CVA 9/2023  #Carotid stenosis  -2 acute or subacute infarcts seen on MRI 9/18/2023  -Continue ASA 81mg daily  -Completed 21 days of plavix (started 9/18/23)  -Continue atorvastatin 80mg daily  -Vascular Surgery follow up outpatient for the carotid stenosis     #Hypercalcemia, resolved  -Ca 11.5 on admission  -S/p 1L LR in the ED, on LR infusion at 125cc/hr  -Repeat was 11.3 on 11/23, so avoiding LR in favor of NS     #Elevated PSA  -Urology follow up outpatient      #Cancer Pain Control:  -HOLD Methadone  -continue 10 mg PRN Oxycodone      Disposition: tele  Follow-ups: PCP, Onc, Surg Onc,      F: prn  E: prn   N: PEG  A: PIVs     DVT ppx: lovenox  GI ppx: home PPI    Code Status: FULL (confirmed on 11/22/23)  Surrogate Medical Decision-maker: Wife April [phone]                 Sid Jose MD

## 2023-12-09 NOTE — SIGNIFICANT EVENT
Due to persistent need for supplemental O2 support and rising white count despite broad spectrum antibiotics, will move forward with CT Chest w/o contrast to better characterize pulmonary status. Also sending fungal workup.

## 2023-12-09 NOTE — PROGRESS NOTES
"Vancomycin Dosing by Pharmacy- INITIAL    Mike Olson is a 68 y.o. year old male who Pharmacy has been consulted for vancomycin dosing for pneumonia. Based on the patient's indication and renal status this patient will be dosed based on a goal trough/random level of 15-20.     Renal function is currently improving although still below baseline renal function.    Visit Vitals  /59   Pulse 95   Temp 36.3 °C (97.3 °F)   Resp 20        Lab Results   Component Value Date    CREATININE 1.89 (H) 12/09/2023    CREATININE 2.19 (H) 12/08/2023    CREATININE 1.64 (H) 12/07/2023    CREATININE 1.52 (H) 12/07/2023        Patient weight is No results found for: \"PTWEIGHT\"    No results found for: \"CULTURE\"     I/O last 3 completed shifts:  In: 860 (11.2 mL/kg) [P.O.:240; NG/GT:620]  Out: 3000 (39.2 mL/kg) [Urine:3000 (1.1 mL/kg/hr)]  Weight: 76.5 kg   [unfilled]    Lab Results   Component Value Date    PATIENTTEMP 37.0 12/07/2023    PATIENTTEMP 37.0 12/07/2023          Assessment/Plan     Patient will not be given a loading dose.  Will initiate vancomycin maintenance, a one time dose of 1250 mg. Patient previously received 1500mg on 12/7 although no levels were collected after as vancomycin had been discontinued.    Follow-up level will be ordered on 12/10 with mid-am labs, unless clinically indicated sooner.  Will continue to monitor renal function daily while on vancomycin and order serum creatinine at least every 48 hours if not already ordered.  Follow for continued vancomycin needs, clinical response, and signs/symptoms of toxicity.       Tru Espinosa, PharmD, BCPS       "

## 2023-12-09 NOTE — NURSING NOTE
Pt is warm to touch, alert and oriented x2-3. Pt temperature is 38.1 and blood sugar is 74. RN paged team and they stated to recheck vitals in one hour. RN also asked team for hypoglycemia orders for future use.

## 2023-12-09 NOTE — CARE PLAN
The patient's goals for the shift include      The clinical goals for the shift include pt will remain safe and free from injury by the end of shift 12/9 @ 0700      Problem: Fall/Injury  Goal: Not fall by end of shift  Outcome: Progressing  Goal: Be free from injury by end of the shift  Outcome: Progressing  Goal: Verbalize understanding of personal risk factors for fall in the hospital  Outcome: Progressing  Goal: Verbalize understanding of risk factor reduction measures to prevent injury from fall in the home  Outcome: Progressing  Goal: Use assistive devices by end of the shift  Outcome: Progressing  Goal: Pace activities to prevent fatigue by end of the shift  Outcome: Progressing     Problem: Skin  Goal: Decreased wound size/increased tissue granulation at next dressing change  Outcome: Progressing  Flowsheets (Taken 12/8/2023 2236)  Decreased wound size/increased tissue granulation at next dressing change:   Promote sleep for wound healing   Protective dressings over bony prominences  Goal: Participates in plan/prevention/treatment measures  Outcome: Progressing  Flowsheets (Taken 12/8/2023 2236)  Participates in plan/prevention/treatment measures: Elevate heels  Goal: Prevent/manage excess moisture  Outcome: Progressing  Flowsheets (Taken 12/8/2023 2236)  Prevent/manage excess moisture:   Moisturize dry skin   Monitor for/manage infection if present  Goal: Prevent/minimize sheer/friction injuries  Outcome: Progressing  Flowsheets (Taken 12/8/2023 2236)  Prevent/minimize sheer/friction injuries:   Turn/reposition every 2 hours/use positioning/transfer devices   Use pull sheet  Goal: Promote/optimize nutrition  Outcome: Progressing  Flowsheets (Taken 12/8/2023 2236)  Promote/optimize nutrition:   Monitor/record intake including meals   Assist with feeding  Goal: Promote skin healing  Outcome: Progressing  Flowsheets (Taken 12/8/2023 2236)  Promote skin healing:   Protective dressings over bony prominences    Turn/reposition every 2 hours/use positioning/transfer devices     Problem: Pain  Goal: My pain/discomfort is manageable  Outcome: Progressing     Problem: Safety  Goal: Patient will be injury free during hospitalization  Outcome: Progressing  Goal: I will remain free of falls  Outcome: Progressing     Problem: Daily Care  Goal: Daily care needs are met  Outcome: Progressing     Problem: Psychosocial Needs  Goal: Demonstrates ability to cope with hospitalization/illness  Outcome: Progressing  Goal: Collaborate with me, my family, and caregiver to identify my specific goals  Outcome: Progressing     Problem: Discharge Barriers  Goal: My discharge needs are met  Outcome: Progressing

## 2023-12-10 ENCOUNTER — HOME HEALTH ADMISSION (OUTPATIENT)
Dept: HOME HEALTH SERVICES | Facility: HOME HEALTH | Age: 68
End: 2023-12-10
Payer: MEDICARE

## 2023-12-10 LAB
ALBUMIN SERPL BCP-MCNC: 2.5 G/DL (ref 3.4–5)
ANION GAP BLDV CALCULATED.4IONS-SCNC: 10 MMOL/L (ref 10–25)
ANION GAP SERPL CALC-SCNC: 18 MMOL/L (ref 10–20)
BASE EXCESS BLDV CALC-SCNC: 0.7 MMOL/L (ref -2–3)
BASOPHILS # BLD MANUAL: 0 X10*3/UL (ref 0–0.1)
BASOPHILS NFR BLD MANUAL: 0 %
BODY TEMPERATURE: 37 DEGREES CELSIUS
BUN SERPL-MCNC: 55 MG/DL (ref 6–23)
CA-I BLDV-SCNC: 1.4 MMOL/L (ref 1.1–1.33)
CALCIUM SERPL-MCNC: 9.6 MG/DL (ref 8.6–10.6)
CHLORIDE BLDV-SCNC: 100 MMOL/L (ref 98–107)
CHLORIDE SERPL-SCNC: 96 MMOL/L (ref 98–107)
CO2 SERPL-SCNC: 25 MMOL/L (ref 21–32)
CREAT SERPL-MCNC: 1.36 MG/DL (ref 0.5–1.3)
EOSINOPHIL # BLD MANUAL: 0.16 X10*3/UL (ref 0–0.7)
EOSINOPHIL NFR BLD MANUAL: 0.8 %
ERYTHROCYTE [DISTWIDTH] IN BLOOD BY AUTOMATED COUNT: 13.2 % (ref 11.5–14.5)
GFR SERPL CREATININE-BSD FRML MDRD: 57 ML/MIN/1.73M*2
GLUCOSE BLD MANUAL STRIP-MCNC: 123 MG/DL (ref 74–99)
GLUCOSE BLDV-MCNC: 110 MG/DL (ref 74–99)
GLUCOSE SERPL-MCNC: 108 MG/DL (ref 74–99)
HCO3 BLDV-SCNC: 26 MMOL/L (ref 22–26)
HCT VFR BLD AUTO: 24.5 % (ref 41–52)
HCT VFR BLD EST: 25 % (ref 41–52)
HGB BLD-MCNC: 7.7 G/DL (ref 13.5–17.5)
HGB BLDV-MCNC: 8.3 G/DL (ref 13.5–17.5)
HOLD SPECIMEN: NORMAL
IMM GRANULOCYTES # BLD AUTO: 1.27 X10*3/UL (ref 0–0.7)
IMM GRANULOCYTES NFR BLD AUTO: 6.3 % (ref 0–0.9)
INHALED O2 CONCENTRATION: 28 %
LACTATE BLDV-SCNC: 1.5 MMOL/L (ref 0.4–2)
LYMPHOCYTES # BLD MANUAL: 0.16 X10*3/UL (ref 1.2–4.8)
LYMPHOCYTES NFR BLD MANUAL: 0.8 %
MAGNESIUM SERPL-MCNC: 2.45 MG/DL (ref 1.6–2.4)
MCH RBC QN AUTO: 28.1 PG (ref 26–34)
MCHC RBC AUTO-ENTMCNC: 31.4 G/DL (ref 32–36)
MCV RBC AUTO: 89 FL (ref 80–100)
METAMYELOCYTES # BLD MANUAL: 0.34 X10*3/UL
METAMYELOCYTES NFR BLD MANUAL: 1.7 %
MONOCYTES # BLD MANUAL: 1.03 X10*3/UL (ref 0.1–1)
MONOCYTES NFR BLD MANUAL: 5.1 %
MYELOCYTES # BLD MANUAL: 0.16 X10*3/UL
MYELOCYTES NFR BLD MANUAL: 0.8 %
NEUTS SEG # BLD MANUAL: 18.25 X10*3/UL (ref 1.2–7)
NEUTS SEG NFR BLD MANUAL: 90.8 %
NRBC BLD-RTO: 0 /100 WBCS (ref 0–0)
OXYHGB MFR BLDV: 72.1 % (ref 45–75)
PCO2 BLDV: 44 MM HG (ref 41–51)
PH BLDV: 7.38 PH (ref 7.33–7.43)
PHOSPHATE SERPL-MCNC: 3.1 MG/DL (ref 2.5–4.9)
PLATELET # BLD AUTO: 409 X10*3/UL (ref 150–450)
PO2 BLDV: 47 MM HG (ref 35–45)
POTASSIUM BLDV-SCNC: 4.7 MMOL/L (ref 3.5–5.3)
POTASSIUM SERPL-SCNC: 4.8 MMOL/L (ref 3.5–5.3)
RBC # BLD AUTO: 2.74 X10*6/UL (ref 4.5–5.9)
RBC MORPH BLD: ABNORMAL
SAO2 % BLDV: 74 % (ref 45–75)
SODIUM BLDV-SCNC: 131 MMOL/L (ref 136–145)
SODIUM SERPL-SCNC: 134 MMOL/L (ref 136–145)
TOTAL CELLS COUNTED BLD: 119
VANCOMYCIN SERPL-MCNC: 10.4 UG/ML (ref 5–20)
WBC # BLD AUTO: 20.1 X10*3/UL (ref 4.4–11.3)

## 2023-12-10 PROCEDURE — 83735 ASSAY OF MAGNESIUM: CPT

## 2023-12-10 PROCEDURE — 2500000004 HC RX 250 GENERAL PHARMACY W/ HCPCS (ALT 636 FOR OP/ED)

## 2023-12-10 PROCEDURE — 1170000001 HC PRIVATE ONCOLOGY ROOM DAILY

## 2023-12-10 PROCEDURE — 99233 SBSQ HOSP IP/OBS HIGH 50: CPT | Performed by: INTERNAL MEDICINE

## 2023-12-10 PROCEDURE — 80069 RENAL FUNCTION PANEL: CPT

## 2023-12-10 PROCEDURE — 82947 ASSAY GLUCOSE BLOOD QUANT: CPT

## 2023-12-10 PROCEDURE — 84132 ASSAY OF SERUM POTASSIUM: CPT

## 2023-12-10 PROCEDURE — 2500000002 HC RX 250 W HCPCS SELF ADMINISTERED DRUGS (ALT 637 FOR MEDICARE OP, ALT 636 FOR OP/ED)

## 2023-12-10 PROCEDURE — 94668 MNPJ CHEST WALL SBSQ: CPT

## 2023-12-10 PROCEDURE — 2500000001 HC RX 250 WO HCPCS SELF ADMINISTERED DRUGS (ALT 637 FOR MEDICARE OP)

## 2023-12-10 PROCEDURE — 96372 THER/PROPH/DIAG INJ SC/IM: CPT

## 2023-12-10 PROCEDURE — A4217 STERILE WATER/SALINE, 500 ML: HCPCS

## 2023-12-10 PROCEDURE — 85027 COMPLETE CBC AUTOMATED: CPT

## 2023-12-10 PROCEDURE — 94640 AIRWAY INHALATION TREATMENT: CPT

## 2023-12-10 PROCEDURE — 85007 BL SMEAR W/DIFF WBC COUNT: CPT

## 2023-12-10 PROCEDURE — 80202 ASSAY OF VANCOMYCIN: CPT

## 2023-12-10 RX ORDER — GLYCOPYRROLATE 0.2 MG/ML
0.2 INJECTION INTRAMUSCULAR; INTRAVENOUS EVERY 6 HOURS
Status: DISCONTINUED | OUTPATIENT
Start: 2023-12-10 | End: 2023-12-14

## 2023-12-10 RX ORDER — IPRATROPIUM BROMIDE AND ALBUTEROL SULFATE 2.5; .5 MG/3ML; MG/3ML
3 SOLUTION RESPIRATORY (INHALATION)
Status: DISCONTINUED | OUTPATIENT
Start: 2023-12-10 | End: 2023-12-12

## 2023-12-10 RX ADMIN — OXYCODONE HYDROCHLORIDE 10 MG: 5 TABLET ORAL at 16:32

## 2023-12-10 RX ADMIN — OXYCODONE HYDROCHLORIDE 10 MG: 5 TABLET ORAL at 21:03

## 2023-12-10 RX ADMIN — ACETAMINOPHEN 650 MG: 650 SOLUTION ORAL at 18:57

## 2023-12-10 RX ADMIN — ACETYLCYSTEINE 6 ML: 100 SOLUTION ORAL; RESPIRATORY (INHALATION) at 13:48

## 2023-12-10 RX ADMIN — HEPARIN SODIUM 5000 UNITS: 5000 INJECTION INTRAVENOUS; SUBCUTANEOUS at 14:01

## 2023-12-10 RX ADMIN — ACETAMINOPHEN 650 MG: 650 SOLUTION ORAL at 13:57

## 2023-12-10 RX ADMIN — IPRATROPIUM BROMIDE AND ALBUTEROL SULFATE 3 ML: .5; 3 SOLUTION RESPIRATORY (INHALATION) at 21:46

## 2023-12-10 RX ADMIN — GLYCOPYRROLATE 0.2 MG: 0.2 INJECTION INTRAMUSCULAR; INTRAVENOUS at 13:57

## 2023-12-10 RX ADMIN — Medication 5 MG: at 21:03

## 2023-12-10 RX ADMIN — ACETAMINOPHEN 650 MG: 650 SOLUTION ORAL at 09:30

## 2023-12-10 RX ADMIN — PIPERACILLIN SODIUM AND TAZOBACTAM SODIUM 3.38 G: 3; .375 INJECTION, SOLUTION INTRAVENOUS at 18:58

## 2023-12-10 RX ADMIN — GUAIFENESIN 600 MG: 600 TABLET ORAL at 09:21

## 2023-12-10 RX ADMIN — METRONIDAZOLE 500 MG: 500 TABLET ORAL at 13:57

## 2023-12-10 RX ADMIN — ACETAMINOPHEN 650 MG: 650 SOLUTION ORAL at 05:33

## 2023-12-10 RX ADMIN — OXYCODONE HYDROCHLORIDE 10 MG: 5 TABLET ORAL at 05:34

## 2023-12-10 RX ADMIN — ACETYLCYSTEINE 6 ML: 100 SOLUTION ORAL; RESPIRATORY (INHALATION) at 09:30

## 2023-12-10 RX ADMIN — PIPERACILLIN SODIUM AND TAZOBACTAM SODIUM 3.38 G: 3; .375 INJECTION, SOLUTION INTRAVENOUS at 13:57

## 2023-12-10 RX ADMIN — GLYCOPYRROLATE 0.2 MG: 0.2 INJECTION INTRAMUSCULAR; INTRAVENOUS at 01:14

## 2023-12-10 RX ADMIN — GABAPENTIN 300 MG: 250 SOLUTION ORAL at 09:21

## 2023-12-10 RX ADMIN — ACETYLCYSTEINE 4 ML: 100 SOLUTION ORAL; RESPIRATORY (INHALATION) at 21:46

## 2023-12-10 RX ADMIN — PIPERACILLIN SODIUM AND TAZOBACTAM SODIUM 3.38 G: 3; .375 INJECTION, SOLUTION INTRAVENOUS at 05:34

## 2023-12-10 RX ADMIN — OXYCODONE HYDROCHLORIDE 10 MG: 5 TABLET ORAL at 01:03

## 2023-12-10 RX ADMIN — HEPARIN SODIUM 5000 UNITS: 5000 INJECTION INTRAVENOUS; SUBCUTANEOUS at 18:58

## 2023-12-10 RX ADMIN — AMLODIPINE BESYLATE 10 MG: 10 TABLET ORAL at 09:21

## 2023-12-10 RX ADMIN — METRONIDAZOLE 500 MG: 500 TABLET ORAL at 21:18

## 2023-12-10 RX ADMIN — PIPERACILLIN SODIUM AND TAZOBACTAM SODIUM 3.38 G: 3; .375 INJECTION, SOLUTION INTRAVENOUS at 00:00

## 2023-12-10 RX ADMIN — SENNOSIDES 5 ML: 8.8 LIQUID ORAL at 09:21

## 2023-12-10 RX ADMIN — ASPIRIN 81 MG CHEWABLE TABLET 81 MG: 81 TABLET CHEWABLE at 09:21

## 2023-12-10 RX ADMIN — POLYETHYLENE GLYCOL 3350 17 G: 17 POWDER, FOR SOLUTION ORAL at 21:03

## 2023-12-10 RX ADMIN — POLYETHYLENE GLYCOL 3350 17 G: 17 POWDER, FOR SOLUTION ORAL at 09:21

## 2023-12-10 RX ADMIN — ATORVASTATIN CALCIUM 80 MG: 80 TABLET, FILM COATED ORAL at 09:21

## 2023-12-10 RX ADMIN — VANCOMYCIN HYDROCHLORIDE 1250 MG: 5 INJECTION, POWDER, LYOPHILIZED, FOR SOLUTION INTRAVENOUS at 14:52

## 2023-12-10 RX ADMIN — ESOMEPRAZOLE MAGNESIUM 20 MG: 20 FOR SUSPENSION ORAL at 05:33

## 2023-12-10 RX ADMIN — HEPARIN SODIUM 5000 UNITS: 5000 INJECTION INTRAVENOUS; SUBCUTANEOUS at 03:00

## 2023-12-10 RX ADMIN — GABAPENTIN 300 MG: 250 SOLUTION ORAL at 21:03

## 2023-12-10 RX ADMIN — IPRATROPIUM BROMIDE AND ALBUTEROL SULFATE 3 ML: .5; 3 SOLUTION RESPIRATORY (INHALATION) at 14:06

## 2023-12-10 RX ADMIN — ACETYLCYSTEINE 6 ML: 100 SOLUTION ORAL; RESPIRATORY (INHALATION) at 02:30

## 2023-12-10 RX ADMIN — ACETAMINOPHEN 650 MG: 650 SOLUTION ORAL at 21:19

## 2023-12-10 RX ADMIN — GLYCOPYRROLATE 0.2 MG: 0.2 INJECTION INTRAMUSCULAR; INTRAVENOUS at 18:57

## 2023-12-10 ASSESSMENT — COGNITIVE AND FUNCTIONAL STATUS - GENERAL
STANDING UP FROM CHAIR USING ARMS: A LOT
DRESSING REGULAR LOWER BODY CLOTHING: A LOT
MOVING FROM LYING ON BACK TO SITTING ON SIDE OF FLAT BED WITH BEDRAILS: A LOT
STANDING UP FROM CHAIR USING ARMS: A LOT
TOILETING: A LOT
EATING MEALS: A LOT
DRESSING REGULAR LOWER BODY CLOTHING: A LOT
TURNING FROM BACK TO SIDE WHILE IN FLAT BAD: A LITTLE
DRESSING REGULAR UPPER BODY CLOTHING: A LOT
MOBILITY SCORE: 13
MOBILITY SCORE: 11
CLIMB 3 TO 5 STEPS WITH RAILING: TOTAL
TURNING FROM BACK TO SIDE WHILE IN FLAT BAD: A LOT
MOVING TO AND FROM BED TO CHAIR: A LOT
HELP NEEDED FOR BATHING: A LOT
PERSONAL GROOMING: A LITTLE
TOILETING: A LOT
DRESSING REGULAR UPPER BODY CLOTHING: A LOT
MOVING FROM LYING ON BACK TO SITTING ON SIDE OF FLAT BED WITH BEDRAILS: A LITTLE
WALKING IN HOSPITAL ROOM: A LOT
EATING MEALS: A LITTLE
CLIMB 3 TO 5 STEPS WITH RAILING: TOTAL
WALKING IN HOSPITAL ROOM: A LOT
PERSONAL GROOMING: A LOT
MOVING TO AND FROM BED TO CHAIR: A LOT
DAILY ACTIVITIY SCORE: 12

## 2023-12-10 ASSESSMENT — PAIN SCALES - GENERAL: PAINLEVEL_OUTOF10: 7

## 2023-12-10 ASSESSMENT — PAIN - FUNCTIONAL ASSESSMENT: PAIN_FUNCTIONAL_ASSESSMENT: 0-10

## 2023-12-10 NOTE — CARE PLAN
The patient's goals for the shift include      The clinical goals for the shift include pt will get some rest during the shift tonight 12/10 @ 0700      Problem: Fall/Injury  Goal: Not fall by end of shift  Outcome: Progressing  Goal: Be free from injury by end of the shift  Outcome: Progressing  Goal: Verbalize understanding of personal risk factors for fall in the hospital  Outcome: Progressing  Goal: Verbalize understanding of risk factor reduction measures to prevent injury from fall in the home  Outcome: Progressing  Goal: Use assistive devices by end of the shift  Outcome: Progressing  Goal: Pace activities to prevent fatigue by end of the shift  Outcome: Progressing     Problem: Skin  Goal: Decreased wound size/increased tissue granulation at next dressing change  Outcome: Progressing  Flowsheets (Taken 12/9/2023 2225)  Decreased wound size/increased tissue granulation at next dressing change:   Promote sleep for wound healing   Protective dressings over bony prominences  Goal: Participates in plan/prevention/treatment measures  Outcome: Progressing  Flowsheets (Taken 12/9/2023 2225)  Participates in plan/prevention/treatment measures: Elevate heels  Goal: Prevent/manage excess moisture  Outcome: Progressing  Flowsheets (Taken 12/9/2023 2225)  Prevent/manage excess moisture: Moisturize dry skin  Goal: Prevent/minimize sheer/friction injuries  Outcome: Progressing  Flowsheets (Taken 12/9/2023 2225)  Prevent/minimize sheer/friction injuries:   Turn/reposition every 2 hours/use positioning/transfer devices   Use pull sheet  Goal: Promote/optimize nutrition  Outcome: Progressing  Flowsheets (Taken 12/9/2023 2225)  Promote/optimize nutrition: Consume > 50% meals/supplements  Goal: Promote skin healing  Outcome: Progressing  Flowsheets (Taken 12/9/2023 2225)  Promote skin healing: Protective dressings over bony prominences     Problem: Pain  Goal: My pain/discomfort is manageable  Outcome: Progressing     Problem:  Safety  Goal: Patient will be injury free during hospitalization  Outcome: Progressing  Goal: I will remain free of falls  Outcome: Progressing     Problem: Daily Care  Goal: Daily care needs are met  Outcome: Progressing     Problem: Psychosocial Needs  Goal: Demonstrates ability to cope with hospitalization/illness  Outcome: Progressing  Goal: Collaborate with me, my family, and caregiver to identify my specific goals  Outcome: Progressing     Problem: Discharge Barriers  Goal: My discharge needs are met  Outcome: Progressing

## 2023-12-10 NOTE — PROGRESS NOTES
Spiritual Care Visit    Clinical Encounter Type  Visited With: Patient  Routine Visit:  (pt sleeping)     Pt sleeping.  Will follow up.  Information on availability of spiritual care provided

## 2023-12-10 NOTE — PROGRESS NOTES
Mike Olson is a 68 y.o. male on day 18 of admission presenting with Difficulty swallowing.    Subjective   Patient seen and evaluated at the bedside this morning.  Overnight team called to the bedside at 0 600 due to waxing and waning mentation.  Vital signs stable on 2 L of oxygen.  Patient was very congested and nursing team used the yankauer to suction out a good volume of fluid sitting in the back of the patient's throat.  Patient eventually perked up after suction.  On evaluation an hour later patient was doing better and not complaining of any pain. Patient denying fevers, chills, chest pain, headache, vision changes, abdominal pain, diarrhea.  Patient has not had a bowel movement in 3 days.    Spoke with respiratory therapy who will come by and do bronchopulmonary hygiene with patient.  Will add DuoNebs every 6 scheduled.  Patient to scheduled continue palliative radiotherapy tomorrow to the right axillary mass.  Increased mouth care.  Scheduling suction.       Objective     Physical Exam  Constitutional:       Appearance: He is ill-appearing. He is not toxic-appearing.      Comments: Mildly uncomfortable   HENT:      Nose: No congestion or rhinorrhea.      Mouth/Throat:      Mouth: Mucous membranes are moist.   Eyes:      General: No scleral icterus.     Extraocular Movements: Extraocular movements intact.      Pupils: Pupils are equal, round, and reactive to light.   Cardiovascular:      Rate and Rhythm: Normal rate and regular rhythm.      Heart sounds: No murmur heard.     No friction rub. No gallop.   Pulmonary:      Effort: Pulmonary effort is normal. No respiratory distress.      Breath sounds: No stridor. No wheezing, rhonchi or rales.      Comments: Diminished breath sounds on the right LL  Abdominal:      General: Bowel sounds are normal. There is no distension.      Palpations: Abdomen is soft. There is no mass.      Tenderness: There is no abdominal tenderness. There is no guarding or rebound.      " Comments: PEG tube in place with small amount of crusted drainage surrounding the outside, some greenish fluid noted around the site of the PEG    Genitourinary:     Comments: Vazquez catheter in place draining, clear yellow urine  Musculoskeletal:         General: No swelling or tenderness. Normal range of motion.      Cervical back: Normal range of motion.      Right lower leg: No edema.      Left lower leg: No edema.   Skin:     General: Skin is warm.      Capillary Refill: Capillary refill takes less than 2 seconds.      Coloration: Skin is not jaundiced.      Findings: No bruising or rash.      Comments: Axillary mass covered with gauze with no strikethrough appreciated    Neurological:      General: No focal deficit present.      Mental Status: He is oriented to person, place, and time. Mental status is at baseline.      Cranial Nerves: No cranial nerve deficit.         Last Recorded Vitals  Blood pressure 118/63, pulse 99, temperature 35.9 °C (96.6 °F), resp. rate 18, height 1.69 m (5' 6.54\"), weight 76.5 kg (168 lb 11.2 oz), SpO2 98 %.  Intake/Output last 3 Shifts:  I/O last 3 completed shifts:  In: 1220 (15.9 mL/kg) [NG/GT:1220]  Out: 3095 (40.4 mL/kg) [Urine:3095 (1.1 mL/kg/hr)]  Weight: 76.5 kg     Relevant Results  Scheduled medications  acetaminophen, 650 mg, g-tube, q4h  acetylcysteine, 6 mL, nebulization, q6h  amLODIPine, 10 mg, g-tube, Daily  aspirin, 81 mg, g-tube, Daily  atorvastatin, 80 mg, g-tube, Daily  [Held by provider] chlorthalidone, 25 mg, g-tube, Daily  esomeprazole, 20 mg, nasogastric tube, Daily before breakfast  fentaNYL PF, , ,   gabapentin, 300 mg, g-tube, BID  glycopyrrolate, 0.2 mg, intravenous, q6h  heparin (porcine), 5,000 Units, subcutaneous, q8h  ipratropium-albuteroL, 3 mL, nebulization, q6h  [Held by provider] losartan, 100 mg, g-tube, Daily  [Held by provider] methadone, 2.5 mg, g-tube, q12h  metroNIDAZOLE, 500 mg, oral, q8h LEORA  midazolam, , ,   piperacillin-tazobactam, 3.375 " g, intravenous, q6h  polyethylene glycol, 17 g, g-tube, BID  senna, 5 mL, g-tube, Daily  vancomycin, 1,250 mg, intravenous, Once      Continuous medications     PRN medications  PRN medications: bisacodyl, dextrose 10 % in water (D10W), dextrose, fentaNYL PF, glucagon, guaiFENesin, melatonin, meperidine, meperidine PF, midazolam, oxyCODONE, oxyCODONE      Results for orders placed or performed during the hospital encounter of 11/22/23 (from the past 24 hour(s))   POCT GLUCOSE   Result Value Ref Range    POCT Glucose 144 (H) 74 - 99 mg/dL   Red Top   Result Value Ref Range    Extra Tube Hold for add-ons.    CBC and Auto Differential   Result Value Ref Range    WBC 20.1 (H) 4.4 - 11.3 x10*3/uL    nRBC 0.0 0.0 - 0.0 /100 WBCs    RBC 2.74 (L) 4.50 - 5.90 x10*6/uL    Hemoglobin 7.7 (L) 13.5 - 17.5 g/dL    Hematocrit 24.5 (L) 41.0 - 52.0 %    MCV 89 80 - 100 fL    MCH 28.1 26.0 - 34.0 pg    MCHC 31.4 (L) 32.0 - 36.0 g/dL    RDW 13.2 11.5 - 14.5 %    Platelets 409 150 - 450 x10*3/uL    Immature Granulocytes %, Automated 6.3 (H) 0.0 - 0.9 %    Immature Granulocytes Absolute, Automated 1.27 (H) 0.00 - 0.70 x10*3/uL   Renal Function Panel   Result Value Ref Range    Glucose 108 (H) 74 - 99 mg/dL    Sodium 134 (L) 136 - 145 mmol/L    Potassium 4.8 3.5 - 5.3 mmol/L    Chloride 96 (L) 98 - 107 mmol/L    Bicarbonate 25 21 - 32 mmol/L    Anion Gap 18 10 - 20 mmol/L    Urea Nitrogen 55 (H) 6 - 23 mg/dL    Creatinine 1.36 (H) 0.50 - 1.30 mg/dL    eGFR 57 (L) >60 mL/min/1.73m*2    Calcium 9.6 8.6 - 10.6 mg/dL    Phosphorus 3.1 2.5 - 4.9 mg/dL    Albumin 2.5 (L) 3.4 - 5.0 g/dL   Magnesium   Result Value Ref Range    Magnesium 2.45 (H) 1.60 - 2.40 mg/dL   Manual Differential   Result Value Ref Range    Neutrophils %, Manual 90.8 40.0 - 80.0 %    Lymphocytes %, Manual 0.8 13.0 - 44.0 %    Monocytes %, Manual 5.1 2.0 - 10.0 %    Eosinophils %, Manual 0.8 0.0 - 6.0 %    Basophils %, Manual 0.0 0.0 - 2.0 %    Metamyelocytes %, Manual 1.7  0.0 - 0.0 %    Myelocytes %, Manual 0.8 0.0 - 0.0 %    Seg Neutrophils Absolute, Manual 18.25 (H) 1.20 - 7.00 x10*3/uL    Lymphocytes Absolute, Manual 0.16 (L) 1.20 - 4.80 x10*3/uL    Monocytes Absolute, Manual 1.03 (H) 0.10 - 1.00 x10*3/uL    Eosinophils Absolute, Manual 0.16 0.00 - 0.70 x10*3/uL    Basophils Absolute, Manual 0.00 0.00 - 0.10 x10*3/uL    Metamyelocytes Absolute, Manual 0.34 0.00 - 0.00 x10*3/uL    Myelocytes Absolute, Manual 0.16 0.00 - 0.00 x10*3/uL    Total Cells Counted 119     RBC Morphology No significant RBC morphology present    POCT GLUCOSE   Result Value Ref Range    POCT Glucose 123 (H) 74 - 99 mg/dL   Green Top   Result Value Ref Range    Extra Tube Hold for add-ons.    Blood Gas Venous Full Panel   Result Value Ref Range    POCT pH, Venous 7.38 7.33 - 7.43 pH    POCT pCO2, Venous 44 41 - 51 mm Hg    POCT pO2, Venous 47 (H) 35 - 45 mm Hg    POCT SO2, Venous 74 45 - 75 %    POCT Oxy Hemoglobin, Venous 72.1 45.0 - 75.0 %    POCT Hematocrit Calculated, Venous 25.0 (L) 41.0 - 52.0 %    POCT Sodium, Venous 131 (L) 136 - 145 mmol/L    POCT Potassium, Venous 4.7 3.5 - 5.3 mmol/L    POCT Chloride, Venous 100 98 - 107 mmol/L    POCT Ionized Calicum, Venous 1.40 (H) 1.10 - 1.33 mmol/L    POCT Glucose, Venous 110 (H) 74 - 99 mg/dL    POCT Lactate, Venous 1.5 0.4 - 2.0 mmol/L    POCT Base Excess, Venous 0.7 -2.0 - 3.0 mmol/L    POCT HCO3 Calculated, Venous 26.0 22.0 - 26.0 mmol/L    POCT Hemoglobin, Venous 8.3 (L) 13.5 - 17.5 g/dL    POCT Anion Gap, Venous 10.0 10.0 - 25.0 mmol/L    Patient Temperature 37.0 degrees Celsius    FiO2 28 %   Vancomycin   Result Value Ref Range    Vancomycin 10.4 5.0 - 20.0 ug/mL                 Malnutrition Diagnosis Status: Ongoing  Malnutrition Diagnosis: Severe malnutrition related to acute disease or injury  As Evidenced by: >5% wt loss in 1 month; reported >7.5% wt loss in 3 months; PO intake likely meeting <75% of nutr needs for >7 days  I agree with the  dietitian's malnutrition diagnosis.      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 Given new diagnosis, no treatment has been started yet and no known origin yet. Will discuss with Med Onc (Dr. Yin was to be his primary oncologist), Surg Onc (Dr Corona had already seen pt) and will also consult GI for EGD/biopsy. EGD on 11/24 showed large, nearly fully obstructing esophageal mass. Patient is not a surgical candidate given metastatic disease. Managing nutrition and need for chemo/radiation. PEG in place. Patient is slightly fluid overloaded, receiving 2L. Ongoing elevation of white count w/o spiking fevers but increased white/yellow sputum productions consistent with pulmonary edema vs silent aspiration from underlying esophageal dysfunctions 2/2 malignancy. 12/8, patient exhibiting more signs of delirium and somnolence, possibly related to initiation of methadone on 12/5.      Updates 12/10:  -s/p 3/5 fraction pRT of the right axilla  -leukocytosis is worsening 20 (18.5)  -MARYELLEN improving daily  -Vazquez in place: net negative 1.2 L /24 hrs   -Continue BP hygiene and Oscillating Positive Expiratory Pressure Therapy, add duonebs q6hrs  -Schedule Robinul daily   -Mouth care and suctioning   -strict Is/Os    #Metastatic SCC, poorly differentiated   #Dysphagia   #Leukocytosis, worsening  - Pathology Microscopic examination of H&E sections demonstrates a poorly differentiated carcinoma , immunoreactive with CK7 and p40 , infiltrating soft tissue.  No residual lymph node parenchyma is seen.  The  following immunostains are negative: NKX3.1, GATA3, CK20, TTF-1 and CDX2   -Diagnosed 11/2023; no treatment yet; unknown origin  -Surg Onc consulted; appreciate recs (saw Dr. Cheungermal outpatient 11/10/23)  -SLP consulted for swallow evaluation and recommended NPO  -EGD/biopsy 11/24:  large, nearly fully obstructing esophageal mass. Patient is not a surgical candidate because of metastatic disease.   - Nutrition consulted: 1. Start 100mg IV thiamine x5 days;  Isosource 1.5 @ 25 ml/hr. Advance slowly by 10 ml/hr q8h, as tolerated, to goal rate of 55 ml/hr.   - CT SIM 11/27  - 5/5 fraction of pRT to esophageal mass 12/4 completed  -CT SIM 12/6  -c/w pRT to right axillary mass fraction (3/5 completed)- scheduled for Monday  -s/p 2 doses IV lasix 20 mg, one dose of 40 IV lasix  -c/w Mucomyst, BP hygiene and PEP oscillatory therapy, duonebs q6hrs, -Schedule Robinul daily   -Procal elevated 0.79  - s/p PEG tube placement 11/27  - c/w 345ml QID bolus feeds, FWF 60 ml four times daily and 60 before and after every feed and 150 ml additional BID  - 100mg IV thiamine x5 days   -discontinue Vancomycin, MRSA nares negative  -c/w IV Zosyn (12/7-  -Respiratory sputum culture with salivary contimination     #Urinary Retention, Worthy catheter in place   -1.9 L out w/straight cat 12/7  -Repeat PVR this morning  -c/w worthy, maryellen improving  -HOLD methadone and limit anticholinergics     #MARYELLEN, 2/2 retention, nephrotoxic meds, diuresis improving  -Baseline Cr ~1; on admission 1.48, was 1.50 on 11/16 -> now at baseline 1.04 on 11/23 -->2.19 12/8 (retention), worthy placed --> 1.89--> 1.36  -S/p 1L LR, IV LR infusion at 125cc/hr, given improvement in Cr likely pre-renal  -HOLD home losartan 100mg daily  -Continue to monitor     #HTN  -Continue home amlodipine 10mg daily  -Holding chlorithalidone 25mg daily given hypercalcemia  -Continue ARB as MARYELLEN has resolved    #CVA 9/2023  #Carotid stenosis  -2 acute or subacute infarcts seen on MRI  9/18/2023  -Continue ASA 81mg daily  -Completed 21 days of plavix (started 9/18/23)  -Continue atorvastatin 80mg daily  -Vascular Surgery follow up outpatient for the carotid stenosis     #Hypercalcemia, resolved  -Ca 11.5 on admission  -S/p 1L LR in the ED, on LR infusion at 125cc/hr  -Repeat was 11.3 on 11/23, so avoiding LR in favor of NS     #Elevated PSA  -Urology follow up outpatient      #Cancer Pain Control:  -HOLD Methadone  -continue 10 mg PRN Oxycodone   -okay for PRN dilaudid      Disposition: tele  Follow-ups: PCP, Onc, Surg Onc,      F: prn  E: prn   N: PEG  A: PIVs     DVT ppx: lovenox  GI ppx: home PPI    Code Status: FULL (confirmed on 11/22/23)  Surrogate Medical Decision-maker: Wife April [phone]                 Sid Jose MD

## 2023-12-10 NOTE — PROGRESS NOTES
"Vancomycin Dosing by Pharmacy- FOLLOW UP    Mike Olson is a 68 y.o. year old male who Pharmacy has been consulted for vancomycin dosing for pneumonia. Based on the patient's indication and renal status this patient is being dosed based on a goal trough/random level of 15-20.     Renal function is currently improving. Dose by levels for one more day before scheduling doses.     Most recent random level: 10.4 mcg/mL drawn 20hr after yesterday's dose.      Visit Vitals  /72   Pulse 97   Temp 35.9 °C (96.6 °F)   Resp 18        Lab Results   Component Value Date    CREATININE 1.36 (H) 12/10/2023    CREATININE 1.89 (H) 12/09/2023    CREATININE 2.19 (H) 12/08/2023    CREATININE 1.64 (H) 12/07/2023        Patient weight is No results found for: \"PTWEIGHT\"    No results found for: \"CULTURE\"     I/O last 3 completed shifts:  In: 1220 (15.9 mL/kg) [NG/GT:1220]  Out: 3095 (40.4 mL/kg) [Urine:3095 (1.1 mL/kg/hr)]  Weight: 76.5 kg   [unfilled]    Lab Results   Component Value Date    PATIENTTEMP 37.0 12/10/2023    PATIENTTEMP 37.0 12/07/2023    PATIENTTEMP 37.0 12/07/2023        Assessment/Plan    Will give another 1250mg x1 dose today.   The next level will be obtained on 12/11 with AM labs. May be obtained sooner if clinically indicated.   Will continue to monitor renal function daily while on vancomycin and order serum creatinine at least every 48 hours if not already ordered.  Follow for continued vancomycin needs, clinical response, and signs/symptoms of toxicity.       Tru Espinosa, PharmD, BCPS           "

## 2023-12-10 NOTE — NURSING NOTE
MD came to bedside and assessed pt. MD stated to just monitor for now d/t pt waxing and weaning and the day team will reassess for respiratory interventions

## 2023-12-11 ENCOUNTER — APPOINTMENT (OUTPATIENT)
Dept: RADIOLOGY | Facility: HOSPITAL | Age: 68
DRG: 356 | End: 2023-12-11
Payer: MEDICARE

## 2023-12-11 ENCOUNTER — HOSPITAL ENCOUNTER (OUTPATIENT)
Dept: RADIATION ONCOLOGY | Facility: HOSPITAL | Age: 68
Setting detail: RADIATION/ONCOLOGY SERIES
Discharge: HOME | End: 2023-12-11
Payer: OTHER MISCELLANEOUS

## 2023-12-11 DIAGNOSIS — Z51.0 ENCOUNTER FOR ANTINEOPLASTIC RADIATION THERAPY: ICD-10-CM

## 2023-12-11 DIAGNOSIS — C15.3 MALIGNANT NEOPLASM OF UPPER THIRD OF ESOPHAGUS (MULTI): ICD-10-CM

## 2023-12-11 DIAGNOSIS — C77.3 SECONDARY AND UNSPECIFIED MALIGNANT NEOPLASM OF AXILLA AND UPPER LIMB LYMPH NODES (MULTI): ICD-10-CM

## 2023-12-11 LAB
ABO GROUP (TYPE) IN BLOOD: NORMAL
ALBUMIN SERPL BCP-MCNC: 2.4 G/DL (ref 3.4–5)
ANION GAP SERPL CALC-SCNC: 15 MMOL/L (ref 10–20)
ANTIBODY SCREEN: NORMAL
BASOPHILS # BLD MANUAL: 0 X10*3/UL (ref 0–0.1)
BASOPHILS NFR BLD MANUAL: 0 %
BUN SERPL-MCNC: 50 MG/DL (ref 6–23)
CALCIUM SERPL-MCNC: 9.3 MG/DL (ref 8.6–10.6)
CHLORIDE SERPL-SCNC: 97 MMOL/L (ref 98–107)
CO2 SERPL-SCNC: 26 MMOL/L (ref 21–32)
CREAT SERPL-MCNC: 1 MG/DL (ref 0.5–1.3)
EOSINOPHIL # BLD MANUAL: 0.16 X10*3/UL (ref 0–0.7)
EOSINOPHIL NFR BLD MANUAL: 0.8 %
ERYTHROCYTE [DISTWIDTH] IN BLOOD BY AUTOMATED COUNT: 13.5 % (ref 11.5–14.5)
GFR SERPL CREATININE-BSD FRML MDRD: 82 ML/MIN/1.73M*2
GLUCOSE BLD MANUAL STRIP-MCNC: 107 MG/DL (ref 74–99)
GLUCOSE BLD MANUAL STRIP-MCNC: 110 MG/DL (ref 74–99)
GLUCOSE BLD MANUAL STRIP-MCNC: 117 MG/DL (ref 74–99)
GLUCOSE BLD MANUAL STRIP-MCNC: 130 MG/DL (ref 74–99)
GLUCOSE BLD MANUAL STRIP-MCNC: 148 MG/DL (ref 74–99)
GLUCOSE BLD MANUAL STRIP-MCNC: 97 MG/DL (ref 74–99)
GLUCOSE SERPL-MCNC: 119 MG/DL (ref 74–99)
HCT VFR BLD AUTO: 23 % (ref 41–52)
HGB BLD-MCNC: 7.2 G/DL (ref 13.5–17.5)
IMM GRANULOCYTES # BLD AUTO: 1.53 X10*3/UL (ref 0–0.7)
IMM GRANULOCYTES NFR BLD AUTO: 7.7 % (ref 0–0.9)
LAB AP ASR DISCLAIMER: NORMAL
LABORATORY COMMENT REPORT: NORMAL
LYMPHOCYTES # BLD MANUAL: 0.67 X10*3/UL (ref 1.2–4.8)
LYMPHOCYTES NFR BLD MANUAL: 3.4 %
MAGNESIUM SERPL-MCNC: 2.26 MG/DL (ref 1.6–2.4)
MCH RBC QN AUTO: 27.9 PG (ref 26–34)
MCHC RBC AUTO-ENTMCNC: 31.3 G/DL (ref 32–36)
MCV RBC AUTO: 89 FL (ref 80–100)
METAMYELOCYTES # BLD MANUAL: 0.16 X10*3/UL
METAMYELOCYTES NFR BLD MANUAL: 0.8 %
MONOCYTES # BLD MANUAL: 0.16 X10*3/UL (ref 0.1–1)
MONOCYTES NFR BLD MANUAL: 0.8 %
MYELOCYTES # BLD MANUAL: 0.34 X10*3/UL
MYELOCYTES NFR BLD MANUAL: 1.7 %
NEUTROPHILS # BLD MANUAL: 17.98 X10*3/UL (ref 1.2–7.7)
NEUTS BAND # BLD MANUAL: 0.16 X10*3/UL (ref 0–0.7)
NEUTS BAND NFR BLD MANUAL: 0.8 %
NEUTS SEG # BLD MANUAL: 17.82 X10*3/UL (ref 1.2–7)
NEUTS SEG NFR BLD MANUAL: 90 %
NRBC BLD-RTO: 0 /100 WBCS (ref 0–0)
PATH REPORT.FINAL DX SPEC: NORMAL
PATH REPORT.GROSS SPEC: NORMAL
PATH REPORT.TOTAL CANCER: NORMAL
PHOSPHATE SERPL-MCNC: 2.8 MG/DL (ref 2.5–4.9)
PLATELET # BLD AUTO: 388 X10*3/UL (ref 150–450)
POTASSIUM SERPL-SCNC: 4.5 MMOL/L (ref 3.5–5.3)
RAD ONC MSQ ACTUAL FRACTIONS DELIVERED: 5
RAD ONC MSQ ACTUAL SESSION DELIVERED DOSE: 400 CGRAY
RAD ONC MSQ ACTUAL TOTAL DOSE: 2000 CGRAY
RAD ONC MSQ ELAPSED DAYS: 6
RAD ONC MSQ LAST DATE: NORMAL
RAD ONC MSQ PRESCRIBED FRACTIONAL DOSE: 400 CGRAY
RAD ONC MSQ PRESCRIBED NUMBER OF FRACTIONS: 5
RAD ONC MSQ PRESCRIBED TECHNIQUE: NORMAL
RAD ONC MSQ PRESCRIBED TOTAL DOSE: 2000 CGRAY
RAD ONC MSQ PRESCRIPTION PATTERN COMMENT: NORMAL
RAD ONC MSQ START DATE: NORMAL
RAD ONC MSQ TREATMENT COURSE NUMBER: 2
RAD ONC MSQ TREATMENT SITE: NORMAL
RBC # BLD AUTO: 2.58 X10*6/UL (ref 4.5–5.9)
RBC MORPH BLD: ABNORMAL
RH FACTOR (ANTIGEN D): NORMAL
SODIUM SERPL-SCNC: 133 MMOL/L (ref 136–145)
TOTAL CELLS COUNTED BLD: 120
VANCOMYCIN SERPL-MCNC: 15.6 UG/ML (ref 5–20)
VARIANT LYMPHS # BLD MANUAL: 0.34 X10*3/UL (ref 0–0.5)
VARIANT LYMPHS NFR BLD: 1.7 %
WBC # BLD AUTO: 19.8 X10*3/UL (ref 4.4–11.3)

## 2023-12-11 PROCEDURE — 94668 MNPJ CHEST WALL SBSQ: CPT

## 2023-12-11 PROCEDURE — 2500000004 HC RX 250 GENERAL PHARMACY W/ HCPCS (ALT 636 FOR OP/ED)

## 2023-12-11 PROCEDURE — 77014 CHG CT GUIDANCE RADIATION THERAPY FLDS PLACEMENT: CPT | Performed by: STUDENT IN AN ORGANIZED HEALTH CARE EDUCATION/TRAINING PROGRAM

## 2023-12-11 PROCEDURE — 80202 ASSAY OF VANCOMYCIN: CPT

## 2023-12-11 PROCEDURE — 97530 THERAPEUTIC ACTIVITIES: CPT | Mod: GO

## 2023-12-11 PROCEDURE — 1170000001 HC PRIVATE ONCOLOGY ROOM DAILY

## 2023-12-11 PROCEDURE — 97530 THERAPEUTIC ACTIVITIES: CPT | Mod: GP

## 2023-12-11 PROCEDURE — 85007 BL SMEAR W/DIFF WBC COUNT: CPT

## 2023-12-11 PROCEDURE — 2500000001 HC RX 250 WO HCPCS SELF ADMINISTERED DRUGS (ALT 637 FOR MEDICARE OP)

## 2023-12-11 PROCEDURE — 86920 COMPATIBILITY TEST SPIN: CPT

## 2023-12-11 PROCEDURE — 82947 ASSAY GLUCOSE BLOOD QUANT: CPT

## 2023-12-11 PROCEDURE — 83735 ASSAY OF MAGNESIUM: CPT

## 2023-12-11 PROCEDURE — 80069 RENAL FUNCTION PANEL: CPT

## 2023-12-11 PROCEDURE — 86850 RBC ANTIBODY SCREEN: CPT

## 2023-12-11 PROCEDURE — 74018 RADEX ABDOMEN 1 VIEW: CPT

## 2023-12-11 PROCEDURE — 77412 RADIATION TX DELIVERY LVL 3: CPT | Performed by: STUDENT IN AN ORGANIZED HEALTH CARE EDUCATION/TRAINING PROGRAM

## 2023-12-11 PROCEDURE — 94640 AIRWAY INHALATION TREATMENT: CPT

## 2023-12-11 PROCEDURE — 99233 SBSQ HOSP IP/OBS HIGH 50: CPT | Performed by: INTERNAL MEDICINE

## 2023-12-11 PROCEDURE — 96372 THER/PROPH/DIAG INJ SC/IM: CPT

## 2023-12-11 PROCEDURE — 74018 RADEX ABDOMEN 1 VIEW: CPT | Performed by: RADIOLOGY

## 2023-12-11 PROCEDURE — 2500000002 HC RX 250 W HCPCS SELF ADMINISTERED DRUGS (ALT 637 FOR MEDICARE OP, ALT 636 FOR OP/ED)

## 2023-12-11 PROCEDURE — 85027 COMPLETE CBC AUTOMATED: CPT

## 2023-12-11 PROCEDURE — 77387 GUIDANCE FOR RADJ TX DLVR: CPT | Performed by: STUDENT IN AN ORGANIZED HEALTH CARE EDUCATION/TRAINING PROGRAM

## 2023-12-11 RX ORDER — OXYCODONE HYDROCHLORIDE 10 MG/1
10 TABLET ORAL EVERY 4 HOURS PRN
Qty: 15 TABLET | Refills: 0 | Status: CANCELLED | OUTPATIENT
Start: 2023-12-11

## 2023-12-11 RX ORDER — ESOMEPRAZOLE MAGNESIUM 20 MG/1
20 GRANULE, DELAYED RELEASE ORAL
Qty: 30 EACH | Refills: 0 | Status: CANCELLED | OUTPATIENT
Start: 2023-12-12

## 2023-12-11 RX ORDER — OXYCODONE HYDROCHLORIDE 5 MG/1
5 TABLET ORAL EVERY 6 HOURS PRN
Qty: 15 TABLET | Refills: 0 | Status: CANCELLED | OUTPATIENT
Start: 2023-12-11

## 2023-12-11 RX ORDER — LOSARTAN POTASSIUM 100 MG/1
100 TABLET ORAL DAILY
Qty: 30 TABLET | Refills: 0 | Status: CANCELLED | OUTPATIENT
Start: 2023-12-12 | End: 2024-01-11

## 2023-12-11 RX ORDER — POLYETHYLENE GLYCOL 3350 17 G/17G
17 POWDER, FOR SOLUTION ORAL 2 TIMES DAILY
Qty: 60 PACKET | Refills: 0 | Status: CANCELLED | OUTPATIENT
Start: 2023-12-11 | End: 2024-01-10

## 2023-12-11 RX ORDER — BISACODYL 10 MG/1
10 SUPPOSITORY RECTAL ONCE AS NEEDED
Qty: 12 SUPPOSITORY | Refills: 0 | Status: CANCELLED | OUTPATIENT
Start: 2023-12-11 | End: 2023-12-23

## 2023-12-11 RX ORDER — ACETAMINOPHEN 500 MG
5 TABLET ORAL NIGHTLY PRN
Qty: 30 TABLET | Refills: 0 | Status: CANCELLED | OUTPATIENT
Start: 2023-12-11

## 2023-12-11 RX ORDER — ENOXAPARIN SODIUM 100 MG/ML
40 INJECTION SUBCUTANEOUS EVERY 24 HOURS
Status: DISCONTINUED | OUTPATIENT
Start: 2023-12-12 | End: 2023-12-14

## 2023-12-11 RX ORDER — AMLODIPINE BESYLATE 10 MG/1
10 TABLET ORAL DAILY
Qty: 30 TABLET | Refills: 0 | Status: CANCELLED | OUTPATIENT
Start: 2023-12-12 | End: 2024-01-11

## 2023-12-11 RX ORDER — GABAPENTIN 250 MG/5ML
300 SOLUTION ORAL 2 TIMES DAILY
Qty: 360 ML | Refills: 0 | Status: CANCELLED | OUTPATIENT
Start: 2023-12-11 | End: 2024-01-10

## 2023-12-11 RX ORDER — ATORVASTATIN CALCIUM 80 MG/1
80 TABLET, FILM COATED ORAL DAILY
Qty: 30 TABLET | Refills: 0 | Status: CANCELLED | OUTPATIENT
Start: 2023-12-12 | End: 2024-01-11

## 2023-12-11 RX ORDER — ACETAMINOPHEN 160 MG/5ML
650 SOLUTION ORAL EVERY 4 HOURS
Qty: 20.3 ML | Refills: 0 | Status: CANCELLED | OUTPATIENT
Start: 2023-12-11

## 2023-12-11 RX ORDER — NAPROXEN SODIUM 220 MG/1
81 TABLET, FILM COATED ORAL DAILY
Qty: 30 TABLET | Refills: 0 | Status: CANCELLED | OUTPATIENT
Start: 2023-12-12 | End: 2024-01-11

## 2023-12-11 RX ORDER — SENNOSIDES 8.8 MG/5ML
5 LIQUID ORAL DAILY
Qty: 240 ML | Refills: 0 | Status: CANCELLED | OUTPATIENT
Start: 2023-12-12

## 2023-12-11 RX ADMIN — IPRATROPIUM BROMIDE AND ALBUTEROL SULFATE 3 ML: .5; 3 SOLUTION RESPIRATORY (INHALATION) at 10:04

## 2023-12-11 RX ADMIN — ASPIRIN 81 MG CHEWABLE TABLET 81 MG: 81 TABLET CHEWABLE at 09:56

## 2023-12-11 RX ADMIN — OXYCODONE HYDROCHLORIDE 10 MG: 5 TABLET ORAL at 20:30

## 2023-12-11 RX ADMIN — ACETYLCYSTEINE 6 ML: 100 SOLUTION ORAL; RESPIRATORY (INHALATION) at 20:30

## 2023-12-11 RX ADMIN — GABAPENTIN 300 MG: 250 SOLUTION ORAL at 10:00

## 2023-12-11 RX ADMIN — GLYCOPYRROLATE 0.2 MG: 0.2 INJECTION INTRAMUSCULAR; INTRAVENOUS at 01:07

## 2023-12-11 RX ADMIN — ACETAMINOPHEN 650 MG: 650 SOLUTION ORAL at 22:47

## 2023-12-11 RX ADMIN — POLYETHYLENE GLYCOL 3350 17 G: 17 POWDER, FOR SOLUTION ORAL at 09:56

## 2023-12-11 RX ADMIN — IPRATROPIUM BROMIDE AND ALBUTEROL SULFATE 3 ML: .5; 3 SOLUTION RESPIRATORY (INHALATION) at 03:13

## 2023-12-11 RX ADMIN — ATORVASTATIN CALCIUM 80 MG: 80 TABLET, FILM COATED ORAL at 09:56

## 2023-12-11 RX ADMIN — HEPARIN SODIUM 5000 UNITS: 5000 INJECTION INTRAVENOUS; SUBCUTANEOUS at 10:04

## 2023-12-11 RX ADMIN — PIPERACILLIN SODIUM AND TAZOBACTAM SODIUM 3.38 G: 3; .375 INJECTION, SOLUTION INTRAVENOUS at 06:41

## 2023-12-11 RX ADMIN — GLYCOPYRROLATE 0.2 MG: 0.2 INJECTION INTRAMUSCULAR; INTRAVENOUS at 18:18

## 2023-12-11 RX ADMIN — PIPERACILLIN SODIUM AND TAZOBACTAM SODIUM 3.38 G: 3; .375 INJECTION, SOLUTION INTRAVENOUS at 18:17

## 2023-12-11 RX ADMIN — ACETAMINOPHEN 650 MG: 650 SOLUTION ORAL at 09:56

## 2023-12-11 RX ADMIN — HEPARIN SODIUM 5000 UNITS: 5000 INJECTION INTRAVENOUS; SUBCUTANEOUS at 02:44

## 2023-12-11 RX ADMIN — POLYETHYLENE GLYCOL 3350 17 G: 17 POWDER, FOR SOLUTION ORAL at 20:31

## 2023-12-11 RX ADMIN — ACETAMINOPHEN 650 MG: 650 SOLUTION ORAL at 18:18

## 2023-12-11 RX ADMIN — ACETYLCYSTEINE 4 ML: 100 SOLUTION ORAL; RESPIRATORY (INHALATION) at 03:13

## 2023-12-11 RX ADMIN — ACETYLCYSTEINE 6 ML: 100 SOLUTION ORAL; RESPIRATORY (INHALATION) at 14:45

## 2023-12-11 RX ADMIN — OXYCODONE HYDROCHLORIDE 10 MG: 5 TABLET ORAL at 09:56

## 2023-12-11 RX ADMIN — SENNOSIDES 5 ML: 8.8 LIQUID ORAL at 09:56

## 2023-12-11 RX ADMIN — ESOMEPRAZOLE MAGNESIUM 20 MG: 20 FOR SUSPENSION ORAL at 05:09

## 2023-12-11 RX ADMIN — AMLODIPINE BESYLATE 10 MG: 10 TABLET ORAL at 09:56

## 2023-12-11 RX ADMIN — ACETAMINOPHEN 650 MG: 650 SOLUTION ORAL at 05:09

## 2023-12-11 RX ADMIN — ACETAMINOPHEN 650 MG: 650 SOLUTION ORAL at 14:47

## 2023-12-11 RX ADMIN — PIPERACILLIN SODIUM AND TAZOBACTAM SODIUM 3.38 G: 3; .375 INJECTION, SOLUTION INTRAVENOUS at 01:07

## 2023-12-11 RX ADMIN — GABAPENTIN 300 MG: 250 SOLUTION ORAL at 20:30

## 2023-12-11 RX ADMIN — ACETYLCYSTEINE 6 ML: 100 SOLUTION ORAL; RESPIRATORY (INHALATION) at 10:04

## 2023-12-11 RX ADMIN — GUAIFENESIN 600 MG: 600 TABLET ORAL at 14:46

## 2023-12-11 RX ADMIN — IPRATROPIUM BROMIDE AND ALBUTEROL SULFATE 3 ML: .5; 3 SOLUTION RESPIRATORY (INHALATION) at 18:17

## 2023-12-11 RX ADMIN — OXYCODONE HYDROCHLORIDE 10 MG: 5 TABLET ORAL at 14:46

## 2023-12-11 RX ADMIN — METRONIDAZOLE 500 MG: 500 TABLET ORAL at 06:41

## 2023-12-11 RX ADMIN — OXYCODONE HYDROCHLORIDE 10 MG: 5 TABLET ORAL at 05:08

## 2023-12-11 RX ADMIN — GLYCOPYRROLATE 0.2 MG: 0.2 INJECTION INTRAMUSCULAR; INTRAVENOUS at 06:47

## 2023-12-11 ASSESSMENT — COGNITIVE AND FUNCTIONAL STATUS - GENERAL
DRESSING REGULAR UPPER BODY CLOTHING: A LOT
CLIMB 3 TO 5 STEPS WITH RAILING: TOTAL
DAILY ACTIVITIY SCORE: 13
PERSONAL GROOMING: A LITTLE
DRESSING REGULAR UPPER BODY CLOTHING: A LOT
STANDING UP FROM CHAIR USING ARMS: A LOT
CLIMB 3 TO 5 STEPS WITH RAILING: TOTAL
TURNING FROM BACK TO SIDE WHILE IN FLAT BAD: A LOT
DRESSING REGULAR LOWER BODY CLOTHING: A LOT
TOILETING: A LOT
MOBILITY SCORE: 10
MOBILITY SCORE: 10
PERSONAL GROOMING: A LITTLE
DRESSING REGULAR LOWER BODY CLOTHING: A LOT
MOVING FROM LYING ON BACK TO SITTING ON SIDE OF FLAT BED WITH BEDRAILS: A LOT
HELP NEEDED FOR BATHING: A LOT
TURNING FROM BACK TO SIDE WHILE IN FLAT BAD: A LOT
MOVING FROM LYING ON BACK TO SITTING ON SIDE OF FLAT BED WITH BEDRAILS: A LOT
WALKING IN HOSPITAL ROOM: TOTAL
WALKING IN HOSPITAL ROOM: TOTAL
EATING MEALS: A LOT
TOILETING: A LOT
EATING MEALS: A LOT
STANDING UP FROM CHAIR USING ARMS: A LOT
MOVING TO AND FROM BED TO CHAIR: A LOT
MOVING TO AND FROM BED TO CHAIR: A LOT
DAILY ACTIVITIY SCORE: 13
HELP NEEDED FOR BATHING: A LOT

## 2023-12-11 ASSESSMENT — PAIN SCALES - GENERAL
PAINLEVEL_OUTOF10: 0 - NO PAIN

## 2023-12-11 ASSESSMENT — PAIN SCALES - PAIN ASSESSMENT IN ADVANCED DEMENTIA (PAINAD)
FACIALEXPRESSION: SAD, FRIGHTENED, FROWN
BODYLANGUAGE: TENSE, DISTRESSED PACING, FIDGETING
CONSOLABILITY: DISTRACTED OR REASSURED BY VOICE/TOUCH
TOTALSCORE: 6
NEGVOCALIZATION: REPEATED TROUBLED CALLING OUT, LOUD MOANING/GROANING, CRYING
BREATHING: OCCASIONAL LABORED BREATHING, SHORT PERIOD OF HYPERVENTILATION

## 2023-12-11 ASSESSMENT — PAIN - FUNCTIONAL ASSESSMENT
PAIN_FUNCTIONAL_ASSESSMENT: 0-10
PAIN_FUNCTIONAL_ASSESSMENT: 0-10

## 2023-12-11 NOTE — PROGRESS NOTES
Occupational Therapy    Occupational Therapy    Occupational Therapy Treatment    Name: Mike Olson  MRN: [id number]  : 1955  Date: 23  Time Calculation  Start Time: 1427  Stop Time: 1443  Time Calculation (min): 16 min    Assessment:  End of Session Communication: Bedside nurse  End of Session Patient Position: Bed, 3 rail up, Alarm on  Plan:  Treatment Interventions: ADL retraining, Functional transfer training, Endurance training, Cognitive reorientation, Equipment evaluation/education  OT Frequency: 2 times per week  OT Discharge Recommendations: Moderate intensity level of continued care    Subjective   General:  OT Last Visit  OT Received On: 23  Prior to Session Communication: Bedside nurse  Patient Position Received: Bed, 3 rail up, Alarm on  Family/Caregiver Present: No  General Comment: Pt in supine on arrival, drowsy but willing to particiapte in therapy. Pt requirinng increased assistance with ADLs and functional mobility since last visit.   Vitals:  Vital Signs  Heart Rate: 107  Heart Rate Source: Monitor  SpO2: 95 %  BP: 162/68  BP Location: Left arm  BP Method: Automatic  Patient Position: Lying  Lines/Tubes/Drains:  Implantable Port 23 Left Chest Single lumen port (Active)   Number of days: 14       Urethral Catheter Latex 14 Fr. (Active)   Number of days: 3       Gastrostomy/Enterostomy Gastrostomy 1 20 Fr. LLQ (Active)   Number of days: 14       Cognition:  Overall Cognitive Status: Impaired  Attention: Exceptions to WFL  Problem Solving: Exceptions to WFL  Safety/Judgement: Exceptions to WFL  Insight: Moderate    Pain Assessment:  Pain Assessment  Pain Assessment: 0-10  Pain Score: 0 - No pain     Objective   Activities of Daily Living:        LE Dressing  LE Dressing: Yes  Sock Level of Assistance: Maximum assistance  LE Dressing Where Assessed: Bed level       Bed Mobility/Transfers:   Bed Mobility  Bed Mobility: Yes  Bed Mobility 1  Bed Mobility 1: Sitting to  supine  Level of Assistance 1: Moderate assistance (x2)  Bed Mobility Comments 1: provided multiple verbal and tactile cues for technique  Bed Mobility 2  Bed Mobility  2: Sitting to supine  Level of Assistance 2: Moderate assistance (x2)    Transfers  Transfer: Yes  Transfer 1  Transfer From 1: Sit to  Transfer to 1: Stand  Technique 1: Sit to stand  Transfer Device 1: Walker  Transfer Level of Assistance 1: Moderate assistance, +2  Transfers 2  Transfer From 2: Stand to  Transfer to 2: Sit  Technique 2: Stand to sit  Transfer Device 2: Walker  Transfer Level of Assistance 2: Moderate assistance, +2    Balance:  Dynamic Sitting Balance  Dynamic Sitting-Balance Support: Bilateral upper extremity supported  Dynamic Sitting-Balance:  (Min A)  Dynamic Standing Balance  Dynamic Standing-Balance Support: Bilateral upper extremity supported (using WW)  Dynamic Standing-Balance:  (took side steps)  Dynamic Standing-Comments: Mod Ax2 using WW  Static Sitting Balance  Static Sitting-Balance Support: Bilateral upper extremity supported  Static Sitting-Level of Assistance: Contact guard         Outcome Measures:  [facility] Daily Activity  Putting on and taking off regular lower body clothing: A lot  Bathing (including washing, rinsing, drying): A lot  Putting on and taking off regular upper body clothing: A lot  Toileting, which includes using toilet, bedpan or urinal: A lot  Taking care of personal grooming such as brushing teeth: A little  Eating Meals: A lot  Daily Activity - Total Score: 13     Education Documentation  Body Mechanics, taught by Negra Shirley OT at 12/11/2023  3:41 PM.  Learner: Patient  Readiness: Acceptance  Method: Explanation  Response: Needs Reinforcement    Precautions, taught by Negra Shirley OT at 12/11/2023  3:41 PM.  Learner: Patient  Readiness: Acceptance  Method: Explanation  Response: Needs Reinforcement    ADL Training, taught by Negra Shirley OT at 12/11/2023  3:41 PM.  Learner:  Patient  Readiness: Acceptance  Method: Explanation  Response: Needs Reinforcement    Education Comments  No comments found.        Goals:  Encounter Problems       Encounter Problems (Active)       ADLs       Pt will complete UB /LB bathing tasks with modified independence while seated and AE as needed. (Not Progressing)       Start:  11/29/23    Expected End:  12/13/23            Pt will complete UB dressing with independent level of assistance while seated and/or standing.   (Not Progressing)       Start:  11/29/23    Expected End:  12/13/23            Pt will complete LB dressing with independent level of assistance while seated and/or standing.   (Not Progressing)       Start:  11/29/23    Expected End:  12/13/23            Pt will complete grooming tasks while seated or standing with independent level of assistance.   (Not Progressing)       Start:  11/29/23    Expected End:  12/13/23            Pt will complete toilet hygiene while seated /standing with independent level of assistance.   (Not Progressing)       Start:  11/29/23    Expected End:  12/13/23               BALANCE       Pt will demo improved dynamic sitting /standing balance while engaging in I/ADL task with modified independence and no LOB.  (Not Progressing)       Start:  11/29/23    Expected End:  12/13/23               MOBILITY       Pt will complete functional ambulation household /community distance with modified independence and LRAD.  (Not Progressing)       Start:  11/29/23    Expected End:  12/13/23                         TRANSFERS       Pt will complete functional transfers with modified independence and LRAD.  (Not Progressing)       Start:  11/29/23    Expected End:  12/13/23 12/11/23 at 3:42 PM   Negra Shirley, OT   [phone]

## 2023-12-11 NOTE — CONSULTS
Vancomycin Dosing by Pharmacy- Cessation of Therapy    Consult to pharmacy for vancomycin dosing has been discontinued by the prescriber, pharmacy will sign off at this time.    Please call pharmacy if there are further questions or re-enter a consult if vancomycin is resumed.     Becky Ferris [facility]

## 2023-12-11 NOTE — PROGRESS NOTES
Mike Olson is a 68 y.o. male on day 19 of admission presenting with Difficulty swallowing.    Spoke with patient's wife about skilled nursing facility choices as PT recommends SNF at this time.  She is not sure if patient will want to go to a facility. She will speak with patient and his daughters. Will follow up with April tomorrow.     YANELY SERVIN RN TCC

## 2023-12-11 NOTE — PROGRESS NOTES
"  Vancomycin Dosing by Pharmacy- FOLLOW UP     Mike Olson is a 68 y.o. year old male who Pharmacy has been consulted for vancomycin dosing for pneumonia. Based on the patient's indication and renal status this patient is being dosed based on a goal AUC of 400-600.      Renal function is currently improving. Switching from dose by level to AUC.     Current vancomycin dose: 1250 mg given once.     Estimated vancomycin AUC on current dose: 361 mg/L.hr      Visit Vitals  /68 (BP Location: Left arm, Patient Position: Lying)   Pulse (!) 113   Temp 37.6 °C (99.7 °F) (Temporal)   Resp 20               Lab Results   Component Value Date     CREATININE 1.00 12/11/2023     CREATININE 1.36 (H) 12/10/2023     CREATININE 1.89 (H) 12/09/2023     CREATININE 2.19 (H) 12/08/2023         Patient weight is No results found for: \"PTWEIGHT\"     No results found for: \"CULTURE\"      I/O last 3 completed shifts:  In: 1065 (13.9 mL/kg) [NG/GT:1065]  Out: 2705 (35.3 mL/kg) [Urine:2705 (1 mL/kg/hr)]  Weight: 76.5 kg   [unfilled]           Lab Results   Component Value Date     PATIENTTEMP 37.0 12/10/2023     PATIENTTEMP 37.0 12/07/2023     PATIENTTEMP 37.0 12/07/2023         Assessment/Plan     Below goal AUC. Orders placed for new vancomcyin regimen of 1500 mg every 24 hours to begin at 12/12 1400.      This dosing regimen is predicted by [employer] to result in the following pharmacokinetic parameters:  Loading dose: N/A  Regimen: 1500 mg IV every 24 hours.  Start time: 14:52 on 12/11/2023  Exposure target: AUC24 (range)400-600 mg/L.hr   AUC24,ss: 429 mg/L.hr  Probability of AUC24 > 400: 65 %  Ctrough,ss: 11.5 mg/L  Probability of Ctrough,ss > 20: 5 %  Probability of nephrotoxicity (Lodise LAILA 2009): 7 %        The next level will be obtained on 12/12 with AM labs. May be obtained sooner if clinically indicated.   Will continue to monitor renal function daily while on vancomycin and order serum creatinine at least every 48 hours if " not already ordered.  Follow for continued vancomycin needs, clinical response, and signs/symptoms of toxicity.         Becky Ferris, RPh

## 2023-12-11 NOTE — PROGRESS NOTES
Mike Olson is a 68 y.o. male on day 19 of admission presenting with Difficulty swallowing.    Subjective   NAEON. No acute concerns or complaints from patient this morning.       Objective     Physical Exam  Constitutional:       Appearance: He is ill-appearing. He is not toxic-appearing.      Comments: Mildly uncomfortable   HENT:      Nose: No congestion or rhinorrhea.      Mouth/Throat:      Mouth: Mucous membranes are moist.   Eyes:      General: No scleral icterus.     Extraocular Movements: Extraocular movements intact.      Pupils: Pupils are equal, round, and reactive to light.   Cardiovascular:      Rate and Rhythm: Normal rate and regular rhythm.      Heart sounds: No murmur heard.     No friction rub. No gallop.   Pulmonary:      Effort: Pulmonary effort is normal. No respiratory distress.      Breath sounds: No stridor. No wheezing, rhonchi or rales.      Comments: Diminished breath sounds on the right LL  Abdominal:      General: Bowel sounds are normal. There is no distension.      Palpations: Abdomen is soft. There is no mass.      Tenderness: There is no abdominal tenderness. There is no guarding or rebound.      Comments: PEG tube in place with small amount of crusted drainage surrounding the outside, some greenish fluid noted around the site of the PEG    Genitourinary:     Comments: Vazquez catheter in place draining, clear yellow urine  Musculoskeletal:         General: No swelling or tenderness. Normal range of motion.      Cervical back: Normal range of motion.      Right lower leg: No edema.      Left lower leg: No edema.   Skin:     General: Skin is warm.      Capillary Refill: Capillary refill takes less than 2 seconds.      Coloration: Skin is not jaundiced.      Findings: No bruising or rash.      Comments: Axillary mass covered with gauze with no strikethrough appreciated    Neurological:      General: No focal deficit present.      Mental Status: He is oriented to person, place, and time.  "Mental status is at baseline.      Cranial Nerves: No cranial nerve deficit.     Last Recorded Vitals  Blood pressure 155/68, pulse 100, temperature 37.3 °C (99.1 °F), temperature source Temporal, resp. rate 20, height 1.69 m (5' 6.54\"), weight 76.5 kg (168 lb 11.2 oz), SpO2 95 %.  Intake/Output last 3 Shifts:  I/O last 3 completed shifts:  In: 1065 (13.9 mL/kg) [NG/GT:1065]  Out: 2705 (35.3 mL/kg) [Urine:2705 (1 mL/kg/hr)]  Weight: 76.5 kg     Relevant Results  Results for orders placed or performed during the hospital encounter of 11/22/23 (from the past 24 hour(s))   POCT GLUCOSE   Result Value Ref Range    POCT Glucose 117 (H) 74 - 99 mg/dL   POCT GLUCOSE   Result Value Ref Range    POCT Glucose 107 (H) 74 - 99 mg/dL   Type and Screen   Result Value Ref Range    ABO TYPE A     Rh TYPE POS     ANTIBODY SCREEN NEG    Vancomycin   Result Value Ref Range    Vancomycin 15.6 5.0 - 20.0 ug/mL   CBC and Auto Differential   Result Value Ref Range    WBC 19.8 (H) 4.4 - 11.3 x10*3/uL    nRBC 0.0 0.0 - 0.0 /100 WBCs    RBC 2.58 (L) 4.50 - 5.90 x10*6/uL    Hemoglobin 7.2 (L) 13.5 - 17.5 g/dL    Hematocrit 23.0 (L) 41.0 - 52.0 %    MCV 89 80 - 100 fL    MCH 27.9 26.0 - 34.0 pg    MCHC 31.3 (L) 32.0 - 36.0 g/dL    RDW 13.5 11.5 - 14.5 %    Platelets 388 150 - 450 x10*3/uL    Immature Granulocytes %, Automated 7.7 (H) 0.0 - 0.9 %    Immature Granulocytes Absolute, Automated 1.53 (H) 0.00 - 0.70 x10*3/uL   Renal function panel   Result Value Ref Range    Glucose 119 (H) 74 - 99 mg/dL    Sodium 133 (L) 136 - 145 mmol/L    Potassium 4.5 3.5 - 5.3 mmol/L    Chloride 97 (L) 98 - 107 mmol/L    Bicarbonate 26 21 - 32 mmol/L    Anion Gap 15 10 - 20 mmol/L    Urea Nitrogen 50 (H) 6 - 23 mg/dL    Creatinine 1.00 0.50 - 1.30 mg/dL    eGFR 82 >60 mL/min/1.73m*2    Calcium 9.3 8.6 - 10.6 mg/dL    Phosphorus 2.8 2.5 - 4.9 mg/dL    Albumin 2.4 (L) 3.4 - 5.0 g/dL   Magnesium   Result Value Ref Range    Magnesium 2.26 1.60 - 2.40 mg/dL   Manual " Differential   Result Value Ref Range    Neutrophils %, Manual 90.0 40.0 - 80.0 %    Bands %, Manual 0.8 0.0 - 5.0 %    Lymphocytes %, Manual 3.4 13.0 - 44.0 %    Monocytes %, Manual 0.8 2.0 - 10.0 %    Eosinophils %, Manual 0.8 0.0 - 6.0 %    Basophils %, Manual 0.0 0.0 - 2.0 %    Atypical Lymphocytes %, Manual 1.7 0.0 - 2.0 %    Metamyelocytes %, Manual 0.8 0.0 - 0.0 %    Myelocytes %, Manual 1.7 0.0 - 0.0 %    Seg Neutrophils Absolute, Manual 17.82 (H) 1.20 - 7.00 x10*3/uL    Bands Absolute, Manual 0.16 0.00 - 0.70 x10*3/uL    Lymphocytes Absolute, Manual 0.67 (L) 1.20 - 4.80 x10*3/uL    Monocytes Absolute, Manual 0.16 0.10 - 1.00 x10*3/uL    Eosinophils Absolute, Manual 0.16 0.00 - 0.70 x10*3/uL    Basophils Absolute, Manual 0.00 0.00 - 0.10 x10*3/uL    Atypical Lymphs Absolute, Manual 0.34 0.00 - 0.50 x10*3/uL    Metamyelocytes Absolute, Manual 0.16 0.00 - 0.00 x10*3/uL    Myelocytes Absolute, Manual 0.34 0.00 - 0.00 x10*3/uL    Total Cells Counted 120     Neutrophils Absolute, Manual 17.98 (H) 1.20 - 7.70 x10*3/uL    RBC Morphology No significant RBC morphology present    POCT GLUCOSE   Result Value Ref Range    POCT Glucose 148 (H) 74 - 99 mg/dL   POCT GLUCOSE   Result Value Ref Range    POCT Glucose 110 (H) 74 - 99 mg/dL      Scheduled medications  acetaminophen, 650 mg, g-tube, q4h  acetylcysteine, 6 mL, nebulization, q6h  amLODIPine, 10 mg, g-tube, Daily  aspirin, 81 mg, g-tube, Daily  atorvastatin, 80 mg, g-tube, Daily  [Held by provider] chlorthalidone, 25 mg, g-tube, Daily  [START ON 12/12/2023] enoxaparin, 40 mg, subcutaneous, q24h  esomeprazole, 20 mg, nasogastric tube, Daily before breakfast  fentaNYL PF, , ,   gabapentin, 300 mg, g-tube, BID  glycopyrrolate, 0.2 mg, intravenous, q6h  ipratropium-albuteroL, 3 mL, nebulization, q6h  losartan, 100 mg, g-tube, Daily  midazolam, , ,   piperacillin-tazobactam, 3.375 g, intravenous, q6h  polyethylene glycol, 17 g, g-tube, BID  senna, 5 mL, g-tube,  Daily      Continuous medications     PRN medications  PRN medications: bisacodyl, dextrose 10 % in water (D10W), dextrose, fentaNYL PF, glucagon, guaiFENesin, melatonin, meperidine, meperidine PF, midazolam, oxyCODONE, oxyCODONE      Assessment/Plan   67 year old male with PMH HTN, CVA (9/2023), recently diagnosed poorly differentiated metastatic SCC (11/2023) who presents for worsening dysphagia. Given new diagnosis, no treatment has been started yet and no known origin yet. Will discuss with Med Onc (Dr. Yin was to be his primary oncologist), Surg Onc (Dr Corona had already seen pt) and will also consult GI for EGD/biopsy. EGD on 11/24 showed large, nearly fully obstructing esophageal mass. Patient is not a surgical candidate given metastatic disease. Managing nutrition and need for chemo/radiation. PEG in place. Patient is slightly fluid overloaded, receiving 2L. Ongoing elevation of white count w/o spiking fevers but increased white/yellow sputum productions consistent with pulmonary edema vs silent aspiration from underlying esophageal dysfunctions 2/2 malignancy. 12/8, patient exhibiting more signs of delirium and somnolence, possibly related to initiation of methadone on 12/5.      Updates 12/11:  -s/p 4/5 fraction pRT of the right axilla  -leukocytosis stable at 19.8, may be iso radiation treatments  -MARYELLEN improving daily  -restarted lovenox for DVT PPx  -Continue BP hygiene and Oscillating Positive Expiratory Pressure Therapy, add duonebs q6hrs  -Schedule Robinul daily   -Mouth care and suctioning   -strict Is/Os     #Metastatic SCC, poorly differentiated   #Dysphagia   #Leukocytosis, worsening  - Pathology Microscopic examination of H&E sections demonstrates a poorly differentiated carcinoma , immunoreactive with CK7 and p40 , infiltrating soft tissue.  No residual lymph node parenchyma is seen.  The following immunostains are negative: NKX3.1, GATA3, CK20, TTF-1 and CDX2   -Diagnosed 11/2023; no  treatment yet; unknown origin  -Surg Onc consulted; appreciate recs (saw Dr. Corona outpatient 11/10/23)  -SLP consulted for swallow evaluation and recommended NPO  -EGD/biopsy 11/24:  large, nearly fully obstructing esophageal mass. Patient is not a surgical candidate because of metastatic disease.   - Nutrition consulted: 1. Start 100mg IV thiamine x5 days;  Isosource 1.5 @ 25 ml/hr. Advance slowly by 10 ml/hr q8h, as tolerated, to goal rate of 55 ml/hr.   - CT SIM 11/27  - 5/5 fraction of pRT to esophageal mass 12/4 completed  -CT SIM 12/6  -c/w pRT to right axillary mass fraction (3/5 completed)- scheduled for Monday  -s/p 2 doses IV lasix 20 mg, one dose of 40 IV lasix  -c/w Mucomyst, BP hygiene and PEP oscillatory therapy, duonebs q6hrs, -Schedule Robinul daily   -Procal elevated 0.79  - s/p PEG tube placement 11/27  - c/w 345ml QID bolus feeds, FWF 60 ml four times daily and 60 before and after every feed and 150 ml additional BID  - 100mg IV thiamine x5 days   -discontinue Vancomycin, MRSA nares negative  -c/w IV Zosyn (12/7-  -Respiratory sputum culture with salivary contimination      #Urinary Retention, Worthy catheter in place   -1.9 L out w/straight cat 12/7  -Repeat PVR this morning  -c/w worthy, maryellen improving  -HOLD methadone and limit anticholinergics     #MARYELLEN, 2/2 retention, nephrotoxic meds, diuresis improving  -Baseline Cr ~1; on admission 1.48, was 1.50 on 11/16 -> now at baseline 1.04 on 11/23 -->2.19 12/8 (retention), worthy placed --> 1.89--> 1.36  -S/p 1L LR, IV LR infusion at 125cc/hr, given improvement in Cr likely pre-renal  -HOLD home losartan 100mg daily  -Continue to monitor      #HTN  -Continue home amlodipine 10mg daily  -Holding chlorithalidone 25mg daily given hypercalcemia  -Continue ARB as MARYELLEN has resolved     #CVA 9/2023  #Carotid stenosis  -2 acute or subacute infarcts seen on MRI 9/18/2023  -Continue ASA 81mg daily  -Completed 21 days of plavix (started 9/18/23)  -Continue  atorvastatin 80mg daily  -Vascular Surgery follow up outpatient for the carotid stenosis     #Hypercalcemia, resolved  -Ca 11.5 on admission  -S/p 1L LR in the ED, on LR infusion at 125cc/hr  -Repeat was 11.3 on 11/23, so avoiding LR in favor of NS     #Elevated PSA  -Urology follow up outpatient        #Cancer Pain Control:  -HOLD Methadone  -continue 10 mg PRN Oxycodone   -okay for PRN dilaudid      Disposition: tele  Follow-ups: PCP, Onc, Surg Onc,      F: prn  E: prn   N: PEG  A: PIVs     DVT ppx: lovenox  GI ppx: home PPI    Code Status: FULL (confirmed on 11/22/23)  Surrogate Medical Decision-maker: Wife April [phone]    Katherine Gay MD  PGY-1    [facility] Team Pager [phone]           Katherine Gay MD    Attending Attestation    Pt seen and case discussed with medical team  Plan as outlined by team    Corbin Whaley MD, FACP  Chief, Solid Tumor Oncology Division   Medical Oncology  Professor of Medicine and Urology  /[facility]

## 2023-12-11 NOTE — PROGRESS NOTES
Physical Therapy    Physical Therapy Treatment    Patient Name: Mike Olson  MRN: [id number]  Today's Date: 12/11/2023  Time Calculation  Start Time: 1428  Stop Time: 1444  Time Calculation (min): 16 min       Assessment/Plan   PT Assessment  Evaluation/Treatment Tolerance: Patient limited by fatigue  Medical Staff Made Aware: Yes  End of Session Communication: Bedside nurse  Assessment Comment: Pt at this time is demonstrating increased need for assistance to complete all functional mobility. Updating DC recommendations to MOD intensity PT after discharge.  End of Session Patient Position: Bed, 3 rail up, Alarm on  PT Plan  Inpatient/Swing Bed or Outpatient: Inpatient  PT Plan  Treatment/Interventions: Transfer training, Gait training, Stair training, Balance training, Neuromuscular re-education, Endurance training, Strengthening, Therapeutic exercise, Therapeutic activity, Home exercise program, Positioning, Postural re-education  PT Plan: Skilled PT  PT Frequency: 3 times per week  PT Discharge Recommendations: Moderate intensity level of continued care  PT Recommended Transfer Status: Assist x2  PT - OK to Discharge: Yes      General Visit Information:   PT  Visit  PT Received On: 12/11/23  Response to Previous Treatment: Patient with no complaints from previous session.  General  Co-Treatment: OT  Co-Treatment Reason: to safely progress functional mobility  Prior to Session Communication: Bedside nurse  Patient Position Received: Bed, 3 rail up, Alarm on  General Comment: Pt supine in bed upon entry to room. Pt pleasant, cooperative and willing to work with PT.    Subjective   Precautions:  Precautions  Medical Precautions: Fall precautions  Vital Signs:  Vital Signs  Heart Rate: 107  SpO2: 95 %  BP: 162/68    Objective   Pain:  Pain Assessment  Pain Assessment: 0-10  Pain Score: 0 - No pain  Cognition:  Cognition  Overall Cognitive Status: Impaired  Orientation Level: Disoriented to time  Postural  Control:  Postural Control  Posture Comment: pt with increased BUE support this date, with pt unable to maintain seated balance without UE support    Activity Tolerance:  Activity Tolerance  Endurance: Tolerates 10 - 20 min exercise with multiple rests  Treatments:  Therapeutic Activity  Therapeutic Activity Performed: Yes  Therapeutic Activity 1: pt completed static sitting balance EOB for ~10 minutes with cga assist with noted increased BUE support needed    Bed Mobility  Bed Mobility: Yes  Bed Mobility 1  Bed Mobility 1: Supine to sitting, Sitting to supine  Level of Assistance 1: Moderate assistance (x2)  Bed Mobility Comments 1: max verbal and tacticle cuing provided    Ambulation/Gait Training  Ambulation/Gait Training Performed: Yes  Ambulation/Gait Training 1  Surface 1: Level tile  Device 1: Rolling walker  Assistance 1: Moderate assistance (x2)  Quality of Gait 1:  (pt with decreased step width and kenny, with noted decreased foot clearance.)  Comments/Distance (ft) 1: 1ft lateral side steps  Transfers  Transfer: Yes  Transfer 1  Technique 1: Sit to stand, Stand to sit  Transfer Device 1: Walker  Transfer Level of Assistance 1: Moderate assistance (x2)  Trials/Comments 1: max verbal cuing provided for handplacement    Outcome Measures:  [facility] Basic Mobility  Turning from your back to your side while in a flat bed without using bedrails: A lot  Moving from lying on your back to sitting on the side of a flat bed without using bedrails: A lot  Moving to and from bed to chair (including a wheelchair): A lot  Standing up from a chair using your arms (e.g. wheelchair or bedside chair): A lot  To walk in hospital room: Total  Climbing 3-5 steps with railing: Total  Basic Mobility - Total Score: 10    Education Documentation  Mobility Training, taught by Neyda Isbell PT at 12/11/2023  4:39 PM.  Learner: Patient  Readiness: Acceptance  Method: Explanation  Response: Needs Reinforcement    Education  Comments  No comments found.        OP EDUCATION:       Encounter Problems       Encounter Problems (Active)       Mobility       Pt will ambulate >250ft with LRAD and MI with no LOB and VSS.   (Not met)       Start:  12/11/23    Expected End:  12/13/23    Resolved:  12/11/23    Updated to: Pt will ambulate >50ft with LRAD and min assist with no LOB and VSS.    Update reason: goals not met         Pt will complete the 6mwt and score within age related normative values. (Not met)       Start:  11/24/23    Expected End:  12/13/23    Resolved:  12/11/23    Updated to: Pt will complete all bed mobility with min assist.    Update reason: goals not met         Pt will ambulate >50ft with LRAD and min assist with no LOB and VSS.       Start:  12/11/23    Expected End:  01/01/24                Pt will complete all bed mobility with min assist.       Start:  12/11/23    Expected End:  01/01/24                Pt will demonstrate WFL BLE strength to complete therapeutic exercise and participate in functional mobility.         Start:  12/11/23    Expected End:  01/01/24                   Mobility          Transfers       Pt will perform all functional transfers with LRAD and MI. (Not met)       Start:  11/24/23    Expected End:  12/13/23    Resolved:  12/11/23    Updated to: Pt will perform all functional transfers with LRAD and min assist.    Update reason: goals not met         Pt will perform all functional transfers with LRAD and min assist.       Start:  12/11/23    Expected End:  01/01/24                      Encounter Problems (Resolved)       Balance       Pt will score >24 On the Tinetti to reduce the risk for falls.   (Met)       Start:  11/24/23    Expected End:  12/15/23    Resolved:  11/29/23 12/11/23 at 4:40 PM - Neyda Isbell, PT

## 2023-12-12 ENCOUNTER — APPOINTMENT (OUTPATIENT)
Dept: RADIOLOGY | Facility: HOSPITAL | Age: 68
DRG: 356 | End: 2023-12-12
Payer: MEDICARE

## 2023-12-12 ENCOUNTER — DOCUMENTATION (OUTPATIENT)
Dept: RADIATION ONCOLOGY | Facility: HOSPITAL | Age: 68
End: 2023-12-12
Payer: MEDICARE

## 2023-12-12 LAB
ALBUMIN SERPL BCP-MCNC: 2.5 G/DL (ref 3.4–5)
ANION GAP BLDV CALCULATED.4IONS-SCNC: 10 MMOL/L (ref 10–25)
ANION GAP SERPL CALC-SCNC: 15 MMOL/L (ref 10–20)
APPEARANCE UR: CLEAR
ASPERGILLUS GALACTOMANNAN EIA,SERUM: 0.05
BASE EXCESS BLDV CALC-SCNC: 1.9 MMOL/L (ref -2–3)
BASOPHILS # BLD MANUAL: 0 X10*3/UL (ref 0–0.1)
BASOPHILS NFR BLD MANUAL: 0 %
BILIRUB UR STRIP.AUTO-MCNC: NEGATIVE MG/DL
BLOOD EXPIRATION DATE: NORMAL
BODY TEMPERATURE: 37 DEGREES CELSIUS
BUN SERPL-MCNC: 41 MG/DL (ref 6–23)
CA-I BLDV-SCNC: 1.32 MMOL/L (ref 1.1–1.33)
CALCIUM SERPL-MCNC: 10 MG/DL (ref 8.6–10.6)
CHLORIDE BLDV-SCNC: 102 MMOL/L (ref 98–107)
CHLORIDE SERPL-SCNC: 100 MMOL/L (ref 98–107)
CO2 SERPL-SCNC: 27 MMOL/L (ref 21–32)
COLOR UR: YELLOW
CREAT SERPL-MCNC: 0.94 MG/DL (ref 0.5–1.3)
DISPENSE STATUS: NORMAL
EOSINOPHIL # BLD MANUAL: 0.54 X10*3/UL (ref 0–0.7)
EOSINOPHIL NFR BLD MANUAL: 2.6 %
ERYTHROCYTE [DISTWIDTH] IN BLOOD BY AUTOMATED COUNT: 13.6 % (ref 11.5–14.5)
FUNGITELL BETA-D GLUCAN,SERUM: <31 PG/ML
GFR SERPL CREATININE-BSD FRML MDRD: 88 ML/MIN/1.73M*2
GLUCOSE BLD MANUAL STRIP-MCNC: 103 MG/DL (ref 74–99)
GLUCOSE BLD MANUAL STRIP-MCNC: 106 MG/DL (ref 74–99)
GLUCOSE BLD MANUAL STRIP-MCNC: 116 MG/DL (ref 74–99)
GLUCOSE BLD MANUAL STRIP-MCNC: 95 MG/DL (ref 74–99)
GLUCOSE BLDV-MCNC: 95 MG/DL (ref 74–99)
GLUCOSE SERPL-MCNC: 103 MG/DL (ref 74–99)
GLUCOSE UR STRIP.AUTO-MCNC: NEGATIVE MG/DL
HCO3 BLDV-SCNC: 26.6 MMOL/L (ref 22–26)
HCT VFR BLD AUTO: 22.6 % (ref 41–52)
HCT VFR BLD EST: 21 % (ref 41–52)
HGB BLD-MCNC: 6.9 G/DL (ref 13.5–17.5)
HGB BLDV-MCNC: 7.1 G/DL (ref 13.5–17.5)
IMM GRANULOCYTES # BLD AUTO: 1.78 X10*3/UL (ref 0–0.7)
IMM GRANULOCYTES NFR BLD AUTO: 8.7 % (ref 0–0.9)
INHALED O2 CONCENTRATION: 94 %
KETONES UR STRIP.AUTO-MCNC: NEGATIVE MG/DL
LACTATE BLDV-SCNC: 1.3 MMOL/L (ref 0.4–2)
LACTATE SERPL-SCNC: 1 MMOL/L (ref 0.4–2)
LEUKOCYTE ESTERASE UR QL STRIP.AUTO: ABNORMAL
LYMPHOCYTES # BLD MANUAL: 0.72 X10*3/UL (ref 1.2–4.8)
LYMPHOCYTES NFR BLD MANUAL: 3.5 %
MAGNESIUM SERPL-MCNC: 2.12 MG/DL (ref 1.6–2.4)
MCH RBC QN AUTO: 27.5 PG (ref 26–34)
MCHC RBC AUTO-ENTMCNC: 30.5 G/DL (ref 32–36)
MCV RBC AUTO: 90 FL (ref 80–100)
MONOCYTES # BLD MANUAL: 0.72 X10*3/UL (ref 0.1–1)
MONOCYTES NFR BLD MANUAL: 3.5 %
MUCOUS THREADS #/AREA URNS AUTO: ABNORMAL /LPF
MYELOCYTES # BLD MANUAL: 0.35 X10*3/UL
MYELOCYTES NFR BLD MANUAL: 1.7 %
NEUTROPHILS # BLD MANUAL: 18.27 X10*3/UL (ref 1.2–7.7)
NEUTS BAND # BLD MANUAL: 1.07 X10*3/UL (ref 0–0.7)
NEUTS BAND NFR BLD MANUAL: 5.2 %
NEUTS SEG # BLD MANUAL: 17.2 X10*3/UL (ref 1.2–7)
NEUTS SEG NFR BLD MANUAL: 83.5 %
NITRITE UR QL STRIP.AUTO: NEGATIVE
NRBC BLD-RTO: 0.2 /100 WBCS (ref 0–0)
OXYHGB MFR BLDV: 71.7 % (ref 45–75)
PCO2 BLDV: 41 MM HG (ref 41–51)
PH BLDV: 7.42 PH (ref 7.33–7.43)
PH UR STRIP.AUTO: 6 [PH]
PHOSPHATE SERPL-MCNC: 3.1 MG/DL (ref 2.5–4.9)
PLATELET # BLD AUTO: 385 X10*3/UL (ref 150–450)
PO2 BLDV: 43 MM HG (ref 35–45)
POTASSIUM BLDV-SCNC: 4.6 MMOL/L (ref 3.5–5.3)
POTASSIUM SERPL-SCNC: 4.5 MMOL/L (ref 3.5–5.3)
PRODUCT BLOOD TYPE: 6200
PRODUCT CODE: NORMAL
PROT UR STRIP.AUTO-MCNC: ABNORMAL MG/DL
RBC # BLD AUTO: 2.51 X10*6/UL (ref 4.5–5.9)
RBC # UR STRIP.AUTO: ABNORMAL /UL
RBC #/AREA URNS AUTO: >20 /HPF
RBC MORPH BLD: ABNORMAL
SAO2 % BLDV: 73 % (ref 45–75)
SODIUM BLDV-SCNC: 134 MMOL/L (ref 136–145)
SODIUM SERPL-SCNC: 137 MMOL/L (ref 136–145)
SP GR UR STRIP.AUTO: 1.03
TOTAL CELLS COUNTED BLD: 115
UNIT ABO: NORMAL
UNIT NUMBER: NORMAL
UNIT RH: NORMAL
UNIT VOLUME: 350
UROBILINOGEN UR STRIP.AUTO-MCNC: <2 MG/DL
WBC # BLD AUTO: 20.6 X10*3/UL (ref 4.4–11.3)
WBC #/AREA URNS AUTO: ABNORMAL /HPF
XM INTEP: NORMAL

## 2023-12-12 PROCEDURE — 2500000004 HC RX 250 GENERAL PHARMACY W/ HCPCS (ALT 636 FOR OP/ED)

## 2023-12-12 PROCEDURE — 99233 SBSQ HOSP IP/OBS HIGH 50: CPT | Performed by: NURSE PRACTITIONER

## 2023-12-12 PROCEDURE — 74177 CT ABD & PELVIS W/CONTRAST: CPT | Performed by: STUDENT IN AN ORGANIZED HEALTH CARE EDUCATION/TRAINING PROGRAM

## 2023-12-12 PROCEDURE — 87070 CULTURE OTHR SPECIMN AEROBIC: CPT

## 2023-12-12 PROCEDURE — 36415 COLL VENOUS BLD VENIPUNCTURE: CPT

## 2023-12-12 PROCEDURE — 94668 MNPJ CHEST WALL SBSQ: CPT

## 2023-12-12 PROCEDURE — 83735 ASSAY OF MAGNESIUM: CPT

## 2023-12-12 PROCEDURE — 84132 ASSAY OF SERUM POTASSIUM: CPT

## 2023-12-12 PROCEDURE — 94640 AIRWAY INHALATION TREATMENT: CPT

## 2023-12-12 PROCEDURE — 85007 BL SMEAR W/DIFF WBC COUNT: CPT

## 2023-12-12 PROCEDURE — 81001 URINALYSIS AUTO W/SCOPE: CPT

## 2023-12-12 PROCEDURE — 83605 ASSAY OF LACTIC ACID: CPT

## 2023-12-12 PROCEDURE — P9040 RBC LEUKOREDUCED IRRADIATED: HCPCS

## 2023-12-12 PROCEDURE — 87040 BLOOD CULTURE FOR BACTERIA: CPT

## 2023-12-12 PROCEDURE — 36430 TRANSFUSION BLD/BLD COMPNT: CPT

## 2023-12-12 PROCEDURE — 74177 CT ABD & PELVIS W/CONTRAST: CPT

## 2023-12-12 PROCEDURE — 96372 THER/PROPH/DIAG INJ SC/IM: CPT

## 2023-12-12 PROCEDURE — 71045 X-RAY EXAM CHEST 1 VIEW: CPT | Mod: FY

## 2023-12-12 PROCEDURE — 2550000001 HC RX 255 CONTRASTS: Performed by: INTERNAL MEDICINE

## 2023-12-12 PROCEDURE — 1170000001 HC PRIVATE ONCOLOGY ROOM DAILY

## 2023-12-12 PROCEDURE — 71045 X-RAY EXAM CHEST 1 VIEW: CPT | Performed by: RADIOLOGY

## 2023-12-12 PROCEDURE — 87086 URINE CULTURE/COLONY COUNT: CPT

## 2023-12-12 PROCEDURE — 2500000002 HC RX 250 W HCPCS SELF ADMINISTERED DRUGS (ALT 637 FOR MEDICARE OP, ALT 636 FOR OP/ED)

## 2023-12-12 PROCEDURE — 82947 ASSAY GLUCOSE BLOOD QUANT: CPT

## 2023-12-12 PROCEDURE — 85027 COMPLETE CBC AUTOMATED: CPT

## 2023-12-12 RX ORDER — SENNOSIDES 8.8 MG/5ML
10 LIQUID ORAL NIGHTLY
Status: DISCONTINUED | OUTPATIENT
Start: 2023-12-12 | End: 2023-12-14

## 2023-12-12 RX ORDER — MEROPENEM 1 G/1
1 INJECTION, POWDER, FOR SOLUTION INTRAVENOUS EVERY 8 HOURS
Status: DISCONTINUED | OUTPATIENT
Start: 2023-12-12 | End: 2023-12-14

## 2023-12-12 RX ORDER — HYDROMORPHONE HYDROCHLORIDE 1 MG/ML
0.4 INJECTION, SOLUTION INTRAMUSCULAR; INTRAVENOUS; SUBCUTANEOUS EVERY 4 HOURS PRN
Status: DISCONTINUED | OUTPATIENT
Start: 2023-12-12 | End: 2023-12-12

## 2023-12-12 RX ORDER — HYDROMORPHONE HYDROCHLORIDE 1 MG/ML
0.4 INJECTION, SOLUTION INTRAMUSCULAR; INTRAVENOUS; SUBCUTANEOUS
Status: DISCONTINUED | OUTPATIENT
Start: 2023-12-12 | End: 2023-12-14

## 2023-12-12 RX ORDER — OLANZAPINE 5 MG/1
5 TABLET, ORALLY DISINTEGRATING ORAL NIGHTLY
Status: DISCONTINUED | OUTPATIENT
Start: 2023-12-12 | End: 2023-12-15 | Stop reason: HOSPADM

## 2023-12-12 RX ORDER — HYDROMORPHONE HYDROCHLORIDE 1 MG/ML
0.2 INJECTION, SOLUTION INTRAMUSCULAR; INTRAVENOUS; SUBCUTANEOUS EVERY 2 HOUR PRN
Status: DISCONTINUED | OUTPATIENT
Start: 2023-12-12 | End: 2023-12-14

## 2023-12-12 RX ORDER — METRONIDAZOLE 500 MG/100ML
500 INJECTION, SOLUTION INTRAVENOUS EVERY 8 HOURS
Status: DISCONTINUED | OUTPATIENT
Start: 2023-12-12 | End: 2023-12-12

## 2023-12-12 RX ORDER — ACETYLCYSTEINE 100 MG/ML
6 SOLUTION ORAL; RESPIRATORY (INHALATION)
Status: DISCONTINUED | OUTPATIENT
Start: 2023-12-12 | End: 2023-12-15 | Stop reason: HOSPADM

## 2023-12-12 RX ORDER — VANCOMYCIN HYDROCHLORIDE 1 G/200ML
1000 INJECTION, SOLUTION INTRAVENOUS EVERY 12 HOURS
Status: DISCONTINUED | OUTPATIENT
Start: 2023-12-12 | End: 2023-12-14

## 2023-12-12 RX ORDER — HYDROMORPHONE HYDROCHLORIDE 1 MG/ML
0.4 INJECTION, SOLUTION INTRAMUSCULAR; INTRAVENOUS; SUBCUTANEOUS ONCE
Status: COMPLETED | OUTPATIENT
Start: 2023-12-12 | End: 2023-12-12

## 2023-12-12 RX ORDER — HYDROMORPHONE HYDROCHLORIDE 1 MG/ML
0.2 INJECTION, SOLUTION INTRAMUSCULAR; INTRAVENOUS; SUBCUTANEOUS EVERY 4 HOURS PRN
Status: DISCONTINUED | OUTPATIENT
Start: 2023-12-12 | End: 2023-12-12

## 2023-12-12 RX ORDER — HYDROMORPHONE HYDROCHLORIDE 1 MG/ML
0.2 INJECTION, SOLUTION INTRAMUSCULAR; INTRAVENOUS; SUBCUTANEOUS ONCE
Status: COMPLETED | OUTPATIENT
Start: 2023-12-12 | End: 2023-12-12

## 2023-12-12 RX ORDER — IPRATROPIUM BROMIDE AND ALBUTEROL SULFATE 2.5; .5 MG/3ML; MG/3ML
3 SOLUTION RESPIRATORY (INHALATION)
Status: DISCONTINUED | OUTPATIENT
Start: 2023-12-12 | End: 2023-12-15 | Stop reason: HOSPADM

## 2023-12-12 RX ADMIN — HYDROMORPHONE HYDROCHLORIDE 0.4 MG: 1 INJECTION, SOLUTION INTRAMUSCULAR; INTRAVENOUS; SUBCUTANEOUS at 14:18

## 2023-12-12 RX ADMIN — ACETYLCYSTEINE 6 ML: 100 SOLUTION ORAL; RESPIRATORY (INHALATION) at 02:00

## 2023-12-12 RX ADMIN — VANCOMYCIN HYDROCHLORIDE 1000 MG: 1 INJECTION, SOLUTION INTRAVENOUS at 05:03

## 2023-12-12 RX ADMIN — GLYCOPYRROLATE 0.2 MG: 0.2 INJECTION INTRAMUSCULAR; INTRAVENOUS at 12:58

## 2023-12-12 RX ADMIN — GLYCOPYRROLATE 0.2 MG: 0.2 INJECTION INTRAMUSCULAR; INTRAVENOUS at 00:39

## 2023-12-12 RX ADMIN — IPRATROPIUM BROMIDE AND ALBUTEROL SULFATE 3 ML: .5; 3 SOLUTION RESPIRATORY (INHALATION) at 09:16

## 2023-12-12 RX ADMIN — ACETYLCYSTEINE 6 ML: 100 SOLUTION ORAL; RESPIRATORY (INHALATION) at 21:54

## 2023-12-12 RX ADMIN — IPRATROPIUM BROMIDE AND ALBUTEROL SULFATE 3 ML: .5; 3 SOLUTION RESPIRATORY (INHALATION) at 00:39

## 2023-12-12 RX ADMIN — HYDROMORPHONE HYDROCHLORIDE 0.2 MG: 1 INJECTION, SOLUTION INTRAMUSCULAR; INTRAVENOUS; SUBCUTANEOUS at 13:02

## 2023-12-12 RX ADMIN — GLYCOPYRROLATE 0.2 MG: 0.2 INJECTION INTRAMUSCULAR; INTRAVENOUS at 06:17

## 2023-12-12 RX ADMIN — IPRATROPIUM BROMIDE AND ALBUTEROL SULFATE 3 ML: .5; 3 SOLUTION RESPIRATORY (INHALATION) at 21:54

## 2023-12-12 RX ADMIN — VANCOMYCIN HYDROCHLORIDE 1000 MG: 1 INJECTION, SOLUTION INTRAVENOUS at 16:56

## 2023-12-12 RX ADMIN — HYDROMORPHONE HYDROCHLORIDE 0.2 MG: 1 INJECTION, SOLUTION INTRAMUSCULAR; INTRAVENOUS; SUBCUTANEOUS at 02:30

## 2023-12-12 RX ADMIN — ENOXAPARIN SODIUM 40 MG: 100 INJECTION SUBCUTANEOUS at 09:32

## 2023-12-12 RX ADMIN — HYDROMORPHONE HYDROCHLORIDE 0.4 MG: 1 INJECTION, SOLUTION INTRAMUSCULAR; INTRAVENOUS; SUBCUTANEOUS at 05:12

## 2023-12-12 RX ADMIN — ACETYLCYSTEINE 6 ML: 100 SOLUTION ORAL; RESPIRATORY (INHALATION) at 15:10

## 2023-12-12 RX ADMIN — OLANZAPINE 5 MG: 5 TABLET, ORALLY DISINTEGRATING ORAL at 22:22

## 2023-12-12 RX ADMIN — IOHEXOL 100 ML: 350 INJECTION, SOLUTION INTRAVENOUS at 05:48

## 2023-12-12 RX ADMIN — PIPERACILLIN SODIUM AND TAZOBACTAM SODIUM 3.38 G: 3; .375 INJECTION, SOLUTION INTRAVENOUS at 00:38

## 2023-12-12 RX ADMIN — SODIUM CHLORIDE, POTASSIUM CHLORIDE, SODIUM LACTATE AND CALCIUM CHLORIDE 500 ML: 600; 310; 30; 20 INJECTION, SOLUTION INTRAVENOUS at 04:23

## 2023-12-12 RX ADMIN — Medication 1 G: at 17:17

## 2023-12-12 RX ADMIN — Medication 1 G: at 09:32

## 2023-12-12 RX ADMIN — SODIUM CHLORIDE, POTASSIUM CHLORIDE, SODIUM LACTATE AND CALCIUM CHLORIDE 1000 ML: 600; 310; 30; 20 INJECTION, SOLUTION INTRAVENOUS at 05:56

## 2023-12-12 RX ADMIN — IPRATROPIUM BROMIDE AND ALBUTEROL SULFATE 3 ML: .5; 3 SOLUTION RESPIRATORY (INHALATION) at 15:09

## 2023-12-12 RX ADMIN — SODIUM CHLORIDE, POTASSIUM CHLORIDE, SODIUM LACTATE AND CALCIUM CHLORIDE 500 ML: 600; 310; 30; 20 INJECTION, SOLUTION INTRAVENOUS at 02:27

## 2023-12-12 RX ADMIN — HYDROMORPHONE HYDROCHLORIDE 0.4 MG: 1 INJECTION, SOLUTION INTRAMUSCULAR; INTRAVENOUS; SUBCUTANEOUS at 11:13

## 2023-12-12 RX ADMIN — ACETYLCYSTEINE 6 ML: 100 SOLUTION ORAL; RESPIRATORY (INHALATION) at 09:17

## 2023-12-12 RX ADMIN — HYDROMORPHONE HYDROCHLORIDE 0.4 MG: 1 INJECTION, SOLUTION INTRAMUSCULAR; INTRAVENOUS; SUBCUTANEOUS at 17:16

## 2023-12-12 RX ADMIN — HYDROMORPHONE HYDROCHLORIDE 0.4 MG: 1 INJECTION, SOLUTION INTRAMUSCULAR; INTRAVENOUS; SUBCUTANEOUS at 22:26

## 2023-12-12 ASSESSMENT — PAIN DESCRIPTION - LOCATION
LOCATION: ABDOMEN

## 2023-12-12 ASSESSMENT — COGNITIVE AND FUNCTIONAL STATUS - GENERAL
TOILETING: A LOT
MOVING FROM LYING ON BACK TO SITTING ON SIDE OF FLAT BED WITH BEDRAILS: A LOT
MOBILITY SCORE: 10
MOVING TO AND FROM BED TO CHAIR: A LOT
PERSONAL GROOMING: A LOT
DRESSING REGULAR UPPER BODY CLOTHING: A LOT
WALKING IN HOSPITAL ROOM: TOTAL
CLIMB 3 TO 5 STEPS WITH RAILING: TOTAL
HELP NEEDED FOR BATHING: A LOT
TURNING FROM BACK TO SIDE WHILE IN FLAT BAD: A LOT
DAILY ACTIVITIY SCORE: 12
EATING MEALS: A LOT
STANDING UP FROM CHAIR USING ARMS: A LOT
DRESSING REGULAR LOWER BODY CLOTHING: A LOT

## 2023-12-12 ASSESSMENT — PAIN SCALES - GENERAL
PAINLEVEL_OUTOF10: 7
PAINLEVEL_OUTOF10: 6
PAINLEVEL_OUTOF10: 4
PAINLEVEL_OUTOF10: 7
PAINLEVEL_OUTOF10: 6
PAINLEVEL_OUTOF10: 7
PAINLEVEL_OUTOF10: 5 - MODERATE PAIN
PAINLEVEL_OUTOF10: 7

## 2023-12-12 ASSESSMENT — PAIN - FUNCTIONAL ASSESSMENT
PAIN_FUNCTIONAL_ASSESSMENT: 0-10

## 2023-12-12 NOTE — CARE PLAN
Problem: Fall/Injury  Goal: Not fall by end of shift  Outcome: Progressing  Goal: Be free from injury by end of the shift  Outcome: Progressing  Goal: Verbalize understanding of personal risk factors for fall in the hospital  Outcome: Progressing  Goal: Verbalize understanding of risk factor reduction measures to prevent injury from fall in the home  Outcome: Progressing  Goal: Use assistive devices by end of the shift  Outcome: Progressing  Goal: Pace activities to prevent fatigue by end of the shift  Outcome: Progressing     Problem: Skin  Goal: Decreased wound size/increased tissue granulation at next dressing change  Outcome: Progressing  Goal: Participates in plan/prevention/treatment measures  Outcome: Progressing  Goal: Prevent/manage excess moisture  Outcome: Progressing  Goal: Prevent/minimize sheer/friction injuries  Outcome: Progressing  Goal: Promote/optimize nutrition  Outcome: Progressing  Goal: Promote skin healing  Outcome: Progressing     Problem: Pain  Goal: My pain/discomfort is manageable  Outcome: Progressing     Problem: Daily Care  Goal: Daily care needs are met  Outcome: Progressing     Problem: Psychosocial Needs  Goal: Demonstrates ability to cope with hospitalization/illness  Outcome: Progressing  Goal: Collaborate with me, my family, and caregiver to identify my specific goals  Outcome: Progressing     Problem: Safety  Goal: Patient will be injury free during hospitalization  Outcome: Progressing  Goal: I will remain free of falls  Outcome: Progressing     Problem: Discharge Barriers  Goal: My discharge needs are met  Outcome: Progressing                     The patient's goals for the shift include  Remain HDS     The clinical goals for the shift include pt will remain free of pain and free of injury throughout the shift    Over the shift, the patient did not make progress toward the following goals.

## 2023-12-12 NOTE — CONSULTS
Wound Care Consult     Visit Date: 12/12/2023      Patient Name: Mike Olson         MRN: [id number]           YOB: 1955     Reason for Consult: assess right axilla         Wound History: Patient with PMH HTN, CVA (9/2023), recently diagnosed poorly differentiated metastatic SCC (11/2023) who presents for worsening dysphagia. Given new diagnosis, no treatment has been started yet and no known origin yet.      Pertinent Labs:   Albumin   Date Value Ref Range Status   12/12/2023 2.5 (L) 3.4 - 5.0 g/dL Final       Wound Assessment:  Wound 11/22/23 Other (comment) Axilla Right (Active)   Wound Image   11/24/23 1638   Site Assessment Purple;Pink;Other (Comment) 12/12/23 1800   Lisa-Wound Assessment Intact 12/12/23 1800   Shape Irregular  11/24/23 1638   Wound Length (cm) 8 cm 12/12/23 1800   Wound Width (cm) 4 cm 12/12/23 1800   Wound Surface Area (cm^2) 32 cm^2 12/12/23 1800   Wound Depth (cm) 0 cm 11/24/23 1638   Wound Volume (cm^3) 0 cm^3 11/24/23 1638   Drainage Description Serous 12/12/23 1800   Drainage Amount Small 12/12/23 1800   Dressing Xeroform;ABD 12/12/23 1800   Dressing Changed Changed 12/12/23 1800   Dressing Status Clean 12/12/23 1800       Wound 11/27/23 Incision Chest Left;Upper (Active)   Site Assessment Clean;Dry 12/06/23 2100   Lisa-Wound Assessment Intact 12/06/23 1204   Closure Glue 12/09/23 0915   Sutures/Staple Line Approximated 12/06/23 1204   Drainage Description None 12/05/23 2100   Drainage Amount None 12/05/23 2100   Dressing Open to air 12/09/23 0915   Dressing Status Dry;Clean 12/09/23 0915       Wound 11/27/23 Incision Abdomen Medial;Upper (Active)   Site Assessment Clean;Dry;Intact 12/09/23 0915   Lisa-Wound Assessment Clean;Dry;Intact 12/09/23 0915   Closure Glue 12/06/23 2100   Sutures/Staple Line Approximated 12/06/23 1205   Drainage Description None 12/05/23 2100   Drainage Amount None 12/05/23 2100   Dressing Open to air 12/05/23 2100   Dressing Changed New 11/27/23  1532   Dressing Status Clean;Dry 12/09/23 0915       Wound Team Summary Assessment: Patient has fungating wound in Right axilla. Stable at this time .   Gently cleanse with normal saline. Encourage patient to shower to cleanse wound if possible   Cover tumor with xeroform and dry dressing.   May allow patient's crease to hole in place. If patient becomes more active may tape in place on top and bottom edge        Wound Team Plan: Please review recommendations and place in EMR     Edith PRATT   12/12/2023  6:53 PM

## 2023-12-12 NOTE — PROGRESS NOTES
Radiation Oncology Treatment Summary    Patient Name:  Mike Olson  MRN:  [id number]  :  1955    Radiation Oncologist:No care team member to display   Referring Provider: No ref. provider found  Primary Care Provider: ANKUSH Mccarthy    Brief History: iMke Olson is a 68 y.o. male with poorly differentiated SCC of unknown origin with diffuse metastasis. He has obstructing proximal third of esophagus, not eligible for resection, planned for palliative external beam radiotherapy for malignant esophageal obstruction. He has non-operable exophytic right axillary mass, planned  for palliative radiation to axillary mass to control rapid pain and progression. The patient completed radiotherapy as outlined below.    Radiation Treatment Summary:    Treatment dates: 23-23  Treatment site: Esophagus  Fraction: 5  Dose: 2000 cGy  Technique: 3D    Treatment dates: 23-23  Treatment site: R Axilla  Fraction: 5  Dose: 2000 cGy  Technique: 3D    Concurrent Chemotherapy:  [No matching plan found]    CTCAE Toxicity Overview:   Toxicity Assessment          2023    10:24 2023    11:16   Toxicity Assessment   Adverse Events Reviewed (WDL) No (Exceptions to WDL) No (Exceptions to WDL)   Treatment Site Abdomen Bone   Anorexia Grade 1 Grade 0   Anxiety Grade 0 Grade 0   Dehydration Grade 0 Grade 0   Depression Grade 0 Grade 0   Dermatitis Radiation Grade 0 Grade 0   Diarrhea Grade 0 Grade 0   Fatigue Grade 0 Grade 0   Fracture Grade 0 Grade 0   Nausea Grade 0    Pain Grade 2    Tumor Pain Grade 2 Grade 2       lower back pain running down left thigh wrapping around to front of thigh.  took codiene and was able to sleep.   Vomiting Grade 0 Grade 0   Abdominal Pain Grade 0    Bloating Grade 0    Constipation Grade 0    Dyspepsia Grade 0    Dysphagia Grade 2    Fecal Incontinence Grade 0    Rectal Pain Grade 0    Peripheral Motor Neuropathy Grade 0    Peripheral Sensory Neuropathy Grade 0     Hematuria Grade 0    Urinary Incontinence Grade 0    Dyspareunia Grade 0    Erectile Dysfunction Grade 0    Pelvic Pain Grade 0    Bone Pain  Grade 2       just started about 3 days ago.     Patient Disposition:   Future Appointments       Date / Time Provider Department Dept Phone    12/20/2023 11:15 AM Jesi Lindsay MD [facility] [phone]    1/10/2024 8:00 AM ROYER STRESS 1 [facility] [phone]    1/10/2024 9:15 AM (Arrive by 9:05 AM) [facility] CT 1 [facility] [phone]    1/16/2024 10:15 AM Rob Phipps MD [facility] [phone]

## 2023-12-12 NOTE — SIGNIFICANT EVENT
RAPID     12/11/23 2300   Onset Documentation   Rapid Response Initiated By Physician;RN   Location/Room [facility]   Pager Time 2300   Arrival Time 2300   Event End Time 2350   Level II Called No   Primary Reason for Call Staff concern     Rapid Response RN called to bedside for concern of tachycardia, AMS. At bedside patient is awake, following commands, oriented x1 RIVERA equally.     VS: T:37  P: 121   R: 20   BP:  151/66   SpO2   94  on  2L NC   .     Interventions: ECG, VBG, CXR, RFP, CBC, 500 ml fluid bolus. CT abd/pel    At bedside: NACR, bedside RN, Nursing supervisor    Outcome: Patient stable to remain on unit at this time.       No additional rapid response needs at current time.     Ending VS: P: 122  R: 20   BP: 141/69  SpO2   93%   on 2L NC .      Plan of care discussed with bedside RN and team.  Staff advised to call with any concerns for deterioration or assistance needed.

## 2023-12-12 NOTE — CARE PLAN
Problem: Fall/Injury  Goal: Not fall by end of shift  Outcome: Progressing  Goal: Be free from injury by end of the shift  Outcome: Progressing  Goal: Verbalize understanding of personal risk factors for fall in the hospital  Outcome: Progressing  Goal: Verbalize understanding of risk factor reduction measures to prevent injury from fall in the home  Outcome: Progressing  Goal: Use assistive devices by end of the shift  Outcome: Progressing  Goal: Pace activities to prevent fatigue by end of the shift  Outcome: Progressing     Problem: Skin  Goal: Decreased wound size/increased tissue granulation at next dressing change  Outcome: Progressing  Goal: Participates in plan/prevention/treatment measures  Outcome: Progressing  Goal: Prevent/manage excess moisture  Outcome: Progressing  Goal: Prevent/minimize sheer/friction injuries  Outcome: Progressing  Goal: Promote/optimize nutrition  Outcome: Progressing  Goal: Promote skin healing  Outcome: Progressing        Right

## 2023-12-12 NOTE — PROGRESS NOTES
"Vancomycin Dosing by Pharmacy- INITIAL    Mike Olson is a 68 y.o. year old male who Pharmacy has been consulted for vancomycin dosing for other abdominal infection . Based on the patient's indication and renal status this patient will be dosed based on a goal AUC of 400-600.     Renal function is currently stable.    Visit Vitals  /72   Pulse (!) 122   Temp 38.8 °C (101.8 °F) (Temporal)   Resp 24        Lab Results   Component Value Date    CREATININE 0.94 12/12/2023    CREATININE 1.00 12/11/2023    CREATININE 1.36 (H) 12/10/2023    CREATININE 1.89 (H) 12/09/2023        Patient weight is No results found for: \"PTWEIGHT\"    No results found for: \"CULTURE\"     I/O last 3 completed shifts:  In: 465 (6.1 mL/kg) [NG/GT:465]  Out: 2475 (32.3 mL/kg) [Urine:2475 (0.9 mL/kg/hr)]  Weight: 76.5 kg   [unfilled]    Lab Results   Component Value Date    PATIENTTEMP 37.0 12/12/2023    PATIENTTEMP 37.0 12/10/2023    PATIENTTEMP 37.0 12/07/2023          Assessment/Plan     Patient will not be given a loading dose.  Will initiate vancomycin maintenance,  1000 mg every 12 hours.    This dosing regimen is predicted by [employer] to result in the following pharmacokinetic parameters:  Loading dose: N/A  Regimen: 1000 mg IV every 12 hours.  Start time: 04:51 on 12/12/2023  Exposure target: AUC24 (range)400-600 mg/L.hr   AUC24,ss: 531 mg/L.hr  Probability of AUC24 > 400: 95 %  Ctrough,ss: 17.1 mg/L  Probability of Ctrough,ss > 20: 28 %  Probability of nephrotoxicity (Lodise LAILA 2009): 13 %  Follow-up level will be ordered on 12/13 at AM labs unless clinically indicated sooner.  Will continue to monitor renal function daily while on vancomycin and order serum creatinine at least every 48 hours if not already ordered.  Follow for continued vancomycin needs, clinical response, and signs/symptoms of toxicity.       Benjamin Gallagher, PharmD       "

## 2023-12-12 NOTE — SIGNIFICANT EVENT
RADAR initiated Rapid Response    RADAR of 8 received by RRT RN for VS of 37.8, 125, 22, 147/69, 93%.  Spoke to bedside RN Dale via phone.  Patient remains at baseline.  No interventions required of Rapid Response Team RN at present time.  RN encouraged to page Rapid Response if patient condition declines.  Patient remains on SCC 4001.

## 2023-12-12 NOTE — PROGRESS NOTES
Surgical Oncology Daily Progress Note      Subjective  ON, patient developed altered mental status, was febrile, and tachycardic to 120. Today, he is afebrile and tachy to 110s. G tube pulled out this AM. He denies abdominal pain, N/V, or any other complaints.    Current Meds  Scheduled medications  acetaminophen, 650 mg, g-tube, q4h  acetylcysteine, 6 mL, nebulization, TID  amLODIPine, 10 mg, g-tube, Daily  aspirin, 81 mg, g-tube, Daily  atorvastatin, 80 mg, g-tube, Daily  [Held by provider] chlorthalidone, 25 mg, g-tube, Daily  enoxaparin, 40 mg, subcutaneous, q24h  esomeprazole, 20 mg, nasogastric tube, Daily before breakfast  fentaNYL PF, , ,   gabapentin, 300 mg, g-tube, BID  glycopyrrolate, 0.2 mg, intravenous, q6h  ipratropium-albuteroL, 3 mL, nebulization, TID  lactated Ringer's, 500 mL, intravenous, Once  lactated Ringer's, 500 mL, intravenous, Once  losartan, 100 mg, g-tube, Daily  meropenem, 1 g, intravenous, q8h  midazolam, , ,   OLANZapine zydis, 5 mg, oral, Nightly  polyethylene glycol, 17 g, g-tube, BID  senna, 10 mL, g-tube, Nightly  vancomycin, 1,000 mg, intravenous, q12h      Continuous medications     PRN medications  PRN medications: bisacodyl, dextrose 10 % in water (D10W), dextrose, fentaNYL PF, glucagon, guaiFENesin, HYDROmorphone, HYDROmorphone, melatonin, meperidine, meperidine PF, midazolam, sodium phosphates    Objective    Vitals:   Temp:  [36.2 °C (97.2 °F)-38.8 °C (101.8 °F)] 37.4 °C (99.3 °F)  Heart Rate:  [110-125] 117  Resp:  [20-24] 20  BP: (140-170)/(61-76) 154/70      I/O  I/O last 3 completed shifts:  In: 3215 (42 mL/kg) [NG/GT:465; IV Piggyback:2750]  Out: 1950 (25.5 mL/kg) [Urine:1950 (0.7 mL/kg/hr)]  Weight: 76.5 kg       Physical Exam  General: laying in bed, NAD  Head: normocephalic, atraumatic  ENT: mucosa are moist   Respiratory: nonlabored breathing on nasal cannula   CV: RRR  GI: g tube site dressed with gauze, abdomen is soft, nontender, nondistended  MSK:  CECILIO  Extremities: no swelling or deformity of b/l LEs   Neuro: CN II-XII grossly intact. Sensation to light touch intact    Labs:  Lab Results   Component Value Date    WBC 20.6 (H) 12/12/2023    HGB 6.9 (L) 12/12/2023    HCT 22.6 (L) 12/12/2023    MCV 90 12/12/2023     12/12/2023     Lab Results   Component Value Date    GLUCOSE 103 (H) 12/12/2023    CALCIUM 10.0 12/12/2023     12/12/2023    K 4.5 12/12/2023    CO2 27 12/12/2023     12/12/2023    BUN 41 (H) 12/12/2023    CREATININE 0.94 12/12/2023     Lab Results   Component Value Date    ALT 18 11/22/2023    AST 21 11/22/2023    ALKPHOS 87 11/22/2023    BILITOT 0.6 11/22/2023                 Imaging  CT abdomen pelvis w IV contrast    Result Date: 12/12/2023  Interpreted By:  Cee Brand and Benza Andrew STUDY: CT ABDOMEN PELVIS W IV CONTRAST;  12/12/2023 5:47 am   INDICATION: Signs/Symptoms:c/f obstruction. Patient with history of metastatic esophageal cancer.   COMPARISON: None.   ACCESSION NUMBER(S): [id number]   ORDERING CLINICIAN: MILENA MARKHAM   TECHNIQUE: CT of the abdomen and pelvis was performed.  Standard contiguous axial images were obtained at 3 mm slice thickness through the abdomen and pelvis. Coronal and sagittal reconstructions at 3 mm slice thickness were performed.   100 ml of contrast Omnipaque 350 were administered intravenously without immediate complication. Examination is limited by suboptimal arm positioning and consequent beam hardening artifact.   FINDINGS: LOWER CHEST: There are trace bilateral pleural effusions. There are bibasilar consolidative opacities. The heart is normal in size without pericardial effusion. Visualized distal esophagus appears normal.   ABDOMEN:   LIVER: The liver is enlarged and measures 21 cm in craniocaudal dimension.   BILE DUCTS: The intrahepatic and extrahepatic ducts are not dilated.   GALLBLADDER: The gallbladder is nondistended and without evidence of radiopaque stones.   PANCREAS:  The pancreas appears unremarkable without evidence of ductal dilatation or masses.   SPLEEN: The spleen is normal in size. An accessory splenule is present.   ADRENAL GLANDS: Bilateral adrenal glands appear normal.   KIDNEYS AND URETERS: The kidneys are normal in size and enhance symmetrically.  No hydroureteronephrosis or nephroureterolithiasis is identified.   PELVIS:   BLADDER: The urinary bladder is decompressed with a Vazquez catheter in place.   REPRODUCTIVE ORGANS: No pelvic masses.   BOWEL: There is a small hiatal hernia. Otherwise, the stomach is unremarkable.   The small and large bowel are diffusely dilated and fluid-filled. There is an area of relative decreased caliber of the small bowel (series 2, image 86) without evident obstruction. The appendix appears normal.   VESSELS: There is no aneurysmal dilatation of the abdominal aorta. The IVC appears normal. There are mild atherosclerotic changes of the abdominal aorta and its branches.   PERITONEUM/RETROPERITONEUM/LYMPH NODES: There is no free or loculated fluid collection, no free intraperitoneal air. The retroperitoneum appears normal.  A single enlarged peritoneal nodule measures 1.4 x 1.3 cm (series 2, image 58), compared to 1.1 x 0.9 cm on 11/17/2023 PET-CT.   BONES AND ABDOMINAL WALL: No suspicious osseous lesions are identified. Degenerative discogenic disease is noted in the lower thoracic and lumbar spine.  There is a small fat containing umbilical hernia, small left fat containing inguinal hernia and right fluid and fat containing inguinal hernia.       1.  No evidence of bowel obstruction. Fluid-filled nondilated small bowel and colon, for correlation with diarrhea. No bowel wall thickening or perienteric fat stranding. 2. Interval increase in size of an indeterminate 1.4 x 1.3 cm hypodense mesenteric nodule abutting an adjacent jejunal bowel loop. The nodule was not FDG avid on 11/17/2023 PET-CT, and may possibly represent a jejunal  diverticulum. However metastatic deposit can not be excluded. Attention on follow-up is recommended. 3. Interval increase in bilateral lower lobes consolidations compared to 12/09/2023 CT. Findings may represent atelectasis, however superimposed pneumonia can not be excluded. 4. Mild hepatomegaly, for correlation liver function tests.   I personally reviewed the images/study and I agree with the findings as stated above by resident physician, Murray Lyn MD. This study was interpreted at [facility], [place], [place].   MACRO: None   Signed by: Cee Brand 12/12/2023 11:13 AM Dictation workstation:   [id number]    X12/12/2023 8:28 AM Dictation workstation:   [id number]      Assessment:  Mr. Olson, 68yoM with near obstructing esophageal mass, sp robotic gastrostomy tube and mediport placement on 11/27. Team notified this evening that patient's tube was pulled out this morning. Able to place a 16Fr G tube in tract with bumper at 5cm.     Recommendations:   - G tube to gravity   - G tube study in AM to confirm placement   - NPO with no meds or feeds through G tube   - remainder of care per primary team   - will continue to monitor       Discussed with Attending Physician    Estrella Elise MD  General Surgery PGY 5  Surgical Oncology [id number]

## 2023-12-12 NOTE — PROGRESS NOTES
Mike Olson is a 68 y.o. male on day 20 of admission presenting with Difficulty swallowing.    Subjective   ON: Patient had two rapids called overnight for tachycardia and fever. Noticed to have erythema, pus, and tenderness from R axilla (radiation site) and PEG tube site. Patient's antibiotics broadened to vanc/xander and was given 2.5L LR. Hb was 6.9 at time of rapid and was given 1 unit pRBCs. Imaging (CXR, CT A/P) unremarkable for larger infectious process.    Patient more alert and oriented this morning. Endorses pain around R axilla and PEG tube site. Denies headaches, lightheadedness, n/v, shortness of breath, cough, dysuria.       Objective     Physical Exam  Constitutional:       Appearance: He is ill-appearing. He is not toxic-appearing.      Comments: Mildly uncomfortable   HENT:      Nose: No congestion or rhinorrhea.      Mouth/Throat:      Mouth: Mucous membranes are moist.   Eyes:      General: No scleral icterus.     Extraocular Movements: Extraocular movements intact.      Pupils: Pupils are equal, round, and reactive to light.   Cardiovascular:      Rate and Rhythm: Normal rate and regular rhythm.      Heart sounds: No murmur heard.     No friction rub. No gallop.   Pulmonary:      Effort: Pulmonary effort is normal. No respiratory distress.      Breath sounds: No stridor. No wheezing, rhonchi or rales.      Comments: Diminished breath sounds on the right LL  Abdominal:      General: Bowel sounds are normal. There is no distension.      Palpations: Abdomen is soft. There is no mass.      Tenderness: There is no abdominal tenderness. There is no guarding or rebound.      Comments: PEG tube in place with some erythema and pus. Tender to touch.    Genitourinary:     Comments: Vazquez catheter in place draining, clear yellow urine  Musculoskeletal:         General: No swelling or tenderness. Normal range of motion.      Cervical back: Normal range of motion.      Right lower leg: No edema.      Left lower  "leg: No edema.   Skin:     General: Skin is warm.      Capillary Refill: Capillary refill takes less than 2 seconds.      Coloration: Skin is not jaundiced.      Findings: No bruising or rash.      Comments: Axillary mass erythematous, tender, with pus present.    Neurological:      General: No focal deficit present.      Mental Status: He is oriented to person, place, and time. Mental status is at baseline.      Cranial Nerves: No cranial nerve deficit.     Last Recorded Vitals  Blood pressure 147/76, pulse (!) 117, temperature 37.1 °C (98.8 °F), temperature source Temporal, resp. rate 20, height 1.69 m (5' 6.54\"), weight 76.5 kg (168 lb 11.2 oz), SpO2 94 %.  Intake/Output last 3 Shifts:  I/O last 3 completed shifts:  In: 3215 (42 mL/kg) [NG/GT:465; IV Piggyback:2750]  Out: 1950 (25.5 mL/kg) [Urine:1950 (0.7 mL/kg/hr)]  Weight: 76.5 kg     Relevant Results  Results for orders placed or performed during the hospital encounter of 11/22/23 (from the past 24 hour(s))   POCT GLUCOSE   Result Value Ref Range    POCT Glucose 97 74 - 99 mg/dL   POCT GLUCOSE   Result Value Ref Range    POCT Glucose 95 74 - 99 mg/dL   Renal function panel   Result Value Ref Range    Glucose 103 (H) 74 - 99 mg/dL    Sodium 137 136 - 145 mmol/L    Potassium 4.5 3.5 - 5.3 mmol/L    Chloride 100 98 - 107 mmol/L    Bicarbonate 27 21 - 32 mmol/L    Anion Gap 15 10 - 20 mmol/L    Urea Nitrogen 41 (H) 6 - 23 mg/dL    Creatinine 0.94 0.50 - 1.30 mg/dL    eGFR 88 >60 mL/min/1.73m*2    Calcium 10.0 8.6 - 10.6 mg/dL    Phosphorus 3.1 2.5 - 4.9 mg/dL    Albumin 2.5 (L) 3.4 - 5.0 g/dL   Magnesium   Result Value Ref Range    Magnesium 2.12 1.60 - 2.40 mg/dL   Blood Gas Venous Full Panel   Result Value Ref Range    POCT pH, Venous 7.42 7.33 - 7.43 pH    POCT pCO2, Venous 41 41 - 51 mm Hg    POCT pO2, Venous 43 35 - 45 mm Hg    POCT SO2, Venous 73 45 - 75 %    POCT Oxy Hemoglobin, Venous 71.7 45.0 - 75.0 %    POCT Hematocrit Calculated, Venous 21.0 (L) 41.0 " - 52.0 %    POCT Sodium, Venous 134 (L) 136 - 145 mmol/L    POCT Potassium, Venous 4.6 3.5 - 5.3 mmol/L    POCT Chloride, Venous 102 98 - 107 mmol/L    POCT Ionized Calicum, Venous 1.32 1.10 - 1.33 mmol/L    POCT Glucose, Venous 95 74 - 99 mg/dL    POCT Lactate, Venous 1.3 0.4 - 2.0 mmol/L    POCT Base Excess, Venous 1.9 -2.0 - 3.0 mmol/L    POCT HCO3 Calculated, Venous 26.6 (H) 22.0 - 26.0 mmol/L    POCT Hemoglobin, Venous 7.1 (L) 13.5 - 17.5 g/dL    POCT Anion Gap, Venous 10.0 10.0 - 25.0 mmol/L    Patient Temperature 37.0 degrees Celsius    FiO2 94 %   CBC and Auto Differential   Result Value Ref Range    WBC 20.6 (H) 4.4 - 11.3 x10*3/uL    nRBC 0.2 (H) 0.0 - 0.0 /100 WBCs    RBC 2.51 (L) 4.50 - 5.90 x10*6/uL    Hemoglobin 6.9 (L) 13.5 - 17.5 g/dL    Hematocrit 22.6 (L) 41.0 - 52.0 %    MCV 90 80 - 100 fL    MCH 27.5 26.0 - 34.0 pg    MCHC 30.5 (L) 32.0 - 36.0 g/dL    RDW 13.6 11.5 - 14.5 %    Platelets 385 150 - 450 x10*3/uL    Immature Granulocytes %, Automated 8.7 (H) 0.0 - 0.9 %    Immature Granulocytes Absolute, Automated 1.78 (H) 0.00 - 0.70 x10*3/uL   Manual Differential   Result Value Ref Range    Neutrophils %, Manual 83.5 40.0 - 80.0 %    Bands %, Manual 5.2 0.0 - 5.0 %    Lymphocytes %, Manual 3.5 13.0 - 44.0 %    Monocytes %, Manual 3.5 2.0 - 10.0 %    Eosinophils %, Manual 2.6 0.0 - 6.0 %    Basophils %, Manual 0.0 0.0 - 2.0 %    Myelocytes %, Manual 1.7 0.0 - 0.0 %    Seg Neutrophils Absolute, Manual 17.20 (H) 1.20 - 7.00 x10*3/uL    Bands Absolute, Manual 1.07 (H) 0.00 - 0.70 x10*3/uL    Lymphocytes Absolute, Manual 0.72 (L) 1.20 - 4.80 x10*3/uL    Monocytes Absolute, Manual 0.72 0.10 - 1.00 x10*3/uL    Eosinophils Absolute, Manual 0.54 0.00 - 0.70 x10*3/uL    Basophils Absolute, Manual 0.00 0.00 - 0.10 x10*3/uL    Myelocytes Absolute, Manual 0.35 0.00 - 0.00 x10*3/uL    Total Cells Counted 115     Neutrophils Absolute, Manual 18.27 (H) 1.20 - 7.70 x10*3/uL    RBC Morphology No significant RBC  morphology present    POCT GLUCOSE   Result Value Ref Range    POCT Glucose 103 (H) 74 - 99 mg/dL   Blood Culture    Specimen: Peripheral Venipuncture; Blood culture   Result Value Ref Range    Blood Culture Loaded on Instrument - Culture in progress    Blood Culture    Specimen: Peripheral Venipuncture; Blood culture   Result Value Ref Range    Blood Culture Loaded on Instrument - Culture in progress    Tissue/Wound Culture/Smear    Specimen: Skin/Superficial Abscess; Tissue/Biopsy   Result Value Ref Range    Gram Stain No polymorphonuclear leukocytes seen (A)     Gram Stain (3+) Moderate Yeast (A)    Prepare RBC: 1 Units, Leukocytes Reduced (CMV reduced risk)   Result Value Ref Range    PRODUCT CODE X1472S54     Unit Number [id number]     Unit ABO A     Unit RH POS     XM INTEP COMP     Dispense Status TR     Blood Expiration Date January 01, 2024 23:59 EST     PRODUCT BLOOD TYPE 6200     UNIT VOLUME 350    Urinalysis with Reflex Microscopic   Result Value Ref Range    Color, Urine Yellow Straw, Yellow    Appearance, Urine Clear Clear    Specific Gravity, Urine 1.034 1.005 - 1.035    pH, Urine 6.0 5.0, 5.5, 6.0, 6.5, 7.0, 7.5, 8.0    Protein, Urine 30 (1+) (N) NEGATIVE mg/dL    Glucose, Urine NEGATIVE NEGATIVE mg/dL    Blood, Urine SMALL (1+) (A) NEGATIVE    Ketones, Urine NEGATIVE NEGATIVE mg/dL    Bilirubin, Urine NEGATIVE NEGATIVE    Urobilinogen, Urine <2.0 <2.0 mg/dL    Nitrite, Urine NEGATIVE NEGATIVE    Leukocyte Esterase, Urine TRACE (A) NEGATIVE   Microscopic Only, Urine   Result Value Ref Range    WBC, Urine 1-5 1-5, NONE /HPF    RBC, Urine >20 (A) NONE, 1-2, 3-5 /HPF    Mucus, Urine 1+ Reference range not established. /LPF   POCT GLUCOSE   Result Value Ref Range    POCT Glucose 106 (H) 74 - 99 mg/dL   Lactate   Result Value Ref Range    Lactate 1.0 0.4 - 2.0 mmol/L      12/12 CT A/P  1.  No evidence of bowel obstruction. Fluid-filled nondilated small   bowel and colon, for correlation with  diarrhea. No bowel wall   thickening or perienteric fat stranding.   2. Interval increase in size of an indeterminate 1.4 x 1.3 cm   hypodense mesenteric nodule abutting an adjacent jejunal bowel loop.   The nodule was not FDG avid on 11/17/2023 PET-CT, and may possibly   represent a jejunal diverticulum. However metastatic deposit can not   be excluded. Attention on follow-up is recommended.   3. Interval increase in bilateral lower lobes consolidations compared   to 12/09/2023 CT. Findings may represent atelectasis, however   superimposed pneumonia can not be excluded.   4. Mild hepatomegaly, for correlation liver function tests.     12/12 CXR  1.  Slight interval worsening of right-greater-than-left bibasilar   airspace opacities reflecting atelectasis or progressive   consolidation. Correlate with concern for infection/aspiration.   2.  Prominent pulmonary vascular and interstitial markings suggestive   of interstitial pulmonary edema.   Scheduled medications  acetaminophen, 650 mg, g-tube, q4h  acetylcysteine, 6 mL, nebulization, TID  amLODIPine, 10 mg, g-tube, Daily  aspirin, 81 mg, g-tube, Daily  atorvastatin, 80 mg, g-tube, Daily  [Held by provider] chlorthalidone, 25 mg, g-tube, Daily  enoxaparin, 40 mg, subcutaneous, q24h  esomeprazole, 20 mg, nasogastric tube, Daily before breakfast  fentaNYL PF, , ,   gabapentin, 300 mg, g-tube, BID  glycopyrrolate, 0.2 mg, intravenous, q6h  ipratropium-albuteroL, 3 mL, nebulization, TID  lactated Ringer's, 500 mL, intravenous, Once  lactated Ringer's, 500 mL, intravenous, Once  losartan, 100 mg, g-tube, Daily  meropenem, 1 g, intravenous, q8h  midazolam, , ,   OLANZapine zydis, 5 mg, oral, Nightly  polyethylene glycol, 17 g, g-tube, BID  senna, 10 mL, g-tube, Nightly  vancomycin, 1,000 mg, intravenous, q12h      Continuous medications     PRN medications  PRN medications: bisacodyl, dextrose 10 % in water (D10W), dextrose, fentaNYL PF, glucagon, guaiFENesin,  HYDROmorphone, HYDROmorphone, melatonin, meperidine, meperidine PF, midazolam, sodium phosphates      Assessment/Plan   67 year old male with PMH HTN, CVA (9/2023), recently diagnosed poorly differentiated metastatic SCC (11/2023) who presents for worsening dysphagia. Given new diagnosis, no treatment has been started yet and no known origin yet. Will discuss with Med Onc (Dr. Yin was to be his primary oncologist), Surg Onc (Dr Corona had already seen pt) and will also consult GI for EGD/biopsy. EGD on 11/24 showed large, nearly fully obstructing esophageal mass. Patient is not a surgical candidate given metastatic disease. Managing nutrition and need for chemo/radiation. PEG in place. Patient is slightly fluid overloaded, receiving 2L. Ongoing elevation of white count w/o spiking fevers but increased white/yellow sputum productions consistent with pulmonary edema vs silent aspiration from underlying esophageal dysfunctions 2/2 malignancy. 12/8, patient exhibiting more signs of delirium and somnolence, possibly related to initiation of methadone on 12/5. 12/12 patient showing signs of infection, currently being treated and pending conversation with Dr Slater.     Updates 12/12:  -s/p 5/5 fraction pRT of the right axilla  -started vanc/meropenem for infection; will follow up wound cultures  -patient pulled out PEG tube, surg onc replaced tube and will have follow up studies in AM on 12/12  -Continue BP hygiene and Oscillating Positive Expiratory Pressure Therapy, add duonebs q6hrs  -Schedule Robinul daily   -Mouth care and suctioning   -strict Is/Os     #Metastatic SCC, poorly differentiated   #Dysphagia   #Leukocytosis, worsening  - Pathology Microscopic examination of H&E sections demonstrates a poorly differentiated carcinoma , immunoreactive with CK7 and p40 , infiltrating soft tissue.  No residual lymph node parenchyma is seen.  The following immunostains are negative: NKX3.1, GATA3, CK20, TTF-1 and  CDX2   -Diagnosed 11/2023; no treatment yet; unknown origin  -Surg Onc consulted; appreciate recs (saw Dr. Corona outpatient 11/10/23)  -SLP consulted for swallow evaluation and recommended NPO  -EGD/biopsy 11/24:  large, nearly fully obstructing esophageal mass. Patient is not a surgical candidate because of metastatic disease.   - Nutrition consulted: 1. Start 100mg IV thiamine x5 days;  Isosource 1.5 @ 25 ml/hr. Advance slowly by 10 ml/hr q8h, as tolerated, to goal rate of 55 ml/hr.   - CT SIM 11/27  - 5/5 fraction of pRT to esophageal mass 12/4 completed  -CT SIM 12/6  -s/p pRT to right axillary mass fraction   -s/p 2 doses IV lasix 20 mg, one dose of 40 IV lasix  -c/w Mucomyst, BP hygiene and PEP oscillatory therapy, duonebs q6hrs, -Schedule Robinul daily   -Procal elevated 0.79  - s/p PEG tube placement 11/27  - c/w 345ml QID bolus feeds, FWF 60 ml four times daily and 60 before and after every feed and 150 ml additional BID  - 100mg IV thiamine x5 days   -discontinue Vancomycin, MRSA nares negative  -c/w IV Zosyn (12/7-12/12)  -vanc/meropenem (12/12-)  -Respiratory sputum culture with salivary contimination      #Urinary Retention, Worthy catheter in place   -1.9 L out w/straight cat 12/7  -Repeat PVR this morning  -c/w worthy, maryellen improving  -HOLD methadone and limit anticholinergics     #MARYELLEN, 2/2 retention, nephrotoxic meds, diuresis improving  -Baseline Cr ~1; on admission 1.48, was 1.50 on 11/16 -> now at baseline 1.04 on 11/23 -->2.19 12/8 (retention), worthy placed --> 1.89--> 1.36  -S/p 1L LR, IV LR infusion at 125cc/hr, given improvement in Cr likely pre-renal  -HOLD home losartan 100mg daily  -Continue to monitor      #HTN  -Continue home amlodipine 10mg daily  -Holding chlorithalidone 25mg daily given hypercalcemia  -Continue ARB as MARYELLEN has resolved     #CVA 9/2023  #Carotid stenosis  -2 acute or subacute infarcts seen on MRI 9/18/2023  -Continue ASA 81mg daily  -Completed 21 days of plavix (started  9/18/23)  -Continue atorvastatin 80mg daily  -Vascular Surgery follow up outpatient for the carotid stenosis     #Hypercalcemia, resolved  -Ca 11.5 on admission  -S/p 1L LR in the ED, on LR infusion at 125cc/hr  -Repeat was 11.3 on 11/23, so avoiding LR in favor of NS     #Elevated PSA  -Urology follow up outpatient        #Cancer Pain Control:  -HOLD Methadone  -continue 10 mg PRN Oxycodone   -okay for PRN dilaudid      Disposition: tele  Follow-ups: PCP, Onc, Surg Onc,      F: prn  E: prn   N: PEG  A: PIVs     DVT ppx: lovenox  GI ppx: home PPI    Code Status: FULL (confirmed on 11/22/23)  Surrogate Medical Decision-maker: Wife April [phone]     Katherine Gay MD  PGY-1     [facility] Team Pager [phone]             Katherine Gay MD      Physician Attestation    Clinically not doing well today- more restless and agitated with tachycardia and low BP's - Atbs were changed last evening  [place]  Pain management  Nutrition and supportive management  Discussed with pts wife and his daughter  Provided prognosis and potential treatment options for his disease

## 2023-12-12 NOTE — PROGRESS NOTES
SUPPORTIVE AND PALLIATIVE ONCOLOGY INPATIENT FOLLOW-UP      SERVICE DATE: 23     SUBJECTIVE:  Interval Events:    Patient pulled out his peg tube. Thus IV pain medications was initiated.  Patients wife April was at bedside and patients daughter Josee was at bedside  Pain Assessment:  Location: Right shoulder  Duration: Constant  Characteristics:   Ratin   Descriptors: aching and sharp   Aggravating: movement    Relieving: Analgesics dilaudid 0.4mg iv q2hrs  and dilaudid 0.4mg iv q3hrs prn   Interference with Function: None    Opioid Use  Past 24 h prn opioid use:  Oxycodone 10mg used 4 doses = 60mg OME   Total 24h OME use:  60mg OME    Symptom Assessment:       Information obtained from: chart review, interview of patient, interview of family, discussion with RN, and discussion with primary team  ______________________________________________________________________        OBJECTIVE:    Lab Results   Component Value Date    WBC 20.6 (H) 2023    HGB 6.9 (L) 2023    HCT 22.6 (L) 2023    MCV 90 2023     2023      Lab Results   Component Value Date    GLUCOSE 103 (H) 2023    CALCIUM 10.0 2023     2023    K 4.5 2023    CO2 27 2023     2023    BUN 41 (H) 2023    CREATININE 0.94 2023     Lab Results   Component Value Date    ALT 18 2023    AST 21 2023    ALKPHOS 87 2023    BILITOT 0.6 2023     Estimated Creatinine Clearance: 69.1 mL/min (by C-G formula based on SCr of 0.94 mg/dL).     Scheduled medications  acetaminophen, 650 mg, g-tube, q4h  acetylcysteine, 6 mL, nebulization, q6h  amLODIPine, 10 mg, g-tube, Daily  aspirin, 81 mg, g-tube, Daily  atorvastatin, 80 mg, g-tube, Daily  [Held by provider] chlorthalidone, 25 mg, g-tube, Daily  enoxaparin, 40 mg, subcutaneous, q24h  esomeprazole, 20 mg, nasogastric tube, Daily before breakfast  fentaNYL PF, , ,   gabapentin, 300 mg, g-tube,  BID  glycopyrrolate, 0.2 mg, intravenous, q6h  ipratropium-albuteroL, 3 mL, nebulization, q6h  lactated Ringer's, 500 mL, intravenous, Once  lactated Ringer's, 500 mL, intravenous, Once  losartan, 100 mg, g-tube, Daily  meropenem, 1 g, intravenous, q8h  midazolam, , ,   polyethylene glycol, 17 g, g-tube, BID  senna, 5 mL, g-tube, Daily  vancomycin, 1,000 mg, intravenous, q12h      Continuous medications     PRN medications  PRN medications: bisacodyl, dextrose 10 % in water (D10W), dextrose, fentaNYL PF, glucagon, guaiFENesin, melatonin, meperidine, meperidine PF, midazolam, oxyCODONE, oxyCODONE   }     PHYSICAL EXAMINATION:    Vital Signs:   Vital signs reviewed  Visit Vitals  /69 (BP Location: Left arm, Patient Position: Lying)   Pulse (!) 125   Temp 37.8 °C (100 °F) (Temporal)   Resp 22        Pain Score: 0 - No pain  PAINAD Score:  [6]        Physical Exam  Constitutional:       Appearance: Normal appearance.   Eyes:      Pupils: Pupils are equal, round, and reactive to light.   Cardiovascular:      Heart sounds: Normal heart sounds.   Pulmonary:      Breath sounds: Normal breath sounds.   Abdominal:      General: Bowel sounds are normal.      Palpations: Abdomen is soft.   Skin:     General: Skin is warm.   Neurological:      Mental Status: He is alert and oriented to person, place, and time.   Psychiatric:         Mood and Affect: Mood normal.         Behavior: Behavior normal.        ASSESSMENT/PLAN:  Mike Olson is a 68 y.o. 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 PMH significant for HTN, CVA (9/2023). Admitted 11/22/2023 for further evaluation and management of worsening dysphagia.Supportive and Palliative Oncology is consulted for pain management.      Pain:  cancer pain related to cancer  Pain is: cancer related pain  Type: somatic  Pain control: well-controlled  Home regimen:  Acetaminophen 650mg q 8hrs   Intolerances/previously tried:  "tramadol 50mg tablet was used in the past   Personalized pain goal: 0  Pain rated 4/10, described as \"sharp pain\".  Total OME usage for the past 24 hours: 60mg OME  discontinue Oxycodone 10mg q 4hours     Add dilaudid 0.2mg IV q 2hrs PRN for 2hrs   Add Dilaudid 0.4mg IV q 3hrs PRN for pain   Continue acetaminophen 650mg q4hrs PRN   Continue Gabapentin 300mg BID [hold for sedation]     Constipation  At risk for constipation related to opioids, currently constipated  Usual bowel pattern: every day  Home regimen: none  LBM 12/9/23  Continue Miralax 17 grams BID  continue senna 5ml liquid q day     continue bisacodyl suppository 10mg daily/PRN   Encouraged him to use his suppository and PRN laxatives.   Add water Enema daily PRN if no BM in 3 days, and if suppository doesn't work.       Sleeping Difficulty:  Impaired sleep related to pain  Home regimen:  none  Add Olanzapine 5mg at night it will dissolved in mouth [it will help with nausea, agitation and sleep].   Gabapentin as above.  Patient reports that he cannot get comfortable, especially sleeping on right arm.   Reports good night sleep last night.      Decreased appetite:  Appetite loss related to chemotherapy, taste changes, and disease process  Nutrition not consulted   Home regimen:  none  Patient has a pegtube and would continue to do well.   Artificial nutrition initiated.      Goals of Care/ Minimum acceptable Quality of Life Measures   Patient's current clinical condition, including diagnosis, prognosis, and management plan, and goals of care were discussed.      Patients endorsed Goals: \"to get the hell out here\".   Jazzy: \"sitting around doing nothing with [his] wife and dog\"  Code status discussion/patients Code status:   DNAR/DNI     #Palliative Medicine encounter: Palliative care was introduced as a service for patients with serious illness to improve quality of life, including helping with pain and other bothersome symptoms, clarifying patient's values " and priorities to help align the patient's care with their goals, and providing support to patients and families.   Declined  for spiritual Support   Declined Music therapy for coping    Declined Art Therapy for legacy building   Welcomed Pet Therapy for Emotional Support   Coordination of Care   Supportive Listening        Advance Directives/Advance Care Planning  Existence of Advance Directives:   Decision maker/ NOK: Surrogate decision maker is chey Black @[phone]  Code Status: Full code     [place] Surrogate Decision Maker Hierarchy     1. Court-appointed Guardian  2. Healthcare Power of   3. Legal Spouse  4. Majority of Adult Children (consensus if possible)  5. Parents  6. Majority of Adult Siblings (consensus if possible)  7. Nearest Blood Relative     Supportive Interventions: Interventions: Music Therapy: offered declined, Art Therapy: offered declined     Disposition:  Please  start the process of having prior authorization with meds to beds deliver medications to patient prior to discharge via [facility] pharmacy. Prescriptions will need to be sent 48-72 hours prior to discharge so that a prior authorization can be completed.      Discharge date: unknown pending acute issues  Will assess if patient needs an appointment with Outpatient Supportive Oncology as appropriate.    Signature and billing:  Thank you for allowing us to participate in the care of this patient. Recommendations will be communicated back to the consulting service by way of shared electronic medical record or face-to-face.    Medical complexity was high level due to due to complexity of problems, extensive data review, and high risk of management/treatment.    I spent 50 minutes in the care of this patient which included chart review, interviewing patient/family, discussion with primary team, coordination of care, and documentation.    Data:   Diagnostic tests and information reviewed for today's visit:  Conversation  with primary team       Some elements copied from supportive oncology note on 12/06/2023, the elements have been updated and all reflect current decision making from today, 12/12/23       Plan of Care discussed with: Provider, RN, Patient, Provider, and Pharmacist    Thank you for asking Supportive and Palliative Oncology to assist with care of this patient.  We will continue to follow  Please contact us for additional questions or concerns.      SIGNATURE: ANKUSH Saldivar   PAGER/CONTACT:  Contact information:  Supportive and Palliative Oncology  Monday-Friday 8 AM-5 PM  Epic Secure chat or pager [phone].  After hours and weekends:  pager [phone]

## 2023-12-12 NOTE — PROGRESS NOTES
Physical Therapy                 Therapy Communication Note    Patient Name: Mike Olson  MRN: [id number]  Today's Date: 12/12/2023     Discipline: Physical Therapy    Missed Visit Reason: Missed Visit Reason:  (Attempted follow up however family member in room states that Pt. is in a lot of pain and they just rung for the RN- Spoke with RN who stated that pt. pulled out his PEG and to hold therapy at this time.)    Missed Time: Attempt    Comment:

## 2023-12-12 NOTE — SIGNIFICANT EVENT
Team was paged at 2:30 am about patient being more altered and tachycardic to the 120s. Patient was reportedly alert and oriented x 2 during the day and now alert and oriented x 0. A rapid response was called. On evaluation, patient was somewhat in distress but denied any pain. He was afebrile, hypertensive to 151/66 (130-160/60-70s over the past day), tachycardic to 121 (has been 100-110 earlier, and was 110 at 9 pm), saturating 94% on 2L (per baseline). On exam, he was able to state his name after some prompting but was slow to answer questions.  No crackles were heard on anterior auscultation, JVD was not distended and no pitting edema in legs bilaterally. I/Os have been fluctuating and were net positive earlier in admission and net negative 900cc today - on exam, appeared euvolemic. On laying him completely flat, his HR remained largely unchanged and was 119-121 and he did not desaturate; he was largely uncomfortable due to shoulder discomfort on laying flat.      Night team was paged earlier in the evening ~11 pm regarding concern for obstruction of PEG tube as with bolus TF, there was green biliary drainage from the PEG tube and residual TF pooling as well. TF were held at this time and KUB was ordered which did not show any bowel obstruction or concern for pneumoperitoneum.     We obtained an EKG which showed sinus tachycardia. VBG and CBC were also drawn STAT and showed a normal lactate at 1.3 and normal pH at 7.42 and CO2 at 41 . CBC with WBC at 20.1 and Hb drop to 6.9 from 7.2. Low concern for active bleeding but 1 unit of PRBC was ordered. CXR was obtained STAT to rule out worsening consolidation/edema and was largely unchanged from 12/8. A CT AP w/ contrast was also ordered to rule out bowel obstruction given worsening tachycardia and concern with TF and biliary fluid leak earlier. Due to shoulder discomfort on lying flat for CTAP he was given dilaudid 0.2 mg IV.     We suspected hypovolemia to be the  cause of his sinus tachycardia given him net negative and no signs of fluid overload and that this could be contributing to his altered mentation (since TF were also held earlier in the evening). PE less likely as he is not hypoxic and is saturating well on 2L. He denied pain anywhere so less likely to be triggered from that. He was given 500cc LR bolus and HR was unchanged at 122.      Team was paged again at ~4 am and a rapid was called for concern for sepsis with fever to 38.8C. /72, HR unchanged at 122, 94% on 2L NC. Sepsis protocol was initiated and 2 sets of Blood cultures were drawn STAT, lactate was normal at 1.3 from 2 hours ago, UA and urine culture was drawn STAT and indwelling worthy was changed. His antibiotics were broadened from zosyn to vancomycin and meropenem to cover for skin and soft tissue sources (R axillary abscess with pus - wound cultures were drawn from this site and wound consult placed and concern for infection at PEG tube site as thickened skin crusting was removed earlier in the day and it is slightly erythamatous) and meropenem for gram negative coverage for suspicion of intrabdominal infection and UTI.  He got a total of 1.5L and another 1L to be given for a total of 2.5L fluid resuscitation for goal of 30cc/kg for sepsis.     Repeat reperfusion exam at 5:15pm after 1.5L fluid resuscitation - vitals 140/67, 122, 94% on 2LNC. On exam, alert and oriented x3 (able to state his full name, that he is at , and year is 2023) and was more conversant than earlier in the night. Normal heart sounds and lungs with good air entry bilaterally and no crackles heard. Hypoactive BS but abdomen otherwise non-tender to palpation. Good capillary refill and extremities were all warm to touch. No pitting edema in legs bilaterally.      Need to follow up on blood cultures, urine analysis + culture, CTAP result, wound cultures in the AM.

## 2023-12-12 NOTE — SIGNIFICANT EVENT
Rapid Response RN Note    RR RN Deyvi followed up on pt and fever noted. Septic workup performed including Cx x2 & labs sent, 1.5L LR given with additional 1L to be completed by bedside RN. RR RN assumed care. Vanco given, xander ordered and to be given, worthy removed and replaced, UA-cx to be sent, R armpit wound swabbed, pt transported to CT abd/pelvis. No additional needs at current time.     Staff to page rapid response for any concerns or acute change in condition/VS. Dale Black RN.

## 2023-12-13 ENCOUNTER — APPOINTMENT (OUTPATIENT)
Dept: CARDIOLOGY | Facility: HOSPITAL | Age: 68
End: 2023-12-13
Payer: MEDICARE

## 2023-12-13 ENCOUNTER — APPOINTMENT (OUTPATIENT)
Dept: RADIOLOGY | Facility: HOSPITAL | Age: 68
DRG: 356 | End: 2023-12-13
Payer: MEDICARE

## 2023-12-13 LAB
ALBUMIN SERPL BCP-MCNC: 2.5 G/DL (ref 3.4–5)
ALBUMIN SERPL BCP-MCNC: 2.5 G/DL (ref 3.4–5)
ALP SERPL-CCNC: 498 U/L (ref 33–136)
ALT SERPL W P-5'-P-CCNC: 66 U/L (ref 10–52)
ANION GAP BLDV CALCULATED.4IONS-SCNC: 11 MMOL/L (ref 10–25)
ANION GAP SERPL CALC-SCNC: 17 MMOL/L (ref 10–20)
ANION GAP SERPL CALC-SCNC: 17 MMOL/L (ref 10–20)
APPEARANCE UR: ABNORMAL
AST SERPL W P-5'-P-CCNC: 32 U/L (ref 9–39)
BACTERIA UR CULT: NO GROWTH
BASE EXCESS BLDV CALC-SCNC: 3.1 MMOL/L (ref -2–3)
BASOPHILS # BLD AUTO: 0.08 X10*3/UL (ref 0–0.1)
BASOPHILS # BLD MANUAL: 0 X10*3/UL (ref 0–0.1)
BASOPHILS NFR BLD AUTO: 0.4 %
BASOPHILS NFR BLD MANUAL: 0 %
BILIRUB SERPL-MCNC: 0.9 MG/DL (ref 0–1.2)
BILIRUB UR STRIP.AUTO-MCNC: NEGATIVE MG/DL
BODY TEMPERATURE: 37 DEGREES CELSIUS
BUN SERPL-MCNC: 34 MG/DL (ref 6–23)
BUN SERPL-MCNC: 34 MG/DL (ref 6–23)
CA-I BLDV-SCNC: 1.45 MMOL/L (ref 1.1–1.33)
CALCIUM SERPL-MCNC: 10.1 MG/DL (ref 8.6–10.6)
CALCIUM SERPL-MCNC: 10.2 MG/DL (ref 8.6–10.6)
CHLORIDE BLDV-SCNC: 107 MMOL/L (ref 98–107)
CHLORIDE SERPL-SCNC: 103 MMOL/L (ref 98–107)
CHLORIDE SERPL-SCNC: 104 MMOL/L (ref 98–107)
CO2 SERPL-SCNC: 26 MMOL/L (ref 21–32)
CO2 SERPL-SCNC: 27 MMOL/L (ref 21–32)
COLOR UR: YELLOW
CREAT SERPL-MCNC: 0.66 MG/DL (ref 0.5–1.3)
CREAT SERPL-MCNC: 0.68 MG/DL (ref 0.5–1.3)
EOSINOPHIL # BLD AUTO: 0.09 X10*3/UL (ref 0–0.7)
EOSINOPHIL # BLD MANUAL: 0 X10*3/UL (ref 0–0.7)
EOSINOPHIL NFR BLD AUTO: 0.4 %
EOSINOPHIL NFR BLD MANUAL: 0 %
ERYTHROCYTE [DISTWIDTH] IN BLOOD BY AUTOMATED COUNT: 14 % (ref 11.5–14.5)
ERYTHROCYTE [DISTWIDTH] IN BLOOD BY AUTOMATED COUNT: 14.1 % (ref 11.5–14.5)
GFR SERPL CREATININE-BSD FRML MDRD: >90 ML/MIN/1.73M*2
GFR SERPL CREATININE-BSD FRML MDRD: >90 ML/MIN/1.73M*2
GLUCOSE BLD MANUAL STRIP-MCNC: 109 MG/DL (ref 74–99)
GLUCOSE BLD MANUAL STRIP-MCNC: 113 MG/DL (ref 74–99)
GLUCOSE BLD MANUAL STRIP-MCNC: 114 MG/DL (ref 74–99)
GLUCOSE BLD MANUAL STRIP-MCNC: 125 MG/DL (ref 74–99)
GLUCOSE BLDV-MCNC: 104 MG/DL (ref 74–99)
GLUCOSE SERPL-MCNC: 102 MG/DL (ref 74–99)
GLUCOSE SERPL-MCNC: 88 MG/DL (ref 74–99)
GLUCOSE UR STRIP.AUTO-MCNC: NEGATIVE MG/DL
HCO3 BLDV-SCNC: 26.9 MMOL/L (ref 22–26)
HCT VFR BLD AUTO: 24.4 % (ref 41–52)
HCT VFR BLD AUTO: 25.1 % (ref 41–52)
HCT VFR BLD EST: 27 % (ref 41–52)
HGB BLD-MCNC: 7.7 G/DL (ref 13.5–17.5)
HGB BLD-MCNC: 8 G/DL (ref 13.5–17.5)
HGB BLDV-MCNC: 9 G/DL (ref 13.5–17.5)
HOLD SPECIMEN: NORMAL
IMM GRANULOCYTES # BLD AUTO: 2.17 X10*3/UL (ref 0–0.7)
IMM GRANULOCYTES # BLD AUTO: 2.19 X10*3/UL (ref 0–0.7)
IMM GRANULOCYTES NFR BLD AUTO: 10.1 % (ref 0–0.9)
IMM GRANULOCYTES NFR BLD AUTO: 10.2 % (ref 0–0.9)
INHALED O2 CONCENTRATION: 28 %
KETONES UR STRIP.AUTO-MCNC: NEGATIVE MG/DL
LACTATE BLDV-SCNC: 1.3 MMOL/L (ref 0.4–2)
LACTATE SERPL-SCNC: 1.1 MMOL/L (ref 0.4–2)
LEUKOCYTE ESTERASE UR QL STRIP.AUTO: NEGATIVE
LYMPHOCYTES # BLD AUTO: 0.94 X10*3/UL (ref 1.2–4.8)
LYMPHOCYTES # BLD MANUAL: 0.37 X10*3/UL (ref 1.2–4.8)
LYMPHOCYTES NFR BLD AUTO: 4.4 %
LYMPHOCYTES NFR BLD MANUAL: 1.7 %
MAGNESIUM SERPL-MCNC: 2.16 MG/DL (ref 1.6–2.4)
MCH RBC QN AUTO: 27.9 PG (ref 26–34)
MCH RBC QN AUTO: 28 PG (ref 26–34)
MCHC RBC AUTO-ENTMCNC: 31.6 G/DL (ref 32–36)
MCHC RBC AUTO-ENTMCNC: 31.9 G/DL (ref 32–36)
MCV RBC AUTO: 88 FL (ref 80–100)
MCV RBC AUTO: 89 FL (ref 80–100)
METAMYELOCYTES # BLD MANUAL: 0.19 X10*3/UL
METAMYELOCYTES NFR BLD MANUAL: 0.9 %
MONOCYTES # BLD AUTO: 1.63 X10*3/UL (ref 0.1–1)
MONOCYTES # BLD MANUAL: 0.56 X10*3/UL (ref 0.1–1)
MONOCYTES NFR BLD AUTO: 7.6 %
MONOCYTES NFR BLD MANUAL: 2.6 %
MUCOUS THREADS #/AREA URNS AUTO: ABNORMAL /LPF
MYELOCYTES # BLD MANUAL: 0.56 X10*3/UL
MYELOCYTES NFR BLD MANUAL: 2.6 %
NEUTROPHILS # BLD AUTO: 16.55 X10*3/UL (ref 1.2–7.7)
NEUTROPHILS NFR BLD AUTO: 77 %
NEUTS SEG # BLD MANUAL: 19.74 X10*3/UL (ref 1.2–7)
NEUTS SEG NFR BLD MANUAL: 91.4 %
NITRITE UR QL STRIP.AUTO: NEGATIVE
NRBC BLD MANUAL-RTO: 0.9 % (ref 0–0)
NRBC BLD-RTO: 0.4 /100 WBCS (ref 0–0)
NRBC BLD-RTO: 0.4 /100 WBCS (ref 0–0)
OXYHGB MFR BLDV: 89.8 % (ref 45–75)
PCO2 BLDV: 37 MM HG (ref 41–51)
PH BLDV: 7.47 PH (ref 7.33–7.43)
PH UR STRIP.AUTO: 6 [PH]
PHOSPHATE SERPL-MCNC: 3.2 MG/DL (ref 2.5–4.9)
PLATELET # BLD AUTO: 358 X10*3/UL (ref 150–450)
PLATELET # BLD AUTO: 359 X10*3/UL (ref 150–450)
PO2 BLDV: 64 MM HG (ref 35–45)
POTASSIUM BLDV-SCNC: 3.8 MMOL/L (ref 3.5–5.3)
POTASSIUM SERPL-SCNC: 4 MMOL/L (ref 3.5–5.3)
POTASSIUM SERPL-SCNC: 4.1 MMOL/L (ref 3.5–5.3)
PROT SERPL-MCNC: 6.6 G/DL (ref 6.4–8.2)
PROT UR STRIP.AUTO-MCNC: ABNORMAL MG/DL
RBC # BLD AUTO: 2.75 X10*6/UL (ref 4.5–5.9)
RBC # BLD AUTO: 2.87 X10*6/UL (ref 4.5–5.9)
RBC # UR STRIP.AUTO: ABNORMAL /UL
RBC #/AREA URNS AUTO: >20 /HPF
RBC MORPH BLD: ABNORMAL
RBC MORPH BLD: NORMAL
SAO2 % BLDV: 94 % (ref 45–75)
SODIUM BLDV-SCNC: 141 MMOL/L (ref 136–145)
SODIUM SERPL-SCNC: 142 MMOL/L (ref 136–145)
SODIUM SERPL-SCNC: 144 MMOL/L (ref 136–145)
SP GR UR STRIP.AUTO: 1.02
TOTAL CELLS COUNTED BLD: 117
UROBILINOGEN UR STRIP.AUTO-MCNC: <2 MG/DL
VANCOMYCIN TROUGH SERPL-MCNC: 12.8 UG/ML (ref 5–20)
VARIANT LYMPHS # BLD MANUAL: 0.17 X10*3/UL (ref 0–0.5)
VARIANT LYMPHS NFR BLD: 0.8 %
WBC # BLD AUTO: 21.5 X10*3/UL (ref 4.4–11.3)
WBC # BLD AUTO: 21.6 X10*3/UL (ref 4.4–11.3)
WBC #/AREA URNS AUTO: ABNORMAL /HPF

## 2023-12-13 PROCEDURE — 74018 RADEX ABDOMEN 1 VIEW: CPT

## 2023-12-13 PROCEDURE — 1170000001 HC PRIVATE ONCOLOGY ROOM DAILY

## 2023-12-13 PROCEDURE — 74018 RADEX ABDOMEN 1 VIEW: CPT | Performed by: RADIOLOGY

## 2023-12-13 PROCEDURE — 82947 ASSAY GLUCOSE BLOOD QUANT: CPT

## 2023-12-13 PROCEDURE — 84132 ASSAY OF SERUM POTASSIUM: CPT

## 2023-12-13 PROCEDURE — 94760 N-INVAS EAR/PLS OXIMETRY 1: CPT

## 2023-12-13 PROCEDURE — 2500000004 HC RX 250 GENERAL PHARMACY W/ HCPCS (ALT 636 FOR OP/ED): Mod: JZ | Performed by: STUDENT IN AN ORGANIZED HEALTH CARE EDUCATION/TRAINING PROGRAM

## 2023-12-13 PROCEDURE — 83735 ASSAY OF MAGNESIUM: CPT

## 2023-12-13 PROCEDURE — 2500000004 HC RX 250 GENERAL PHARMACY W/ HCPCS (ALT 636 FOR OP/ED)

## 2023-12-13 PROCEDURE — 83605 ASSAY OF LACTIC ACID: CPT

## 2023-12-13 PROCEDURE — 80202 ASSAY OF VANCOMYCIN: CPT

## 2023-12-13 PROCEDURE — 94640 AIRWAY INHALATION TREATMENT: CPT

## 2023-12-13 PROCEDURE — 80053 COMPREHEN METABOLIC PANEL: CPT

## 2023-12-13 PROCEDURE — 85025 COMPLETE CBC W/AUTO DIFF WBC: CPT

## 2023-12-13 PROCEDURE — 76881 US COMPL JOINT R-T W/IMG: CPT

## 2023-12-13 PROCEDURE — 76882 US LMTD JT/FCL EVL NVASC XTR: CPT | Performed by: RADIOLOGY

## 2023-12-13 PROCEDURE — 2550000001 HC RX 255 CONTRASTS

## 2023-12-13 PROCEDURE — 71045 X-RAY EXAM CHEST 1 VIEW: CPT | Mod: FY

## 2023-12-13 PROCEDURE — 71045 X-RAY EXAM CHEST 1 VIEW: CPT | Performed by: RADIOLOGY

## 2023-12-13 PROCEDURE — 87040 BLOOD CULTURE FOR BACTERIA: CPT

## 2023-12-13 PROCEDURE — 93005 ELECTROCARDIOGRAM TRACING: CPT

## 2023-12-13 PROCEDURE — 85007 BL SMEAR W/DIFF WBC COUNT: CPT

## 2023-12-13 PROCEDURE — 99233 SBSQ HOSP IP/OBS HIGH 50: CPT | Performed by: NURSE PRACTITIONER

## 2023-12-13 PROCEDURE — 96372 THER/PROPH/DIAG INJ SC/IM: CPT

## 2023-12-13 PROCEDURE — 94668 MNPJ CHEST WALL SBSQ: CPT

## 2023-12-13 PROCEDURE — 81003 URINALYSIS AUTO W/O SCOPE: CPT

## 2023-12-13 PROCEDURE — 9420000001 HC RT PATIENT EDUCATION 5 MIN

## 2023-12-13 PROCEDURE — 2500000002 HC RX 250 W HCPCS SELF ADMINISTERED DRUGS (ALT 637 FOR MEDICARE OP, ALT 636 FOR OP/ED)

## 2023-12-13 PROCEDURE — 85027 COMPLETE CBC AUTOMATED: CPT

## 2023-12-13 PROCEDURE — 99233 SBSQ HOSP IP/OBS HIGH 50: CPT | Performed by: INTERNAL MEDICINE

## 2023-12-13 PROCEDURE — 36415 COLL VENOUS BLD VENIPUNCTURE: CPT

## 2023-12-13 PROCEDURE — 31720 CLEARANCE OF AIRWAYS: CPT

## 2023-12-13 PROCEDURE — 87086 URINE CULTURE/COLONY COUNT: CPT

## 2023-12-13 RX ORDER — INSULIN LISPRO 100 [IU]/ML
10 INJECTION, SOLUTION INTRAVENOUS; SUBCUTANEOUS ONCE
Status: DISCONTINUED | OUTPATIENT
Start: 2023-12-13 | End: 2023-12-13

## 2023-12-13 RX ORDER — HALOPERIDOL 5 MG/ML
1 INJECTION INTRAMUSCULAR EVERY 6 HOURS PRN
Status: DISCONTINUED | OUTPATIENT
Start: 2023-12-13 | End: 2023-12-14

## 2023-12-13 RX ORDER — HALOPERIDOL 5 MG/ML
2 INJECTION INTRAMUSCULAR ONCE
Status: COMPLETED | OUTPATIENT
Start: 2023-12-13 | End: 2023-12-13

## 2023-12-13 RX ADMIN — HYDROMORPHONE HYDROCHLORIDE 0.4 MG: 1 INJECTION, SOLUTION INTRAMUSCULAR; INTRAVENOUS; SUBCUTANEOUS at 23:32

## 2023-12-13 RX ADMIN — HALOPERIDOL LACTATE 2 MG: 5 INJECTION, SOLUTION INTRAMUSCULAR at 12:37

## 2023-12-13 RX ADMIN — IPRATROPIUM BROMIDE AND ALBUTEROL SULFATE 3 ML: .5; 3 SOLUTION RESPIRATORY (INHALATION) at 21:35

## 2023-12-13 RX ADMIN — Medication 1 G: at 09:30

## 2023-12-13 RX ADMIN — VANCOMYCIN HYDROCHLORIDE 1000 MG: 1 INJECTION, SOLUTION INTRAVENOUS at 04:41

## 2023-12-13 RX ADMIN — OLANZAPINE 5 MG: 5 TABLET, ORALLY DISINTEGRATING ORAL at 21:48

## 2023-12-13 RX ADMIN — GLYCOPYRROLATE 0.2 MG: 0.2 INJECTION INTRAMUSCULAR; INTRAVENOUS at 23:05

## 2023-12-13 RX ADMIN — GLYCOPYRROLATE 0.2 MG: 0.2 INJECTION INTRAMUSCULAR; INTRAVENOUS at 00:59

## 2023-12-13 RX ADMIN — ACETYLCYSTEINE 6 ML: 100 SOLUTION ORAL; RESPIRATORY (INHALATION) at 08:29

## 2023-12-13 RX ADMIN — Medication 1 G: at 00:59

## 2023-12-13 RX ADMIN — ACETYLCYSTEINE 6 ML: 100 SOLUTION ORAL; RESPIRATORY (INHALATION) at 21:47

## 2023-12-13 RX ADMIN — HYDROMORPHONE HYDROCHLORIDE 0.4 MG: 1 INJECTION, SOLUTION INTRAMUSCULAR; INTRAVENOUS; SUBCUTANEOUS at 16:30

## 2023-12-13 RX ADMIN — IPRATROPIUM BROMIDE AND ALBUTEROL SULFATE 3 ML: .5; 3 SOLUTION RESPIRATORY (INHALATION) at 12:37

## 2023-12-13 RX ADMIN — GLYCOPYRROLATE 0.2 MG: 0.2 INJECTION INTRAMUSCULAR; INTRAVENOUS at 06:24

## 2023-12-13 RX ADMIN — IPRATROPIUM BROMIDE AND ALBUTEROL SULFATE 3 ML: .5; 3 SOLUTION RESPIRATORY (INHALATION) at 08:30

## 2023-12-13 RX ADMIN — Medication 1 G: at 17:30

## 2023-12-13 RX ADMIN — GLYCOPYRROLATE 0.2 MG: 0.2 INJECTION INTRAMUSCULAR; INTRAVENOUS at 17:38

## 2023-12-13 RX ADMIN — VANCOMYCIN HYDROCHLORIDE 1000 MG: 1 INJECTION, SOLUTION INTRAVENOUS at 17:00

## 2023-12-13 RX ADMIN — MICAFUNGIN SODIUM 150 MG: 100 INJECTION, POWDER, LYOPHILIZED, FOR SOLUTION INTRAVENOUS at 16:27

## 2023-12-13 RX ADMIN — HYDROMORPHONE HYDROCHLORIDE 0.2 MG: 1 INJECTION, SOLUTION INTRAMUSCULAR; INTRAVENOUS; SUBCUTANEOUS at 21:35

## 2023-12-13 RX ADMIN — ENOXAPARIN SODIUM 40 MG: 100 INJECTION SUBCUTANEOUS at 08:16

## 2023-12-13 RX ADMIN — HYDROMORPHONE HYDROCHLORIDE 0.4 MG: 1 INJECTION, SOLUTION INTRAMUSCULAR; INTRAVENOUS; SUBCUTANEOUS at 19:21

## 2023-12-13 RX ADMIN — GLYCOPYRROLATE 0.2 MG: 0.2 INJECTION INTRAMUSCULAR; INTRAVENOUS at 12:37

## 2023-12-13 RX ADMIN — ACETYLCYSTEINE 6 ML: 100 SOLUTION ORAL; RESPIRATORY (INHALATION) at 14:50

## 2023-12-13 RX ADMIN — HYDROMORPHONE HYDROCHLORIDE 0.4 MG: 1 INJECTION, SOLUTION INTRAMUSCULAR; INTRAVENOUS; SUBCUTANEOUS at 13:36

## 2023-12-13 RX ADMIN — SODIUM CHLORIDE 1000 ML: 0.9 INJECTION, SOLUTION INTRAVENOUS at 14:00

## 2023-12-13 ASSESSMENT — COGNITIVE AND FUNCTIONAL STATUS - GENERAL
MOVING FROM LYING ON BACK TO SITTING ON SIDE OF FLAT BED WITH BEDRAILS: A LOT
PERSONAL GROOMING: A LOT
CLIMB 3 TO 5 STEPS WITH RAILING: TOTAL
WALKING IN HOSPITAL ROOM: TOTAL
HELP NEEDED FOR BATHING: A LOT
EATING MEALS: TOTAL
TURNING FROM BACK TO SIDE WHILE IN FLAT BAD: A LOT
DAILY ACTIVITIY SCORE: 11
DRESSING REGULAR LOWER BODY CLOTHING: A LOT
TOILETING: A LOT
MOVING TO AND FROM BED TO CHAIR: A LOT
MOBILITY SCORE: 10
DRESSING REGULAR UPPER BODY CLOTHING: A LOT
STANDING UP FROM CHAIR USING ARMS: A LOT

## 2023-12-13 ASSESSMENT — PAIN SCALES - GENERAL
PAINLEVEL_OUTOF10: 0 - NO PAIN
PAINLEVEL_OUTOF10: 9
PAINLEVEL_OUTOF10: 7

## 2023-12-13 NOTE — PROGRESS NOTES
Mike Olson is a 68 y.o. male on day 21 of admission presenting with Difficulty swallowing.    Spoke with patient's wife about skilled nursing facility choices as PT recommends SNF at this time.  She is not sure if patient will want to go to a facility. She will speak with patient and his daughters. Will follow up with April tomorrow.   Josselyn Gipson RN TCC    12/13/23 @ 1300  Spoke with April, his two daughters, and son in law at bedside. Dr. Whaley and Dr. Coronado at bedside as well speaking with family. At this time, we will wait to make any SNF choices. There was discussion about hospice and family agreed that the goal is to make him comfortable. The family will take the next few days to talk about what they and the patient want and feel is best.  My number was given to the family if they need any assistance or have any questions.  Josselyn Gipson RN TCC

## 2023-12-13 NOTE — PROGRESS NOTES
Vancomycin Dosing by Pharmacy- INITIAL    Mike Olson is a 68 y.o. year old male who Pharmacy has been consulted for vancomycin dosing for other abdominal infection . Based on the patient's indication and renal status this patient will be dosed based on a goal AUC of 400-600.     Renal function is currently stable.    Visit Vitals  /77 (Patient Position: Lying)   Pulse (!) 119   Temp 36.8 °C (98.2 °F)   Resp 26        Lab Results   Component Value Date    CREATININE 0.68 12/13/2023    CREATININE 0.94 12/12/2023    CREATININE 1.00 12/11/2023    CREATININE 1.36 (H) 12/10/2023        Lab Results   Component Value Date    PATIENTTEMP 37.0 12/12/2023    PATIENTTEMP 37.0 12/10/2023    PATIENTTEMP 37.0 12/07/2023          Assessment/Plan     Will continue vancomycin maintenance,  1000 mg every 12 hours.    This dosing regimen is predicted by [employer] to result in the following pharmacokinetic parameters:  Loading dose: N/A  Regimen: 1000 mg IV every 12 hours.  Start time: 16:41 on 12/13/2023  Exposure target: AUC24 (range)400-600 mg/L.hr   AUC24,ss: 408 mg/L.hr  Probability of AUC24 > 400: 55 %  Ctrough,ss: 11.9 mg/L  Probability of Ctrough,ss > 20: 2 %  Probability of nephrotoxicity (Lodise LAILA 2009): 7 %    Follow-up level will be ordered on 12/15 at AM labs unless clinically indicated sooner.  Will continue to monitor renal function daily while on vancomycin and order serum creatinine at least every 48 hours if not already ordered.  Follow for continued vancomycin needs, clinical response, and signs/symptoms of toxicity.       Sade Quinones, FabrizioD

## 2023-12-13 NOTE — CARE PLAN
The patient's goals for the shift include      The clinical goals for the shift include pt will remain safe and free from injury 12/13 @1900      Problem: Fall/Injury  Goal: Not fall by end of shift  Outcome: Progressing  Goal: Be free from injury by end of the shift  Outcome: Progressing  Goal: Verbalize understanding of personal risk factors for fall in the hospital  Outcome: Progressing  Goal: Verbalize understanding of risk factor reduction measures to prevent injury from fall in the home  Outcome: Progressing  Goal: Use assistive devices by end of the shift  Outcome: Progressing  Goal: Pace activities to prevent fatigue by end of the shift  Outcome: Progressing     Problem: Skin  Goal: Decreased wound size/increased tissue granulation at next dressing change  Outcome: Progressing  Flowsheets (Taken 12/13/2023 1015)  Decreased wound size/increased tissue granulation at next dressing change: Protective dressings over bony prominences  Goal: Participates in plan/prevention/treatment measures  Outcome: Progressing  Flowsheets (Taken 12/13/2023 1015)  Participates in plan/prevention/treatment measures: Elevate heels  Goal: Prevent/manage excess moisture  Outcome: Progressing  Flowsheets (Taken 12/13/2023 1015)  Prevent/manage excess moisture: Moisturize dry skin  Goal: Prevent/minimize sheer/friction injuries  Outcome: Progressing  Flowsheets (Taken 12/13/2023 1015)  Prevent/minimize sheer/friction injuries: Turn/reposition every 2 hours/use positioning/transfer devices  Goal: Promote/optimize nutrition  Outcome: Progressing  Flowsheets (Taken 12/13/2023 1015)  Promote/optimize nutrition: Consume > 50% meals/supplements  Goal: Promote skin healing  Outcome: Progressing  Flowsheets (Taken 12/13/2023 1015)  Promote skin healing: Protective dressings over bony prominences     Problem: Pain  Goal: My pain/discomfort is manageable  Outcome: Progressing     Problem: Safety  Goal: Patient will be injury free during  hospitalization  Outcome: Progressing  Goal: I will remain free of falls  Outcome: Progressing     Problem: Daily Care  Goal: Daily care needs are met  Outcome: Progressing     Problem: Psychosocial Needs  Goal: Demonstrates ability to cope with hospitalization/illness  Outcome: Progressing  Goal: Collaborate with me, my family, and caregiver to identify my specific goals  Outcome: Progressing     Problem: Discharge Barriers  Goal: My discharge needs are met  Outcome: Progressing

## 2023-12-13 NOTE — PROGRESS NOTES
"Nutrition Follow Up Assessment:   Nutrition Assessment    The patient is a 68 y.o. male who is hospital day #21.  Pt completed radiation of the right axilla. 12/8, patient exhibiting more signs of delirium and somnolence, possibly related to initiation of methadone on 12/5. 12/12 patient showing signs of infection, currently being treated. Pt pulled out PEG on 12/12 but replaced by surg onc team - currently waiting studies to initiate feedings again. 12/13 there was discussion about hospice - family to discuss next steps.     Nutrition History:  Energy Intake: Good > 75 % (Via TF)  Food and Nutrient History: Before PEG was pulled out pt continued to be on a bolus TF regimen. Tolerating adequately. No N/V/D noted in chart.         Anthropometrics:  Start of admission anthropometrics:  Height: 170.2 cm (5' 7\")  Weight: 82.6 kg (182 lb)  BMI (Calculated): 28.5    IBW/kg (Dietitian Calculated): 66 kg  Percent of IBW: 116 %           Weight Change %:   Weight History / % Weight Change: No new wt since start of admission. Unable to determine if pt has had significant wt changes. Pt with significant wt loss PTA.    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     Objective Data:    Last BM Date: 12/10/23    Nutrition Significant Labs:    Results from last 7 days   Lab Units 12/13/23  1431 12/13/23  0444 12/12/23  0258   HEMOGLOBIN g/dL 7.7* 8.0* 6.9*   MCV fL 89 88 90   GLUCOSE mg/dL  --  102* 103*   POTASSIUM mmol/L  --  4.1 4.5   SODIUM mmol/L  --  142 137   PHOSPHORUS mg/dL  --  3.2 3.1   MAGNESIUM mg/dL  --  2.16 2.12   CREATININE mg/dL  --  0.68 0.94   BUN mg/dL  --  34* 41*       Nutrition Specific Medications:  atorvastatin, 80 mg, g-tube, Daily  esomeprazole, 20 mg, nasogastric tube, Daily before breakfast  gabapentin, 300 mg, g-tube, BID  meropenem, 1 g, intravenous, q8h  [START ON 12/14/2023] micafungin, 100 mg, intravenous, " q24h  micafungin, 150 mg, intravenous, Once  OLANZapine zydis, 5 mg, oral, Nightly  polyethylene glycol, 17 g, g-tube, BID  senna, 10 mL, g-tube, Nightly  vancomycin, 1,000 mg, intravenous, q12h    I/O:   I/O last 2 completed shifts:  In: 452.5 (5.9 mL/kg) [Blood:352.5; IV Piggyback:100]  Out: 1825 (23.8 mL/kg) [Urine:1825 (1 mL/kg/hr)]  Weight: 76.5 kg     Dietary Orders (From admission, onward)       Start     Ordered    12/12/23 1707  NPO Diet; Effective now  Diet effective now         12/12/23 1706 11/26/23 0855  Oral nutritional supplements  Until discontinued        Comments: As tolerated, please hold if concerns for aspiration   Question Answer Comment   Deliver with All meals    Select supplement: Ensure Clear        11/26/23 0854                     Estimated Needs:   Total Energy Estimated Needs (kCal): 2050 kCal  Method for Estimating Needs: admit wt (76.5kg) * 27 kcal/kg    Total Protein Estimated Needs (g): 100 g  Method for Estimating Needs: admit wt (76.5kg) * 1.3 g/kg    Total Fluid Estimated Needs (mL): 2050 mL  Method for Estimating Needs: 1 ml/kcal or per team          Nutrition Diagnosis   Malnutrition Diagnosis  Patient has Malnutrition Diagnosis: Yes  Diagnosis Status: Ongoing  Malnutrition Diagnosis: Severe malnutrition related to acute disease or injury  As Evidenced by: >5% wt loss in 1 month; reported >7.5% wt loss in 3 months; PO intake likely meeting <75% of nutr needs for >7 days  Additional Assessment Information: Nutrition status likely improving given pt now 2+ weeks on TF that meets 100% of nutr needs. Unsure if pt has had wt loss since TF started - need updated wt.            Nutrition Interventions/Recommendations     Obtain updated wt to determine if pt continues with wt loss  Can adjust TF regimen accordingly  OK to restart pt on previous TF regimen once able to use PEG/  Isosource 1.5 bolus @ 345 ml x4/d (~5.5 cartons /d)  FWF: 60 ml pre/post feeds w/ additional 260 ml  BID  This regimen provides: 1380 ml, 2070 kcal, 94 g protein, and 2054 ml free water         Nutrition Prescription:   Individualized Nutrition Prescription Provided for : 2050 kcal and 100g protein via enteral nutrition (Isosource bolus @345ml x4/d = 2070 kcal and 94g pro)        Nutrition Recommendation Details:    Interventions: Enteral intake  Enteral Intake: Other (Comment) (Continuation of TF regimen)  Goal: TF to decrease wt loss rate     Collaboration and Referral of Nutrition Care: Team meeting involving nutrition professional    Nutrition Education:    N/A     Nutrition Monitoring and Evaluation   Food/Nutrient Related History Monitoring  Monitoring and Evaluation Plan: Enteral and parenteral nutrition intake  Enteral and Parenteral Nutrition Intake: Enteral nutrition formula/solution, Enteral nutrition intake  Criteria: 100% of nutr needs    Body Composition/Growth/Weight History  Monitoring and Evaluation Plan: Weight  Weight: Weight change  Criteria: no wt change    Biochemical Data, Medical Tests and Procedures  Monitoring and Evaluation Plan: Electrolyte/renal panel  Electrolyte and Renal Panel: Magnesium, Phosphorus, Potassium, Sodium  Criteria: labs WNL            Time Spent/Follow-up Reminder:   Time Spent (min): 30 minutes  Last Date of Nutrition Visit: 12/13/23  Nutrition Follow-Up Needed?: Dietitian to reassess per policy

## 2023-12-13 NOTE — PROGRESS NOTES
SUPPORTIVE AND PALLIATIVE ONCOLOGY INPATIENT FOLLOW-UP      SERVICE DATE: 23     SUBJECTIVE:  Interval Events:     Patient was visibly agitated and restless. His daughter jenaro, his wife April and his son in law [jenaro's ], were at bedside. Patient also had a sitter present. Patient was visibly confused.     Discussed giving patient some haldol to assist in reducing his agitation. Discussed with bedside nurse who equally voiced her concerns of patients clinical presentation and was receptive to haldol addition.       Pain Assessment:  Location: Right shoulder  Duration: Constant  Characteristics:   Ratin   Descriptors: aching and sharp   Aggravating: movement    Relieving: Analgesics dilaudid 0.4mg iv q2hrs  and dilaudid 0.4mg iv q3hrs prn   Interference with Function: None    Opioid Use  Past 24 h prn opioid use:  2 doses of dilaudid 0.2mg = 8mg OME and 5 doses of Dilaudid 0.4mg = 40mg OME.   Total 24h OME use:  48mg OME    Symptom Assessment:       Information obtained from: chart review, interview of patient, interview of family, discussion with RN, and discussion with primary team  ______________________________________________________________________        OBJECTIVE:    Lab Results   Component Value Date    WBC 21.5 (H) 2023    HGB 8.0 (L) 2023    HCT 25.1 (L) 2023    MCV 88 2023     2023      Lab Results   Component Value Date    GLUCOSE 102 (H) 2023    CALCIUM 10.2 2023     2023    K 4.1 2023    CO2 26 2023     2023    BUN 34 (H) 2023    CREATININE 0.68 2023     Lab Results   Component Value Date    ALT 18 2023    AST 21 2023    ALKPHOS 87 2023    BILITOT 0.6 2023     Estimated Creatinine Clearance: 95.6 mL/min (by C-G formula based on SCr of 0.68 mg/dL).     Scheduled medications  acetaminophen, 650 mg, g-tube, q4h  acetylcysteine, 6 mL, nebulization, TID  amLODIPine, 10  mg, g-tube, Daily  aspirin, 81 mg, g-tube, Daily  atorvastatin, 80 mg, g-tube, Daily  [Held by provider] chlorthalidone, 25 mg, g-tube, Daily  enoxaparin, 40 mg, subcutaneous, q24h  esomeprazole, 20 mg, nasogastric tube, Daily before breakfast  fentaNYL PF, , ,   gabapentin, 300 mg, g-tube, BID  glycopyrrolate, 0.2 mg, intravenous, q6h  ipratropium-albuteroL, 3 mL, nebulization, TID  lactated Ringer's, 500 mL, intravenous, Once  lactated Ringer's, 500 mL, intravenous, Once  losartan, 100 mg, g-tube, Daily  meropenem, 1 g, intravenous, q8h  midazolam, , ,   OLANZapine zydis, 5 mg, oral, Nightly  polyethylene glycol, 17 g, g-tube, BID  senna, 10 mL, g-tube, Nightly  vancomycin, 1,000 mg, intravenous, q12h      Continuous medications     PRN medications  PRN medications: bisacodyl, dextrose 10 % in water (D10W), dextrose, fentaNYL PF, glucagon, guaiFENesin, HYDROmorphone, HYDROmorphone, melatonin, meperidine, meperidine PF, midazolam, sodium phosphates   }     PHYSICAL EXAMINATION:    Vital Signs:   Vital signs reviewed  Visit Vitals  /77 (Patient Position: Lying)   Pulse (!) 119   Temp 36.8 °C (98.2 °F)   Resp 26        Pain Score: 7       Physical Exam  Eyes:      Pupils: Pupils are equal, round, and reactive to light.   Cardiovascular:      Heart sounds: Normal heart sounds.   Pulmonary:      Breath sounds: Normal breath sounds.   Abdominal:      General: Bowel sounds are normal.      Palpations: Abdomen is soft.   Skin:     General: Skin is warm.   Neurological:      Mental Status: He is alert and oriented to person, place, and time.      Comments: Agitated and restless         ASSESSMENT/PLAN:  Mike Olson is a 68 y.o. 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 PMH significant for HTN, CVA (9/2023). Admitted 11/22/2023 for further evaluation and management of worsening dysphagia.Supportive and Palliative Oncology is consulted for pain management.  "     Pain:  cancer pain related to cancer  Pain is: cancer related pain  Type: somatic  Pain control: well-controlled  Home regimen:  Acetaminophen 650mg q 8hrs   Intolerances/previously tried: tramadol 50mg tablet was used in the past   Personalized pain goal: 0  Pain rated 4/10, described as \"sharp pain\".  Total OME usage for the past 24 hours: 48mg OME  Continue dilaudid 0.2mg IV q 2hrs PRN for 2hrs [used 2 doses = 8mg OME]  Continue Dilaudid 0.4mg IV q 3hrs PRN for pain [used 5 doses= 40mg OME   Continue acetaminophen 650mg q4hrs PRN   Continue Gabapentin 300mg BID [hold for sedation]     Constipation  At risk for constipation related to opioids, currently constipated  Usual bowel pattern: every day  Home regimen: none  LBM 12/9/23  Continue Miralax 17 grams BID  continue senna 5ml liquid q day     continue bisacodyl suppository 10mg daily/PRN   Encouraged him to use his suppository and PRN laxatives.   Continue water Enema daily PRN if no BM in 3 days, and if suppository doesn't work.       Agitation/ restlessness  Consider one time dose of Haldol 2mg iv now   Add haldol 1mg iv q6hrs PRN   Get EKG, check QTC    Sleeping Difficulty:  Impaired sleep related to pain  Home regimen:  none  Continue Olanzapine 5mg at night it will dissolved in mouth [it will help with nausea, agitation and sleep].   Gabapentin as above.  Patient reports that he cannot get comfortable, especially sleeping on right arm.   Reports good night sleep last night.      Decreased appetite:  Appetite loss related to chemotherapy, taste changes, and disease process  Nutrition not consulted   Home regimen:  none  Patient has a pegtube and would continue to do well.   Artificial nutrition initiated.      Goals of Care/ Minimum acceptable Quality of Life Measures   Patient's current clinical condition, including diagnosis, prognosis, and management plan, and goals of care were discussed.      Patients endorsed Goals: \"to get the hell out here\". " "  Jazzy: \"sitting around doing nothing with [his] wife and dog\"  Code status discussion/patients Code status:   DNAR/DNI     #Palliative Medicine encounter: Palliative care was introduced as a service for patients with serious illness to improve quality of life, including helping with pain and other bothersome symptoms, clarifying patient's values and priorities to help align the patient's care with their goals, and providing support to patients and families.   Declined  for spiritual Support   Declined Music therapy for coping    Declined Art Therapy for legacy building   Welcomed Pet Therapy for Emotional Support   Coordination of Care   Supportive Listening        Advance Directives/Advance Care Planning  Existence of Advance Directives:   Decision maker/ NOK: Surrogate decision maker is wife Kavitha Black @[phone]  Code Status: Full code     [place] Surrogate Decision Maker Hierarchy     1. Court-appointed Guardian  2. Healthcare Power of   3. Legal Spouse  4. Majority of Adult Children (consensus if possible)  5. Parents  6. Majority of Adult Siblings (consensus if possible)  7. Nearest Blood Relative     Supportive Interventions: Interventions: Music Therapy: offered declined, Art Therapy: offered declined     Disposition:  Please  start the process of having prior authorization with meds to beds deliver medications to patient prior to discharge via [facility] pharmacy. Prescriptions will need to be sent 48-72 hours prior to discharge so that a prior authorization can be completed.      Discharge date: unknown pending acute issues  Will assess if patient needs an appointment with Outpatient Supportive Oncology as appropriate.    Signature and billing:  Thank you for allowing us to participate in the care of this patient. Recommendations will be communicated back to the consulting service by way of shared electronic medical record or face-to-face.    Medical complexity was high level due to due to " complexity of problems, extensive data review, and high risk of management/treatment.    I spent 50 minutes in the care of this patient which included chart review, interviewing patient/family, discussion with primary team, coordination of care, and documentation.    Data:   Diagnostic tests and information reviewed for today's visit:  Conversation with primary team       Some elements copied from supportive oncology note on 12/06/2023, the elements have been updated and all reflect current decision making from today, 12/13/23       Plan of Care discussed with: Provider, RN, Patient, Provider, and Pharmacist    Thank you for asking Supportive and Palliative Oncology to assist with care of this patient.  We will continue to follow  Please contact us for additional questions or concerns.      SIGNATURE: JEFF Saldivar-CNP   PAGER/CONTACT:  Contact information:  Supportive and Palliative Oncology  Monday-Friday 8 AM-5 PM  Epic Secure chat or pager [phone].  After hours and weekends:  pager [phone]

## 2023-12-13 NOTE — PROGRESS NOTES
Mike Olson is a 68 y.o. male on day 21 of admission presenting with Difficulty swallowing.    Subjective   NAEON. Patient denies shortness of breath, cough, fever, headache, lightheadedness, n/v. Endorses pain is under control with current regimen.       Objective     Physical Exam  Constitutional:       Appearance: He is ill-appearing. He is not toxic-appearing.      Comments: Mildly uncomfortable   HENT:      Nose: No congestion or rhinorrhea.      Mouth/Throat:      Mouth: Mucous membranes are moist.   Eyes:      General: No scleral icterus.     Extraocular Movements: Extraocular movements intact.      Pupils: Pupils are equal, round, and reactive to light.   Cardiovascular:      Rate and Rhythm: Normal rate and regular rhythm.      Heart sounds: No murmur heard.     No friction rub. No gallop.   Pulmonary:      Effort: Increased work of breathing.     Breath sounds: No stridor. No wheezing, rhonchi or rales.      Comments: Diminished breath sounds on the right LL  Abdominal:      General: Bowel sounds are normal. There is no distension.      Palpations: Abdomen is soft. There is no mass.      Tenderness: There is no abdominal tenderness. There is no guarding or rebound.      Comments: PEG tube in place with some erythema and pus. Tender to touch.    Genitourinary:     Comments: Vazquez catheter in place draining, clear yellow urine  Musculoskeletal:         General: No swelling or tenderness. Normal range of motion.      Cervical back: Normal range of motion.      Right lower leg: No edema.      Left lower leg: No edema.   Skin:     General: Skin is warm.      Capillary Refill: Capillary refill takes less than 2 seconds.      Coloration: Skin is not jaundiced.      Findings: No bruising or rash.      Comments: Axillary mass erythematous, tender, with pus present.    Neurological:      General: No focal deficit present.      Mental Status: He is oriented to person, place, and time. Mental status is at baseline.      " Cranial Nerves: No cranial nerve deficit.     Last Recorded Vitals  Blood pressure 167/77, pulse (!) 119, temperature 36.8 °C (98.2 °F), resp. rate 26, height 1.69 m (5' 6.54\"), weight 76.5 kg (168 lb 11.2 oz), SpO2 96 %.  Intake/Output last 3 Shifts:  I/O last 3 completed shifts:  In: 3202.5 (41.9 mL/kg) [Blood:352.5; IV Piggyback:2850]  Out: 2125 (27.8 mL/kg) [Urine:2125 (0.8 mL/kg/hr)]  Weight: 76.5 kg     Relevant Results  Results for orders placed or performed during the hospital encounter of 11/22/23 (from the past 24 hour(s))   POCT GLUCOSE   Result Value Ref Range    POCT Glucose 106 (H) 74 - 99 mg/dL   Lactate   Result Value Ref Range    Lactate 1.0 0.4 - 2.0 mmol/L   POCT GLUCOSE   Result Value Ref Range    POCT Glucose 116 (H) 74 - 99 mg/dL   POCT GLUCOSE   Result Value Ref Range    POCT Glucose 109 (H) 74 - 99 mg/dL   Vancomycin, Trough   Result Value Ref Range    Vancomycin, Trough 12.8 5.0 - 20.0 ug/mL   CBC and Auto Differential   Result Value Ref Range    WBC 21.5 (H) 4.4 - 11.3 x10*3/uL    nRBC 0.4 (H) 0.0 - 0.0 /100 WBCs    RBC 2.87 (L) 4.50 - 5.90 x10*6/uL    Hemoglobin 8.0 (L) 13.5 - 17.5 g/dL    Hematocrit 25.1 (L) 41.0 - 52.0 %    MCV 88 80 - 100 fL    MCH 27.9 26.0 - 34.0 pg    MCHC 31.9 (L) 32.0 - 36.0 g/dL    RDW 14.0 11.5 - 14.5 %    Platelets 359 150 - 450 x10*3/uL    Neutrophils % 77.0 40.0 - 80.0 %    Immature Granulocytes %, Automated 10.2 (H) 0.0 - 0.9 %    Lymphocytes % 4.4 13.0 - 44.0 %    Monocytes % 7.6 2.0 - 10.0 %    Eosinophils % 0.4 0.0 - 6.0 %    Basophils % 0.4 0.0 - 2.0 %    Neutrophils Absolute 16.55 (H) 1.20 - 7.70 x10*3/uL    Immature Granulocytes Absolute, Automated 2.19 (H) 0.00 - 0.70 x10*3/uL    Lymphocytes Absolute 0.94 (L) 1.20 - 4.80 x10*3/uL    Monocytes Absolute 1.63 (H) 0.10 - 1.00 x10*3/uL    Eosinophils Absolute 0.09 0.00 - 0.70 x10*3/uL    Basophils Absolute 0.08 0.00 - 0.10 x10*3/uL   Renal function panel   Result Value Ref Range    Glucose 102 (H) 74 - 99 " mg/dL    Sodium 142 136 - 145 mmol/L    Potassium 4.1 3.5 - 5.3 mmol/L    Chloride 103 98 - 107 mmol/L    Bicarbonate 26 21 - 32 mmol/L    Anion Gap 17 10 - 20 mmol/L    Urea Nitrogen 34 (H) 6 - 23 mg/dL    Creatinine 0.68 0.50 - 1.30 mg/dL    eGFR >90 >60 mL/min/1.73m*2    Calcium 10.2 8.6 - 10.6 mg/dL    Phosphorus 3.2 2.5 - 4.9 mg/dL    Albumin 2.5 (L) 3.4 - 5.0 g/dL   Magnesium   Result Value Ref Range    Magnesium 2.16 1.60 - 2.40 mg/dL   Morphology   Result Value Ref Range    RBC Morphology No significant RBC morphology present    POCT GLUCOSE   Result Value Ref Range    POCT Glucose 114 (H) 74 - 99 mg/dL   SST TOP   Result Value Ref Range    Extra Tube Hold for add-ons.    SST TOP   Result Value Ref Range    Extra Tube Hold for add-ons.    Red Top   Result Value Ref Range    Extra Tube Hold for add-ons.       Wound Culture: 3+ yeast    Scheduled medications  acetaminophen, 650 mg, g-tube, q4h  acetylcysteine, 6 mL, nebulization, TID  amLODIPine, 10 mg, g-tube, Daily  aspirin, 81 mg, g-tube, Daily  atorvastatin, 80 mg, g-tube, Daily  [Held by provider] chlorthalidone, 25 mg, g-tube, Daily  enoxaparin, 40 mg, subcutaneous, q24h  esomeprazole, 20 mg, nasogastric tube, Daily before breakfast  fentaNYL PF, , ,   gabapentin, 300 mg, g-tube, BID  glycopyrrolate, 0.2 mg, intravenous, q6h  ipratropium-albuteroL, 3 mL, nebulization, TID  lactated Ringer's, 500 mL, intravenous, Once  lactated Ringer's, 500 mL, intravenous, Once  losartan, 100 mg, g-tube, Daily  meropenem, 1 g, intravenous, q8h  midazolam, , ,   OLANZapine zydis, 5 mg, oral, Nightly  polyethylene glycol, 17 g, g-tube, BID  senna, 10 mL, g-tube, Nightly  vancomycin, 1,000 mg, intravenous, q12h      Continuous medications     PRN medications  PRN medications: bisacodyl, dextrose 10 % in water (D10W), dextrose, fentaNYL PF, glucagon, guaiFENesin, HYDROmorphone, HYDROmorphone, melatonin, meperidine, meperidine PF, midazolam, sodium  phosphates      Assessment/Plan   67 year old male with PMH HTN, CVA (9/2023), recently diagnosed poorly differentiated metastatic SCC (11/2023) who presents for worsening dysphagia. Given new diagnosis, no treatment has been started yet and no known origin yet. Will discuss with Med Onc (Dr. Yin was to be his primary oncologist), Surg Onc (Dr Corona had already seen pt) and will also consult GI for EGD/biopsy. EGD on 11/24 showed large, nearly fully obstructing esophageal mass. Patient is not a surgical candidate given metastatic disease. Managing nutrition and need for chemo/radiation. PEG in place. Patient is slightly fluid overloaded, receiving 2L. Ongoing elevation of white count w/o spiking fevers but increased white/yellow sputum productions consistent with pulmonary edema vs silent aspiration from underlying esophageal dysfunctions 2/2 malignancy. 12/8, patient exhibiting more signs of delirium and somnolence, possibly related to initiation of methadone on 12/5. 12/12 patient showing signs of infection, currently being treated and pending conversation with Dr Slater.     Updates 12/13:  -s/p 5/5 fraction pRT of the right axilla  -wound culture growing 3+ yeast, c/s ID  -micafungin 150mg today, 100mg tomorrow  -repeat sepsis protocol  -started vanc/meropenem for infection  -patient pulled out PEG tube, surg onc replaced tube and will have follow up studies in AM on 12/12  -Continue BP hygiene and Oscillating Positive Expiratory Pressure Therapy, add duonebs q6hrs  -Mouth care and suctioning   -strict Is/Os     #Metastatic SCC, poorly differentiated   #Dysphagia   #Leukocytosis, worsening  - Pathology Microscopic examination of H&E sections demonstrates a poorly differentiated carcinoma , immunoreactive with CK7 and p40 , infiltrating soft tissue.  No residual lymph node parenchyma is seen.  The following immunostains are negative: NKX3.1, GATA3, CK20, TTF-1 and CDX2   -Diagnosed 11/2023; no  treatment yet; unknown origin  -Surg Onc consulted; appreciate recs (saw Dr. Corona outpatient 11/10/23)  -SLP consulted for swallow evaluation and recommended NPO  -EGD/biopsy 11/24:  large, nearly fully obstructing esophageal mass. Patient is not a surgical candidate because of metastatic disease.   - Nutrition consulted: 1. Start 100mg IV thiamine x5 days;  Isosource 1.5 @ 25 ml/hr. Advance slowly by 10 ml/hr q8h, as tolerated, to goal rate of 55 ml/hr.   - CT SIM 11/27  - 5/5 fraction of pRT to esophageal mass 12/4 completed  -CT SIM 12/6  -s/p pRT to right axillary mass fraction   -s/p 2 doses IV lasix 20 mg, one dose of 40 IV lasix  -c/w Mucomyst, BP hygiene and PEP oscillatory therapy, duonebs q6hrs, -Schedule Robinul daily   -Procal elevated 0.79  - s/p PEG tube placement 11/27  - c/w 345ml QID bolus feeds, FWF 60 ml four times daily and 60 before and after every feed and 150 ml additional BID  - 100mg IV thiamine x5 days   -discontinue Vancomycin, MRSA nares negative  -c/w IV Zosyn (12/7-12/12)  -vanc/meropenem (12/12-)  -micafungin 150mg 12/13, 100mg 12/14  -Respiratory sputum culture with salivary contimination      #Urinary Retention, Worthy catheter in place   -1.9 L out w/straight cat 12/7  -Repeat PVR this morning  -c/w worthy, maryellen improving  -HOLD methadone and limit anticholinergics     #MARYELLEN, 2/2 retention, nephrotoxic meds, diuresis improving  -Baseline Cr ~1; on admission 1.48, was 1.50 on 11/16 -> now at baseline 1.04 on 11/23 -->2.19 12/8 (retention), worthy placed --> 1.89--> 1.36  -S/p 1L LR, IV LR infusion at 125cc/hr, given improvement in Cr likely pre-renal  -HOLD home losartan 100mg daily  -Continue to monitor      #HTN  -Continue home amlodipine 10mg daily  -Holding chlorithalidone 25mg daily given hypercalcemia  -Continue ARB as MARYELLEN has resolved     #CVA 9/2023  #Carotid stenosis  -2 acute or subacute infarcts seen on MRI 9/18/2023  -Continue ASA 81mg daily  -Completed 21 days of plavix  (started 9/18/23)  -Continue atorvastatin 80mg daily  -Vascular Surgery follow up outpatient for the carotid stenosis     #Hypercalcemia, resolved  -Ca 11.5 on admission  -S/p 1L LR in the ED, on LR infusion at 125cc/hr  -Repeat was 11.3 on 11/23, so avoiding LR in favor of NS     #Elevated PSA  -Urology follow up outpatient        #Cancer Pain Control:  -HOLD Methadone  -continue 10 mg PRN Oxycodone   -okay for PRN dilaudid      Disposition: tele  Follow-ups: PCP, Onc, Surg Onc,      F: prn  E: prn   N: PEG  A: PIVs     DVT ppx: lovenox  GI ppx: home PPI    Code Status: FULL (confirmed on 11/22/23)  Surrogate Medical Decision-maker: Wife April [phone]     Katherine Gay MD  PGY-1     [facility] Team Pager [phone]             Katherine Gay MD    Physician Attestation  Pt seen and case discussed with team  On broad spectrum antibiotics  Now with fungus from axillary mass  ID to see  Discussed prognosis with family  Supportive management    Corbin Whaley MD, FACP  Chief, Solid Tumor Oncology Division   Medical Oncology  Professor of Medicine and Urology  /[facility]

## 2023-12-13 NOTE — SIGNIFICANT EVENT
Rapid Response RN at bedside for RADAR score 7 due to the following VS: T 37.3 °Celsius; HR  116; RR 24; /69; SPO2 94% .      Reviewed abnormal VS with bedside RN, who indicated having no concerns for the patient's condition based upon these VS.  No interventions by Rapid Response team indicated at this time.

## 2023-12-14 ENCOUNTER — APPOINTMENT (OUTPATIENT)
Dept: RADIOLOGY | Facility: HOSPITAL | Age: 68
DRG: 356 | End: 2023-12-14
Payer: MEDICARE

## 2023-12-14 ENCOUNTER — APPOINTMENT (OUTPATIENT)
Dept: CARDIOLOGY | Facility: HOSPITAL | Age: 68
End: 2023-12-14
Payer: MEDICARE

## 2023-12-14 VITALS
RESPIRATION RATE: 36 BRPM | OXYGEN SATURATION: 96 % | DIASTOLIC BLOOD PRESSURE: 75 MMHG | HEART RATE: 118 BPM | TEMPERATURE: 99 F | BODY MASS INDEX: 26.48 KG/M2 | SYSTOLIC BLOOD PRESSURE: 175 MMHG | WEIGHT: 168.7 LBS | HEIGHT: 67 IN

## 2023-12-14 LAB
ALBUMIN SERPL BCP-MCNC: 2.7 G/DL (ref 3.4–5)
ANION GAP BLDA CALCULATED.4IONS-SCNC: 11 MMO/L (ref 10–25)
ANION GAP BLDV CALCULATED.4IONS-SCNC: 11 MMOL/L (ref 10–25)
ANION GAP SERPL CALC-SCNC: 16 MMOL/L (ref 10–20)
ATRIAL RATE: 125 BPM
ATRIAL RATE: 128 BPM
ATRIAL RATE: 92 BPM
BACTERIA SPEC CULT: ABNORMAL
BACTERIA UR CULT: NO GROWTH
BASE EXCESS BLDA CALC-SCNC: 2.5 MMOL/L (ref -2–3)
BASE EXCESS BLDV CALC-SCNC: 3.5 MMOL/L (ref -2–3)
BASOPHILS # BLD MANUAL: 0.16 X10*3/UL (ref 0–0.1)
BASOPHILS NFR BLD MANUAL: 0.8 %
BODY TEMPERATURE: 37 DEGREES CELSIUS
BODY TEMPERATURE: 37 DEGREES CELSIUS
BUN SERPL-MCNC: 33 MG/DL (ref 6–23)
CA-I BLDA-SCNC: 1.53 MMOL/L (ref 1.1–1.33)
CA-I BLDV-SCNC: 1.47 MMOL/L (ref 1.1–1.33)
CALCIUM SERPL-MCNC: 10.4 MG/DL (ref 8.6–10.6)
CHLORIDE BLDA-SCNC: 112 MMOL/L (ref 98–107)
CHLORIDE BLDV-SCNC: 110 MMOL/L (ref 98–107)
CHLORIDE SERPL-SCNC: 108 MMOL/L (ref 98–107)
CO2 SERPL-SCNC: 27 MMOL/L (ref 21–32)
CREAT SERPL-MCNC: 0.6 MG/DL (ref 0.5–1.3)
EOSINOPHIL # BLD MANUAL: 0 X10*3/UL (ref 0–0.7)
EOSINOPHIL NFR BLD MANUAL: 0 %
ERYTHROCYTE [DISTWIDTH] IN BLOOD BY AUTOMATED COUNT: 14.2 % (ref 11.5–14.5)
GFR SERPL CREATININE-BSD FRML MDRD: >90 ML/MIN/1.73M*2
GLUCOSE BLDA-MCNC: 131 MG/DL (ref 74–99)
GLUCOSE BLDV-MCNC: 121 MG/DL (ref 74–99)
GLUCOSE SERPL-MCNC: 118 MG/DL (ref 74–99)
GRAM STN SPEC: ABNORMAL
GRAM STN SPEC: ABNORMAL
HCO3 BLDA-SCNC: 26.2 MMOL/L (ref 22–26)
HCO3 BLDV-SCNC: 26.3 MMOL/L (ref 22–26)
HCT VFR BLD AUTO: 25.7 % (ref 41–52)
HCT VFR BLD EST: 26 % (ref 41–52)
HCT VFR BLD EST: 35 % (ref 41–52)
HGB BLD-MCNC: 8 G/DL (ref 13.5–17.5)
HGB BLDA-MCNC: 8.7 G/DL (ref 13.5–17.5)
HGB BLDV-MCNC: 11.7 G/DL (ref 13.5–17.5)
IMM GRANULOCYTES # BLD AUTO: 2.01 X10*3/UL (ref 0–0.7)
IMM GRANULOCYTES NFR BLD AUTO: 10 % (ref 0–0.9)
INHALED O2 CONCENTRATION: 28 %
INHALED O2 CONCENTRATION: 28 %
LACTATE BLDA-SCNC: 0.9 MMOL/L (ref 0.4–2)
LACTATE BLDV-SCNC: 1.1 MMOL/L (ref 0.4–2)
LYMPHOCYTES # BLD MANUAL: 0.5 X10*3/UL (ref 1.2–4.8)
LYMPHOCYTES NFR BLD MANUAL: 2.5 %
MAGNESIUM SERPL-MCNC: 2.13 MG/DL (ref 1.6–2.4)
MCH RBC QN AUTO: 28.4 PG (ref 26–34)
MCHC RBC AUTO-ENTMCNC: 31.1 G/DL (ref 32–36)
MCV RBC AUTO: 91 FL (ref 80–100)
MONOCYTES # BLD MANUAL: 0.66 X10*3/UL (ref 0.1–1)
MONOCYTES NFR BLD MANUAL: 3.3 %
MYELOCYTES # BLD MANUAL: 1 X10*3/UL
MYELOCYTES NFR BLD MANUAL: 5 %
NEUTS SEG # BLD MANUAL: 17.68 X10*3/UL (ref 1.2–7)
NEUTS SEG NFR BLD MANUAL: 88.4 %
NRBC BLD-RTO: 0.8 /100 WBCS (ref 0–0)
OXYHGB MFR BLDA: 95 % (ref 94–98)
OXYHGB MFR BLDV: 96.9 % (ref 45–75)
P AXIS: 49 DEGREES
P AXIS: 54 DEGREES
P AXIS: 62 DEGREES
P OFFSET: 191 MS
P OFFSET: 194 MS
P OFFSET: 196 MS
P ONSET: 139 MS
P ONSET: 141 MS
P ONSET: 142 MS
PCO2 BLDA: 36 MM HG (ref 38–42)
PCO2 BLDV: 33 MM HG (ref 41–51)
PH BLDA: 7.47 PH (ref 7.38–7.42)
PH BLDV: 7.51 PH (ref 7.33–7.43)
PHOSPHATE SERPL-MCNC: 3 MG/DL (ref 2.5–4.9)
PLATELET # BLD AUTO: 319 X10*3/UL (ref 150–450)
PO2 BLDA: 79 MM HG (ref 85–95)
PO2 BLDV: 107 MM HG (ref 35–45)
POTASSIUM BLDA-SCNC: 3.6 MMOL/L (ref 3.5–5.3)
POTASSIUM BLDV-SCNC: 3.5 MMOL/L (ref 3.5–5.3)
POTASSIUM SERPL-SCNC: 3.7 MMOL/L (ref 3.5–5.3)
PR INTERVAL: 148 MS
PR INTERVAL: 150 MS
PR INTERVAL: 152 MS
Q ONSET: 215 MS
Q ONSET: 215 MS
Q ONSET: 217 MS
QRS COUNT: 15 BEATS
QRS COUNT: 20 BEATS
QRS COUNT: 21 BEATS
QRS DURATION: 80 MS
QRS DURATION: 80 MS
QRS DURATION: 82 MS
QT INTERVAL: 292 MS
QT INTERVAL: 304 MS
QT INTERVAL: 342 MS
QTC CALCULATION(BAZETT): 422 MS
QTC CALCULATION(BAZETT): 426 MS
QTC CALCULATION(BAZETT): 438 MS
QTC FREDERICIA: 376 MS
QTC FREDERICIA: 388 MS
QTC FREDERICIA: 394 MS
R AXIS: -17 DEGREES
R AXIS: -5 DEGREES
R AXIS: -6 DEGREES
RBC # BLD AUTO: 2.82 X10*6/UL (ref 4.5–5.9)
RBC MORPH BLD: ABNORMAL
SAO2 % BLDA: 98 % (ref 94–100)
SAO2 % BLDV: 99 % (ref 45–75)
SODIUM BLDA-SCNC: 146 MMOL/L (ref 136–145)
SODIUM BLDV-SCNC: 144 MMOL/L (ref 136–145)
SODIUM SERPL-SCNC: 147 MMOL/L (ref 136–145)
T AXIS: 34 DEGREES
T AXIS: 42 DEGREES
T AXIS: 53 DEGREES
T OFFSET: 363 MS
T OFFSET: 367 MS
T OFFSET: 386 MS
TOTAL CELLS COUNTED BLD: 121
VENTRICULAR RATE: 125 BPM
VENTRICULAR RATE: 128 BPM
VENTRICULAR RATE: 92 BPM
WBC # BLD AUTO: 20 X10*3/UL (ref 4.4–11.3)

## 2023-12-14 PROCEDURE — 93005 ELECTROCARDIOGRAM TRACING: CPT

## 2023-12-14 PROCEDURE — 2500000004 HC RX 250 GENERAL PHARMACY W/ HCPCS (ALT 636 FOR OP/ED)

## 2023-12-14 PROCEDURE — 99233 SBSQ HOSP IP/OBS HIGH 50: CPT | Performed by: NURSE PRACTITIONER

## 2023-12-14 PROCEDURE — 71045 X-RAY EXAM CHEST 1 VIEW: CPT

## 2023-12-14 PROCEDURE — 31720 CLEARANCE OF AIRWAYS: CPT

## 2023-12-14 PROCEDURE — 2500000005 HC RX 250 GENERAL PHARMACY W/O HCPCS

## 2023-12-14 PROCEDURE — 2500000001 HC RX 250 WO HCPCS SELF ADMINISTERED DRUGS (ALT 637 FOR MEDICARE OP)

## 2023-12-14 PROCEDURE — 71045 X-RAY EXAM CHEST 1 VIEW: CPT | Performed by: RADIOLOGY

## 2023-12-14 PROCEDURE — 84132 ASSAY OF SERUM POTASSIUM: CPT

## 2023-12-14 PROCEDURE — 2500000002 HC RX 250 W HCPCS SELF ADMINISTERED DRUGS (ALT 637 FOR MEDICARE OP, ALT 636 FOR OP/ED)

## 2023-12-14 PROCEDURE — 94640 AIRWAY INHALATION TREATMENT: CPT

## 2023-12-14 PROCEDURE — 85027 COMPLETE CBC AUTOMATED: CPT

## 2023-12-14 PROCEDURE — 36600 WITHDRAWAL OF ARTERIAL BLOOD: CPT

## 2023-12-14 PROCEDURE — 99233 SBSQ HOSP IP/OBS HIGH 50: CPT | Performed by: INTERNAL MEDICINE

## 2023-12-14 PROCEDURE — 83735 ASSAY OF MAGNESIUM: CPT

## 2023-12-14 PROCEDURE — 85007 BL SMEAR W/DIFF WBC COUNT: CPT

## 2023-12-14 PROCEDURE — 94668 MNPJ CHEST WALL SBSQ: CPT

## 2023-12-14 PROCEDURE — 1170000001 HC PRIVATE ONCOLOGY ROOM DAILY

## 2023-12-14 RX ORDER — MORPHINE SULFATE 20 MG/ML
5 SOLUTION ORAL
Status: DISCONTINUED | OUTPATIENT
Start: 2023-12-14 | End: 2023-12-14

## 2023-12-14 RX ORDER — LORAZEPAM 2 MG/ML
0.5 INJECTION INTRAMUSCULAR EVERY 4 HOURS PRN
Status: DISCONTINUED | OUTPATIENT
Start: 2023-12-14 | End: 2023-12-15

## 2023-12-14 RX ORDER — OLANZAPINE 5 MG/1
2.5 TABLET ORAL EVERY 6 HOURS PRN
Status: DISCONTINUED | OUTPATIENT
Start: 2023-12-14 | End: 2023-12-14

## 2023-12-14 RX ORDER — GLYCOPYRROLATE 0.2 MG/ML
0.2 INJECTION INTRAMUSCULAR; INTRAVENOUS EVERY 4 HOURS PRN
Status: DISCONTINUED | OUTPATIENT
Start: 2023-12-14 | End: 2023-12-15 | Stop reason: HOSPADM

## 2023-12-14 RX ORDER — HYOSCYAMINE SULFATE 0.12 MG/1
0.12 TABLET, ORALLY DISINTEGRATING ORAL EVERY 4 HOURS PRN
Status: DISCONTINUED | OUTPATIENT
Start: 2023-12-14 | End: 2023-12-15 | Stop reason: HOSPADM

## 2023-12-14 RX ORDER — HYDROMORPHONE HCL/0.9% NACL/PF 15 MG/30ML
PATIENT CONTROLLED ANALGESIA SYRINGE INTRAVENOUS CONTINUOUS
Status: DISCONTINUED | OUTPATIENT
Start: 2023-12-14 | End: 2023-12-15

## 2023-12-14 RX ORDER — FUROSEMIDE 10 MG/ML
40 INJECTION INTRAMUSCULAR; INTRAVENOUS ONCE
Status: COMPLETED | OUTPATIENT
Start: 2023-12-14 | End: 2023-12-14

## 2023-12-14 RX ORDER — HYDROMORPHONE HYDROCHLORIDE 1 MG/ML
0.4 INJECTION, SOLUTION INTRAMUSCULAR; INTRAVENOUS; SUBCUTANEOUS
Status: DISCONTINUED | OUTPATIENT
Start: 2023-12-14 | End: 2023-12-15 | Stop reason: HOSPADM

## 2023-12-14 RX ORDER — GLYCOPYRROLATE 0.2 MG/ML
0.4 INJECTION INTRAMUSCULAR; INTRAVENOUS EVERY 6 HOURS
Status: DISCONTINUED | OUTPATIENT
Start: 2023-12-14 | End: 2023-12-15 | Stop reason: HOSPADM

## 2023-12-14 RX ORDER — HYDROMORPHONE HYDROCHLORIDE 2 MG/1
1 TABLET ORAL
Status: DISCONTINUED | OUTPATIENT
Start: 2023-12-14 | End: 2023-12-14

## 2023-12-14 RX ORDER — HALOPERIDOL 2 MG/ML
1 SOLUTION ORAL EVERY 8 HOURS PRN
Status: DISCONTINUED | OUTPATIENT
Start: 2023-12-14 | End: 2023-12-14

## 2023-12-14 RX ORDER — NALOXONE HYDROCHLORIDE 0.4 MG/ML
0.2 INJECTION, SOLUTION INTRAMUSCULAR; INTRAVENOUS; SUBCUTANEOUS AS NEEDED
Status: DISCONTINUED | OUTPATIENT
Start: 2023-12-14 | End: 2023-12-15 | Stop reason: HOSPADM

## 2023-12-14 RX ORDER — HALOPERIDOL 5 MG/ML
2 INJECTION INTRAMUSCULAR EVERY 6 HOURS PRN
Status: DISCONTINUED | OUTPATIENT
Start: 2023-12-14 | End: 2023-12-15 | Stop reason: HOSPADM

## 2023-12-14 RX ADMIN — ACETAMINOPHEN 650 MG: 650 SOLUTION ORAL at 10:25

## 2023-12-14 RX ADMIN — HYDROMORPHONE HYDROCHLORIDE 0.4 MG: 1 INJECTION, SOLUTION INTRAMUSCULAR; INTRAVENOUS; SUBCUTANEOUS at 07:23

## 2023-12-14 RX ADMIN — VANCOMYCIN HYDROCHLORIDE 1000 MG: 1 INJECTION, SOLUTION INTRAVENOUS at 05:18

## 2023-12-14 RX ADMIN — ACETYLCYSTEINE 6 ML: 100 SOLUTION ORAL; RESPIRATORY (INHALATION) at 08:08

## 2023-12-14 RX ADMIN — GLYCOPYRROLATE 0.2 MG: 0.2 INJECTION INTRAMUSCULAR; INTRAVENOUS at 05:18

## 2023-12-14 RX ADMIN — Medication 1 G: at 01:35

## 2023-12-14 RX ADMIN — HYOSCYAMINE SULFATE 0.12 MG: 0.12 TABLET SUBLINGUAL at 14:18

## 2023-12-14 RX ADMIN — IPRATROPIUM BROMIDE AND ALBUTEROL SULFATE 3 ML: .5; 3 SOLUTION RESPIRATORY (INHALATION) at 21:33

## 2023-12-14 RX ADMIN — Medication: at 11:48

## 2023-12-14 RX ADMIN — FUROSEMIDE 40 MG: 10 INJECTION, SOLUTION INTRAVENOUS at 09:05

## 2023-12-14 RX ADMIN — Medication 1 G: at 09:06

## 2023-12-14 RX ADMIN — HYDROMORPHONE HYDROCHLORIDE 0.4 MG: 1 INJECTION, SOLUTION INTRAMUSCULAR; INTRAVENOUS; SUBCUTANEOUS at 22:31

## 2023-12-14 RX ADMIN — HYDROMORPHONE HYDROCHLORIDE 0.4 MG: 1 INJECTION, SOLUTION INTRAMUSCULAR; INTRAVENOUS; SUBCUTANEOUS at 18:26

## 2023-12-14 RX ADMIN — IPRATROPIUM BROMIDE AND ALBUTEROL SULFATE 3 ML: .5; 3 SOLUTION RESPIRATORY (INHALATION) at 08:08

## 2023-12-14 RX ADMIN — HYDROMORPHONE HYDROCHLORIDE 0.4 MG: 1 INJECTION, SOLUTION INTRAMUSCULAR; INTRAVENOUS; SUBCUTANEOUS at 16:13

## 2023-12-14 RX ADMIN — HYDROMORPHONE HYDROCHLORIDE 0.2 MG: 1 INJECTION, SOLUTION INTRAMUSCULAR; INTRAVENOUS; SUBCUTANEOUS at 02:10

## 2023-12-14 RX ADMIN — MORPHINE SULFATE 5 MG: 100 SOLUTION ORAL at 09:05

## 2023-12-14 RX ADMIN — MORPHINE SULFATE 5 MG: 100 SOLUTION ORAL at 10:25

## 2023-12-14 ASSESSMENT — PAIN SCALES - PAIN ASSESSMENT IN ADVANCED DEMENTIA (PAINAD)
TOTALSCORE: 3
BODYLANGUAGE: TENSE, DISTRESSED PACING, FIDGETING
TOTALSCORE: OTHER (COMMENT)
BREATHING: NOISY LABORED BREATHING, LONG PERIODS OF HYPERVENTILATION, CHEYNE-STOKES RESPIRATIONS
FACIALEXPRESSION: SMILING OR INEXPRESSIVE
CONSOLABILITY: NO NEED TO CONSOLE

## 2023-12-14 ASSESSMENT — COGNITIVE AND FUNCTIONAL STATUS - GENERAL
EATING MEALS: TOTAL
MOVING FROM LYING ON BACK TO SITTING ON SIDE OF FLAT BED WITH BEDRAILS: A LOT
DRESSING REGULAR LOWER BODY CLOTHING: A LOT
STANDING UP FROM CHAIR USING ARMS: A LOT
MOBILITY SCORE: 10
PERSONAL GROOMING: A LOT
DAILY ACTIVITIY SCORE: 11
HELP NEEDED FOR BATHING: A LOT
DRESSING REGULAR UPPER BODY CLOTHING: A LOT
CLIMB 3 TO 5 STEPS WITH RAILING: TOTAL
TOILETING: A LOT
WALKING IN HOSPITAL ROOM: TOTAL
TURNING FROM BACK TO SIDE WHILE IN FLAT BAD: A LOT
MOVING TO AND FROM BED TO CHAIR: A LOT

## 2023-12-14 ASSESSMENT — PAIN - FUNCTIONAL ASSESSMENT
PAIN_FUNCTIONAL_ASSESSMENT: 0-10
PAIN_FUNCTIONAL_ASSESSMENT: PAINAD (PAIN ASSESSMENT IN ADVANCED DEMENTIA SCALE)

## 2023-12-14 ASSESSMENT — PAIN SCALES - GENERAL
PAINLEVEL_OUTOF10: 6
PAINLEVEL_OUTOF10: 9
PAINLEVEL_OUTOF10: 7

## 2023-12-14 NOTE — SIGNIFICANT EVENT
Pt underwent G tube study with radiology, showed no extravasation. Contrast present in stomach and gastric fundus, flows into duodenum past ligament of treitz. Okay to use for feeds/meds, no contraindications. Surgical oncology signing off, please page with any further questions.

## 2023-12-14 NOTE — PROGRESS NOTES
Spiritual Care Visit      visited with the patient and gathered family after the patient had just been made comfort measures.     Family expressed being worried to move the patient on a long drive and hoped to keep the patient at [facility].    Together with the family we prayed with the patient, asking for prayers of peace and for the patient to know it is okay to let go.     The family participated in patient life review, sharing stories about the patient, and what made him so unique and special and a wonderful man. They spoke about what a wonderful and fun spirit the patient has.      will continue to follow and provide support. Please reach out with any needs/concerns.     Rev. Bakari Edge, Supportive Oncology

## 2023-12-14 NOTE — PROGRESS NOTES
Mike Olson is a 68 y.o. male on day 22 of admission presenting with Difficulty swallowing.    Spoke with patient's wife about skilled nursing facility choices as PT recommends SNF at this time.  She is not sure if patient will want to go to a facility. She will speak with patient and his daughters. Will follow up with April tomorrow.   Josselyn Gipson RN [facility]    12/13/23 @ 1300  Spoke with April, his two daughters, and son in law at bedside. Dr. Whaley and Dr. Coronado at bedside as well speaking with family. At this time, we will wait to make any SNF choices. There was discussion about hospice and family agreed that the goal is to make him comfortable. The family will take the next few days to talk about what they and the patient want and feel is best.  My number was given to the family if they need any assistance or have any questions.  Josselyn Gipson RN [facility]    12/14/23 @ 1100  Patient made comfort care this morning. Hospice consult placed and referral sent to [facility]. Spoke with family at bedside and they do not want him transported back home and agreed that inpatient would be best.  Explained that [facility] would be in touch with April and will see patient today for [facility] hospice.  Josselyn Gipson RN [facility]

## 2023-12-14 NOTE — CONSULTS
Vancomycin Dosing by Pharmacy- Cessation of Therapy    Consult to pharmacy for vancomycin dosing has been discontinued by the prescriber, pharmacy will sign off at this time.    Please call pharmacy if there are further questions or re-enter a consult if vancomycin is resumed.     Tru Espinosa, PharmD

## 2023-12-14 NOTE — SIGNIFICANT EVENT
Patient had rapid called this morning for worsening tachypnea and pain. Most recent CXRs show tracheal deviation concerning for esophageal mass progression. Daughter was present in the room and called POA to discuss code status and patient's wishes. Family decision was made to make patient comfort care.    Code status was changed to DNR DNI comfort care measures only. Comfort care order set and hospice consult was placed.    Katherine Gay MD  PGY-1    [facility] Team Pager [phone]

## 2023-12-14 NOTE — PROGRESS NOTES
Mike Olson is a 68 y.o. male on day 22 of admission presenting with Difficulty swallowing.    Subjective   Interval Events: Persistent tachycardia and tachypnea. NF performed VBG and CXR which showed respiratory alkalosis and slightly more congest lungs. Trachea still deviated, most likely from progression of esophageal mass. Rapid called this morning for worsening tachypnea. POA was called and patient was made DNR DNI comfort care measures only.    Patient not alert at bedside. Spoke to family who does not want patient to be in pain anymore. Daughter reported pain has been horrible overnight and he has been struggling to breathe as well.        Objective     Physical Exam  Constitutional:       Appearance: Patient not alert. Ill-appearing and in pain.  HENT:      Mouth/Throat: Very congested.     Mouth: Mucous membranes are moist.   Cardiovascular:      Heart sounds: Limited by congestion.  Pulmonary:      Effort: Increased work of breathing.     Breath sounds: Rhonchi, congestion.  Abdominal:      General: Bowel sounds are normal. There is no distension.      Palpations: Abdomen is soft. There is no mass.      Tenderness: There is no abdominal tenderness. There is no guarding or rebound.      Comments: PEG tube in place.  Genitourinary:     Comments: Vazquez catheter in place draining, clear yellow urine  Musculoskeletal:         General: No swelling or tenderness. Normal range of motion.      Cervical back: Normal range of motion.      Right lower leg: No edema.      Left lower leg: No edema.   Skin:     General: Skin is warm.      Capillary Refill: Capillary refill takes less than 2 seconds.      Coloration: Skin is not jaundiced.      Findings: No bruising or rash.      Comments: Axillary mass erythematous, tender, with pus present.    Neurological:      Not alert at this time.    Last Recorded Vitals  Blood pressure 161/86, pulse (!) 123, temperature 37 °C (98.6 °F), temperature source Temporal, resp. rate (!)  "38, height 1.69 m (5' 6.54\"), weight 76.5 kg (168 lb 11.2 oz), SpO2 93 %.  Intake/Output last 3 Shifts:  I/O last 3 completed shifts:  In: - (0 mL/kg)   Out: 2775 (36.3 mL/kg) [Urine:2775 (1 mL/kg/hr)]  Weight: 76.5 kg     Relevant Results  Results for orders placed or performed during the hospital encounter of 11/22/23 (from the past 24 hour(s))   SST TOP   Result Value Ref Range    Extra Tube Hold for add-ons.    SST TOP   Result Value Ref Range    Extra Tube Hold for add-ons.    Red Top   Result Value Ref Range    Extra Tube Hold for add-ons.    POCT GLUCOSE   Result Value Ref Range    POCT Glucose 125 (H) 74 - 99 mg/dL   CBC and Auto Differential   Result Value Ref Range    WBC 21.6 (H) 4.4 - 11.3 x10*3/uL    nRBC 0.4 (H) 0.0 - 0.0 /100 WBCs    RBC 2.75 (L) 4.50 - 5.90 x10*6/uL    Hemoglobin 7.7 (L) 13.5 - 17.5 g/dL    Hematocrit 24.4 (L) 41.0 - 52.0 %    MCV 89 80 - 100 fL    MCH 28.0 26.0 - 34.0 pg    MCHC 31.6 (L) 32.0 - 36.0 g/dL    RDW 14.1 11.5 - 14.5 %    Platelets 358 150 - 450 x10*3/uL    Immature Granulocytes %, Automated 10.1 (H) 0.0 - 0.9 %    Immature Granulocytes Absolute, Automated 2.17 (H) 0.00 - 0.70 x10*3/uL   Comprehensive Metabolic Panel   Result Value Ref Range    Glucose 88 74 - 99 mg/dL    Sodium 144 136 - 145 mmol/L    Potassium 4.0 3.5 - 5.3 mmol/L    Chloride 104 98 - 107 mmol/L    Bicarbonate 27 21 - 32 mmol/L    Anion Gap 17 10 - 20 mmol/L    Urea Nitrogen 34 (H) 6 - 23 mg/dL    Creatinine 0.66 0.50 - 1.30 mg/dL    eGFR >90 >60 mL/min/1.73m*2    Calcium 10.1 8.6 - 10.6 mg/dL    Albumin 2.5 (L) 3.4 - 5.0 g/dL    Alkaline Phosphatase 498 (H) 33 - 136 U/L    Total Protein 6.6 6.4 - 8.2 g/dL    AST 32 9 - 39 U/L    Bilirubin, Total 0.9 0.0 - 1.2 mg/dL    ALT 66 (H) 10 - 52 U/L   Lactate   Result Value Ref Range    Lactate 1.1 0.4 - 2.0 mmol/L   Blood Culture    Specimen: Peripheral Venipuncture; Blood culture   Result Value Ref Range    Blood Culture Loaded on Instrument - Culture in " progress    Blood Culture    Specimen: Peripheral Venipuncture; Blood culture   Result Value Ref Range    Blood Culture Loaded on Instrument - Culture in progress    Blood Gas Venous Full Panel   Result Value Ref Range    POCT pH, Venous 7.47 (H) 7.33 - 7.43 pH    POCT pCO2, Venous 37 (L) 41 - 51 mm Hg    POCT pO2, Venous 64 (H) 35 - 45 mm Hg    POCT SO2, Venous 94 (H) 45 - 75 %    POCT Oxy Hemoglobin, Venous 89.8 (H) 45.0 - 75.0 %    POCT Hematocrit Calculated, Venous 27.0 (L) 41.0 - 52.0 %    POCT Sodium, Venous 141 136 - 145 mmol/L    POCT Potassium, Venous 3.8 3.5 - 5.3 mmol/L    POCT Chloride, Venous 107 98 - 107 mmol/L    POCT Ionized Calicum, Venous 1.45 (H) 1.10 - 1.33 mmol/L    POCT Glucose, Venous 104 (H) 74 - 99 mg/dL    POCT Lactate, Venous 1.3 0.4 - 2.0 mmol/L    POCT Base Excess, Venous 3.1 (H) -2.0 - 3.0 mmol/L    POCT HCO3 Calculated, Venous 26.9 (H) 22.0 - 26.0 mmol/L    POCT Hemoglobin, Venous 9.0 (L) 13.5 - 17.5 g/dL    POCT Anion Gap, Venous 11.0 10.0 - 25.0 mmol/L    Patient Temperature 37.0 degrees Celsius    FiO2 28 %   Manual Differential   Result Value Ref Range    Neutrophils %, Manual 91.4 40.0 - 80.0 %    Lymphocytes %, Manual 1.7 13.0 - 44.0 %    Monocytes %, Manual 2.6 2.0 - 10.0 %    Eosinophils %, Manual 0.0 0.0 - 6.0 %    Basophils %, Manual 0.0 0.0 - 2.0 %    Atypical Lymphocytes %, Manual 0.8 0.0 - 2.0 %    Metamyelocytes %, Manual 0.9 0.0 - 0.0 %    Myelocytes %, Manual 2.6 0.0 - 0.0 %    Seg Neutrophils Absolute, Manual 19.74 (H) 1.20 - 7.00 x10*3/uL    Lymphocytes Absolute, Manual 0.37 (L) 1.20 - 4.80 x10*3/uL    Monocytes Absolute, Manual 0.56 0.10 - 1.00 x10*3/uL    Eosinophils Absolute, Manual 0.00 0.00 - 0.70 x10*3/uL    Basophils Absolute, Manual 0.00 0.00 - 0.10 x10*3/uL    Atypical Lymphs Absolute, Manual 0.17 0.00 - 0.50 x10*3/uL    Metamyelocytes Absolute, Manual 0.19 0.00 - 0.00 x10*3/uL    Myelocytes Absolute, Manual 0.56 0.00 - 0.00 x10*3/uL    Total Cells Counted  117     Manual nRBC per 100 Cells 0.9 (H) 0.0 - 0.0 %    RBC Morphology No significant RBC morphology present    Urinalysis with Reflex Microscopic and Culture   Result Value Ref Range    Color, Urine Yellow Straw, Yellow    Appearance, Urine Hazy (N) Clear    Specific Gravity, Urine 1.019 1.005 - 1.035    pH, Urine 6.0 5.0, 5.5, 6.0, 6.5, 7.0, 7.5, 8.0    Protein, Urine 30 (1+) (N) NEGATIVE mg/dL    Glucose, Urine NEGATIVE NEGATIVE mg/dL    Blood, Urine MODERATE (2+) (A) NEGATIVE    Ketones, Urine NEGATIVE NEGATIVE mg/dL    Bilirubin, Urine NEGATIVE NEGATIVE    Urobilinogen, Urine <2.0 <2.0 mg/dL    Nitrite, Urine NEGATIVE NEGATIVE    Leukocyte Esterase, Urine NEGATIVE NEGATIVE   Extra Urine Gray Tube   Result Value Ref Range    Extra Tube Hold for add-ons.    Urinalysis Microscopic   Result Value Ref Range    WBC, Urine 6-10 (A) 1-5, NONE /HPF    RBC, Urine >20 (A) NONE, 1-2, 3-5 /HPF    Mucus, Urine 1+ Reference range not established. /LPF   ECG 12 lead   Result Value Ref Range    Ventricular Rate 120 BPM    Atrial Rate 120 BPM    KY Interval 158 ms    QRS Duration 82 ms    QT Interval 304 ms    QTC Calculation(Bazett) 429 ms    P Axis 41 degrees    R Axis -16 degrees    T Axis 37 degrees    QRS Count 19 beats    Q Onset 218 ms    P Onset 139 ms    P Offset 196 ms    T Offset 370 ms    QTC Fredericia 383 ms   POCT GLUCOSE   Result Value Ref Range    POCT Glucose 113 (H) 74 - 99 mg/dL   ECG 12 Lead   Result Value Ref Range    Ventricular Rate 125 BPM    Atrial Rate 125 BPM    KY Interval 148 ms    QRS Duration 82 ms    QT Interval 304 ms    QTC Calculation(Bazett) 438 ms    P Axis 54 degrees    R Axis -17 degrees    T Axis 42 degrees    QRS Count 20 beats    Q Onset 215 ms    P Onset 141 ms    P Offset 196 ms    T Offset 367 ms    QTC Fredericia 388 ms   Blood Gas Venous Full Panel   Result Value Ref Range    POCT pH, Venous 7.51 (H) 7.33 - 7.43 pH    POCT pCO2, Venous 33 (L) 41 - 51 mm Hg    POCT pO2, Venous 107  (H) 35 - 45 mm Hg    POCT SO2, Venous 99 (H) 45 - 75 %    POCT Oxy Hemoglobin, Venous 96.9 (H) 45.0 - 75.0 %    POCT Hematocrit Calculated, Venous 35.0 (L) 41.0 - 52.0 %    POCT Sodium, Venous 144 136 - 145 mmol/L    POCT Potassium, Venous 3.5 3.5 - 5.3 mmol/L    POCT Chloride, Venous 110 (H) 98 - 107 mmol/L    POCT Ionized Calicum, Venous 1.47 (H) 1.10 - 1.33 mmol/L    POCT Glucose, Venous 121 (H) 74 - 99 mg/dL    POCT Lactate, Venous 1.1 0.4 - 2.0 mmol/L    POCT Base Excess, Venous 3.5 (H) -2.0 - 3.0 mmol/L    POCT HCO3 Calculated, Venous 26.3 (H) 22.0 - 26.0 mmol/L    POCT Hemoglobin, Venous 11.7 (L) 13.5 - 17.5 g/dL    POCT Anion Gap, Venous 11.0 10.0 - 25.0 mmol/L    Patient Temperature 37.0 degrees Celsius    FiO2 28 %   CBC and Auto Differential   Result Value Ref Range    WBC 20.0 (H) 4.4 - 11.3 x10*3/uL    nRBC 0.8 (H) 0.0 - 0.0 /100 WBCs    RBC 2.82 (L) 4.50 - 5.90 x10*6/uL    Hemoglobin 8.0 (L) 13.5 - 17.5 g/dL    Hematocrit 25.7 (L) 41.0 - 52.0 %    MCV 91 80 - 100 fL    MCH 28.4 26.0 - 34.0 pg    MCHC 31.1 (L) 32.0 - 36.0 g/dL    RDW 14.2 11.5 - 14.5 %    Platelets 319 150 - 450 x10*3/uL    Immature Granulocytes %, Automated 10.0 (H) 0.0 - 0.9 %    Immature Granulocytes Absolute, Automated 2.01 (H) 0.00 - 0.70 x10*3/uL   Renal function panel   Result Value Ref Range    Glucose 118 (H) 74 - 99 mg/dL    Sodium 147 (H) 136 - 145 mmol/L    Potassium 3.7 3.5 - 5.3 mmol/L    Chloride 108 (H) 98 - 107 mmol/L    Bicarbonate 27 21 - 32 mmol/L    Anion Gap 16 10 - 20 mmol/L    Urea Nitrogen 33 (H) 6 - 23 mg/dL    Creatinine 0.60 0.50 - 1.30 mg/dL    eGFR >90 >60 mL/min/1.73m*2    Calcium 10.4 8.6 - 10.6 mg/dL    Phosphorus 3.0 2.5 - 4.9 mg/dL    Albumin 2.7 (L) 3.4 - 5.0 g/dL   Magnesium   Result Value Ref Range    Magnesium 2.13 1.60 - 2.40 mg/dL   Manual Differential   Result Value Ref Range    Neutrophils %, Manual 88.4 40.0 - 80.0 %    Lymphocytes %, Manual 2.5 13.0 - 44.0 %    Monocytes %, Manual 3.3 2.0 -  10.0 %    Eosinophils %, Manual 0.0 0.0 - 6.0 %    Basophils %, Manual 0.8 0.0 - 2.0 %    Myelocytes %, Manual 5.0 0.0 - 0.0 %    Seg Neutrophils Absolute, Manual 17.68 (H) 1.20 - 7.00 x10*3/uL    Lymphocytes Absolute, Manual 0.50 (L) 1.20 - 4.80 x10*3/uL    Monocytes Absolute, Manual 0.66 0.10 - 1.00 x10*3/uL    Eosinophils Absolute, Manual 0.00 0.00 - 0.70 x10*3/uL    Basophils Absolute, Manual 0.16 (H) 0.00 - 0.10 x10*3/uL    Myelocytes Absolute, Manual 1.00 0.00 - 0.00 x10*3/uL    Total Cells Counted 121     RBC Morphology No significant RBC morphology present       Scheduled medications  acetaminophen, 650 mg, g-tube, q4h  acetylcysteine, 6 mL, nebulization, TID  fentaNYL PF, , ,   furosemide, 40 mg, intravenous, Once  glycopyrrolate, 0.2 mg, intravenous, q6h  ipratropium-albuteroL, 3 mL, nebulization, TID  lactated Ringer's, 500 mL, intravenous, Once  lactated Ringer's, 500 mL, intravenous, Once  meropenem, 1 g, intravenous, q8h  micafungin, 100 mg, intravenous, q24h  midazolam, , ,   OLANZapine zydis, 5 mg, oral, Nightly  vancomycin, 1,000 mg, intravenous, q12h      Continuous medications     PRN medications  PRN medications: bisacodyl, dextrose 10 % in water (D10W), dextrose, fentaNYL PF, glucagon, glycopyrrolate, guaiFENesin, haloperidol, haloperidol lactate, HYDROmorphone, HYDROmorphone, HYDROmorphone, hyoscyamine, melatonin, meperidine, meperidine PF, midazolam, morphine, OLANZapine, sodium phosphates      Assessment/Plan   67 year old male with PMH HTN, CVA (9/2023), recently diagnosed poorly differentiated metastatic SCC (11/2023) who presents for worsening dysphagia. Given new diagnosis, no treatment has been started yet and no known origin yet. Will discuss with Med Onc (Dr. Yin was to be his primary oncologist), Surg Onc (Dr Corona had already seen pt) and will also consult GI for EGD/biopsy. EGD on 11/24 showed large, nearly fully obstructing esophageal mass. Patient is not a surgical  candidate given metastatic disease. Managing nutrition and need for chemo/radiation. PEG in place. Patient is slightly fluid overloaded, receiving 2L. Ongoing elevation of white count w/o spiking fevers but increased white/yellow sputum productions consistent with pulmonary edema vs silent aspiration from underlying esophageal dysfunctions 2/2 malignancy. 12/8, patient exhibiting more signs of delirium and somnolence, possibly related to initiation of methadone on 12/5. 12/14: patient has been persistently tachypneic and tachycardic. CXR shows deviation of trachea concerning for progressing esophageal mass. Patient currently not alert and in respiratory distress. POA was called and family decided to make patient DNRI DNI comfort care measures only.     Updates 12/14:  -Patient now DNR DNI comfort care  -hospice consult placed     #Metastatic SCC, poorly differentiated   #Dysphagia   #Leukocytosis, worsening  - Pathology Microscopic examination of H&E sections demonstrates a poorly differentiated carcinoma , immunoreactive with CK7 and p40 , infiltrating soft tissue.  No residual lymph node parenchyma is seen.  The following immunostains are negative: NKX3.1, GATA3, CK20, TTF-1 and CDX2   -Diagnosed 11/2023; no treatment yet; unknown origin  -Surg Onc consulted; appreciate recs (saw Dr. Corona outpatient 11/10/23)  -SLP consulted for swallow evaluation and recommended NPO  -EGD/biopsy 11/24:  large, nearly fully obstructing esophageal mass. Patient is not a surgical candidate because of metastatic disease.   - Nutrition consulted: 1. Start 100mg IV thiamine x5 days;  Isosource 1.5 @ 25 ml/hr. Advance slowly by 10 ml/hr q8h, as tolerated, to goal rate of 55 ml/hr.   - CT SIM 11/27  - 5/5 fraction of pRT to esophageal mass 12/4 completed  -CT SIM 12/6  -s/p pRT to right axillary mass fraction   -s/p 2 doses IV lasix 20 mg, one dose of 40 IV lasix  -c/w Mucomyst, BP hygiene and PEP oscillatory therapy, duonebs q6hrs,  -Schedule Robinul daily   -Procal elevated 0.79  - s/p PEG tube placement 11/27  - c/w 345ml QID bolus feeds, FWF 60 ml four times daily and 60 before and after every feed and 150 ml additional BID  - 100mg IV thiamine x5 days   -discontinue Vancomycin, MRSA nares negative  -c/w IV Zosyn (12/7-12/12)  -vanc/meropenem (12/12-12/14)  -micafungin 150mg 12/13, 100mg 12/14  -Patient now DNR DNI comfort care measures only; comfort care order set in place     #Urinary Retention, Worthy catheter in place   -1.9 L out w/straight cat 12/7  -Repeat PVR this morning  -c/w worthy     #MARYELLEN, 2/2 retention, nephrotoxic meds, diuresis improving  -Baseline Cr ~1; on admission 1.48, was 1.50 on 11/16 -> now at baseline 1.04 on 11/23 -->2.19 12/8 (retention), worthy placed --> 1.89--> 1.36  -S/p 1L LR, IV LR infusion at 125cc/hr, given improvement in Cr likely pre-renal     #HTN  -dc'd home amlodipine 10mg daily iso comfort care  -dc'd chlorithalidone 25mg daily iso comfort care  -dc'd ARB iso comfort care     #CVA 9/2023  #Carotid stenosis  -2 acute or subacute infarcts seen on MRI 9/18/2023  -dc'd ASA 81mg daily iso comfort care  -Completed 21 days of plavix (started 9/18/23)  -dc'd atorvastatin 80mg daily iso comfort care  -Vascular Surgery follow up outpatient for the carotid stenosis     #Hypercalcemia, resolved  -Ca 11.5 on admission  -S/p 1L LR in the ED, on LR infusion at 125cc/hr  -Repeat was 11.3 on 11/23, so avoiding LR in favor of NS       Disposition: tele  Follow-ups: PCP, Onc, Surg Onc,      F: prn  E: prn   N: PEG  A: PIVs     DVT ppx: lovenox  GI ppx: home PPI    Code Status: DNR DNI Comfort Care Measures Only  Surrogate Medical Decision-maker: Wife April [phone]     Katherine Gay MD  PGY-1     [facility] Team Pager [phone]             Katherine Gay MD    Physician Attestation    Pt worsening overnight and family now has made decision to transition to comfort care/hospice  Will organize and continue aggressive supportive  care  Family at bedside    Corbin Whaley MD, FACP  Chief, Solid Tumor Oncology Division   Medical Oncology  Professor of Medicine and Urology  /[facility]

## 2023-12-14 NOTE — PROGRESS NOTES
Physical Therapy                 Therapy Communication Note    Patient Name: Mike Olson  MRN: [id number]  Today's Date: 12/14/2023     Discipline: Physical Therapy    Missed Visit Reason: Missed Visit Reason: Other (Comment) (Pt transitioned to comfort care this morning. Will DC orders at this time.)    Missed Time: Attempt      12/14/23 at 10:22 AM - Neyda Isbell PT

## 2023-12-14 NOTE — SIGNIFICANT EVENT
RADAR/RN Request -     Increased RR (34) (RADAR)  NT suction/ ABG (RN call)    Pt with fever, upper rhonchi and snoring. Per rapid RN, physician requesting  NT sx. Pt. Assessed - CXR (12/12, 12/13, 12/14) all look almost identical but have three different readings. Left on 2L NC (SpO2 97%)    NT sx'd via left nare - small white thick      RR  36

## 2023-12-14 NOTE — CARE PLAN
Pt transitioned to comfort care this shift. Pain currently being managed with dilaudid PCA running at a basal rate of 0.2mg/hr with occasional bolus doses given for breakthrough pain. Overall, per pt and daughter, pain is being well managed at this time. Family and pt educated on PRN meds available, and what they can be given for. Full head to toe assessment deferred in order to maintain patients comfort. Pt remained safe and free from falls through shift.   Problem: Fall/Injury  Goal: Not fall by end of shift  Outcome: Progressing     Problem: Fall/Injury  Goal: Be free from injury by end of the shift  Outcome: Progressing     Problem: Fall/Injury  Goal: Verbalize understanding of personal risk factors for fall in the hospital  Outcome: Progressing     Problem: Skin  Goal: Prevent/manage excess moisture  Outcome: Progressing     Problem: Skin  Goal: Prevent/minimize sheer/friction injuries  Outcome: Progressing     Problem: Pain  Goal: My pain/discomfort is manageable  Outcome: Progressing     Problem: Safety  Goal: Patient will be injury free during hospitalization  Outcome: Progressing     Problem: Safety  Goal: I will remain free of falls    Problem: Daily Care  Goal: Daily care needs are met  Outcome: Progressing     Outcome: Progressing

## 2023-12-14 NOTE — NURSING NOTE
0755: Rapid response called by RN: respiratory concerns     0758: rapid response RN at bedside    VS: 0758  98.4  RR: 28  O2 sat: 96% 2.5 L  HR: 117  BP: 162/72    0759: Respiratory at bedside    0802: nasal suction performed by respiratory and rapid RN  0804: second nasal suction by respiratory and rapid RN    0806: NACR/DACR at bedside   RR 36    RT treatments: x2 nebulizer (Duoneb and Mucomyst)     0815: Primary team at bedside     0820: POA on phone   RR- 34     0824: Wife of pt called. Family wishes for pt to be switched to comfort care. Awaiting new orders.

## 2023-12-14 NOTE — CARE PLAN
Problem: Fall/Injury  Goal: Not fall by end of shift  Outcome: Progressing  Goal: Be free from injury by end of the shift  Outcome: Progressing  Goal: Verbalize understanding of personal risk factors for fall in the hospital  Outcome: Progressing  Goal: Verbalize understanding of risk factor reduction measures to prevent injury from fall in the home  Outcome: Progressing  Goal: Use assistive devices by end of the shift  Outcome: Progressing  Goal: Pace activities to prevent fatigue by end of the shift  Outcome: Progressing     Problem: Skin  Goal: Decreased wound size/increased tissue granulation at next dressing change  Outcome: Progressing  Flowsheets (Taken 12/14/2023 0356)  Decreased wound size/increased tissue granulation at next dressing change: Promote sleep for wound healing  Goal: Participates in plan/prevention/treatment measures  Outcome: Progressing  Flowsheets (Taken 12/14/2023 0356)  Participates in plan/prevention/treatment measures: Discuss with provider PT/OT consult  Goal: Prevent/manage excess moisture  Outcome: Progressing  Flowsheets (Taken 12/14/2023 0356)  Prevent/manage excess moisture: Moisturize dry skin  Goal: Prevent/minimize sheer/friction injuries  Outcome: Progressing  Flowsheets (Taken 12/14/2023 0356)  Prevent/minimize sheer/friction injuries: HOB 30 degrees or less  Goal: Promote/optimize nutrition  Outcome: Progressing  Flowsheets (Taken 12/14/2023 0356)  Promote/optimize nutrition: Discuss with provider if NPO > 2 days  Goal: Promote skin healing  Outcome: Progressing  Flowsheets (Taken 12/14/2023 0356)  Promote skin healing: Protective dressings over bony prominences     Problem: Pain  Goal: My pain/discomfort is manageable  Outcome: Progressing     Problem: Safety  Goal: Patient will be injury free during hospitalization  Outcome: Progressing  Goal: I will remain free of falls  Outcome: Progressing     Problem: Daily Care  Goal: Daily care needs are met  Outcome: Progressing      Problem: Psychosocial Needs  Goal: Demonstrates ability to cope with hospitalization/illness  Outcome: Progressing  Goal: Collaborate with me, my family, and caregiver to identify my specific goals  Outcome: Progressing     Problem: Discharge Barriers  Goal: My discharge needs are met  Outcome: Progressing

## 2023-12-14 NOTE — PROGRESS NOTES
Mike Olson is a 68 y.o. male on day 22 of admission presenting with Difficulty swallowing.    Spoke with patient's wife about skilled nursing facility choices as PT recommends SNF at this time.  She is not sure if patient will want to go to a facility. She will speak with patient and his daughters. Will follow up with April tomorrow.   LUIS Bello    12/13/23 @ 1300  Spoke with April, his two daughters, and son in law at bedside. Dr. Whaley and Dr. Coronado at bedside as well speaking with family. At this time, we will wait to make any SNF choices. There was discussion about hospice and family agreed that the goal is to make him comfortable. The family will take the next few days to talk about what they and the patient want and feel is best.  My number was given to the family if they need any assistance or have any questions.  LUIS Bello    12/14/23 @ 1100  Patient made comfort care this morning. Hospice consult placed and referral sent to [facility]. Spoke with family at bedside and they do not want him transported back home and agreed that inpatient would be best.  Explained that [facility] would be in touch with April and will see patient today for [facility] hospice.  LUIS Bello    12/14/23 @ 1350  HOWR to meet with April at bedside tomorrow morning, 12/15 @ 10:30am.   Josselyn Gipson RN T[facility]

## 2023-12-14 NOTE — SIGNIFICANT EVENT
Rapid Response RN Note    Rapid response RN at bedside for RADAR score 7 due to the following VS: T 37.6 °Celsius;  ; RR 34; /79; SPO2 96%.     Reviewed above VS with bedside RN. Lungs wet/coarse bilat & RR=40 on 2L & 99%; NT sxn'd for mod amt thinner yellow/white secretions; CXR done prior to my arrival; EKG done-'s; pt remains on continuous POX on 2L NC & 99%    Staff to page rapid response for any concerns or acute change in condition/VS.

## 2023-12-14 NOTE — SIGNIFICANT EVENT
Rapid Response Note    I responded for a Rapid Response called for tachypnea and increased secretions.  Daughter is at the bedside.      Upon examination, RR 35-40, breath sounds significant for rhonchi will some faint stridor, SpO2 in the mid 90s.  Performed deep suctioning x2 with thick creamy secretions.    Dr. Zamorano (NACR) at the bedside.  Per report chest xray is largely unchanged.    Dr. Coronado of primary team arrived to the bedside.  She called wife to discuss code status.  Wife will be in at approximately 1 hour to discuss possible comfort care status.    RT administered Duoneb treatment.    RN will call Rapid Response with further concerns or clinical deterioration.

## 2023-12-14 NOTE — PROGRESS NOTES
"SUPPORTIVE AND PALLIATIVE ONCOLOGY INPATIENT FOLLOW-UP      SERVICE DATE: 12/14/23     SUBJECTIVE:  Interval Events:    With chart check patient is now DNR-Comfort Care only. Discussed starting patient on dilaudid PCA pump 0.2mg basal and 0.4mg clinician bolus. No demand dose.     Provided support to the family, his wife April, daughter Josee and her boyfriend, daughter jenaro and other daughter Kathy.     Discussed hospice care and they report that \"we just want him comfortable. Give him all the drugs you can so he can be comfortable and not in pain\". Family is okay with comfort measures here at hospital. They welcomed hospice [facility] referral.     Pain Assessment:  Location: Right shoulder  Duration: Constant  Characteristics:   Rating:  pain ad 4, moaning    Descriptors: aching and sharp   Aggravating: movement    Relieving:  dilaudid PCA pump   Interference with Function: None    Opioid Use  Past 24 h prn opioid use:  used 1 dose of dilaudid 0.2mg = 4mg OME and used 4 doses of dilaudid 0.4mg = 32mg OME  Total 24h OME use:  36mg OME    Symptom Assessment:       Information obtained from: chart review, interview of patient, interview of family, discussion with RN, and discussion with primary team  ______________________________________________________________________        OBJECTIVE:    Lab Results   Component Value Date    WBC 20.0 (H) 12/14/2023    HGB 8.0 (L) 12/14/2023    HCT 25.7 (L) 12/14/2023    MCV 91 12/14/2023     12/14/2023      Lab Results   Component Value Date    GLUCOSE 118 (H) 12/14/2023    CALCIUM 10.4 12/14/2023     (H) 12/14/2023    K 3.7 12/14/2023    CO2 27 12/14/2023     (H) 12/14/2023    BUN 33 (H) 12/14/2023    CREATININE 0.60 12/14/2023     Lab Results   Component Value Date    ALT 66 (H) 12/13/2023    AST 32 12/13/2023    ALKPHOS 498 (H) 12/13/2023    BILITOT 0.9 12/13/2023     Estimated Creatinine Clearance: 108.3 mL/min (by C-G formula based on SCr of 0.6 " mg/dL).     Scheduled medications  acetaminophen, 650 mg, g-tube, q4h  acetylcysteine, 6 mL, nebulization, TID  fentaNYL PF, , ,   glycopyrrolate, 0.2 mg, intravenous, q6h  ipratropium-albuteroL, 3 mL, nebulization, TID  lactated Ringer's, 500 mL, intravenous, Once  lactated Ringer's, 500 mL, intravenous, Once  meropenem, 1 g, intravenous, q8h  micafungin, 100 mg, intravenous, q24h  midazolam, , ,   OLANZapine zydis, 5 mg, oral, Nightly  vancomycin, 1,000 mg, intravenous, q12h      Continuous medications     PRN medications  PRN medications: bisacodyl, dextrose 10 % in water (D10W), dextrose, fentaNYL PF, glucagon, glycopyrrolate, guaiFENesin, haloperidol, haloperidol lactate, HYDROmorphone, HYDROmorphone, HYDROmorphone, hyoscyamine, melatonin, meperidine, meperidine PF, midazolam, morphine, OLANZapine, sodium phosphates   }     PHYSICAL EXAMINATION:    Vital Signs:   Vital signs reviewed  Visit Vitals  /86 (BP Location: Left arm, Patient Position: Lying)   Pulse (!) 123   Temp 37 °C (98.6 °F) (Temporal)   Resp (!) 38        Pain Score: 9       Physical Exam  Constitutional:       Comments: Pleasantly confused    Eyes:      Pupils: Pupils are equal, round, and reactive to light.   Cardiovascular:      Heart sounds: Normal heart sounds.   Pulmonary:      Breath sounds: Normal breath sounds.   Abdominal:      General: Bowel sounds are normal.      Palpations: Abdomen is soft.   Skin:     General: Skin is warm.   Neurological:      Mental Status: He is disoriented.      Comments: Agitated and restless         ASSESSMENT/PLAN:  Mike Olson is a 68 y.o. 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 PMH significant for HTN, CVA (9/2023). Admitted 11/22/2023 for further evaluation and management of worsening dysphagia.Supportive and Palliative Oncology is consulted for pain management.      Pain:  cancer pain related to cancer  Pain is: cancer related  "pain  Type: somatic  Pain control: well-controlled  Home regimen:  Acetaminophen 650mg q 8hrs   Intolerances/previously tried: tramadol 50mg tablet was used in the past   Personalized pain goal: 0  Pain rated 4/10, described as \"sharp pain\".  Total OME usage for the past 24 hours: 36mg OME  discontinue dilaudid 0.2mg IV q 2hrs PRN for 2hrs [used 1 dose = 4mg OME]  discontinue Dilaudid 0.4mg IV q 3hrs PRN for pain [used 4 doses= 32mg OME   Start Dilaudid PCA Pump 0.2mg iv/hr basal and 0.4mg iv clinician bolus. No demand dose.    Remove sublingual morphine   Remove acetaminophen 650mg q4hrs PRN   Continue Gabapentin 300mg BID [hold for sedation]     Constipation  At risk for constipation related to opioids, currently constipated  Usual bowel pattern: every day  Home regimen: none  LBM 12/9/23  Continue Miralax 17 grams BID  continue senna 5ml liquid q day     continue bisacodyl suppository 10mg daily/PRN   Encouraged him to use his suppository and PRN laxatives.   Continue water Enema daily PRN if no BM in 3 days, and if suppository doesn't work.       Agitation/ Restlessness  Change haldol 2mg iv q6hrs PRN   Added ativan 0.5 mg q4hrs PRN     Sleeping Difficulty:  Impaired sleep related to pain  Home regimen:  none  Continue Olanzapine 5mg at night it will dissolved in mouth [it will help with nausea, agitation and sleep].   remnove Melatonin 5mg PRN at night   Gabapentin as above.  Patient reports that he cannot get comfortable, especially sleeping on right arm.   Reports good night sleep last night.      Decreased appetite:  Appetite loss related to chemotherapy, taste changes, and disease process  Nutrition not consulted   Home regimen:  none  Patient has a pegtube and would continue to do well.   Artificial nutrition initiated.     Respiratory Secretions:   Change Robinul 0.4mg q6hrs PRN     Goals of Care/ Minimum acceptable Quality of Life Measures   Patient's current clinical condition, including diagnosis, " "prognosis, and management plan, and goals of care were discussed.      Patients endorsed Goals: \"to get the hell out here\".   Jazzy: \"sitting around doing nothing with [his] wife and dog\"  Code status discussion/patients Code status:   DNAR/DNI     #Palliative Medicine encounter: Palliative care was introduced as a service for patients with serious illness to improve quality of life, including helping with pain and other bothersome symptoms, clarifying patient's values and priorities to help align the patient's care with their goals, and providing support to patients and families.   Declined  for spiritual Support   Declined Music therapy for coping    Declined Art Therapy for legacy building   Welcomed Pet Therapy for Emotional Support   Coordination of Care   Supportive Listening        Advance Directives/Advance Care Planning  Existence of Advance Directives:   Decision maker/ NOK: Surrogate decision maker is wife Kavitha Black @[phone]  Code Status: Full code     [place] Surrogate Decision Maker Hierarchy     1. Court-appointed Guardian  2. Healthcare Power of   3. Legal Spouse  4. Majority of Adult Children (consensus if possible)  5. Parents  6. Majority of Adult Siblings (consensus if possible)  7. Nearest Blood Relative     Supportive Interventions: Interventions: Music Therapy: offered declined, Art Therapy: offered declined     Disposition:  Please  start the process of having prior authorization with meds to beds deliver medications to patient prior to discharge via [facility] pharmacy. Prescriptions will need to be sent 48-72 hours prior to discharge so that a prior authorization can be completed.      Discharge date: unknown pending acute issues  Will assess if patient needs an appointment with Outpatient Supportive Oncology as appropriate.    Signature and billing:  Thank you for allowing us to participate in the care of this patient. Recommendations will be communicated back to the " consulting service by way of shared electronic medical record or face-to-face.    Medical complexity was high level due to due to complexity of problems, extensive data review, and high risk of management/treatment.    I spent 50 minutes in the care of this patient which included chart review, interviewing patient/family, discussion with primary team, coordination of care, and documentation.    Data:   Diagnostic tests and information reviewed for today's visit:  Conversation with primary team       Some elements copied from supportive oncology note on 12/06/2023, the elements have been updated and all reflect current decision making from today, 12/14/23       Plan of Care discussed with: Provider, RN, Patient, Provider, and Pharmacist    Thank you for asking Supportive and Palliative Oncology to assist with care of this patient.  We will continue to follow  Please contact us for additional questions or concerns.      SIGNATURE: ANKUSH Saldivar   PAGER/CONTACT:  Contact information:  Supportive and Palliative Oncology  Monday-Friday 8 AM-5 PM  Epic Secure chat or pager [phone].  After hours and weekends:  pager [phone]

## 2023-12-15 ENCOUNTER — HOSPITAL ENCOUNTER (INPATIENT)
Facility: HOSPITAL | Age: 68
LOS: 2 days | DRG: 951 | End: 2023-12-17
Attending: INTERNAL MEDICINE | Admitting: INTERNAL MEDICINE
Payer: OTHER MISCELLANEOUS

## 2023-12-15 DIAGNOSIS — C15.9 ESOPHAGEAL CANCER, STAGE IV (MULTI): Primary | ICD-10-CM

## 2023-12-15 LAB
ATRIAL RATE: 120 BPM
P AXIS: 41 DEGREES
P OFFSET: 196 MS
P ONSET: 139 MS
PR INTERVAL: 158 MS
Q ONSET: 218 MS
QRS COUNT: 19 BEATS
QRS DURATION: 82 MS
QT INTERVAL: 304 MS
QTC CALCULATION(BAZETT): 429 MS
QTC FREDERICIA: 383 MS
R AXIS: -16 DEGREES
T AXIS: 37 DEGREES
T OFFSET: 370 MS
VENTRICULAR RATE: 120 BPM

## 2023-12-15 PROCEDURE — 99233 SBSQ HOSP IP/OBS HIGH 50: CPT

## 2023-12-15 PROCEDURE — G0316 PR PROLONGED INPATIENT/OBSERVATION EM SVC EA 15 MIN: HCPCS

## 2023-12-15 PROCEDURE — 99223 1ST HOSP IP/OBS HIGH 75: CPT | Performed by: INTERNAL MEDICINE

## 2023-12-15 PROCEDURE — 2500000004 HC RX 250 GENERAL PHARMACY W/ HCPCS (ALT 636 FOR OP/ED)

## 2023-12-15 PROCEDURE — 94640 AIRWAY INHALATION TREATMENT: CPT

## 2023-12-15 PROCEDURE — 2500000002 HC RX 250 W HCPCS SELF ADMINISTERED DRUGS (ALT 637 FOR MEDICARE OP, ALT 636 FOR OP/ED)

## 2023-12-15 PROCEDURE — 99239 HOSP IP/OBS DSCHRG MGMT >30: CPT | Performed by: INTERNAL MEDICINE

## 2023-12-15 PROCEDURE — 2500000001 HC RX 250 WO HCPCS SELF ADMINISTERED DRUGS (ALT 637 FOR MEDICARE OP)

## 2023-12-15 PROCEDURE — A4216 STERILE WATER/SALINE, 10 ML: HCPCS

## 2023-12-15 PROCEDURE — 1150000001 HC HOSPICE PRIVATE ROOM DAILY

## 2023-12-15 RX ORDER — LORAZEPAM 2 MG/ML
1 INJECTION INTRAMUSCULAR EVERY 4 HOURS PRN
Status: DISCONTINUED | OUTPATIENT
Start: 2023-12-15 | End: 2023-12-17 | Stop reason: HOSPADM

## 2023-12-15 RX ORDER — LORAZEPAM 2 MG/ML
1 INJECTION INTRAMUSCULAR EVERY 4 HOURS PRN
Refills: 0
Start: 2023-12-15 | End: 2023-12-18

## 2023-12-15 RX ORDER — MORPHINE SULFATE IN 0.9 % NACL 30 MG/30ML
1 PATIENT CONTROLLED ANALGESIA SYRINGE INTRAVENOUS CONTINUOUS
Status: DISCONTINUED | OUTPATIENT
Start: 2023-12-15 | End: 2023-12-15

## 2023-12-15 RX ORDER — MORPHINE SULFATE IN 0.9 % NACL 30 MG/30ML
PATIENT CONTROLLED ANALGESIA SYRINGE INTRAVENOUS CONTINUOUS
Status: DISCONTINUED | OUTPATIENT
Start: 2023-12-15 | End: 2023-12-17 | Stop reason: HOSPADM

## 2023-12-15 RX ORDER — MORPHINE SULFATE IN 0.9 % NACL 30 MG/30ML
1.5 PATIENT CONTROLLED ANALGESIA SYRINGE INTRAVENOUS CONTINUOUS
Status: DISCONTINUED | OUTPATIENT
Start: 2023-12-15 | End: 2023-12-15

## 2023-12-15 RX ORDER — HALOPERIDOL 5 MG/ML
2 INJECTION INTRAMUSCULAR EVERY 6 HOURS PRN
Qty: 1 ML
Start: 2023-12-15 | End: 2023-12-18

## 2023-12-15 RX ORDER — HYDROMORPHONE HYDROCHLORIDE 1 MG/ML
0.4 INJECTION, SOLUTION INTRAMUSCULAR; INTRAVENOUS; SUBCUTANEOUS
Status: DISCONTINUED | OUTPATIENT
Start: 2023-12-15 | End: 2023-12-15

## 2023-12-15 RX ORDER — NALOXONE HYDROCHLORIDE 0.4 MG/ML
0.2 INJECTION, SOLUTION INTRAMUSCULAR; INTRAVENOUS; SUBCUTANEOUS AS NEEDED
Status: DISCONTINUED | OUTPATIENT
Start: 2023-12-15 | End: 2023-12-17 | Stop reason: HOSPADM

## 2023-12-15 RX ORDER — IPRATROPIUM BROMIDE AND ALBUTEROL SULFATE 2.5; .5 MG/3ML; MG/3ML
3 SOLUTION RESPIRATORY (INHALATION) EVERY 4 HOURS PRN
Status: DISCONTINUED | OUTPATIENT
Start: 2023-12-15 | End: 2023-12-17 | Stop reason: HOSPADM

## 2023-12-15 RX ORDER — MORPHINE SULFATE 2 MG/ML
1 INJECTION, SOLUTION INTRAMUSCULAR; INTRAVENOUS
Status: CANCELLED | OUTPATIENT
Start: 2023-12-15

## 2023-12-15 RX ORDER — GUAIFENESIN 600 MG/1
600 TABLET, EXTENDED RELEASE ORAL 2 TIMES DAILY PRN
Start: 2023-12-15 | End: 2023-12-18

## 2023-12-15 RX ORDER — NALOXONE HYDROCHLORIDE 0.4 MG/ML
0.2 INJECTION, SOLUTION INTRAMUSCULAR; INTRAVENOUS; SUBCUTANEOUS AS NEEDED
Qty: 1 ML | Status: SHIPPED | OUTPATIENT
Start: 2023-12-15 | End: 2023-12-18

## 2023-12-15 RX ORDER — NALOXONE HYDROCHLORIDE 0.4 MG/ML
0.2 INJECTION, SOLUTION INTRAMUSCULAR; INTRAVENOUS; SUBCUTANEOUS AS NEEDED
Status: DISCONTINUED | OUTPATIENT
Start: 2023-12-15 | End: 2023-12-15

## 2023-12-15 RX ORDER — DEXTROSE MONOHYDRATE 100 MG/ML
0.3 INJECTION, SOLUTION INTRAVENOUS ONCE AS NEEDED
Qty: 500 ML
Start: 2023-12-15 | End: 2023-12-18

## 2023-12-15 RX ORDER — MORPHINE SULFATE 2 MG/ML
1 INJECTION, SOLUTION INTRAMUSCULAR; INTRAVENOUS
Status: DISCONTINUED | OUTPATIENT
Start: 2023-12-15 | End: 2023-12-15 | Stop reason: HOSPADM

## 2023-12-15 RX ORDER — MORPHINE SULFATE 2 MG/ML
1 INJECTION, SOLUTION INTRAMUSCULAR; INTRAVENOUS
Refills: 0
Start: 2023-12-15 | End: 2023-12-18

## 2023-12-15 RX ORDER — MORPHINE SULFATE IN 0.9 % NACL 30 MG/30ML
PATIENT CONTROLLED ANALGESIA SYRINGE INTRAVENOUS CONTINUOUS
Status: DISCONTINUED | OUTPATIENT
Start: 2023-12-15 | End: 2023-12-15

## 2023-12-15 RX ORDER — HALOPERIDOL 5 MG/ML
2 INJECTION INTRAMUSCULAR EVERY 6 HOURS PRN
Status: DISCONTINUED | OUTPATIENT
Start: 2023-12-15 | End: 2023-12-17 | Stop reason: HOSPADM

## 2023-12-15 RX ORDER — MORPHINE SULFATE IN 0.9 % NACL 30 MG/30ML
1 PATIENT CONTROLLED ANALGESIA SYRINGE INTRAVENOUS CONTINUOUS
Status: CANCELLED | OUTPATIENT
Start: 2023-12-15

## 2023-12-15 RX ORDER — OLANZAPINE 5 MG/1
5 TABLET, ORALLY DISINTEGRATING ORAL NIGHTLY
Status: DISCONTINUED | OUTPATIENT
Start: 2023-12-15 | End: 2023-12-17 | Stop reason: HOSPADM

## 2023-12-15 RX ORDER — LORAZEPAM 2 MG/ML
1 INJECTION INTRAMUSCULAR EVERY 4 HOURS PRN
Status: DISCONTINUED | OUTPATIENT
Start: 2023-12-15 | End: 2023-12-15 | Stop reason: HOSPADM

## 2023-12-15 RX ORDER — ACETYLCYSTEINE 100 MG/ML
6 SOLUTION ORAL; RESPIRATORY (INHALATION) 3 TIMES DAILY PRN
Status: DISCONTINUED | OUTPATIENT
Start: 2023-12-15 | End: 2023-12-17 | Stop reason: HOSPADM

## 2023-12-15 RX ORDER — HYDROMORPHONE HYDROCHLORIDE 1 MG/ML
0.4 INJECTION, SOLUTION INTRAMUSCULAR; INTRAVENOUS; SUBCUTANEOUS
Refills: 0
Start: 2023-12-15 | End: 2023-12-18

## 2023-12-15 RX ORDER — ACETAMINOPHEN 160 MG/5ML
650 SOLUTION ORAL EVERY 6 HOURS PRN
Status: DISCONTINUED | OUTPATIENT
Start: 2023-12-15 | End: 2023-12-17 | Stop reason: HOSPADM

## 2023-12-15 RX ORDER — HYOSCYAMINE SULFATE 0.12 MG/1
0.12 TABLET, ORALLY DISINTEGRATING ORAL EVERY 4 HOURS PRN
Status: DISCONTINUED | OUTPATIENT
Start: 2023-12-15 | End: 2023-12-17 | Stop reason: HOSPADM

## 2023-12-15 RX ORDER — MORPHINE SULFATE IN 0.9 % NACL 30 MG/30ML
PATIENT CONTROLLED ANALGESIA SYRINGE INTRAVENOUS CONTINUOUS
Status: DISCONTINUED | OUTPATIENT
Start: 2023-12-15 | End: 2023-12-15 | Stop reason: HOSPADM

## 2023-12-15 RX ORDER — ACETYLCYSTEINE 100 MG/ML
6 SOLUTION ORAL; RESPIRATORY (INHALATION) 3 TIMES DAILY
Status: DISCONTINUED | OUTPATIENT
Start: 2023-12-15 | End: 2023-12-15

## 2023-12-15 RX ORDER — HYOSCYAMINE SULFATE 0.12 MG/1
0.12 TABLET, ORALLY DISINTEGRATING ORAL EVERY 4 HOURS PRN
Refills: 0
Start: 2023-12-15 | End: 2023-12-18

## 2023-12-15 RX ORDER — IPRATROPIUM BROMIDE AND ALBUTEROL SULFATE 2.5; .5 MG/3ML; MG/3ML
3 SOLUTION RESPIRATORY (INHALATION) 3 TIMES DAILY
Status: DISCONTINUED | OUTPATIENT
Start: 2023-12-15 | End: 2023-12-15

## 2023-12-15 RX ORDER — OLANZAPINE 5 MG/1
5 TABLET, ORALLY DISINTEGRATING ORAL NIGHTLY
Start: 2023-12-15 | End: 2023-12-18

## 2023-12-15 RX ORDER — ACETYLCYSTEINE 100 MG/ML
6 SOLUTION ORAL; RESPIRATORY (INHALATION)
Start: 2023-12-15 | End: 2023-12-18

## 2023-12-15 RX ORDER — IPRATROPIUM BROMIDE AND ALBUTEROL SULFATE 2.5; .5 MG/3ML; MG/3ML
3 SOLUTION RESPIRATORY (INHALATION)
Start: 2023-12-15 | End: 2023-12-18

## 2023-12-15 RX ORDER — NALOXONE HYDROCHLORIDE 0.4 MG/ML
0.2 INJECTION, SOLUTION INTRAMUSCULAR; INTRAVENOUS; SUBCUTANEOUS AS NEEDED
Status: DISCONTINUED | OUTPATIENT
Start: 2023-12-15 | End: 2023-12-15 | Stop reason: HOSPADM

## 2023-12-15 RX ORDER — DEXTROSE 50 % IN WATER (D50W) INTRAVENOUS SYRINGE
25
Start: 2023-12-15 | End: 2023-12-18

## 2023-12-15 RX ORDER — MORPHINE SULFATE 2 MG/ML
1 INJECTION, SOLUTION INTRAMUSCULAR; INTRAVENOUS
Status: DISCONTINUED | OUTPATIENT
Start: 2023-12-15 | End: 2023-12-17 | Stop reason: HOSPADM

## 2023-12-15 RX ORDER — BISACODYL 10 MG/1
10 SUPPOSITORY RECTAL ONCE AS NEEDED
Qty: 12 SUPPOSITORY | Refills: 0
Start: 2023-12-15 | End: 2023-12-18

## 2023-12-15 RX ADMIN — ACETAMINOPHEN 650 MG: 650 SOLUTION ORAL at 21:47

## 2023-12-15 RX ADMIN — MORPHINE SULFATE 1 MG: 2 INJECTION, SOLUTION INTRAMUSCULAR; INTRAVENOUS at 12:31

## 2023-12-15 RX ADMIN — LORAZEPAM 0.5 MG: 2 INJECTION INTRAMUSCULAR; INTRAVENOUS at 11:02

## 2023-12-15 RX ADMIN — Medication: at 16:11

## 2023-12-15 RX ADMIN — IPRATROPIUM BROMIDE AND ALBUTEROL SULFATE 3 ML: .5; 3 SOLUTION RESPIRATORY (INHALATION) at 09:26

## 2023-12-15 RX ADMIN — LORAZEPAM 1 MG: 2 INJECTION INTRAMUSCULAR; INTRAVENOUS at 21:39

## 2023-12-15 RX ADMIN — LORAZEPAM 1 MG: 2 INJECTION INTRAMUSCULAR; INTRAVENOUS at 15:33

## 2023-12-15 RX ADMIN — MORPHINE SULFATE: 10 INJECTION INTRAVENOUS at 12:32

## 2023-12-15 RX ADMIN — OLANZAPINE 5 MG: 5 TABLET, ORALLY DISINTEGRATING ORAL at 21:28

## 2023-12-15 RX ADMIN — HYDROMORPHONE HYDROCHLORIDE 0.4 MG: 1 INJECTION, SOLUTION INTRAMUSCULAR; INTRAVENOUS; SUBCUTANEOUS at 09:40

## 2023-12-15 RX ADMIN — HYDROMORPHONE HYDROCHLORIDE 0.4 MG: 1 INJECTION, SOLUTION INTRAMUSCULAR; INTRAVENOUS; SUBCUTANEOUS at 06:16

## 2023-12-15 RX ADMIN — HYDROMORPHONE HYDROCHLORIDE 0.4 MG: 1 INJECTION, SOLUTION INTRAMUSCULAR; INTRAVENOUS; SUBCUTANEOUS at 10:48

## 2023-12-15 RX ADMIN — MORPHINE SULFATE 1 MG: 2 INJECTION, SOLUTION INTRAMUSCULAR; INTRAVENOUS at 21:28

## 2023-12-15 ASSESSMENT — COGNITIVE AND FUNCTIONAL STATUS - GENERAL
DRESSING REGULAR LOWER BODY CLOTHING: A LOT
TOILETING: A LOT
DAILY ACTIVITIY SCORE: 6
MOBILITY SCORE: 6
CLIMB 3 TO 5 STEPS WITH RAILING: TOTAL
PERSONAL GROOMING: TOTAL
WALKING IN HOSPITAL ROOM: TOTAL
EATING MEALS: TOTAL
PERSONAL GROOMING: A LOT
MOBILITY SCORE: 12
TOILETING: A LOT
DRESSING REGULAR UPPER BODY CLOTHING: TOTAL
MOVING TO AND FROM BED TO CHAIR: A LOT
WALKING IN HOSPITAL ROOM: TOTAL
DRESSING REGULAR LOWER BODY CLOTHING: A LOT
CLIMB 3 TO 5 STEPS WITH RAILING: TOTAL
DRESSING REGULAR UPPER BODY CLOTHING: A LOT
HELP NEEDED FOR BATHING: A LOT
STANDING UP FROM CHAIR USING ARMS: TOTAL
STANDING UP FROM CHAIR USING ARMS: A LOT
DRESSING REGULAR LOWER BODY CLOTHING: TOTAL
HELP NEEDED FOR BATHING: TOTAL
TURNING FROM BACK TO SIDE WHILE IN FLAT BAD: A LOT
MOVING FROM LYING ON BACK TO SITTING ON SIDE OF FLAT BED WITH BEDRAILS: A LOT
MOVING TO AND FROM BED TO CHAIR: A LOT
DAILY ACTIVITIY SCORE: 11
WALKING IN HOSPITAL ROOM: A LOT
MOVING FROM LYING ON BACK TO SITTING ON SIDE OF FLAT BED WITH BEDRAILS: A LOT
MOVING FROM LYING ON BACK TO SITTING ON SIDE OF FLAT BED WITH BEDRAILS: TOTAL
EATING MEALS: TOTAL
TURNING FROM BACK TO SIDE WHILE IN FLAT BAD: TOTAL
EATING MEALS: TOTAL
PERSONAL GROOMING: A LOT
STANDING UP FROM CHAIR USING ARMS: A LOT
CLIMB 3 TO 5 STEPS WITH RAILING: A LOT
DAILY ACTIVITIY SCORE: 11
MOBILITY SCORE: 10
HELP NEEDED FOR BATHING: A LOT
TOILETING: TOTAL
DRESSING REGULAR UPPER BODY CLOTHING: A LOT
MOVING TO AND FROM BED TO CHAIR: TOTAL
TURNING FROM BACK TO SIDE WHILE IN FLAT BAD: A LOT

## 2023-12-15 ASSESSMENT — PAIN SCALES - PAIN ASSESSMENT IN ADVANCED DEMENTIA (PAINAD)
BODYLANGUAGE: TENSE, DISTRESSED PACING, FIDGETING
TOTALSCORE: 1
TOTALSCORE: 7
CONSOLABILITY: NO NEED TO CONSOLE
BODYLANGUAGE: RELAXED
FACIALEXPRESSION: FACIAL GRIMACING
BREATHING: OCCASIONAL LABORED BREATHING, SHORT PERIOD OF HYPERVENTILATION
FACIALEXPRESSION: SMILING OR INEXPRESSIVE
NEGVOCALIZATION: OCCASIONAL MOAN/GROAN, LOW SPEECH, NEGATIVE/DISAPPROVING QUALITY
CONSOLABILITY: UNABLE TO CONSOLE, DISTRACT OR REASSURE
BREATHING: OCCASIONAL LABORED BREATHING, SHORT PERIOD OF HYPERVENTILATION
TOTALSCORE: MEDICATION (SEE MAR)

## 2023-12-15 ASSESSMENT — PAIN - FUNCTIONAL ASSESSMENT
PAIN_FUNCTIONAL_ASSESSMENT: 0-10
PAIN_FUNCTIONAL_ASSESSMENT: 0-10

## 2023-12-15 ASSESSMENT — PAIN SCALES - GENERAL
PAINLEVEL_OUTOF10: 10 - WORST POSSIBLE PAIN
PAINLEVEL_OUTOF10: 9
PAINLEVEL_OUTOF10: 10 - WORST POSSIBLE PAIN

## 2023-12-15 NOTE — CONSULTS
Reason For Consult  hospice    History Of Present Illness.     Past Medical History  He has a past medical history of Cancer (CMS/HCC).    Surgical History  He has a past surgical history that includes MR angio head wo IV contrast (9/18/2023).     Social History  He reports that he has quit smoking. His smoking use included cigarettes. He has never used smokeless tobacco. He reports that he does not currently use alcohol. He reports current drug use. Drug: Marijuana.    Family History  Family History   Problem Relation Name Age of Onset    Hypertension Mother      Prostate cancer Father      Stroke Maternal Grandfather          Allergies  Patient has no known allergies.    Review of Systems       Physical Exam       Last Recorded Vitals  There were no vitals taken for this visit.    Relevant Results       Assessment/Plan     This RN met with pt. And family.  Pt. Initially in pain and breathing labored.  Briefly reviewed hospice level of care with family, who state agreement and request that pt. Be placed in GIP and started on morphine drip as this is the only thing that makes him comfortable.  Pt. Placed in Gip and morphine drip started at 1mg./hour.  Pt. Resting comfortably at the end of this visit.  Family aware that if pt. Should linger a DC plan may be needed.          Terri Harris RN

## 2023-12-15 NOTE — CARE PLAN
The patient's goals for the shift include        Problem: Fall/Injury  Goal: Not fall by end of shift  Outcome: Progressing  Goal: Be free from injury by end of the shift  Outcome: Progressing  Goal: Verbalize understanding of personal risk factors for fall in the hospital  Outcome: Progressing  Goal: Verbalize understanding of risk factor reduction measures to prevent injury from fall in the home  Outcome: Progressing  Goal: Use assistive devices by end of the shift  Outcome: Progressing  Goal: Pace activities to prevent fatigue by end of the shift  Outcome: Progressing     Problem: Skin  Goal: Decreased wound size/increased tissue granulation at next dressing change  Outcome: Progressing  Flowsheets (Taken 12/15/2023 1014)  Decreased wound size/increased tissue granulation at next dressing change: Promote sleep for wound healing  Goal: Participates in plan/prevention/treatment measures  Outcome: Progressing  Flowsheets (Taken 12/15/2023 1014)  Participates in plan/prevention/treatment measures: Elevate heels  Goal: Prevent/manage excess moisture  Outcome: Progressing  Flowsheets (Taken 12/15/2023 1014)  Prevent/manage excess moisture: Cleanse incontinence/protect with barrier cream  Goal: Prevent/minimize sheer/friction injuries  Outcome: Progressing  Flowsheets (Taken 12/15/2023 1014)  Prevent/minimize sheer/friction injuries: Use pull sheet  Goal: Promote/optimize nutrition  Outcome: Progressing  Goal: Promote skin healing  Outcome: Progressing  Flowsheets (Taken 12/15/2023 1014)  Promote skin healing:   Protective dressings over bony prominences   Assess skin/pad under line(s)/device(s)     Problem: Pain  Goal: My pain/discomfort is manageable  Outcome: Progressing     Problem: Safety  Goal: Patient will be injury free during hospitalization  Outcome: Progressing  Goal: I will remain free of falls  Outcome: Progressing     Problem: Daily Care  Goal: Daily care needs are met  Outcome: Progressing     Problem:  Psychosocial Needs  Goal: Demonstrates ability to cope with hospitalization/illness  Outcome: Progressing  Goal: Collaborate with me, my family, and caregiver to identify my specific goals  Outcome: Progressing     Problem: Discharge Barriers  Goal: My discharge needs are met  Outcome: Progressing

## 2023-12-15 NOTE — DISCHARGE INSTRUCTIONS
Parth Devine Whitney.     You came in with concerns for dysphagia. We found that you have squamous cell carcinoma of your esophagus. You have elected to choose comfort and have been admitted to our hospice service.    Privilege taking care of you,  Your [facility] Medical Team

## 2023-12-15 NOTE — SIGNIFICANT EVENT
Rapid Follow-up -     Pt made DNR - comfort care and will receive only the treatments that keep him comfortable - per RRT and Family discretion.

## 2023-12-15 NOTE — PROGRESS NOTES
"SUPPORTIVE AND PALLIATIVE ONCOLOGY INPATIENT FOLLOW-UP      SERVICE DATE: 12/15/23     SUBJECTIVE:  Interval Events:    Family and patient reporting worsening pain and inadequate pain relief. Pt telling nurse \"let me die\"; negative vocalization.  Hospice RN Terri Harris currently at bedside for enrollment; pt to convert to inpatient hospice here and transfer to hospice house at later date if needed.  Family is requesting IV Dilaudid to be changed to IV Morphine drip as pt seemed to obtain adequate relief from previously received Morphine doses. Not interested in titration up on Dilaudid.     Pain Assessment:  Unable to conduct thorough pain assessment due to pt's level of discomfort, negative vocalization, agitation/restlessness    Opioid Use  Past 24 h prn opioid use:  PCA Dilaudid started yesterday 12/14/23 at 1148. Basal rate of 0.2 mg/hr with IV Dilaudid 0.4 mg q 1 hr prn for breakthrough. No demand doses due to pt's altered mental status yesterday.  Total mg in basal rate of Dilaudid=4 mg (12/14 1000-12/15 1000)=50 OME  Additionally received 5 doses of 0.4 mg IVP Dilaudid for breakthrough=2 mg=25 OME  Total OME/24 hrs=75 OME         Information obtained from: chart review, interview of patient, interview of family, discussion with RN, and discussion with primary team  ______________________________________________________________________        OBJECTIVE:    Lab Results   Component Value Date    WBC 20.0 (H) 12/14/2023    HGB 8.0 (L) 12/14/2023    HCT 25.7 (L) 12/14/2023    MCV 91 12/14/2023     12/14/2023      Lab Results   Component Value Date    GLUCOSE 118 (H) 12/14/2023    CALCIUM 10.4 12/14/2023     (H) 12/14/2023    K 3.7 12/14/2023    CO2 27 12/14/2023     (H) 12/14/2023    BUN 33 (H) 12/14/2023    CREATININE 0.60 12/14/2023     Lab Results   Component Value Date    ALT 66 (H) 12/13/2023    AST 32 12/13/2023    ALKPHOS 498 (H) 12/13/2023    BILITOT 0.9 12/13/2023     Estimated " Creatinine Clearance: 108.3 mL/min (by C-G formula based on SCr of 0.6 mg/dL).     Scheduled medications  acetylcysteine, 6 mL, nebulization, TID  fentaNYL PF, , ,   [Held by provider] glycopyrrolate, 0.4 mg, intravenous, q6h  ipratropium-albuteroL, 3 mL, nebulization, TID  lactated Ringer's, 500 mL, intravenous, Once  lactated Ringer's, 500 mL, intravenous, Once  midazolam, , ,   OLANZapine zydis, 5 mg, oral, Nightly      Continuous medications  morphine,     PRN medications  PRN medications: bisacodyl, dextrose 10 % in water (D10W), dextrose, fentaNYL PF, glucagon, [Held by provider] glycopyrrolate, guaiFENesin, haloperidol lactate, HYDROmorphone, hyoscyamine, LORazepam, meperidine, meperidine PF, midazolam, morphine, naloxone, naloxone   }     PHYSICAL EXAMINATION:    Vital Signs:   Vital signs reviewed  Visit Vitals  /75 (BP Location: Left arm, Patient Position: Lying)   Pulse (!) 118   Temp 37.2 °C (99 °F) (Temporal)   Resp (!) 36        Pain Score: 10 - Worst possible pain  PAINAD Score:  [3]        Physical Exam  Constitutional:       General: He is in acute distress.      Appearance: He is ill-appearing.      Comments: Pleasantly confused    HENT:      Head: Normocephalic.   Eyes:      Pupils: Pupils are equal, round, and reactive to light.   Cardiovascular:      Heart sounds: Normal heart sounds.   Pulmonary:      Breath sounds: Normal breath sounds.   Abdominal:      General: Bowel sounds are normal.      Palpations: Abdomen is soft.   Skin:     General: Skin is warm.   Neurological:      Mental Status: He is alert.      Comments: Agitated and restless         ASSESSMENT/PLAN:  Mike Olson is a 68 y.o. 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 PMH significant for HTN, CVA (9/2023). Admitted 11/22/2023 for further evaluation and management of worsening dysphagia.Supportive and Palliative Oncology is consulted for pain management.       Pain:  Cancer pain related to cancer  Pain is: cancer related pain  Type: somatic  Pain control: sub-optimally controlled  Home regimen:  Acetaminophen 650mg q 8hrs   Intolerances/previously tried: tramadol 50mg tablet was used in the past   Personalized pain goal: 0  Pain: unable to conduct thorough assessment due to patients level of discomfort and negative vocalization  Total OME usage for the past 24 hours: 75mg OME  Discontinue Dilaudid PCA Pump 0.2mg iv/hr basal and 0.4mg iv clinician bolus. No demand dose.    Start Morphine Infusion at 1 mg/hr.  Start Morphine 1 mg/hr IVP q 1 hr prn for breakthrough pain.   Continue Gabapentin 300mg BID [hold for sedation]  Once pt transitioned over to generalized hospice inpatient status, please utilize end of life/comfort care order set.       Constipation  At risk for constipation related to opioids, currently constipated  Usual bowel pattern: every day  Home regimen: none  LBM 12/15/23  Continue Miralax 17 grams BID  continue senna 5ml liquid q day     continue bisacodyl suppository 10mg daily/PRN       Agitation/ Restlessness  Increased agitation likely terminal agitation and restlessness  Poor pain control likely contributing  Discontinue Ativan IV 0.5 mg q 6 hrs prn   Recommend Increasing Ativan to 1 mg IV q 4 hrs prn for agitation/restlessness    Sleeping Difficulty:  Impaired sleep related to pain  Home regimen:  none  Will likely improve with adequate pain control  Continue Olanzapine 5mg at night it will dissolved in mouth [it will help with nausea, agitation and sleep].   Gabapentin as above.  Patient reports that he cannot get comfortable, especially sleeping on right arm.   Reports good night sleep last night.      Decreased appetite:  Appetite loss related to chemotherapy, taste changes, and disease process  Nutrition not consulted   Home regimen:  none  Patient has a pegtube and would continue to do well.   Artificial nutrition initiated.     Respiratory  "Secretions:   Well controlled and stable  Continue Robinul 0.4mg q6hrs PRN     Goals of Care/ Minimum acceptable Quality of Life Measures   Patient's current clinical condition, including diagnosis, prognosis, and management plan, and goals of care were discussed.      Patients endorsed Goals: \"to get the hell out here\".   Jazzy: \"sitting around doing nothing with [his] wife and dog\"  Code status discussion/patients Code status:   DNAR/DNI  DNR-comfort care on 12/14/23     #Palliative Medicine encounter: Palliative care was introduced as a service for patients with serious illness to improve quality of life, including helping with pain and other bothersome symptoms, clarifying patient's values and priorities to help align the patient's care with their goals, and providing support to patients and families.   Declined  for spiritual Support   Declined Music therapy for coping    Declined Art Therapy for legacy building   Welcomed Pet Therapy for Emotional Support   Coordination of Care   Supportive Listening        Advance Directives/Advance Care Planning  Existence of Advance Directives:   Decision maker/ NOK: Surrogate decision maker is wife Kavitha Black @[phone]  Code Status: DNR-comfort care as of 12/14/23     [place] Surrogate Decision Maker Hierarchy     1. Court-appointed Guardian  2. Healthcare Power of   3. Legal Spouse  4. Majority of Adult Children (consensus if possible)  5. Parents  6. Majority of Adult Siblings (consensus if possible)  7. Nearest Blood Relative     Supportive Interventions: Interventions: Music Therapy: offered declined, Art Therapy: offered declined     Disposition:  Please  start the process of having prior authorization with meds to beds deliver medications to patient prior to discharge via [facility] pharmacy. Prescriptions will need to be sent 48-72 hours prior to discharge so that a prior authorization can be completed.      Discharge date: unknown pending acute " issues  Will assess if patient needs an appointment with Outpatient Supportive Oncology as appropriate.    Signature and billing:  Thank you for allowing us to participate in the care of this patient. Recommendations will be communicated back to the consulting service by way of shared electronic medical record or face-to-face.    Medical complexity was high level due to due to complexity of problems, extensive data review, and high risk of management/treatment.    I spent 75 minutes in the care of this patient which included chart review, interviewing patient/family, discussion with primary team, coordination of care, and documentation.    Data:   Diagnostic tests and information reviewed for today's visit:  Conversation with primary team       Some elements copied from Mark Steinberg's note on 12/14/2023, the elements have been updated and all reflect current decision making from today, 12/15/23       Plan of Care discussed with: Provider, RN, Patient, Provider, and Pharmacist    Pt resting comfortably 30 min after Morphine infusion started. Agitation/restlessness improved as well.  Family pleased with response to Morphine; enrolled with R and will remain inpatient hospice on unit for now.     Thank you for asking Supportive and Palliative Oncology to assist with care of this patient.  We will continue to follow  Please contact us for additional questions or concerns.      SIGNATURE: JEFF Breaux-CNP   PAGER/CONTACT:  Contact information:  Supportive and Palliative Oncology  Monday-Friday 8 AM-5 PM  Epic Secure chat or pager [phone].  After hours and weekends:  pager [phone]

## 2023-12-15 NOTE — DISCHARGE SUMMARY
Discharge Diagnosis  Difficulty swallowing    Issues Requiring Follow-Up  [  ] GIP and [facility] Team: continue comfort care orders for GIP    Test Results Pending At Discharge  Pending Labs       Order Current Status    Respiratory Culture/Smear Collected (12/02/23 1812)    TEMPUS XT PLACEHOLDER In process    Tempus xT In process    Blood Culture Preliminary result    Blood Culture Preliminary result    Blood Culture Preliminary result    Blood Culture Preliminary result            Hospital Course  67yo with hx of HTN, HLD, chronic alcoholism, long term tobacco usage (chewing tobacco), CVA 2/2 carotid artery stenosis (09/17/2023, finished course of DAPT) hx of rapidly enlarging right axillary mass who initially presented through ED for workup/treatment of worsening dysphagia. PET scan 11/2023 showed widely metastatic disease to bone, proximal esophagus, and lung.  Given new diagnosis, no treatment has been started yet and no known origin yet. Consulted GI who performed EGD/biopsy on 11/24 which showed a large, nearly completely obstructing esophageal mass. Rad onc was consulted for palliative radiation therapy, plan for CT sim on 11/27 and begin radiation treatment after. General surgery was consulted for possible G-tube and port placement for chemo infusion and plan to take patient to OR on 11/30.  Eventually had PEG tube placed with general surgery without complication.  Patient began palliative radiation with radiation oncology in 5 fractions to target the esophageal mass.  Patient was tolerating radiation treatment but did develop signs and symptoms of delirium while inpatient.  Most notably deliriums symptoms were worse on 12/8 with the patient was only oriented to self and had an increasing oxygen requirement that necessitated 2 L of supplemental oxygen therapy.  The precipitation of the delirium is likely multifactorial but initiation of methadone preceded the delirium 3 days prior and likely contributed to  the bladder retention and somnolence.  After discontinuing the patient's methadone, he was more awake and oriented on 12/9 upon evaluation by providers and by family.  Repeats chest x-rays demonstrated pulmonary edema and diuresis with multiple doses of Lasix was given over the course of 3 days.  On 12/8 patient did develop bladder retention and was straight cathed 3 times and PVR demonstrated a volume of greater than 400 cc, bladder.  A Vazquez catheter was placed on 12/8 and the patient was net negative on 12/9 with 1.9 L out in 24 hours and net -1 L.  Patient's creatinine slowly improved during this time although his white count slowly crept up.  CT chest without contrast was ordered on 12/9 due to persistent oxygen requirement and elevated white count on antibiotics.  CT chest demonstrated coarse bibasilar airspace opacities that may correlate with residual aspiration pneumonia. Patient also received RT to R axillary mass from 12/5-12/11. On 12/12 in AM rapid response was called for fever, tachycardia, and altered mental status. Wound cultures from R axillary mass started growing yeast on 12/13. ID was consulted and patient was started on Micafungin in addition to Vancomycin and Meropenem. On 12/14 patient became increasingly tachypneic and pain was out of proportion. Patient made comfort care on 12/14 and was admitted to [facility] on 12/15.          Pertinent Physical Exam At Time of Discharge  Physical Exam  Constitutional:       Appearance: Patient alert. Ill-appearing and in pain. Says he wants to die.  HENT:      Mouth/Throat: Very congested.     Mouth: Mucous membranes are moist.   Cardiovascular:      Heart sounds: Limited by congestion.  Pulmonary:      Effort: Increased work of breathing.     Breath sounds: Rhonchi, congestion.  Abdominal:      General: Bowel sounds are normal. There is no distension.      Palpations: Abdomen is soft. There is no mass.      Tenderness: There is no abdominal tenderness. There is  no guarding or rebound.      Comments: PEG tube in place.  Genitourinary:     Comments: Vazquez catheter in place draining, clear yellow urine  Musculoskeletal:         General: No swelling or tenderness. Normal range of motion.      Cervical back: Normal range of motion.      Right lower leg: No edema.      Left lower leg: No edema.   Skin:     General: Skin is warm.      Capillary Refill: Capillary refill takes less than 2 seconds.      Coloration: Skin is not jaundiced.      Findings: No bruising or rash.      Comments: Axillary mass erythematous, tender, with pus present.    Neurological:      Not alert at this time.    Home Medications     Medication List      START taking these medications     acetylcysteine 100 mg/mL (10 %) nebulizer solution; Commonly known as:   Mucomyst; Take 6 mL by nebulization 3 times a day.   bisacodyl 10 mg suppository; Commonly known as: Dulcolax; Insert 1   suppository (10 mg) into the rectum 1 time if needed for constipation for   up to 1 dose.   dextrose 10 % in water (D10W) 10 % infusion; Infuse 22.95 g/hr at 229.5   mL/hr into a venous catheter 1 time if needed (For blood glucose less than   70 mg/dL after 30 minutes of intervention.  Discontinue once blood glucose   reaches 100 mg/dL.) for up to 1 dose.   dextrose 50 % injection; Infuse 50 mL (25 g) into a venous catheter   every 15 minutes if needed (For blood glucose less than or equal to 40   mg/dL).   glucagon 1 mg injection; Commonly known as: Glucagen; Inject 1 mg into   the muscle every 15 minutes if needed for low blood sugar - see comments   (For blood glucose less than or equal to 70 mg/dL and no IV access).   guaiFENesin 600 mg 12 hr tablet; Commonly known as: Mucinex; Take 1   tablet (600 mg) by mouth 2 times a day as needed for cough. Do not crush,   chew, or split.   haloperidol lactate 5 mg/mL injection; Commonly known as: Haldol; Infuse   0.4 mL (2 mg) into a venous catheter every 6 hours if needed for agitation    (use second line after lorazepam).   HYDROmorphone 1 mg/mL injection; Commonly known as: Dilaudid; Infuse 0.4   mL (0.4 mg) into a venous catheter every 1 hour if needed for other   (breakthrough).   hyoscyamine 0.125 mg disintegrating tablet; Commonly known as: Anaspaz;   Place 1 tablet (0.125 mg) under the tongue every 4 hours if needed (excess   secretions).   ipratropium-albuteroL 0.5-2.5 mg/3 mL nebulizer solution; Commonly known   as: Duo-Neb; Take 3 mL by nebulization 3 times a day.   LORazepam 2 mg/mL injection; Commonly known as: Ativan; Infuse 0.5 mL (1   mg) over 5 minutes into a venous catheter every 4 hours if needed for   anxiety.   morphine 2 mg/mL injection; Infuse 0.5 mL (1 mg) into a venous catheter   every 1 hour if needed (breakthrough).   * naloxone 0.4 mg/mL injection; Commonly known as: Narcan; Infuse 0.5 mL   (0.2 mg) into a venous catheter if needed for respiratory depression (Once   PRN patient is unarousable, and respiratory rate less than 8).   * naloxone 0.4 mg/mL injection; Commonly known as: Narcan; Infuse 0.5 mL   (0.2 mg) into a venous catheter if needed for respiratory depression (Once   PRN patient is unarousable, and respiratory rate less than 8).   OLANZapine zydis 5 mg disintegrating tablet; Commonly known as: ZyPREXA;   Take 1 tablet (5 mg) by mouth once daily at bedtime.  * This list has 2 medication(s) that are the same as other medications   prescribed for you. Read the directions carefully, and ask your doctor or   other care provider to review them with you.     STOP taking these medications     amLODIPine 10 mg tablet; Commonly known as: Norvasc   aspirin 81 mg chewable tablet   atorvastatin 80 mg tablet; Commonly known as: Lipitor   chlorthalidone 25 mg tablet; Commonly known as: Hygroton   losartan 100 mg tablet; Commonly known as: Cozaar   omeprazole 20 mg DR capsule; Commonly known as: PriLOSEC   potassium chloride CR 10 mEq ER tablet; Commonly known as: Klor-Con    thiamine 100 mg tablet; Commonly known as: Vitamin B-1   traMADol 50 mg tablet; Commonly known as: Ultram       Outpatient Follow-Up  Future Appointments   Date Time Provider Department Center   12/20/2023 11:15 AM Jesi Lindsay MD [id number] [facility]   1/10/2024  8:00 AM ROYER STRESS 1 [id number] [facility]   1/10/2024  9:15 AM ROYER CT 1 SAMCT [facility]   1/16/2024 10:15 AM Rob Phipps MD [id number] [facility]       Katherine Gay MD    TEACHING ATTENDING ATTESTATION    I saw and evaluated this patient with Resident/Fellow. I saw and evaluated the patient. I reviewed the resident/fellow's documentation and discussed the patient with the resident/fellow. I agree with the resident/fellow's medical decision making as documented in the note.     Corbin Whaley MD, FACP  Chief, Solid Tumor Oncology Division   Medical Oncology  Professor of Medicine and Urology  /[facility]

## 2023-12-15 NOTE — H&P
History Of Present Illness  Mike Olson is a 68 y.o. male presenting with squamous cell carcinoma of the esophagus.    Agree with previous H&P. Patient is now doing [facility] hospice.     Past Medical History  Past Medical History:   Diagnosis Date    Cancer (CMS/HCC)        Surgical History  Past Surgical History:   Procedure Laterality Date    MR HEAD ANGIO WO IV CONTRAST  9/18/2023    MR HEAD ANGIO WO IV CONTRAST 9/18/2023 [facility] MRI        Social History  He reports that he has quit smoking. His smoking use included cigarettes. He has never used smokeless tobacco. He reports that he does not currently use alcohol. He reports current drug use. Drug: Marijuana.    Family History  Family History   Problem Relation Name Age of Onset    Hypertension Mother      Prostate cancer Father      Stroke Maternal Grandfather          Allergies  Patient has no known allergies.    Review of Systems  N/A     Physical Exam  Constitutional:       Appearance: Patient alert. Ill-appearing and in pain. Says he wants to die.  HENT:      Mouth/Throat: Very congested.     Mouth: Mucous membranes are moist.   Cardiovascular:      Heart sounds: Limited by congestion.  Pulmonary:      Effort: Increased work of breathing.     Breath sounds: Rhonchi, congestion.  Abdominal:      General: Bowel sounds are normal. There is no distension.      Palpations: Abdomen is soft. There is no mass.      Tenderness: There is no abdominal tenderness. There is no guarding or rebound.      Comments: PEG tube in place.  Genitourinary:     Comments: Vazquez catheter in place draining, clear yellow urine  Musculoskeletal:         General: No swelling or tenderness. Normal range of motion.      Cervical back: Normal range of motion.      Right lower leg: No edema.      Left lower leg: No edema.   Skin:     General: Skin is warm.      Capillary Refill: Capillary refill takes less than 2 seconds.      Coloration: Skin is not jaundiced.      Findings: No bruising or rash.       Comments: Axillary mass erythematous, tender, with pus present.    Neurological:      Not alert at this time.     Last Recorded Vitals  There were no vitals taken for this visit.    Relevant Results  N/A     Assessment/Plan   67 year old male with PMH HTN, CVA (9/2023), recently diagnosed poorly differentiated metastatic SCC (11/2023) who presents for worsening dysphagia. Given new diagnosis, no treatment has been started yet and no known origin yet. Will discuss with Med Onc (Dr. Yin was to be his primary oncologist), Surg Onc (Dr Corona had already seen pt) and will also consult GI for EGD/biopsy. EGD on 11/24 showed large, nearly fully obstructing esophageal mass. Patient is not a surgical candidate given metastatic disease. Managing nutrition and need for chemo/radiation. PEG in place. Patient is slightly fluid overloaded, receiving 2L. Ongoing elevation of white count w/o spiking fevers but increased white/yellow sputum productions consistent with pulmonary edema vs silent aspiration from underlying esophageal dysfunctions 2/2 malignancy. 12/8, patient exhibiting more signs of delirium and somnolence, possibly related to initiation of methadone on 12/5. 12/14: patient has been persistently tachypneic and tachycardic. CXR shows deviation of trachea concerning for progressing esophageal mass. Patient currently not alert and in respiratory distress. POA was called and family decided to make patient DNRI DNI comfort care measures only. 12/15 Patient was admitted to [facility] hospice service.     Updates 12/15:  -Patient admitted to [facility] hospice  -Comfort care order in place, appreciate hospice/supportive oncology recs    #Comfort Care  #Hospice  -morphine PCA with morphine 1mg q1h prn breakthrough  -haldol 2mg q6h prn agitation  -dilaudid 0.4mg q1h prn breakthrough  -ativan 1mg q4h prn anxiety/agitation  -zyprexa 5mg qnight   -respiratory therapies  -liquid tylenol for fever     #Metastatic SCC, poorly  differentiated   #Dysphagia   #Leukocytosis, worsening  - Pathology Microscopic examination of H&E sections demonstrates a poorly differentiated carcinoma , immunoreactive with CK7 and p40 , infiltrating soft tissue.  No residual lymph node parenchyma is seen.  The following immunostains are negative: NKX3.1, GATA3, CK20, TTF-1 and CDX2   -Diagnosed 11/2023; no treatment yet; unknown origin  -Surg Onc consulted; appreciate recs (saw Dr. Cheungermal outpatient 11/10/23)  -SLP consulted for swallow evaluation and recommended NPO  -EGD/biopsy 11/24:  large, nearly fully obstructing esophageal mass. Patient is not a surgical candidate because of metastatic disease.   - Nutrition consulted: 1. Start 100mg IV thiamine x5 days;  Isosource 1.5 @ 25 ml/hr. Advance slowly by 10 ml/hr q8h, as tolerated, to goal rate of 55 ml/hr.   - CT SIM 11/27  - 5/5 fraction of pRT to esophageal mass 12/4 completed  -CT SIM 12/6  -s/p pRT to right axillary mass fraction   -s/p 2 doses IV lasix 20 mg, one dose of 40 IV lasix  -c/w Mucomyst, BP hygiene and PEP oscillatory therapy, duonebs q6hrs, -Schedule Robinul daily   -Procal elevated 0.79  - s/p PEG tube placement 11/27  - c/w 345ml QID bolus feeds, FWF 60 ml four times daily and 60 before and after every feed and 150 ml additional BID  - 100mg IV thiamine x5 days   -discontinue Vancomycin, MRSA nares negative  -c/w IV Zosyn (12/7-12/12)  -vanc/meropenem (12/12-12/14)  -micafungin 150mg 12/13, 100mg 12/14  -Patient now DNR DNI comfort care measures only; comfort care order set in place     #Urinary Retention, Worthy catheter in place   -1.9 L out w/straight cat 12/7  -c/w worthy     #HTN  -dc'd home amlodipine 10mg daily iso comfort care  -dc'd chlorithalidone 25mg daily iso comfort care  -dc'd ARB iso comfort care     #CVA 9/2023  #Carotid stenosis  -2 acute or subacute infarcts seen on MRI 9/18/2023  -dc'd ASA 81mg daily iso comfort care  -Completed 21 days of plavix (started  9/18/23)  -dc'd atorvastatin 80mg daily iso comfort care     #Hypercalcemia, resolved  -Ca 11.5 on admission  -S/p 1L LR in the ED, on LR infusion at 125cc/hr  -Repeat was 11.3 on 11/23, so avoiding LR in favor of NS       F: prn  E: prn   N: PEG  A: PIVs     DVT ppx: N/A  GI ppx: N/A    Code Status: DNR DNI Comfort Care Measures Only  Surrogate Medical Decision-maker: Wife April [phone]     Katherine Gay MD  PGY-1     [facility] Team Pager [phone]               Katherine Gay MD      TEACHING ATTENDING ATTESTATION    I saw and evaluated this patient with Resident/Fellow. I saw and evaluated the patient. I reviewed the resident/fellow's documentation and discussed the patient with the resident/fellow. I agree with the resident/fellow's medical decision making as documented in the note.     Corbin Whaley MD, FACP  Chief, Solid Tumor Oncology Division   Medical Oncology  Professor of Medicine and Urology  /[facility]

## 2023-12-16 LAB
BACTERIA BLD CULT: NORMAL
BACTERIA BLD CULT: NORMAL

## 2023-12-16 PROCEDURE — 1150000001 HC HOSPICE PRIVATE ROOM DAILY

## 2023-12-16 PROCEDURE — 99233 SBSQ HOSP IP/OBS HIGH 50: CPT | Performed by: INTERNAL MEDICINE

## 2023-12-16 PROCEDURE — 2500000004 HC RX 250 GENERAL PHARMACY W/ HCPCS (ALT 636 FOR OP/ED)

## 2023-12-16 PROCEDURE — 2500000001 HC RX 250 WO HCPCS SELF ADMINISTERED DRUGS (ALT 637 FOR MEDICARE OP)

## 2023-12-16 PROCEDURE — 2500000004 HC RX 250 GENERAL PHARMACY W/ HCPCS (ALT 636 FOR OP/ED): Performed by: STUDENT IN AN ORGANIZED HEALTH CARE EDUCATION/TRAINING PROGRAM

## 2023-12-16 RX ADMIN — LORAZEPAM 1 MG: 2 INJECTION INTRAMUSCULAR; INTRAVENOUS at 04:57

## 2023-12-16 RX ADMIN — OLANZAPINE 5 MG: 5 TABLET, ORALLY DISINTEGRATING ORAL at 21:40

## 2023-12-16 RX ADMIN — MORPHINE SULFATE 1 MG: 2 INJECTION, SOLUTION INTRAMUSCULAR; INTRAVENOUS at 04:57

## 2023-12-16 RX ADMIN — ACETAMINOPHEN 650 MG: 650 SOLUTION ORAL at 10:43

## 2023-12-16 RX ADMIN — Medication: at 16:41

## 2023-12-16 RX ADMIN — MORPHINE SULFATE 1 MG: 2 INJECTION, SOLUTION INTRAMUSCULAR; INTRAVENOUS at 02:31

## 2023-12-16 ASSESSMENT — PAIN SCALES - PAIN ASSESSMENT IN ADVANCED DEMENTIA (PAINAD)
BREATHING: NOISY LABORED BREATHING, LONG PERIODS OF HYPERVENTILATION, CHEYNE-STOKES RESPIRATIONS
CONSOLABILITY: UNABLE TO CONSOLE, DISTRACT OR REASSURE
TOTALSCORE: 8
CONSOLABILITY: UNABLE TO CONSOLE, DISTRACT OR REASSURE
BODYLANGUAGE: TENSE, DISTRESSED PACING, FIDGETING
CONSOLABILITY: UNABLE TO CONSOLE, DISTRACT OR REASSURE
NEGVOCALIZATION: OCCASIONAL MOAN/GROAN, LOW SPEECH, NEGATIVE/DISAPPROVING QUALITY
BREATHING: NOISY LABORED BREATHING, LONG PERIODS OF HYPERVENTILATION, CHEYNE-STOKES RESPIRATIONS
BODYLANGUAGE: TENSE, DISTRESSED PACING, FIDGETING
FACIALEXPRESSION: SAD, FRIGHTENED, FROWN
FACIALEXPRESSION: FACIAL GRIMACING
BODYLANGUAGE: TENSE, DISTRESSED PACING, FIDGETING
TOTALSCORE: 6
NEGVOCALIZATION: OCCASIONAL MOAN/GROAN, LOW SPEECH, NEGATIVE/DISAPPROVING QUALITY
BREATHING: NOISY LABORED BREATHING, LONG PERIODS OF HYPERVENTILATION, CHEYNE-STOKES RESPIRATIONS
TOTALSCORE: 8
FACIALEXPRESSION: FACIAL GRIMACING

## 2023-12-16 NOTE — NURSING NOTE
Hospice visit to pt. Pt heart rate and respirations both tachy. Morphine gtt increased to 2mg/hr. Mottling noted to left knee, cooler lower extremities. Dtr Kathy present at bedside. S/S imminent death reviewed. Collaborated with Dr Whaley,Dr Denise, Lisa Green and Annette LARRY.HWMARCO ANTONIO arias daily. Please call [phone] for any pt or family needs. Thank you.

## 2023-12-16 NOTE — PROGRESS NOTES
Mike Olson is a 68 y.o. male on day 1 of admission presenting with Esophageal cancer, stage IV (CMS/HCC).    Subjective   NAEO. Discussed with hospice RN this morning. Increased basal dose of morphine to 2mg/hr.     Objective     Physical Exam  Deferred for patient comfort, in bed with patient's daughter at bedside.    Last Recorded Vitals  Temperature 37.7 °C (99.9 °F), temperature source Temporal.  Intake/Output last 3 Shifts:  I/O last 3 completed shifts:  In: -   Out: 250 [Urine:250]    Relevant Results    Assessment/Plan   Principal Problem:    Esophageal cancer, stage IV (CMS/HCC)    67 year old male with PMH HTN, CVA (9/2023), recently diagnosed poorly differentiated metastatic SCC (11/2023) who presents for worsening dysphagia. Given new diagnosis, no treatment has been started yet and no known origin yet. Will discuss with Med Onc (Dr. Yin was to be his primary oncologist), Surg Onc (Dr Corona had already seen pt) and will also consult GI for EGD/biopsy. EGD on 11/24 showed large, nearly fully obstructing esophageal mass. Patient is not a surgical candidate given metastatic disease. Managing nutrition and need for chemo/radiation. PEG in place. Patient is slightly fluid overloaded, receiving 2L. Ongoing elevation of white count w/o spiking fevers but increased white/yellow sputum productions consistent with pulmonary edema vs silent aspiration from underlying esophageal dysfunctions 2/2 malignancy. 12/8, patient exhibiting more signs of delirium and somnolence, possibly related to initiation of methadone on 12/5. 12/14: patient has been persistently tachypneic and tachycardic. CXR shows deviation of trachea concerning for progressing esophageal mass. Patient currently not alert and in respiratory distress. POA was called and family decided to make patient DNRI DNI comfort care measures only. 12/15 Patient was admitted to [facility] hospice service.     Updates 12/16:  -no updates, increased morphine per  hospice RN  -Patient admitted to [facility] hospice  -Comfort care order in place, appreciate hospice/supportive oncology recs     #Comfort Care  #Hospice  -morphine PCA with morphine 2mg  -haldol 2mg q6h prn agitation  -dilaudid 0.4mg q1h prn breakthrough  -ativan 1mg q4h prn anxiety/agitation  -zyprexa 5mg qnight   -respiratory therapies  -liquid tylenol for fever     #Metastatic SCC, poorly differentiated   #Dysphagia   #Leukocytosis, worsening  - Pathology Microscopic examination of H&E sections demonstrates a poorly differentiated carcinoma , immunoreactive with CK7 and p40 , infiltrating soft tissue.  No residual lymph node parenchyma is seen.  The following immunostains are negative: NKX3.1, GATA3, CK20, TTF-1 and CDX2   -Diagnosed 11/2023; no treatment yet; unknown origin  -Surg Onc consulted; appreciate recs (saw Dr. Cheungermal outpatient 11/10/23)  -SLP consulted for swallow evaluation and recommended NPO  -EGD/biopsy 11/24:  large, nearly fully obstructing esophageal mass. Patient is not a surgical candidate because of metastatic disease.   - Nutrition consulted: 1. Start 100mg IV thiamine x5 days;  Isosource 1.5 @ 25 ml/hr. Advance slowly by 10 ml/hr q8h, as tolerated, to goal rate of 55 ml/hr.   - CT SIM 11/27  - 5/5 fraction of pRT to esophageal mass 12/4 completed  -CT SIM 12/6  -s/p pRT to right axillary mass fraction   -s/p 2 doses IV lasix 20 mg, one dose of 40 IV lasix  -c/w Mucomyst, BP hygiene and PEP oscillatory therapy, duonebs q6hrs, -Schedule Robinul daily   -Procal elevated 0.79  - s/p PEG tube placement 11/27  - c/w 345ml QID bolus feeds, FWF 60 ml four times daily and 60 before and after every feed and 150 ml additional BID  - 100mg IV thiamine x5 days   -discontinue Vancomycin, MRSA nares negative  -c/w IV Zosyn (12/7-12/12)  -vanc/meropenem (12/12-12/14)  -micafungin 150mg 12/13, 100mg 12/14  -Patient now DNR DNI comfort care measures only; comfort care order set in place     #Urinary  Retention, Worthy catheter in place   -1.9 L out w/straight cat 12/7  -c/w worthy     #HTN  -dc'd home amlodipine 10mg daily iso comfort care  -dc'd chlorithalidone 25mg daily iso comfort care  -dc'd ARB iso comfort care     #CVA 9/2023  #Carotid stenosis  -2 acute or subacute infarcts seen on MRI 9/18/2023  -dc'd ASA 81mg daily iso comfort care  -Completed 21 days of plavix (started 9/18/23)  -dc'd atorvastatin 80mg daily iso comfort care     #Hypercalcemia, resolved  -Ca 11.5 on admission  -S/p 1L LR in the ED, on LR infusion at 125cc/hr  -Repeat was 11.3 on 11/23, so avoiding LR in favor of NS        F: prn  E: prn   N: PEG  A: PIVs     DVT ppx: N/A  GI ppx: N/A    Code Status: DNR DNI Comfort Care Measures Only  Surrogate Medical Decision-maker: Wife April [phone]           Say Denise MD    TEACHING ATTENDING ATTESTATION    I saw and evaluated this patient with Resident/Fellow. I reviewed the resident/fellow's documentation and discussed the patient with the resident/fellow. I agree with the resident/fellow's medical decision making as documented in the note.     Corbin Whaley MD, FACP  Chief, Solid Tumor Oncology Division   Medical Oncology  Professor of Medicine and Urology  /[facility]

## 2023-12-17 VITALS
OXYGEN SATURATION: 37 % | DIASTOLIC BLOOD PRESSURE: 57 MMHG | HEART RATE: 127 BPM | SYSTOLIC BLOOD PRESSURE: 120 MMHG | RESPIRATION RATE: 35 BRPM | TEMPERATURE: 98.8 F

## 2023-12-17 LAB
BACTERIA BLD CULT: NORMAL
BACTERIA BLD CULT: NORMAL
GLUCOSE BLD MANUAL STRIP-MCNC: 110 MG/DL (ref 74–99)
GLUCOSE BLD MANUAL STRIP-MCNC: 116 MG/DL (ref 74–99)

## 2023-12-17 PROCEDURE — 2500000001 HC RX 250 WO HCPCS SELF ADMINISTERED DRUGS (ALT 637 FOR MEDICARE OP)

## 2023-12-17 PROCEDURE — 2500000005 HC RX 250 GENERAL PHARMACY W/O HCPCS

## 2023-12-17 PROCEDURE — 99239 HOSP IP/OBS DSCHRG MGMT >30: CPT | Performed by: INTERNAL MEDICINE

## 2023-12-17 RX ADMIN — HYOSCYAMINE SULFATE 0.12 MG: 0.12 TABLET SUBLINGUAL at 01:18

## 2023-12-17 RX ADMIN — ACETAMINOPHEN 650 MG: 650 SOLUTION ORAL at 01:18

## 2023-12-17 ASSESSMENT — PAIN SCALES - PAIN ASSESSMENT IN ADVANCED DEMENTIA (PAINAD)
FACIALEXPRESSION: FACIAL GRIMACING
CONSOLABILITY: UNABLE TO CONSOLE, DISTRACT OR REASSURE
BREATHING: NOISY LABORED BREATHING, LONG PERIODS OF HYPERVENTILATION, CHEYNE-STOKES RESPIRATIONS
TOTALSCORE: 7
NEGVOCALIZATION: OCCASIONAL MOAN/GROAN, LOW SPEECH, NEGATIVE/DISAPPROVING QUALITY
BODYLANGUAGE: RELAXED

## 2023-12-17 NOTE — HOSPITAL COURSE
69yo with hx of HTN, HLD, chronic alcoholism, long term tobacco usage (chewing tobacco), CVA 2/2 carotid artery stenosis (09/17/2023, finished course of DAPT) hx of rapidly enlarging right axillary mass who initially presented through ED for workup/treatment of worsening dysphagia. PET scan 11/2023 showed widely metastatic disease to bone, proximal esophagus, and lung.  Given new diagnosis, no treatment has been started yet and no known origin yet. Consulted GI who performed EGD/biopsy on 11/24 which showed a large, nearly completely obstructing esophageal mass. Rad onc was consulted for palliative radiation therapy, plan for CT sim on 11/27 and begin radiation treatment after. General surgery was consulted for possible G-tube and port placement for chemo infusion and plan to take patient to OR on 11/30.  Eventually had PEG tube placed with general surgery without complication.  Patient began palliative radiation with radiation oncology in 5 fractions to target the esophageal mass.  Patient was tolerating radiation treatment but did develop signs and symptoms of delirium while inpatient.  Most notably deliriums symptoms were worse on 12/8 with the patient was only oriented to self and had an increasing oxygen requirement that necessitated 2 L of supplemental oxygen therapy.  The precipitation of the delirium is likely multifactorial but initiation of methadone preceded the delirium 3 days prior and likely contributed to the bladder retention and somnolence.  After discontinuing the patient's methadone, he was more awake and oriented on 12/9 upon evaluation by providers and by family.  Repeats chest x-rays demonstrated pulmonary edema and diuresis with multiple doses of Lasix was given over the course of 3 days.  On 12/8 patient did develop bladder retention and was straight cathed 3 times and PVR demonstrated a volume of greater than 400 cc, bladder.  A Vazquez catheter was placed on 12/8 and the patient was net  negative on 12/9 with 1.9 L out in 24 hours and net -1 L.  Patient's creatinine slowly improved during this time although his white count slowly crept up.  CT chest without contrast was ordered on 12/9 due to persistent oxygen requirement and elevated white count on antibiotics.  CT chest demonstrated coarse bibasilar airspace opacities that may correlate with residual aspiration pneumonia. Patient also received RT to R axillary mass from 12/5-12/11. On 12/12 in AM rapid response was called for fever, tachycardia, and altered mental status. Wound cultures from R axillary mass started growing yeast on 12/13. ID was consulted and patient was started on Micafungin in addition to Vancomycin and Meropenem. On 12/14 patient became increasingly tachypneic and pain was out of proportion. Patient made comfort care on 12/14 and was admitted to [facility] on 12/15.  Patient passed away on 12/17 at 1:12PM.

## 2023-12-17 NOTE — NURSING NOTE
Pt will DC home today at 730p via [facility]. DME has been delivered. Reviewed DC med rec with Dr Lay Sierra. Needed meds to be delivered to the home by 6p or so. [facility] staff will follow up with pt and family once pt arrives at home. Nishant SMITH and Marianela COFFEY aware of DC plan. [phone]

## 2023-12-17 NOTE — SIGNIFICANT EVENT
Called to see patient for unresponsiveness. On exam the patient did not respond to verbal or physical stimuli. Absent heart and breath sounds for one minute, no response to noxious stimuli, and absent corneal reflex. Absent peripheral pulses. Pupils are fixed and dilated.   Patient pronounced dead at minute on  1:12PM . Dr. Corbin Whaley MD notified.  FAMILY  notified.

## 2023-12-17 NOTE — DISCHARGE SUMMARY
Discharge Diagnosis  Esophageal cancer, stage IV (CMS/HCC)      Test Results Pending At Discharge  Pending Labs       No current pending labs.            Hospital Course  69yo with hx of HTN, HLD, chronic alcoholism, long term tobacco usage (chewing tobacco), CVA 2/2 carotid artery stenosis (09/17/2023, finished course of DAPT) hx of rapidly enlarging right axillary mass who initially presented through ED for workup/treatment of worsening dysphagia. PET scan 11/2023 showed widely metastatic disease to bone, proximal esophagus, and lung.  Given new diagnosis, no treatment has been started yet and no known origin yet. Consulted GI who performed EGD/biopsy on 11/24 which showed a large, nearly completely obstructing esophageal mass. Rad onc was consulted for palliative radiation therapy, plan for CT sim on 11/27 and begin radiation treatment after. General surgery was consulted for possible G-tube and port placement for chemo infusion and plan to take patient to OR on 11/30.  Eventually had PEG tube placed with general surgery without complication.  Patient began palliative radiation with radiation oncology in 5 fractions to target the esophageal mass.  Patient was tolerating radiation treatment but did develop signs and symptoms of delirium while inpatient.  Most notably deliriums symptoms were worse on 12/8 with the patient was only oriented to self and had an increasing oxygen requirement that necessitated 2 L of supplemental oxygen therapy.  The precipitation of the delirium is likely multifactorial but initiation of methadone preceded the delirium 3 days prior and likely contributed to the bladder retention and somnolence.  After discontinuing the patient's methadone, he was more awake and oriented on 12/9 upon evaluation by providers and by family.  Repeats chest x-rays demonstrated pulmonary edema and diuresis with multiple doses of Lasix was given over the course of 3 days.  On 12/8 patient did develop bladder  retention and was straight cathed 3 times and PVR demonstrated a volume of greater than 400 cc, bladder.  A Vazquez catheter was placed on 12/8 and the patient was net negative on 12/9 with 1.9 L out in 24 hours and net -1 L.  Patient's creatinine slowly improved during this time although his white count slowly crept up.  CT chest without contrast was ordered on 12/9 due to persistent oxygen requirement and elevated white count on antibiotics.  CT chest demonstrated coarse bibasilar airspace opacities that may correlate with residual aspiration pneumonia. Patient also received RT to R axillary mass from 12/5-12/11. On 12/12 in AM rapid response was called for fever, tachycardia, and altered mental status. Wound cultures from R axillary mass started growing yeast on 12/13. ID was consulted and patient was started on Micafungin in addition to Vancomycin and Meropenem. On 12/14 patient became increasingly tachypneic and pain was out of proportion. Patient made comfort care on 12/14 and was admitted to [facility] on 12/15.  Patient passed away on 12/17 at 1:12PM. Family notified.    Pertinent Physical Exam At Time of Discharge  Physical Exam  On exam the patient did not respond to verbal or physical stimuli. Absent heart and breath sounds for one minute, no response to noxious stimuli, and absent corneal reflex. Absent peripheral pulses. Pupils are fixed and dilated.     Home Medications     Medication List      ASK your doctor about these medications     acetylcysteine 100 mg/mL (10 %) nebulizer solution; Commonly known as:   Mucomyst; Take 6 mL by nebulization 3 times a day.   bisacodyl 10 mg suppository; Commonly known as: Dulcolax; Insert 1   suppository (10 mg) into the rectum 1 time if needed for constipation for   up to 1 dose.   dextrose 10 % in water (D10W) 10 % infusion; Infuse 22.95 g/hr at 229.5   mL/hr into a venous catheter 1 time if needed (For blood glucose less than   70 mg/dL after 30 minutes of intervention.   Discontinue once blood glucose   reaches 100 mg/dL.) for up to 1 dose.   dextrose 50 % injection; Infuse 50 mL (25 g) into a venous catheter   every 15 minutes if needed (For blood glucose less than or equal to 40   mg/dL).   glucagon 1 mg injection; Commonly known as: Glucagen; Inject 1 mg into   the muscle every 15 minutes if needed for low blood sugar - see comments   (For blood glucose less than or equal to 70 mg/dL and no IV access).   guaiFENesin 600 mg 12 hr tablet; Commonly known as: Mucinex; Take 1   tablet (600 mg) by mouth 2 times a day as needed for cough. Do not crush,   chew, or split.   haloperidol lactate 5 mg/mL injection; Commonly known as: Haldol; Infuse   0.4 mL (2 mg) into a venous catheter every 6 hours if needed for agitation   (use second line after lorazepam).   HYDROmorphone 1 mg/mL injection; Commonly known as: Dilaudid; Infuse 0.4   mL (0.4 mg) into a venous catheter every 1 hour if needed for other   (breakthrough).   hyoscyamine 0.125 mg disintegrating tablet; Commonly known as: Anaspaz;   Place 1 tablet (0.125 mg) under the tongue every 4 hours if needed (excess   secretions).   ipratropium-albuteroL 0.5-2.5 mg/3 mL nebulizer solution; Commonly known   as: Duo-Neb; Take 3 mL by nebulization 3 times a day.   LORazepam 2 mg/mL injection; Commonly known as: Ativan; Infuse 0.5 mL (1   mg) over 5 minutes into a venous catheter every 4 hours if needed for   anxiety.   morphine 2 mg/mL injection; Infuse 0.5 mL (1 mg) into a venous catheter   every 1 hour if needed (breakthrough).   * naloxone 0.4 mg/mL injection; Commonly known as: Narcan; Infuse 0.5 mL   (0.2 mg) into a venous catheter if needed for respiratory depression (Once   PRN patient is unarousable, and respiratory rate less than 8).   * naloxone 0.4 mg/mL injection; Commonly known as: Narcan; Infuse 0.5 mL   (0.2 mg) into a venous catheter if needed for respiratory depression (Once   PRN patient is unarousable, and respiratory rate  less than 8).   OLANZapine zydis 5 mg disintegrating tablet; Commonly known as: ZyPREXA;   Take 1 tablet (5 mg) by mouth once daily at bedtime.  * This list has 2 medication(s) that are the same as other medications   prescribed for you. Read the directions carefully, and ask your doctor or   other care provider to review them with you.       Outpatient Follow-Up  Future Appointments   Date Time Provider Department Center   12/20/2023 11:15 AM [id number]   1/10/2024  8:00 AM ROYER STRESS 1 [id number] [facility]   1/10/2024  9:15 AM ROYER CT 1 SAMCT [facility]   1/16/2024 10:15 AM Rob Phipps MD [id number] [facility]       Katherine Gay MD    TEACHING ATTENDING ATTESTATION    I saw and evaluated this patient with Resident/Fellow. I reviewed the resident/fellow's documentation and discussed the patient with the resident/fellow. I agree with the resident/fellow's medical decision making as documented in the note.     Family at bedside; patient with agonal breathing under hospice care    Corbin Whaley MD, FACP  Chief, Solid Tumor Oncology Division   Medical Oncology  Professor of Medicine and Urology  /[facility]

## 2023-12-20 ENCOUNTER — APPOINTMENT (OUTPATIENT)
Dept: OTOLARYNGOLOGY | Facility: HOSPITAL | Age: 68
End: 2023-12-20
Payer: OTHER MISCELLANEOUS

## 2024-01-16 ENCOUNTER — APPOINTMENT (OUTPATIENT)
Dept: CARDIOLOGY | Facility: CLINIC | Age: 69
End: 2024-01-16
Payer: MEDICARE
